# Patient Record
Sex: MALE | Race: WHITE | NOT HISPANIC OR LATINO | Employment: FULL TIME | URBAN - METROPOLITAN AREA
[De-identification: names, ages, dates, MRNs, and addresses within clinical notes are randomized per-mention and may not be internally consistent; named-entity substitution may affect disease eponyms.]

---

## 2017-03-13 ENCOUNTER — OFFICE VISIT (OUTPATIENT)
Dept: INTERNAL MEDICINE | Facility: MEDICAL CENTER | Age: 42
End: 2017-03-13
Payer: MEDICAID

## 2017-03-13 VITALS
BODY MASS INDEX: 29.58 KG/M2 | HEIGHT: 73 IN | OXYGEN SATURATION: 94 % | SYSTOLIC BLOOD PRESSURE: 132 MMHG | RESPIRATION RATE: 20 BRPM | WEIGHT: 223.2 LBS | DIASTOLIC BLOOD PRESSURE: 84 MMHG | TEMPERATURE: 97.3 F | HEART RATE: 114 BPM

## 2017-03-13 DIAGNOSIS — R12 HEARTBURN: ICD-10-CM

## 2017-03-13 DIAGNOSIS — R05.3 CHRONIC COUGH: ICD-10-CM

## 2017-03-13 DIAGNOSIS — E55.9 VITAMIN D DEFICIENCY: ICD-10-CM

## 2017-03-13 DIAGNOSIS — K86.1 CHRONIC PANCREATITIS, UNSPECIFIED PANCREATITIS TYPE (HCC): ICD-10-CM

## 2017-03-13 DIAGNOSIS — R51.9 CHRONIC NONINTRACTABLE HEADACHE, UNSPECIFIED HEADACHE TYPE: ICD-10-CM

## 2017-03-13 DIAGNOSIS — R61 NIGHT SWEATS: ICD-10-CM

## 2017-03-13 DIAGNOSIS — G89.29 CHRONIC NONINTRACTABLE HEADACHE, UNSPECIFIED HEADACHE TYPE: ICD-10-CM

## 2017-03-13 DIAGNOSIS — R00.2 PALPITATION: ICD-10-CM

## 2017-03-13 LAB — EKG 4674: NORMAL

## 2017-03-13 PROCEDURE — 93000 ELECTROCARDIOGRAM COMPLETE: CPT | Performed by: INTERNAL MEDICINE

## 2017-03-13 PROCEDURE — 99214 OFFICE O/P EST MOD 30 MIN: CPT | Mod: 25,GC | Performed by: INTERNAL MEDICINE

## 2017-03-13 RX ORDER — ERGOCALCIFEROL 1.25 MG/1
50000 CAPSULE ORAL
Qty: 4 CAP | Refills: 0 | Status: SHIPPED | OUTPATIENT
Start: 2017-03-13 | End: 2017-04-10

## 2017-03-13 RX ORDER — BENZONATATE 100 MG/1
100 CAPSULE ORAL 3 TIMES DAILY PRN
Qty: 30 CAP | Refills: 0 | Status: SHIPPED | OUTPATIENT
Start: 2017-03-13 | End: 2017-11-13

## 2017-03-13 RX ORDER — GUAIFENESIN 200 MG/1
200 TABLET ORAL EVERY 4 HOURS PRN
Qty: 90 TAB | Refills: 1 | Status: SHIPPED | OUTPATIENT
Start: 2017-03-13 | End: 2017-11-13

## 2017-03-13 ASSESSMENT — PATIENT HEALTH QUESTIONNAIRE - PHQ9: CLINICAL INTERPRETATION OF PHQ2 SCORE: 0

## 2017-03-13 ASSESSMENT — PAIN SCALES - GENERAL: PAINLEVEL: NO PAIN

## 2017-03-13 NOTE — MR AVS SNAPSHOT
"        Wero Thorpe   3/13/2017 3:00 PM   Office Visit   MRN: [id number]    Department:  [facility] - Internal Med   Dept Phone:  [phone]    Description:  Male : 1975   Provider:  Maria E Rodgers M.D.           Reason for Visit     URI cough times one year      Allergies as of 3/13/2017     Allergen Noted Reactions    Nkda [No Known Drug Allergy] 2007         You were diagnosed with     Vitamin D deficiency   [3851214]       Chronic cough   [600769]       Palpitation   [461860]       Night sweats   [462017]         Vital Signs     Blood Pressure Pulse Temperature Respirations Height Weight    132/84 mmHg 114 36.3 °C (97.3 °F) 20 1.854 m (6' 0.99\") 101.243 kg (223 lb 3.2 oz)    Body Mass Index Oxygen Saturation Smoking Status             29.45 kg/m2 94% Current Every Day Smoker         Basic Information     Date Of Birth Sex Race Ethnicity Preferred Language    1975 Male White Non- English      Your appointments     2017 10:15 AM   Established Patient with Maria E Rodgers M.D.   [facility] Med - Internal Medicine (--)    [address] [place] [id number] [phone]           You will be receiving a confirmation call a few days before your appointment from our automated call confirmation system.              Problem List              ICD-10-CM Priority Class Noted - Resolved    Chronic pancreatitis (CMS-[facility]) K86.1   2010 - Present    Alcoholism (CMS-[facility]) F10.20   2010 - Present    ADD (attention deficit disorder) F98.8   2010 - Present    Dyshidrotic eczema L30.1   3/13/2014 - Present    Transaminitis R74.0   2016 - Present    Abrasion of forehead S00.81XA   2016 - Present    Thrombocytopenia (CMS-[facility]) D69.6   2016 - Present    Pancytopenia (CMS-[facility]) D61.818   2016 - Present    Hypokalemia E87.6   2016 - Present    Shock liver K72.00   6/15/2016 - Present    Alcoholic pancreatitis K85.20  "  6/15/2016 - Present    Alcoholic hepatitis K70.10   6/16/2016 - Present    Anemia D64.9   6/16/2016 - Present    Essential hypertension I10   6/16/2016 - Present    Obstruction of bile duct K83.1   10/21/2016 - Present    Acute pancreatitis K85.90   10/21/2016 - Present    Other nonspecific abnormal serum enzyme levels R74.8   10/21/2016 - Present    Vitamin D deficiency E55.9   3/13/2017 - Present    Chronic cough R05   3/13/2017 - Present    Palpitation R00.2   3/13/2017 - Present      Health Maintenance        Date Due Completion Dates    IMM DTaP/Tdap/Td Vaccine (1 - Tdap) 8/29/1994 ---    IMM INFLUENZA (1) 9/1/2016 ---            Current Immunizations     No immunizations on file.      Below and/or attached are the medications your provider expects you to take. Review all of your home medications and newly ordered medications with your provider and/or pharmacist. Follow medication instructions as directed by your provider and/or pharmacist. Please keep your medication list with you and share with your provider. Update the information when medications are discontinued, doses are changed, or new medications (including over-the-counter products) are added; and carry medication information at all times in the event of emergency situations     Allergies:  NKDA - (reactions not documented)               Medications  Valid as of: March 13, 2017 -  5:02 PM    Generic Name Brand Name Tablet Size Instructions for use    Albuterol Sulfate (Aero Soln) albuterol 108 (90 BASE) MCG/ACT Inhale 2 Puffs by mouth every 2 hours as needed for Shortness of Breath (or cough).        Albuterol Sulfate (Aero Soln) albuterol 108 (90 BASE) MCG/ACT Inhale 2 Puffs by mouth every 6 hours as needed for Shortness of Breath.        Benzonatate (Cap) TESSALON 200 MG Take 1 Cap by mouth 3 times a day as needed for Cough.        Benzonatate (Cap) TESSALON 100 MG Take 1 Cap by mouth 3 times a day as needed for Cough.        BuPROPion HCl (Tab)  WELLBUTRIN 100 MG Take 1 Tab by mouth every day.        Ergocalciferol (Cap) DRISDOL 33286 UNITS Take 1 Cap by mouth every 7 days for 28 days.        GuaiFENesin (Tab) guaifenesin 200 MG Take 1 Tab by mouth every four hours as needed (cough).        Ibuprofen (Tab) MOTRIN 800 MG Take 800 mg by mouth every 8 hours as needed for Mild Pain.        Multiple Vitamins-Minerals (Liquid) MULTIVITAMIN  Take 1 Drop by mouth every day.        Omega-3 Fatty Acids   Take 1 Cap by mouth every day. One tsp liquid fish oil daily        Omeprazole (CAPSULE DELAYED RELEASE) PRILOSEC 20 MG Take 20 mg by mouth every day.        Pancrelipase (Lip-Prot-Amyl) (Cap DR Particles) Pancrelipase (Lip-Prot-Amyl) 41694 UNITS Take 2 Tabs by mouth 3 times a day before meals. Takes two and before snacks        TraZODone HCl (Tab) DESYREL 100 MG Take 100 mg by mouth every evening.        .                 Medicines prescribed today were sent to:     [facility] #[id number] [address] [address]    Phone: [phone] Fax: [phone]    Open 24 Hours?: No      Medication refill instructions:       If your prescription bottle indicates you have medication refills left, it is not necessary to call your provider’s office. Please contact your pharmacy and they will refill your medication.    If your prescription bottle indicates you do not have any refills left, you may request refills at any time through one of the following ways: The online eriQoo system (except Urgent Care), by calling your provider’s office, or by asking your pharmacy to contact your provider’s office with a refill request. Medication refills are processed only during regular business hours and may not be available until the next business day. Your provider may request additional information or to have a follow-up visit with you prior to refilling your medication.   *Please Note: Medication refills are assigned a new Rx  number when refilled electronically. Your pharmacy may indicate that no refills were authorized even though a new prescription for the same medication is available at the pharmacy. Please request the medicine by name with the pharmacy before contacting your provider for a refill.        Your To Do List     Future Labs/Procedures Complete By Expires    CBC WITH DIFFERENTIAL  As directed 3/13/2018    COMP METABOLIC PANEL  As directed 3/13/2018    CRP QUANTITIVE (NON-CARDIAC)  As directed 3/13/2018    DX-CHEST-2 VIEWS  As directed 3/13/2018    ECHOCARDIOGRAM COMP W/O CONT  As directed 3/13/2018    Scheduling Instructions:    palpitation    HIV-2 ABS  PANEL [id number]  As directed 3/13/2018    HOLTER MONITOR STUDY  As directed 3/13/2018    Quantiferon Gold TB (PPD)  As directed 3/13/2018    TSH WITH REFLEX TO FT4  As directed 3/13/2018    URINALYSIS  As directed 3/13/2018    WESTERGREN SED RATE  As directed 3/13/2018      Instructions    Request records from GI consultant  Keep journal of night sweats  Lab work, CXR before next visit              LeK[employer]k Access Code: [email or web address]  Expires: 3/24/2017  1:31 PM    LeKiosk  A secure, online tool to manage your health information     [employer]’s LeKiosk® is a secure, online tool that connects you to your personalized health information from the privacy of your home -- day or night - making it very easy for you to manage your healthcare. Once the activation process is completed, you can even access your medical information using the LeKiosk beulah, which is available for free in the Apple Beulah store or Google Play store.     LeKiosk provides the following levels of access (as shown below):   My Chart Features   [facility]  Specialists [facility]  Urgent  Care [facility]  Primary Care  Doctor   Email your healthcare team securely and privately 24/7 X X X    Manage appointments: schedule your next appointment; view details of past/upcoming appointments X       Request prescription refills. X      View recent personal medical records, including lab and immunizations X X X X   View health record, including health history, allergies, medications X X X X   Read reports about your outpatient visits, procedures, consult and ER notes X X X X   See your discharge summary, which is a recap of your hospital and/or ER visit that includes your diagnosis, lab results, and care plan. X X       How to register for [employer]:  1. Go to  [email or web address].  2. Click on the Sign Up Now box, which takes you to the New Member Sign Up page. You will need to provide the following information:  a. Enter your [employer]te Access Code exactly as it appears at the top of this page. (You will not need to use this code after you’ve completed the sign-up process. If you do not sign up before the expiration date, you must request a new code.)   b. Enter your date of birth.   c. Enter your home email address.   d. Click Submit, and follow the next screen’s instructions.  3. Create a LumaCyte ID. This will be your [employer]uma[employer]te login ID and cannot be changed, so think of one that is secure and easy to remember.  4. Create a [employer]te password. You can change your password at any time.  5. Enter your Password Reset Question and Answer. This can be used at a later time if you forget your password.   6. Enter your e-mail address. This allows you to receive e-mail notifications when new information is available in [employer]uma[employer]te.  7. Click Sign Up. You can now view your health information.    For assistance activating your [employer] account, call [phone]

## 2017-03-13 NOTE — PATIENT INSTRUCTIONS
Request records from GI consultant  Keep journal of night sweats  Lab work, CXR before next visit  Pending echo and holter for palpitations

## 2017-03-14 ENCOUNTER — TELEPHONE (OUTPATIENT)
Dept: INTERNAL MEDICINE | Facility: MEDICAL CENTER | Age: 42
End: 2017-03-14

## 2017-03-14 DIAGNOSIS — R61 NIGHT SWEATS: ICD-10-CM
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
## 2017-03-27 RX ORDER — BENZONATATE 100 MG/1
CAPSULE ORAL
Qty: 30 CAP | Refills: 0 | OUTPATIENT
Start: 2017-03-27

## 2017-03-27 NOTE — TELEPHONE ENCOUNTER
Last seen: 3/13/17 by Dr. Rodgers  Next appt: 4/17/17 with Dr. Rodgers    Was the patient seen in the last year in this department? Yes   Does patient have an active prescription for medications requested? No   Received Request Via: Pharmacy

## 2017-04-21 ENCOUNTER — HOSPITAL ENCOUNTER (OUTPATIENT)
Dept: CARDIOLOGY | Facility: MEDICAL CENTER | Age: 42
End: 2017-04-21
Attending: INTERNAL MEDICINE
Payer: MEDICAID

## 2017-04-21 DIAGNOSIS — R00.2 PALPITATION: ICD-10-CM

## 2017-04-21 LAB
LV EJECT FRACT  99904: 65
LV EJECT FRACT MOD 2C 99903: 56.18
LV EJECT FRACT MOD 4C 99902: 50.03
LV EJECT FRACT MOD BP 99901: 52.66

## 2017-04-21 PROCEDURE — 93306 TTE W/DOPPLER COMPLETE: CPT | Mod: 26 | Performed by: INTERNAL MEDICINE

## 2017-04-21 PROCEDURE — 93306 TTE W/DOPPLER COMPLETE: CPT

## 2017-05-30 RX ORDER — TRAZODONE HYDROCHLORIDE 100 MG/1
100 TABLET ORAL
Qty: 30 TAB | OUTPATIENT
Start: 2017-05-30

## 2017-11-09 ENCOUNTER — HOSPITAL ENCOUNTER (INPATIENT)
Facility: MEDICAL CENTER | Age: 42
LOS: 1 days | DRG: 493 | End: 2017-11-10
Attending: EMERGENCY MEDICINE | Admitting: ORTHOPAEDIC SURGERY
Payer: COMMERCIAL

## 2017-11-09 DIAGNOSIS — S82.842A ANKLE FRACTURE, BIMALLEOLAR, CLOSED, LEFT, INITIAL ENCOUNTER: ICD-10-CM

## 2017-11-09 DIAGNOSIS — S93.06XA CLOSED DISLOCATION OF ANKLE, INITIAL ENCOUNTER: ICD-10-CM

## 2017-11-09 DIAGNOSIS — S82.842A BIMALLEOLAR ANKLE FRACTURE, LEFT, CLOSED, INITIAL ENCOUNTER: ICD-10-CM

## 2017-11-09 PROCEDURE — 99285 EMERGENCY DEPT VISIT HI MDM: CPT

## 2017-11-09 ASSESSMENT — PAIN SCALES - GENERAL: PAINLEVEL_OUTOF10: 10

## 2017-11-10 ENCOUNTER — APPOINTMENT (OUTPATIENT)
Dept: RADIOLOGY | Facility: MEDICAL CENTER | Age: 42
DRG: 493 | End: 2017-11-10
Attending: EMERGENCY MEDICINE
Payer: COMMERCIAL

## 2017-11-10 ENCOUNTER — APPOINTMENT (OUTPATIENT)
Dept: RADIOLOGY | Facility: MEDICAL CENTER | Age: 42
DRG: 493 | End: 2017-11-10
Attending: ORTHOPAEDIC SURGERY
Payer: COMMERCIAL

## 2017-11-10 VITALS
HEART RATE: 110 BPM | OXYGEN SATURATION: 93 % | RESPIRATION RATE: 16 BRPM | WEIGHT: 190 LBS | DIASTOLIC BLOOD PRESSURE: 69 MMHG | HEIGHT: 73 IN | SYSTOLIC BLOOD PRESSURE: 120 MMHG | BODY MASS INDEX: 25.18 KG/M2 | TEMPERATURE: 98.7 F

## 2017-11-10 PROBLEM — S93.05XA ANKLE DISLOCATION, LEFT, INITIAL ENCOUNTER: Status: ACTIVE | Noted: 2017-11-10

## 2017-11-10 PROBLEM — S82.842A ANKLE FRACTURE, BIMALLEOLAR, CLOSED, LEFT, INITIAL ENCOUNTER: Status: ACTIVE | Noted: 2017-11-10

## 2017-11-10 LAB
ANION GAP SERPL CALC-SCNC: 10 MMOL/L (ref 0–11.9)
BUN SERPL-MCNC: 17 MG/DL (ref 8–22)
CALCIUM SERPL-MCNC: 9.1 MG/DL (ref 8.5–10.5)
CHLORIDE SERPL-SCNC: 111 MMOL/L (ref 96–112)
CO2 SERPL-SCNC: 22 MMOL/L (ref 20–33)
CREAT SERPL-MCNC: 0.99 MG/DL (ref 0.5–1.4)
ERYTHROCYTE [DISTWIDTH] IN BLOOD BY AUTOMATED COUNT: 38.8 FL (ref 35.9–50)
GFR SERPL CREATININE-BSD FRML MDRD: >60 ML/MIN/1.73 M 2
GLUCOSE SERPL-MCNC: 105 MG/DL (ref 65–99)
HCT VFR BLD AUTO: 48.2 % (ref 42–52)
HGB BLD-MCNC: 16.7 G/DL (ref 14–18)
MCH RBC QN AUTO: 29.7 PG (ref 27–33)
MCHC RBC AUTO-ENTMCNC: 34.6 G/DL (ref 33.7–35.3)
MCV RBC AUTO: 85.8 FL (ref 81.4–97.8)
PLATELET # BLD AUTO: 301 K/UL (ref 164–446)
PMV BLD AUTO: 9.4 FL (ref 9–12.9)
POTASSIUM SERPL-SCNC: 3.9 MMOL/L (ref 3.6–5.5)
RBC # BLD AUTO: 5.62 M/UL (ref 4.7–6.1)
SODIUM SERPL-SCNC: 143 MMOL/L (ref 135–145)
WBC # BLD AUTO: 8.1 K/UL (ref 4.8–10.8)

## 2017-11-10 PROCEDURE — A9270 NON-COVERED ITEM OR SERVICE: HCPCS | Performed by: PHYSICIAN ASSISTANT

## 2017-11-10 PROCEDURE — 96376 TX/PRO/DX INJ SAME DRUG ADON: CPT

## 2017-11-10 PROCEDURE — A9270 NON-COVERED ITEM OR SERVICE: HCPCS

## 2017-11-10 PROCEDURE — 73700 CT LOWER EXTREMITY W/O DYE: CPT | Mod: LT

## 2017-11-10 PROCEDURE — 73600 X-RAY EXAM OF ANKLE: CPT | Mod: LT

## 2017-11-10 PROCEDURE — 160002 HCHG RECOVERY MINUTES (STAT): Performed by: ORTHOPAEDIC SURGERY

## 2017-11-10 PROCEDURE — A9270 NON-COVERED ITEM OR SERVICE: HCPCS | Performed by: ORTHOPAEDIC SURGERY

## 2017-11-10 PROCEDURE — 73590 X-RAY EXAM OF LOWER LEG: CPT | Mod: LT

## 2017-11-10 PROCEDURE — 700105 HCHG RX REV CODE 258: Performed by: EMERGENCY MEDICINE

## 2017-11-10 PROCEDURE — 0QSH04Z REPOSITION LEFT TIBIA WITH INTERNAL FIXATION DEVICE, OPEN APPROACH: ICD-10-PCS | Performed by: ORTHOPAEDIC SURGERY

## 2017-11-10 PROCEDURE — 770006 HCHG ROOM/CARE - MED/SURG/GYN SEMI*

## 2017-11-10 PROCEDURE — C1713 ANCHOR/SCREW BN/BN,TIS/BN: HCPCS | Performed by: ORTHOPAEDIC SURGERY

## 2017-11-10 PROCEDURE — 27840 TREAT ANKLE DISLOCATION: CPT

## 2017-11-10 PROCEDURE — G8987 SELF CARE CURRENT STATUS: HCPCS | Mod: CI

## 2017-11-10 PROCEDURE — 96374 THER/PROPH/DIAG INJ IV PUSH: CPT

## 2017-11-10 PROCEDURE — G8989 SELF CARE D/C STATUS: HCPCS | Mod: CI

## 2017-11-10 PROCEDURE — 501445 HCHG STAPLER, SKIN DISP: Performed by: ORTHOPAEDIC SURGERY

## 2017-11-10 PROCEDURE — 160009 HCHG ANES TIME/MIN: Performed by: ORTHOPAEDIC SURGERY

## 2017-11-10 PROCEDURE — G8979 MOBILITY GOAL STATUS: HCPCS | Mod: CI

## 2017-11-10 PROCEDURE — 73610 X-RAY EXAM OF ANKLE: CPT | Mod: LT

## 2017-11-10 PROCEDURE — 700111 HCHG RX REV CODE 636 W/ 250 OVERRIDE (IP)

## 2017-11-10 PROCEDURE — 160029 HCHG SURGERY MINUTES - 1ST 30 MINS LEVEL 4: Performed by: ORTHOPAEDIC SURGERY

## 2017-11-10 PROCEDURE — 700112 HCHG RX REV CODE 229: Performed by: ORTHOPAEDIC SURGERY

## 2017-11-10 PROCEDURE — 97165 OT EVAL LOW COMPLEX 30 MIN: CPT

## 2017-11-10 PROCEDURE — 93005 ELECTROCARDIOGRAM TRACING: CPT | Performed by: EMERGENCY MEDICINE

## 2017-11-10 PROCEDURE — 99152 MOD SED SAME PHYS/QHP 5/>YRS: CPT

## 2017-11-10 PROCEDURE — 160036 HCHG PACU - EA ADDL 30 MINS PHASE I: Performed by: ORTHOPAEDIC SURGERY

## 2017-11-10 PROCEDURE — 0SSGXZZ REPOSITION LEFT ANKLE JOINT, EXTERNAL APPROACH: ICD-10-PCS | Performed by: EMERGENCY MEDICINE

## 2017-11-10 PROCEDURE — 700102 HCHG RX REV CODE 250 W/ 637 OVERRIDE(OP): Performed by: ORTHOPAEDIC SURGERY

## 2017-11-10 PROCEDURE — 96375 TX/PRO/DX INJ NEW DRUG ADDON: CPT

## 2017-11-10 PROCEDURE — 97162 PT EVAL MOD COMPLEX 30 MIN: CPT

## 2017-11-10 PROCEDURE — 700102 HCHG RX REV CODE 250 W/ 637 OVERRIDE(OP): Performed by: PHYSICIAN ASSISTANT

## 2017-11-10 PROCEDURE — 500881 HCHG PACK, EXTREMITY: Performed by: ORTHOPAEDIC SURGERY

## 2017-11-10 PROCEDURE — 304562 HCHG STAT O2 MASK/CANNULA

## 2017-11-10 PROCEDURE — 160041 HCHG SURGERY MINUTES - EA ADDL 1 MIN LEVEL 4: Performed by: ORTHOPAEDIC SURGERY

## 2017-11-10 PROCEDURE — 700111 HCHG RX REV CODE 636 W/ 250 OVERRIDE (IP): Performed by: EMERGENCY MEDICINE

## 2017-11-10 PROCEDURE — G8978 MOBILITY CURRENT STATUS: HCPCS | Mod: CJ

## 2017-11-10 PROCEDURE — 160048 HCHG OR STATISTICAL LEVEL 1-5: Performed by: ORTHOPAEDIC SURGERY

## 2017-11-10 PROCEDURE — 700101 HCHG RX REV CODE 250

## 2017-11-10 PROCEDURE — 501838 HCHG SUTURE GENERAL: Performed by: ORTHOPAEDIC SURGERY

## 2017-11-10 PROCEDURE — 502000 HCHG MISC OR IMPLANTS RC 0278: Performed by: ORTHOPAEDIC SURGERY

## 2017-11-10 PROCEDURE — 160035 HCHG PACU - 1ST 60 MINS PHASE I: Performed by: ORTHOPAEDIC SURGERY

## 2017-11-10 PROCEDURE — 0QHK04Z INSERTION OF INTERNAL FIXATION DEVICE INTO LEFT FIBULA, OPEN APPROACH: ICD-10-PCS | Performed by: ORTHOPAEDIC SURGERY

## 2017-11-10 PROCEDURE — G8988 SELF CARE GOAL STATUS: HCPCS | Mod: CI

## 2017-11-10 PROCEDURE — 85027 COMPLETE CBC AUTOMATED: CPT

## 2017-11-10 PROCEDURE — 80048 BASIC METABOLIC PNL TOTAL CA: CPT

## 2017-11-10 PROCEDURE — 700102 HCHG RX REV CODE 250 W/ 637 OVERRIDE(OP)

## 2017-11-10 DEVICE — SCREW 2.5 MM LOCKING TI X 14MM LONG (6TX8=48): Type: IMPLANTABLE DEVICE | Status: FUNCTIONAL

## 2017-11-10 DEVICE — SCREW 3.5 MM NON-LOCKING TI X 12MM LONG (6TX8+2TX5=58): Type: IMPLANTABLE DEVICE | Status: FUNCTIONAL

## 2017-11-10 DEVICE — SCREW 3.5 MM NON-LOCKING TI X 14MM LONG (6TX8+2TX5=58): Type: IMPLANTABLE DEVICE | Status: FUNCTIONAL

## 2017-11-10 DEVICE — SCREW CANN 4.0X50 LONG OIC - (3TX2=6): Type: IMPLANTABLE DEVICE | Status: FUNCTIONAL

## 2017-11-10 DEVICE — PLATE DISTAL FIBULA 7H (2TX2+2TX1=6): Type: IMPLANTABLE DEVICE | Status: FUNCTIONAL

## 2017-11-10 DEVICE — IMPLANTABLE DEVICE: Type: IMPLANTABLE DEVICE | Status: FUNCTIONAL

## 2017-11-10 DEVICE — SCREW 3.5 MM NON-LOCKING TI X 55MM LONG (6TX8=48): Type: IMPLANTABLE DEVICE | Status: FUNCTIONAL

## 2017-11-10 RX ORDER — ONDANSETRON 2 MG/ML
4 INJECTION INTRAMUSCULAR; INTRAVENOUS
Status: DISCONTINUED | OUTPATIENT
Start: 2017-11-10 | End: 2017-11-10 | Stop reason: HOSPADM

## 2017-11-10 RX ORDER — MORPHINE SULFATE 10 MG/ML
5 INJECTION, SOLUTION INTRAMUSCULAR; INTRAVENOUS
Status: COMPLETED | OUTPATIENT
Start: 2017-11-10 | End: 2017-11-10

## 2017-11-10 RX ORDER — PSEUDOEPHEDRINE HCL 30 MG
100 TABLET ORAL 2 TIMES DAILY
Qty: 60 CAP | Refills: 0 | Status: SHIPPED | OUTPATIENT
Start: 2017-11-10 | End: 2018-05-09

## 2017-11-10 RX ORDER — DIPHENHYDRAMINE HYDROCHLORIDE 50 MG/ML
25 INJECTION INTRAMUSCULAR; INTRAVENOUS EVERY 6 HOURS PRN
Status: DISCONTINUED | OUTPATIENT
Start: 2017-11-10 | End: 2017-11-10 | Stop reason: HOSPADM

## 2017-11-10 RX ORDER — DEXAMETHASONE SODIUM PHOSPHATE 4 MG/ML
4 INJECTION, SOLUTION INTRA-ARTICULAR; INTRALESIONAL; INTRAMUSCULAR; INTRAVENOUS; SOFT TISSUE
Status: DISCONTINUED | OUTPATIENT
Start: 2017-11-10 | End: 2017-11-10 | Stop reason: HOSPADM

## 2017-11-10 RX ORDER — OXYCODONE AND ACETAMINOPHEN 10; 325 MG/1; MG/1
1-2 TABLET ORAL EVERY 4 HOURS PRN
Qty: 60 TAB | Refills: 0 | Status: SHIPPED | OUTPATIENT
Start: 2017-11-10 | End: 2018-05-09

## 2017-11-10 RX ORDER — ACETAMINOPHEN 500 MG
1000 TABLET ORAL EVERY 6 HOURS PRN
Status: DISCONTINUED | OUTPATIENT
Start: 2017-11-10 | End: 2017-11-10 | Stop reason: HOSPADM

## 2017-11-10 RX ORDER — BUPIVACAINE HYDROCHLORIDE 2.5 MG/ML
INJECTION, SOLUTION EPIDURAL; INFILTRATION; INTRACAUDAL
Status: DISCONTINUED | OUTPATIENT
Start: 2017-11-10 | End: 2017-11-10 | Stop reason: HOSPADM

## 2017-11-10 RX ORDER — ENEMA 19; 7 G/133ML; G/133ML
1 ENEMA RECTAL
Status: DISCONTINUED | OUTPATIENT
Start: 2017-11-10 | End: 2017-11-10 | Stop reason: HOSPADM

## 2017-11-10 RX ORDER — ACETAMINOPHEN 325 MG/1
1000 TABLET ORAL EVERY 6 HOURS PRN
Status: DISCONTINUED | OUTPATIENT
Start: 2017-11-10 | End: 2017-11-10

## 2017-11-10 RX ORDER — ONDANSETRON 2 MG/ML
4 INJECTION INTRAMUSCULAR; INTRAVENOUS ONCE
Status: COMPLETED | OUTPATIENT
Start: 2017-11-10 | End: 2017-11-10

## 2017-11-10 RX ORDER — POLYETHYLENE GLYCOL 3350 17 G/17G
1 POWDER, FOR SOLUTION ORAL 2 TIMES DAILY PRN
Status: DISCONTINUED | OUTPATIENT
Start: 2017-11-10 | End: 2017-11-10 | Stop reason: HOSPADM

## 2017-11-10 RX ORDER — AMOXICILLIN 250 MG
1 CAPSULE ORAL NIGHTLY
Status: DISCONTINUED | OUTPATIENT
Start: 2017-11-10 | End: 2017-11-10 | Stop reason: HOSPADM

## 2017-11-10 RX ORDER — ASPIRIN 325 MG
325 TABLET ORAL 2 TIMES DAILY
Qty: 60 TAB | Refills: 3 | Status: SHIPPED | OUTPATIENT
Start: 2017-11-10 | End: 2018-05-09

## 2017-11-10 RX ORDER — ACETAMINOPHEN 500 MG
1000 TABLET ORAL EVERY 6 HOURS
Status: DISCONTINUED | OUTPATIENT
Start: 2017-11-10 | End: 2017-11-10 | Stop reason: HOSPADM

## 2017-11-10 RX ORDER — DOCUSATE SODIUM 100 MG/1
100 CAPSULE, LIQUID FILLED ORAL 2 TIMES DAILY
Status: DISCONTINUED | OUTPATIENT
Start: 2017-11-10 | End: 2017-11-10 | Stop reason: HOSPADM

## 2017-11-10 RX ORDER — OXYCODONE HCL 5 MG/5 ML
SOLUTION, ORAL ORAL
Status: COMPLETED
Start: 2017-11-10 | End: 2017-11-10

## 2017-11-10 RX ORDER — SODIUM CHLORIDE 9 MG/ML
1000 INJECTION, SOLUTION INTRAVENOUS ONCE
Status: COMPLETED | OUTPATIENT
Start: 2017-11-10 | End: 2017-11-10

## 2017-11-10 RX ORDER — ONDANSETRON 2 MG/ML
4 INJECTION INTRAMUSCULAR; INTRAVENOUS EVERY 4 HOURS PRN
Status: DISCONTINUED | OUTPATIENT
Start: 2017-11-10 | End: 2017-11-10 | Stop reason: HOSPADM

## 2017-11-10 RX ORDER — BISACODYL 10 MG
10 SUPPOSITORY, RECTAL RECTAL
Status: DISCONTINUED | OUTPATIENT
Start: 2017-11-10 | End: 2017-11-10 | Stop reason: HOSPADM

## 2017-11-10 RX ORDER — AMOXICILLIN 250 MG
1 CAPSULE ORAL
Status: DISCONTINUED | OUTPATIENT
Start: 2017-11-10 | End: 2017-11-10 | Stop reason: HOSPADM

## 2017-11-10 RX ORDER — HALOPERIDOL 5 MG/ML
1 INJECTION INTRAMUSCULAR EVERY 6 HOURS PRN
Status: DISCONTINUED | OUTPATIENT
Start: 2017-11-10 | End: 2017-11-10 | Stop reason: HOSPADM

## 2017-11-10 RX ORDER — SCOLOPAMINE TRANSDERMAL SYSTEM 1 MG/1
1 PATCH, EXTENDED RELEASE TRANSDERMAL
Status: DISCONTINUED | OUTPATIENT
Start: 2017-11-10 | End: 2017-11-10 | Stop reason: HOSPADM

## 2017-11-10 RX ORDER — OXYCODONE HYDROCHLORIDE 5 MG/1
5 TABLET ORAL
Status: DISCONTINUED | OUTPATIENT
Start: 2017-11-10 | End: 2017-11-10 | Stop reason: HOSPADM

## 2017-11-10 RX ORDER — OXYCODONE HYDROCHLORIDE 5 MG/1
5 TABLET ORAL
Qty: 30 TAB | Refills: 0 | Status: SHIPPED | OUTPATIENT
Start: 2017-11-10 | End: 2017-11-13

## 2017-11-10 RX ORDER — ONDANSETRON 2 MG/ML
INJECTION INTRAMUSCULAR; INTRAVENOUS
Status: COMPLETED
Start: 2017-11-10 | End: 2017-11-10

## 2017-11-10 RX ADMIN — PROPOFOL 75 MG: 10 INJECTION, EMULSION INTRAVENOUS at 02:06

## 2017-11-10 RX ADMIN — MORPHINE SULFATE 5 MG: 10 INJECTION INTRAVENOUS at 04:03

## 2017-11-10 RX ADMIN — HYDROMORPHONE HYDROCHLORIDE 1 MG: 1 INJECTION, SOLUTION INTRAMUSCULAR; INTRAVENOUS; SUBCUTANEOUS at 06:52

## 2017-11-10 RX ADMIN — OXYCODONE HYDROCHLORIDE 5 MG: 5 TABLET ORAL at 13:48

## 2017-11-10 RX ADMIN — FENTANYL CITRATE 50 MCG: 50 INJECTION, SOLUTION INTRAMUSCULAR; INTRAVENOUS at 12:00

## 2017-11-10 RX ADMIN — ONDANSETRON 4 MG: 2 INJECTION INTRAMUSCULAR; INTRAVENOUS at 12:00

## 2017-11-10 RX ADMIN — SODIUM CHLORIDE 1000 ML: 9 INJECTION, SOLUTION INTRAVENOUS at 04:03

## 2017-11-10 RX ADMIN — ACETAMINOPHEN 1000 MG: 500 TABLET ORAL at 13:48

## 2017-11-10 RX ADMIN — NICOTINE POLACRILEX 2 MG: 2 GUM, CHEWING BUCCAL at 13:53

## 2017-11-10 RX ADMIN — HYDROMORPHONE HYDROCHLORIDE 1 MG: 1 INJECTION, SOLUTION INTRAMUSCULAR; INTRAVENOUS; SUBCUTANEOUS at 00:31

## 2017-11-10 RX ADMIN — FENTANYL CITRATE 50 MCG: 50 INJECTION, SOLUTION INTRAMUSCULAR; INTRAVENOUS at 12:30

## 2017-11-10 RX ADMIN — ONDANSETRON 4 MG: 2 INJECTION INTRAMUSCULAR; INTRAVENOUS at 00:23

## 2017-11-10 RX ADMIN — HYDROMORPHONE HYDROCHLORIDE 1 MG: 1 INJECTION, SOLUTION INTRAMUSCULAR; INTRAVENOUS; SUBCUTANEOUS at 00:23

## 2017-11-10 RX ADMIN — DOCUSATE SODIUM 100 MG: 100 CAPSULE ORAL at 13:48

## 2017-11-10 RX ADMIN — OXYCODONE HYDROCHLORIDE 10 MG: 5 SOLUTION ORAL at 12:00

## 2017-11-10 ASSESSMENT — PAIN SCALES - GENERAL
PAINLEVEL_OUTOF10: 10
PAINLEVEL_OUTOF10: 9
PAINLEVEL_OUTOF10: 6
PAINLEVEL_OUTOF10: 8
PAINLEVEL_OUTOF10: 7
PAINLEVEL_OUTOF10: 9
PAINLEVEL_OUTOF10: 7
PAINLEVEL_OUTOF10: 7
PAINLEVEL_OUTOF10: 8
PAINLEVEL_OUTOF10: 7
PAINLEVEL_OUTOF10: 7
PAINLEVEL_OUTOF10: 3
PAINLEVEL_OUTOF10: 10
PAINLEVEL_OUTOF10: 3
PAINLEVEL_OUTOF10: 7

## 2017-11-10 ASSESSMENT — LIFESTYLE VARIABLES
EVER_SMOKED: YES
CONSUMPTION TOTAL: POSITIVE
PACK_YEARS: 12
HAVE YOU EVER FELT YOU SHOULD CUT DOWN ON YOUR DRINKING: NO
HOW MANY TIMES IN THE PAST YEAR HAVE YOU HAD 5 OR MORE DRINKS IN A DAY: 1
EVER HAD A DRINK FIRST THING IN THE MORNING TO STEADY YOUR NERVES TO GET RID OF A HANGOVER: NO
ALCOHOL_USE: YES
TOTAL SCORE: 0
EVER FELT BAD OR GUILTY ABOUT YOUR DRINKING: NO
TOTAL SCORE: 0
HAVE PEOPLE ANNOYED YOU BY CRITICIZING YOUR DRINKING: NO
ON A TYPICAL DAY WHEN YOU DRINK ALCOHOL HOW MANY DRINKS DO YOU HAVE: 1
TOTAL SCORE: 0
AVERAGE NUMBER OF DAYS PER WEEK YOU HAVE A DRINK CONTAINING ALCOHOL: 1

## 2017-11-10 ASSESSMENT — COGNITIVE AND FUNCTIONAL STATUS - GENERAL
DAILY ACTIVITIY SCORE: 22
DRESSING REGULAR LOWER BODY CLOTHING: A LITTLE
MOVING FROM LYING ON BACK TO SITTING ON SIDE OF FLAT BED: UNABLE
MOBILITY SCORE: 18
HELP NEEDED FOR BATHING: A LITTLE
STANDING UP FROM CHAIR USING ARMS: A LITTLE
CLIMB 3 TO 5 STEPS WITH RAILING: A LITTLE
SUGGESTED CMS G CODE MODIFIER DAILY ACTIVITY: CJ
WALKING IN HOSPITAL ROOM: A LITTLE
SUGGESTED CMS G CODE MODIFIER MOBILITY: CK

## 2017-11-10 ASSESSMENT — GAIT ASSESSMENTS
DISTANCE (FEET): 200
GAIT LEVEL OF ASSIST: STAND BY ASSIST
ASSISTIVE DEVICE: CRUTCHES

## 2017-11-10 ASSESSMENT — ACTIVITIES OF DAILY LIVING (ADL): TOILETING: INDEPENDENT

## 2017-11-10 NOTE — ED PROVIDER NOTES
ED Provider Note    Scribed for Red Herrera M.D. by Seth Feliciano. 11/10/2017, 12:00 AM.    Primary care provider: Maria E Rodgers M.D.  Means of arrival: Wheel Chair  History obtained from: Patient  History limited by: None    CHIEF COMPLAINT  Chief Complaint   Patient presents with   • T-5000 Ankle Injury     left ankle deformity, fell down 2 steps     HPI  Wero Thorpe is a 42 y.o. male who presents to the Emergency Department complaining of a T-5000 left ankle deformity onset 45 minutes prior to being seen. The patient fell down 2 flights of stairs and reports twisting his ankle. Denies LOC, head trauma, neck pain, pain elsewhere. His last meal was around 4 hours prior. Denies any allergies to medications.    REVIEW OF SYSTEMS  See HPI,  Negative for LOC, head trauma, neck pain, pain elsewhere. Remainder of ROS negative.    C.    PAST MEDICAL HISTORY   has a past medical history of Asthma; Bowel habit changes; Bronchitis (8/2016); Chronic pancreatitis (CMS-HCC); Cough (9/23/16); Fall (10/8/2016); Heart burn; Hypertension; Lumbar arthropathy; Pain (9/23/16); Psychiatric problem; Renal disorder (6/2016); and Urinary bladder disorder.    SURGICAL HISTORY   has a past surgical history that includes skull fracture depressed elevation (1995); cholecystectomy (2007); tonsillectomy and adenoidectomy (1989); gastroscopy-endo (N/A, 10/21/2016); egd w/endoscopic ultrasound (N/A, 10/21/2016); and ercp-diagnostic (N/A, 10/21/2016).    SOCIAL HISTORY  Social History   Substance Use Topics   • Smoking status: Current Every Day Smoker     Packs/day: 0.50     Years: 20.00     Types: Cigarettes     Last attempt to quit: 4/15/2011   • Smokeless tobacco: Never Used   • Alcohol use No      Comment: prior hx of alcoholism      History   Drug Use No     Comment: denies     FAMILY HISTORY  Family History   Problem Relation Age of Onset   • Cancer Mother      breast   • Cancer Brother      head, neck and lung  "  • Cancer Maternal Aunt      breast   • Hypertension Father      CURRENT MEDICATIONS  Reviewed.  See Encounter Summary.     ALLERGIES  Allergies   Allergen Reactions   • Nkda [No Known Drug Allergy]      PHYSICAL EXAM  VITAL SIGNS: /80   Pulse (!) 137   Temp 36.4 °C (97.6 °F)   Resp 20   Ht 1.854 m (6' 1\")   Wt 91.2 kg (201 lb)   SpO2 96%   BMI 26.52 kg/m²   Constitutional: Awake, alert in mild apparent distress.  HENT: Normocephalic, Bilateral external ears normal. Nose normal.   Eyes: Conjunctiva normal, non-icteric, EOMI.    Thorax & Lungs: Easy unlabored respirations, Clear to ascultation bilaterally.  Cardiovascular:Tachycardic, Regular rhythm, No murmurs, rubs or gallops.  Abdomen:  Soft, nontender, no masses    Skin: Visualized skin is  Dry, No erythema, No rash.   Extremities: Grossly deformed left ankle. The foot is everted with medial angulation of the ankle. Dorsalis pedis 2+. Left knee, RLE and bilateral upper extremities are unremarkable.  Neurologic: Alert, Grossly non-focal.   Psychiatric: Normal affect, Normal mood    DIAGNOSTIC STUDIES / PROCEDURES   Conscious Sedation Procedure Note    Indication: Ankle dislocation    Consent: I have discussed with the patient and/or the patient representative the indication, alternatives, and the possible risks and/or complications of the planned procedure and the anesthesia methods. The patient and/or patient representative appear to understand and agree to proceed.    Physician Involvement: The attending physician was present and supervising this procedure.    Pre-Sedation Documentation and Exam: I have personally completed a history, physical exam & review of systems for this patient (see notes).    Airway Assessment: Normal, dentition not prohibitive    Prior History of Anesthesia Complications: None    ASA Classification: Class 2 - A normal healthy patient with mild systemic disease    Sedation/ Anesthesia Plan: Intravenous " sedation    Medications Used: Propofol intravenously    Monitoring and Safety: The patient was placed on a cardiac monitor and vital signs, pulse oximetry and level of consciousness were continuously evaluated throughout the procedure. The patient was closely monitored until recovery from the medications was complete and the patient had returned to baseline status. Respiratory therapy was on standby at all times during the procedure.    (The following sections must be completed)  Post-Sedation Vital Signs: Vital signs were reviewed and were stable after the procedure (see flow sheet for vitals)            Post-Sedation Exam: Lungs: Clear to auscultation bilaterally           Complications: None    Joint Reduction Procedure Note    Indication: Joint dislocation    Consent: The patient provided verbal consent for this procedure.    Procedure: The pre-reduction exam showed distal perfusion & neurologic function to be normal. The patient was placed in the appropriate position. Anesthesia/pain control was obtained using conscious sedation -SEE CONSCIOUS SEDATION NOTE FOR DETAILS. Reduction of the left ankle was performed by direct traction. Post reduction films were obtained and revealed improved reduction though still significant angulation- the joint is unstable, the patient when awaking from anesthesia repeatedly move the left lower extremity which caused some malalignment. A post-reduction exam revealed distal perfusion & neurologic function to be normal. The affected area was immobilized with posterior slab with stirrups.    The patient tolerated the procedure well.    Complications: None    RADIOLOGY  DX-TIBIA AND FIBULA LEFT    (Results Pending)   DX-ANKLE 2- VIEWS LEFT    (Results Pending)   DX-ANKLE 3+ VIEWS LEFT    (Results Pending)   CT-ANKLE W/O PLUS RECONS LEFT    (Results Pending)     The radiologist's interpretation of all radiological studies have been reviewed by me.    COURSE & MEDICAL DECISION  MAKING  Pertinent Labs & Imaging studies reviewed. (See chart for details)    12:00 AM - Patient seen and examined at bedside. I explained that the patient will need imaging conducted and will likely need to have his ankle reduced. Patient will be treated with 4mg Zofran, 1mg Dilaudid. Ordered DX-Ankle to evaluate his symptoms.    12:45 AM - Ordered Estimated GFR, BMP, CBC.    1:05 AM - The images of the patient's radiology studies were independently reviewed by me. Ordered DX-Ankle. Treated with 1mg Dilaudid.    1:17 AM - Paged Ortho.    1:27 AM - Consulted with Dr. Garcia, Ortho, who is aware of the patient and advises he has a joint reduction to stabilize his ankle and has a CT-Ankle conducted. He requests holding orders for ORIF.    2:02 AM - Patient seen at bedside. I discussed the consult noted above. Performed Conscious Sedation as noted above.    2:03 AM - Performed Joint Reduction as noted above. Ordered DX-Ankle, CT-Ankle.    Decision Making:  This is a 42 y.o. year old male who presents with fracture dislocation of the left ankle. The patient is neurovascularly intact. I did perform a conscious sedation and reduce the ankle. The joint is quite unstable, when the patient awoke from anesthesia there was additional movement caused malalignment of the reduction. He is still neurovascularly intact. The angle is not acceptable for discharge however the patient will be getting ORIF later today. Given the instability of the joint, do not feel that it is worth the risk for a repeat conscious sedation to improve alignment.    The patient will be admitted to orthopedics in stable condition.        FINAL IMPRESSION  1. Closed dislocation of ankle, initial encounter    2. Bimalleolar ankle fracture, left, closed, initial encounter       Conscious Sedation Procedure  Joint Reduction Procedure    ISeth (Scribe), am scribing for, and in the presence of, Red Herrera M.D..    Electronically signed by:  Seth Feliciano (Scribe), 11/10/2017    IRed M.D. personally performed the services described in this documentation, as scribed by Seth Feliciano in my presence, and it is both accurate and complete.    The note accurately reflects work and decisions made by me.  Red Herrera  11/10/2017  2:47 AM

## 2017-11-10 NOTE — PROGRESS NOTES
Spoke with patient regarding surgery for his ankle.  Explained his ankle is not in a great position in the splint & he would benefit from staying at [facility] to allow us to fix his ankle today.  He is in agreement; however would like to go home the same day after, which is reasonable as long as his pain is controlled.    Exam:  Splint in place  F/e toes  Sensation preserved over exposed toes  Toes warm, well-perfused    Plan:  - Keep NPO for OR today, will plan for ORIF L ankle

## 2017-11-10 NOTE — OP REPORT
DATE OF SERVICE:  11/10/2017    PREOPERATIVE DIAGNOSIS:  Ankle fracture.    POSTOPERATIVE DIAGNOSIS:  Ankle fracture.    OPERATION PERFORMED:  Open reduction and internal fixation, bimalleolar ankle   fracture with syndesmotic disruption.    SURGEON:  Shaun Deluca MD    ASSISTANT:  Onofre Cope MD.    INDICATIONS FOR THE OPERATION:  Ankle fracture in a healthy patient.    COMPLICATIONS:  None.    MEDICATIONS UTILIZED:  Ancef.    TOURNIQUET TIME:  About 40 minutes.    DESCRIPTION OF OPERATION:  The patient was brought to the operating room,   awake and alert, placed on the operating table in supine position, delivered   general endotracheal anesthetic.  The lower extremity was then shaved,   scrubbed, prepped and draped in normal sterile routine fashion.  Timeout and   operation began.  Curvilinear incision over the medial side, subcutaneous   tissue dissected down to the fracture interface.  We spread the fracture part   and then irrigated out the joint and there were multiple joint debris.    At this point, using tenaculum reduction clamp, we reduced the medial side   stabilized with 2 screws anatomically.    Now, attention was turned to the lateral side.  Due to the severe comminution,   we simply used the plate in a neutral -- buttress mode.  Straight incision,   subcutaneous tissue dissected down, we did not interrupt the fracture   interface whatsoever created just enough exposure both proximal and distal,   applied the plate and single screws in the lag fashion and then checked the   plate position in multiple views.    The x-rays demonstrate excellent position of the implants and the reduction.    At this point, we simply placed the remainder of the screws including a   syndesmotic screw.  The post-reduction x-rays demonstrate excellent alignment   position.  The wounds were copiously irrigated, closed with 0 2-0, skin   staples.  We injected our local, posterior splint and the patient was then    taken to recovery room in stable condition.  No intraoperative or immediate   postop complications.  Patient tolerated the procedure well.       ____________________________________     MD JIMMIE MOSS / JULISSA    DD:  11/10/2017 11:15:10  DT:  11/10/2017 12:09:02    D#:  [id number]  Job#:  [id number]

## 2017-11-10 NOTE — OR SURGEON
Immediate Post OP Note    PreOp Diagnosis: bimalleolar ankle fracture, syndesmosis disruption    PostOp Diagnosis: same    Procedure(s):  ANKLE ORIF - Wound Class: Clean    Surgeon(s):  Shaun Deluca M.D. surgeon  Onofre Cope M.D. 1st assist    Anesthesiologist/Type of Anesthesia:  No anesthesia staff entered./General    Surgical Staff:  Circulator: Shivani Cardenas R.N.; Madiha Boss R.N.  Relief Circulator: Guillermina Joyner R.N.  Scrub Person: Dc Asif  Radiology Technologist: Juan BEJARANO Gross    Specimens:  * No specimens in log *    Estimated Blood Loss: minimal    Findings: as described    Complications: none        11/10/2017 11:26 AM Shaun Deluca

## 2017-11-10 NOTE — PROGRESS NOTES
Discharge to home vis [facility]. Pt signed a copy of the discharge papers and confirms all questions have been answered by the MD or the RN. Second copy of d/c papers in chart. 3 Prescriptions provided to pt. IV discontinued. Pt states all person belongings are in possession. Pt escorted off unit with assistance from the CNA staff in a wheelchair without incident. Pt room has been stripped and is ready to be cleaned. No belongings left behind.

## 2017-11-10 NOTE — CARE PLAN
Problem: Discharge Barriers/Planning  Goal: Patient's continuum of care needs will be met  Outcome: PROGRESSING AS EXPECTED  Pt is cleared for dc after surgery, voiding, ambulating, and eating food with nausea     Problem: Pain Management  Goal: Pain level will decrease to patient's comfort goal  Outcome: PROGRESSING AS EXPECTED  Pain is being managed with current regime.

## 2017-11-10 NOTE — CONSULTS
DATE OF SERVICE:  11/10/2017    CHIEF COMPLAINT:  Left ankle injury.    HISTORY OF PRESENT ILLNESS:  The patient is a 42-year-old gentleman who fell   down a couple flights of stairs and injured his left ankle.  He was seen in   the [facility] where he was found to have left ankle   fracture dislocation.  He underwent conscious sedation and closed reduction in   the emergency department by the emergency department staff and had a CT scan   and was admitted for expectant operative intervention for the ankle.  He   denies any other trauma.    REVIEW OF SYSTEMS:  Negative for loss of consciousness.    PAST MEDICAL HISTORY:  Asthma, bronchitis, chronic pancreatitis, heartburn,   hypertension, lumbar arthropathy, pain disorder, psychiatric issues, renal   disorder and bladder disorder.    PAST SURGICAL HISTORY:  Surgery for depressed skull fracture, cholecystectomy,   tonsillectomy, and adenoidectomy.    SOCIAL HISTORY:  The patient works in sales.  He smokes daily.  He is   attempting to quit by December, he was on the second floor of the apartment   building.    FAMILY HISTORY:  Noncontributory.    CURRENT MEDICATIONS:  See encounter summary.    ALLERGIES:  No known drug allergies.    PHYSICAL EXAMINATION:  VITAL SIGNS:  Blood pressure 103/80, pulse 137, temperature 97.6, respirations   20, BMI is 26.52.  GENERAL:  He is a healthy-appearing gentleman, who is in no apparent distress.    He is alert and oriented x4.  His mood is appropriate to the situation.  HEENT:  Pupils are equal, round and react to light and accommodate.    Extraocular muscles are intact.  NECK:  Full pain free range of motion of the cervical spine with no midline   tenderness.  HEART:  Regular heart rate.  LUNGS:  Regular respirations.  ABDOMEN:  Benign.  EXTREMITIES:  Examination of the left lower extremity shows normal range of   motion of the hip and knee.  He has a well-padded posterior splint on the left   lower extremity  per the emergency department staff.  SKIN:  Intact.  NEUROLOGIC:  He can wiggle his toes and has intact sensation to his toes.    IMAGING:  His x-rays show an open bimalleolar ankle fracture dislocation with   subsequent partial reduction by the emergency department staff.  CT scan shows   a comminuted transverse distal fibula fracture and comminuted medial   malleolus fracture with tibiotalar joint subluxation.    ASSESSMENT:  Left ankle fracture dislocation.    PLAN:  The risks, benefits, alternatives, limitations of open reduction and   internal fixation of the fracture were discussed in detail with the patient.    Plan will be for him to undergo surgery with Dr. Deluca later today.  He will be   kept n.p.o. until the time of the surgery.  He does understand the inherent   risks of surgery and desires to proceed.       ____________________________________     EDWARD CURRY MD RD / JULISSA    DD:  11/10/2017 08:08:02  DT:  11/10/2017 09:38:52    D#:  [id number]  Job#:  [id number]

## 2017-11-10 NOTE — PROGRESS NOTES
2 RN skin check done with JUANITA Gonzalez. Dressing to left ankle CDI, otherwise skin is intact, no noted breakdown or abrasions.

## 2017-11-10 NOTE — ED NOTES
Pt given urinal and educated on fall risk.  Pt educated he can not stand up due to sedation medication and ankle injury.  Pt also requesting to talk to BLAIR, RN verbalized she is not allowed on the property.  Pt verbalized understanding.

## 2017-11-10 NOTE — DISCHARGE INSTRUCTIONS
Discharge Instructions    Discharged to home by car with relative. Discharged via wheelchair, hospital escort: Yes.  Special equipment needed: Crutches    Be sure to schedule a follow-up appointment with your primary care doctor or any specialists as instructed.     Discharge Plan: Regular diet as tolerated        I understand that a diet low in cholesterol, fat, and sodium is recommended for good health. Unless I have been given specific instructions below for another diet, I accept this instruction as my diet prescription.   Other diet: Re    Special Instructions: Discharge instructions for the Orthopedic Patient    Follow up with Primary Care Physician within 2 weeks of discharge to home, regarding:  Review of medications and diagnostic testing.  Surveillance for medical complications.  Workup and treatment of osteoporosis, if appropriate.     -Is this a Joint Replacement patient? No    -Is this patient being discharged with medication to prevent blood clots?  No    · Is patient discharged on Warfarin / Coumadin?   No     · Is patient Post Blood Transfusion?  No    Fracture Care, Generic  The 206 bones in our body are important for supporting our body (skeleton) and also for production of blood cells by the bone marrow. A fracture is a break in a tissue. A tissue is a collection of cells that performs a function or job in our body. We most commonly think of fractures in bones.  When a bone is broken, or fractured, it affects not only blood production and function. There may also be other damage when structures near the bones are injured.  There are three main types of fractures:  · Open - where there is a wound leading to the fracture site or the bone is protruding from the skin.   · Closed - where the bone has fractured but has no external wound.   · Complicated - this may involve damage to associated vital organs and major blood vessels as a result of the fracture.   Fractures are usually managed by keeping the  bones in place long enough for them to heal. This is usually done with splints or casts. Sometimes surgery is required and pins, plates and screws may be used to hold fractures in proper position. The amount of time it takes a fracture to heal depends mostly on the age and health of the patient.  Young children are prone to fractures. These fractures heal rather quickly. The common fractures suffered by children tend to be associated with the arms and wrists. As young bones do not harden for some years, children's fractures tend to 'bend and splinter', similar to a broken branch on a tree. This the reason for the name, 'greenstick fracture'.  As we grow older, there may be a loss of bone known as osteopenia or osteoporosis. These conditions make breaking a bone much easier. Sometimes a minor accident or simply over-use may produce a fracture. These fractures do not heal as fast a younger person's.  SYMPTOMS  The signs and symptoms of fractures of bones depend on how bad the injury is. If shock is present, there may be pale, cool, clammy skin with a rapid, weak pulse. There is usually pain and tenderness in the area of the fracture. There may or may not be deformity of the bone. There may be injury to surrounding tissues.  TREATMENT  · Care and treatment of fractures relies on immobilization and adequate splinting of the injury. If the fracture is complex, the wound associated with an open fracture may be difficult to handle without professional help.   · If the pulse to the end part of the limb (distal pulse) cannot be restored by gentle traction, then the limb should be stabilized in its current position. Urgent ambulance transport should be obtained. Do not waste time with splinting.   · Generally, fractured limbs should be made immobile and left for medical aid. In remote areas or some distance from medical aid, you may be required to treat as follows.   FRACTURED LEG  · Check for circulation and pulse at the end  part of the limb (skin color and temperature). If no circulation, apply gentle traction until pulse or color returns.   · Call '911' for an ambulance.   · Treat any wounds.   · Immobilize (keep it from moving) the limb.   · Pad bony prominences.   · Reassess circulation below injury.   FRACTURED PELVIS  · Call '911' for an ambulance.   · Check for pulses in both legs.   · Bend legs at knees, elevate lower legs slightly and support on pillows or something padded.   · Support both hips with folded blankets either side.   · Discourage attempts to urinate.   · Care must be exercised with a suspected fractured pelvis. This injury may have serious complications. The casualty should always be transported by ambulance and not by alternative means unless absolutely necessary.   ·   If your caregiver has given you a follow-up appointment, it is very important to keep that appointment. This includes any orthopedic referrals, physical therapy, and rehabilitation. Any delay in obtaining necessary care could result in a delay or failure of the bones to heal. Not keeping the appointment could result in a chronic or permanent injury, pain, and disability. If there is any problem keeping the appointment, you must call back to this facility for assistance.   Document Released: 12/22/2005 Document Revised: 03/11/2013 Document Reviewed: 07/24/2009  [employer]® Patient Information ©2013 [employer].    Infection Control in the Home  If you have an infection or you are taking care of someone who has an infection, it is important to know how to keep the infection from spreading. Follow these guidelines to help stop the spread of infection, and talk to your health care provider.  HOW ARE INFECTIONS SPREAD?  In order for an infection to spread, the following must be present:  · A germ. This may be a virus, bacteria, fungus, or parasite.  · A place for the germ to live. This may be:  ¨ On or in a person, animal, plant, or food.  ¨ In soil or  water.  ¨ On surfaces, such as a door handle.  · A susceptible host. This is a person or animal who does not have resistance (immunity) to the germ.  · A way for the germ to enter the host. This may occur by:  ¨ Direct contact. This may happen by making contact--such as shaking hands or hugging--with an infected person or animal. Some germs can also travel through the air and spread to you if an infected person coughs or sneezes on you or near you.  ¨ Indirect contact. This is when the germ enters the host through contact with an infected object. Examples include eating contaminated food, drinking contaminated water, or touching a contaminated surface with your hands and then touching your face, nose, or mouth soon after that.  HOW CAN I HELP TO PREVENT INFECTION FROM SPREADING?  There are several things that you can do to help prevent infection from spreading.  Hand Washing  It is very important to wash your hands correctly, following these steps:  1. Wet your hands with clean, running water.  2. Apply soap to your hands. Liquid soap is better than bar soap.  3. Rub your hands together quickly to create lather.  4. Keep rubbing your hands together for at least 20 seconds. Thoroughly scrub all parts of your hands, including under your fingernails and between your fingers.  5. Rinse your hands with clean, running water until all of the soap is gone.  6. Dry your hands with an air dryer or a clean paper or cloth towel, or let your hands air-dry. Do not use your clothing or a soiled towel to dry your hands.  7. If you are in a public restroom, use your towel to turn off the water faucet and to open the bathroom door.  Make sure to wash your hands:  · Before:  ¨ Visiting a baby or anyone with a weakened or lowered defense (immune) system.  ¨ Putting in and taking out any contact lenses.  · After:  ¨ Working or playing outside.  ¨ Touching an animal or its toys or leash.  ¨ Handling livestock.  ¨ Using the bathroom or  helping a child or adult to use the bathroom.  ¨ Using household  or toxic chemicals.  ¨ Touching or taking out the garbage.  ¨ Touching anything dirty around your home.  ¨ Handling soiled clothes or rags.  ¨ Taking care of a sick child. This includes touching used tissues, toys, and clothes.  ¨ Sneezing, coughing, or blowing your nose.  ¨ Using public transportation.  ¨ Shaking hands.  ¨ Using a phone, including your mobile phone.  ¨ Touching money.  · Before and after:  ¨ Preparing food.  ¨ Preparing a bottle for a baby.  ¨ Feeding a baby or a young child.  ¨ Eating.  ¨ Visiting or taking care of someone who is sick.  ¨ Changing a diaper.  ¨ Changing a bandage (dressing) or taking care of an injury or wound.  ¨ Giving or taking medicine.  Taking Care of Your Home  · Make sure that you have enough cleaning supplies at all times. These include:  ¨ Disinfectants.  ¨ Reusable cleaning cloths. Wash these after each use.  ¨ Paper towels.  ¨ Utility gloves. Replace your gloves if they are cracked or torn or if they start to peel.  · Use bleach safely. Never mix it with other cleaning products, especially those that contain ammonia. This mixture can create a dangerous gas that may be deadly.  · Take care of your cleaning supplies. Toilet brushes, mops, and sponges can breed germs. Soak them in bleach and water for 5 minutes after each use.  · Do not pour used mop water down the sink. Pour it down the toilet instead.  · Maintain proper ventilation in your home.  · If you have a pet, ensure that your pet stays clean. Do not let people with weak immune systems touch bird droppings, fish tank water, or a litter box.  ¨ If you have a cat, be sure to change the litter every day.  · In the bathroom, make sure you:  ¨ Provide liquid soap.  ¨ Change towels and washcloths frequently. Avoid sharing towels and washcloths.  ¨ Change toothbrushes often and store them separately in a clean, dry place.  ¨ Disinfect the  toilet.  ¨ Clean the tub, shower, and sink with standard cleaning products.    ¨ Mop the floor with a standard .  ¨ Do not share personal items, such as razors, toothbrushes, drinking glasses, deodorant, vivas, brushes, towels, and washcloths.    · In the kitchen, make sure you:  ¨ Store food carefully.  ¨ Refrigerate leftovers promptly in covered containers.  ¨ Throw out stale or spoiled food.  ¨ Clean the inside of your refrigerator each week.  ¨ Keep your refrigerator set at 40°F (4°C) or less, and set your freezer at 0°F (-18°C) or less.  ¨ Thaw foods in the refrigerator or microwave, not at room temperature.  ¨ Serve foods at the proper temperature. Do not eat raw meat. Make sure it is cooked to the appropriate temperature. Cook eggs until they are firm.  ¨ Wash fruits and vegetables under running water.  ¨ Use separate cutting boards, plates, and utensils for raw foods and cooked foods.  ¨ Keep work surfaces clean.  ¨ Use a clean spoon each time you sample food while cooking.  ¨ Wash your dishes in hot, soapy water. Air-dry your dishes or use a .  ¨ Do not share forks, cups, or spoons during meals.  · Wear gloves if laundry is visibly soiled.  · Change linens each week or whenever they are soiled.  · Do not shake soiled linens. Doing that may send germs into the air. Put dressings, sanitary or incontinence pads, diapers, and gloves in plastic garbage bags for disposal.     This information is not intended to replace advice given to you by your health care provider. Make sure you discuss any questions you have with your health care provider.     Document Released: 09/26/2009 Document Revised: 01/08/2016 Document Reviewed: 08/20/2015  [employer]vier Interactive Patient Education ©2016 [employer]      Pain Medicine Instructions  HOW CAN PAIN MEDICINE AFFECT ME?  You were prescribed pain medicine. This medicine may:  · Make you tired or sleepy.  · Affect how well you can:  ¨ Drive  ¨ Do certain  activities.  Pain medicine may not make all of your pain go away. You should be comfortable enough to:  · Move.  · Breathe.  · Take care of yourself.  HOW OFTEN SHOULD I TAKE PAIN MEDICINE AND HOW MUCH SHOULD I TAKE?  · Take pain medicine only as told by your doctor and only as needed for pain.  · You do not need to take pain medicine if you are not having pain, unless your doctor tells you to do that.  · You can take less than the prescribed dose if you find that less medicine helps your pain.  WHAT SHOULD I AVOID WHILE I AM TAKING PAIN MEDICINE?  Follow these instructions after you start taking pain medicine, while you are taking the medicine, and for 8 hours after you stop taking the medicine:  · Do not drive.  · Do not use machinery.  · Do not use power tools.  · Do not sign legal documents.  · Do not drink alcohol.  · Do not take sleeping pills.  · Do not take care of children by yourself.  · Do not do any activities that involve climbing or being in high places.  · Do not go into any body of water unless there is an adult nearby who can watch and help you. This includes:  ¨ Lakes.  ¨ Rivers.  ¨ Oceans.  ¨ Spas.  ¨ Swimming pools.  HOW CAN I KEEP OTHERS SAFE WHILE I AM TAKING PAIN MEDICINE?  · Store your pain medicine as told by your doctor. Make sure that you keep it where children and pets cannot reach it.  · Do not share your pain medicine with anyone.  · Do not save any leftover pills. If you have any leftover pain medicine, get rid of it or destroy it as told by your doctor.  WHAT ELSE DO I NEED TO KNOW ABOUT TAKING PAIN MEDICINE?  · Use a poop (stool) softener if you have trouble pooping (constipation) because of your pain medicine. Eating more fruits and vegetables also helps with constipation.  · Write down the times when you take your pain medicine. Look at the times before you take your next dose of medicine.  · If your pain is very bad, do not take more pills than told by your doctor. Call your  doctor for help.  · Your pain medicine might have acetaminophen in it. Do not take any other acetaminophen while you are taking this medicine. An overdose of acetaminophen can do very bad damage to your liver. If you are taking any medicines in addition to your pain medicine, check the active ingredients on those medicines to see if acetaminophen is listed.  WHEN SHOULD I CALL MY DOCTOR?  · Your medicine is not helping the pain.  · You do either of these soon after you take the medicine:  ¨ Throw up (vomit).  ¨ Have watery poop (diarrhea).  · You have new pain in areas that did not hurt before.  · You have an allergic reaction to your medicine. This may include:  ¨ Feeling itchy.  ¨ Swelling.  ¨ Feeling dizzy.  ¨ Getting a new rash.  WHEN SHOULD I CALL 911 OR GO TO THE EMERGENCY ROOM?  · You feel dizzy or you faint.  · You feel very confused.  · You throw up again and again.  · Your skin or lips turn pale or bluish in color.  · You are:  ¨ Short of breath.  ¨ Breathing much more slowly than usual.  · You have a very bad allergic reaction to your medicine. This includes:  ¨ Developing a swollen tongue.  ¨ Having trouble breathing.     This information is not intended to replace advice given to you by your health care provider. Make sure you discuss any questions you have with your health care provider.     Document Released: 06/05/2009 Document Revised: 05/03/2016 Document Reviewed: 10/22/2015  [employer] Interactive Patient Education ©2016 [employer]      Depression / Suicide Risk    As you are discharged from this [facility] facility, it is important to learn how to keep safe from harming yourself.    Recognize the warning signs:  · Abrupt changes in personality, positive or negative- including increase in energy   · Giving away possessions  · Change in eating patterns- significant weight changes-  positive or negative  · Change in sleeping patterns- unable to sleep or sleeping all the time   · Unwillingness or  inability to communicate  · Depression  · Unusual sadness, discouragement and loneliness  · Talk of wanting to die  · Neglect of personal appearance   · Rebelliousness- reckless behavior  · Withdrawal from people/activities they love  · Confusion- inability to concentrate     If you or a loved one observes any of these behaviors or has concerns about self-harm, here's what you can do:  · Talk about it- your feelings and reasons for harming yourself  · Remove any means that you might use to hurt yourself (examples: pills, rope, extension cords, firearm)  · Get professional help from the community (Mental Health, Substance Abuse, psychological counseling)  · Do not be alone:Call your Safe Contact- someone whom you trust who will be there for you.  · Call your local CRISIS HOTLINE 849-2098 or 064-613-8985  · Call your local Children's Mobile Crisis Response Team [place] (754) 530-9722 or www.[employer]  · Call the toll free National Suicide Prevention Hotlines   · National Suicide Prevention Lifeline 877-240-EZCB (4403)  · National Hope Line Network 800-SUICIDE (166-0315)

## 2017-11-10 NOTE — THERAPY
"Occupational Therapy Evaluation completed.   Functional Status:  OT eval completed on 43 YO M s/p ORIF for bimalleolar ankle fracture. Pt demonstrated UB/LB dressing and toilet transfer with CGA/SPV. Pt demonstrated impulsivity and decreased safety awareness with use of crutches during ADLs and functional transfers. Pt reports SO and children are available to assist as needed upon d/c. Pt does not require further acute OT services at this time.   Plan of Care: Patient with no further skilled OT needs in the acute care setting at this time  Discharge Recommendations:  Equipment: Crutches. Post-acute therapy Discharge to home with outpatient or home health for additional skilled therapy services.    See \"Rehab Therapy-Acute\" Patient Summary Report for complete documentation.    "
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
## 2017-11-10 NOTE — ED NOTES
Pt to triage via w/c with friend. Pt c/o left ankle pain after falling down 2 steps, denies hitting head or LOC. Pt has deformity to left ankle, weak pedal pulse. Pt in obvious discomfort. Charge RN informed, working on room.

## 2017-11-10 NOTE — PROGRESS NOTES
Received bedside shift report from night RN. Pt AOx4.  Pt reports pain is 7/10. Does call appropriately. Bed alarm is off.  Pt is resting in bed. PIV assessed and is patent. Pt is on 3 L NC. POC discussed as well as unit routine, comfort, and safety. Dicussed DC planning to home after srugery. Discussed the goal for today surgery, keeping NPO, decrease anxiety and education about possible dc today. Reviewed orders, notes, labs, and test results. Hourly rounding in place with RN rounding on odd hours and CNA on even hours. 12 hour chart check completed.
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
## 2017-11-10 NOTE — PROGRESS NOTES
Pt back from surgery at 1340. Pt pain 7/10 and medicated accordingly. Pt has voided and diet tray ordered. PT and OT have been contacted to work with him for dc this after noon

## 2017-11-10 NOTE — PROGRESS NOTES
Patient seen and examined pre operatively  Left ankle ORIF explained procedure, and RBA  Answered all questions  [place]

## 2017-11-10 NOTE — RESPIRATORY CARE
Conscious Sedation Respiratory Update       O2 (LPM): 4 (11/10/17 0210)  O2 Daily Delivery Respiratory : Silicone Nasal Cannula (11/10/17 0210)

## 2017-11-13 ENCOUNTER — OFFICE VISIT (OUTPATIENT)
Dept: INTERNAL MEDICINE | Facility: MEDICAL CENTER | Age: 42
End: 2017-11-13
Payer: COMMERCIAL

## 2017-11-13 VITALS
BODY MASS INDEX: 28.04 KG/M2 | DIASTOLIC BLOOD PRESSURE: 82 MMHG | WEIGHT: 207 LBS | TEMPERATURE: 98.2 F | HEART RATE: 110 BPM | HEIGHT: 72 IN | OXYGEN SATURATION: 95 % | SYSTOLIC BLOOD PRESSURE: 143 MMHG

## 2017-11-13 DIAGNOSIS — K86.1 CHRONIC PANCREATITIS, UNSPECIFIED PANCREATITIS TYPE (HCC): ICD-10-CM

## 2017-11-13 DIAGNOSIS — R12 HEARTBURN: ICD-10-CM

## 2017-11-13 DIAGNOSIS — S82.842S ANKLE FRACTURE, BIMALLEOLAR, CLOSED, LEFT, SEQUELA: ICD-10-CM

## 2017-11-13 DIAGNOSIS — F41.9 ANXIETY: ICD-10-CM

## 2017-11-13 DIAGNOSIS — Z72.0 TOBACCO USE: ICD-10-CM

## 2017-11-13 PROBLEM — R00.2 PALPITATION: Status: RESOLVED | Noted: 2017-03-13 | Resolved: 2017-11-13

## 2017-11-13 PROBLEM — R61 NIGHT SWEATS: Status: RESOLVED | Noted: 2017-03-13 | Resolved: 2017-11-13

## 2017-11-13 PROCEDURE — 99214 OFFICE O/P EST MOD 30 MIN: CPT | Mod: GC | Performed by: INTERNAL MEDICINE

## 2017-11-13 RX ORDER — OXYCODONE HYDROCHLORIDE 5 MG/1
5 TABLET ORAL
Qty: 56 TAB | Refills: 0 | Status: SHIPPED | OUTPATIENT
Start: 2017-11-14 | End: 2018-05-09

## 2017-11-13 RX ORDER — ALBUTEROL SULFATE 90 UG/1
2 AEROSOL, METERED RESPIRATORY (INHALATION)
Qty: 1 INHALER | Refills: 2 | Status: ON HOLD | OUTPATIENT
Start: 2017-11-13 | End: 2018-05-11

## 2017-11-13 ASSESSMENT — PAIN SCALES - GENERAL: PAINLEVEL: 8=MODERATE-SEVERE PAIN

## 2017-11-13 NOTE — LETTER
November 13, 2017    To Whom It May Concern:         Please excuse Mr. Thorpe for work due to his ankle fracture. He had surgery ( Open reduction and internal fixation, bimalleolar ankle fracture with syndesmotic disruption. ) He will be follow up with orthopedics.         If you have any questions please do not hesitate to call me at the phone number listed below.    Sincerely,          Maria E Rodgers M.D.  [phone]

## 2017-11-14 NOTE — PROGRESS NOTES
Established Patient    Wero presents today with the following:    CC: 43 yo M with hx of heartburn, chronic pancreatitis, anxiety here c/o left ankle pain    HPI:        Ankle fracture, bimalleolar, closed, left  Stepped on his pet's foot while walked down on the stairs. Hospitalized at [facility] and s/p Open reduction and internal fixation, bimalleolar ankle fracture with syndesmotic disruption on 11/10/17. Was discharged with 3 days of Roxicodone. Patient still has severe pain.      Heartburn: stable on omeprazole     Chronic pancreatitis, unspecified pancreatitis type (CMS-HCC)  Stable. Hx of chornic pancreatitis due to gallstones and ETOH, had cholecystectomy and ERCP with biliary sphinterotomy and extraction of small stone.  On CREON. Following with GI. Dr. Shah     Anxiety Stable on wellbutrin and trazodone. No SI/HI. Used to follow up with mental health. Now change of insurance, will need another referral to mental health.    Tobacco use: 1/2 ppd x 20 years.  Asthma: stable on albuterol      Patient Active Problem List    Diagnosis Date Noted   • Ankle fracture, bimalleolar, closed, left, initial encounter 11/10/2017   • Ankle dislocation, left, initial encounter 11/10/2017   • Vitamin D deficiency 03/13/2017   • Chronic cough 03/13/2017   • Heartburn 03/13/2017   • Dyshidrotic eczema 03/13/2014   • ADD (attention deficit disorder) 09/02/2010   • Chronic pancreatitis (CMS-HCC) 08/02/2010   • Alcoholism (CMS-HCC) 08/02/2010       Current Outpatient Prescriptions   Medication Sig Dispense Refill   • albuterol 108 (90 Base) MCG/ACT Aero Soln inhalation aerosol Inhale 2 Puffs by mouth every 2 hours as needed for Shortness of Breath (or cough). 1 Inhaler 2   • [START ON 11/14/2017] oxycodone immediate-release (ROXICODONE) 5 MG Tab Take 1 Tab by mouth every 3 hours as needed (Severe Pain (NRS Pain Scale 7-10; CPOT Pain Scale 6-8)). 56 Tab 0   • oxycodone-acetaminophen (PERCOCET)  MG Tab Take 1-2 Tabs by  "mouth every four hours as needed for Severe Pain. 60 Tab 0   • aspirin (ASA) 325 MG Tab Take 1 Tab by mouth 2 Times a Day. 60 Tab 3   • docusate sodium 100 MG Cap Take 100 mg by mouth 2 Times a Day. 60 Cap 0   • aspirin EC (ECOTRIN) 325 MG Tablet Delayed Response Take 1 Tab by mouth 2 Times a Day. 60 Tab 0   • ibuprofen (MOTRIN) 800 MG Tab Take 800 mg by mouth every 8 hours as needed for Mild Pain.     • Pancrelipase, Lip-Prot-Amyl, (CREON) 91863 UNITS Cap DR Particles Take 2 Tabs by mouth 3 times a day before meals. Takes two and before snacks     • Multiple Vitamins-Minerals (MULTIVITAMIN) Liquid Take 1 Drop by mouth every day.     • Omega-3 Fatty Acids (FISH OIL PO) Take 1 Cap by mouth every day. One tsp liquid fish oil daily     • trazodone (DESYREL) 100 MG Tab Take 100 mg by mouth every evening.     • omeprazole (PRILOSEC) 20 MG delayed-release capsule Take 20 mg by mouth every day.     • buPROPion (WELLBUTRIN) 100 MG Tab Take 1 Tab by mouth every day. 30 Tab 7     No current facility-administered medications for this visit.        Allergies:  Allergies   Allergen Reactions   • Nkda [No Known Drug Allergy]        ROS  Constitutional: Denies fevers or chills  Eyes: Denies changes in vision  Ears/Nose/Throat/Mouth: Denies nasal congestion or sore throat   Cardiovascular: Denies chest pain or palpitations   Respiratory: asthma Denies shortness of breath , Denies cough  Gastrointestinal/Hepatic: Denies abd pain, nausea, vomiting   Genitourinary: Denies dysuria or frequency  Musculoskeletal/Rheum: per HPI  Neurological: Denies headache  Psychiatric: Anxiety.  Endocrine: Denies hx of diabetes or thyroid dysfunction  Heme/Oncology/Lymph Nodes: Denies weight changes or enlarged LNs.    /82   Pulse (!) 110   Temp 36.8 °C (98.2 °F)   Ht 1.831 m (6' 0.1\")   Wt 93.9 kg (207 lb)   SpO2 95%   BMI 28.00 kg/m²     Physical Exam  General: non-toxic apeparnce. NAD.   HEENT: EOMI. Scleral clear.  CVS: normal S1, S2. " NSR. No m/r/g. No JVD.   PULM: CTABL . No w/r/r. No basilar crackles.   ABD: soft, NT, ND. +BS.   : No suprapubic tenderness. No CVA tenderness.   EXT: LLE swelling, wrapped with dressing. Pulses intact.  Neuro: No focal deficits.   Pysch: AAOx4  Skin: no rashes.     Assessment and Plan    41 yo M with hx of heartburn, chronic pancreatitis, anxiety here c/o left ankle pain     Ankle fracture, bimalleolar, closed, left  Stepped on his pet's foot while walked down on the stairs. Hospitalized at [facility] and s/p Open reduction and internal fixation, bimalleolar ankle fracture with syndesmotic disruption on 11/10/17. Was discharged with 3 days of Roxicodone. Patient still has severe pain.   Plan:  -  aware checked. We will acutely prescribe 7 days of Roxicodone for severe pain due to acute fracture. Patient will have to follow up with orthopedics. Patient will need re-evaluation if his pain persists. Oxycodone immediate-release (ROXICODONE) 5 MG Tab; Take 1 Tab by mouth every 3 hours as needed (Severe Pain (NRS Pain Scale 7-10; CPOT Pain Scale 6-8)).  Dispense: 56 Tab; Refill: 0  - ibuprofen prn for moderate pain  - follow up with orthopedics as soon as possible.  - letter for work provided     Heartburn: stable on omeprazole     Chronic pancreatitis, unspecified pancreatitis type (CMS-HCC)  Stable. Hx of chornic pancreatitis due to gallstones and ETOH, had cholecystectomy and ERCP with biliary sphinterotomy and extraction of small stone.  On CREON. Following with GI. Dr. Shah     Anxiety  Stable on wellbutrin and trazodone. No SI/HI. Used to follow up with mental health. Now change of insurance, will need another referral to mental health.    Tobacco use: 1/2 ppd x 20 years. Recommend smoking cessation.  Asthma: stable on albuterol    Preventive care  Flu - decline  TD- NA  TDaP - discuss in the next visit  Pneumococcal - due to smoking. discuss in the next visit  Prevnar - NA  Colonoscopy - NA  Zostavax-  NA      Follow-up:Return in about 10 weeks (around 1/22/2018) for Long.    Signed by: Maria E Rodgers M.D.

## 2017-11-17 LAB — EKG IMPRESSION: NORMAL

## 2018-05-08 ENCOUNTER — APPOINTMENT (OUTPATIENT)
Dept: RADIOLOGY | Facility: MEDICAL CENTER | Age: 43
DRG: 440 | End: 2018-05-08
Attending: EMERGENCY MEDICINE
Payer: COMMERCIAL

## 2018-05-08 ENCOUNTER — HOSPITAL ENCOUNTER (INPATIENT)
Facility: MEDICAL CENTER | Age: 43
LOS: 2 days | DRG: 440 | End: 2018-05-11
Attending: EMERGENCY MEDICINE | Admitting: HOSPITALIST
Payer: COMMERCIAL

## 2018-05-08 DIAGNOSIS — K85.20 ALCOHOL-INDUCED ACUTE PANCREATITIS, UNSPECIFIED COMPLICATION STATUS: ICD-10-CM

## 2018-05-08 LAB
ALBUMIN SERPL BCP-MCNC: 4.4 G/DL (ref 3.2–4.9)
ALBUMIN/GLOB SERPL: 1.5 G/DL
ALP SERPL-CCNC: 68 U/L (ref 30–99)
ALT SERPL-CCNC: 28 U/L (ref 2–50)
ANION GAP SERPL CALC-SCNC: 6 MMOL/L (ref 0–11.9)
APTT PPP: 24.2 SEC (ref 24.7–36)
AST SERPL-CCNC: 20 U/L (ref 12–45)
BASOPHILS # BLD AUTO: 0.3 % (ref 0–1.8)
BASOPHILS # BLD: 0.04 K/UL (ref 0–0.12)
BILIRUB SERPL-MCNC: 0.7 MG/DL (ref 0.1–1.5)
BNP SERPL-MCNC: 16 PG/ML (ref 0–100)
BUN SERPL-MCNC: 9 MG/DL (ref 8–22)
CALCIUM SERPL-MCNC: 8.4 MG/DL (ref 8.4–10.2)
CHLORIDE SERPL-SCNC: 107 MMOL/L (ref 96–112)
CO2 SERPL-SCNC: 25 MMOL/L (ref 20–33)
COMMENT 1642: NORMAL
CREAT SERPL-MCNC: 0.72 MG/DL (ref 0.5–1.4)
EKG IMPRESSION: NORMAL
EOSINOPHIL # BLD AUTO: 0.02 K/UL (ref 0–0.51)
EOSINOPHIL NFR BLD: 0.2 % (ref 0–6.9)
ERYTHROCYTE [DISTWIDTH] IN BLOOD BY AUTOMATED COUNT: 39.6 FL (ref 35.9–50)
GLOBULIN SER CALC-MCNC: 3 G/DL (ref 1.9–3.5)
GLUCOSE SERPL-MCNC: 117 MG/DL (ref 65–99)
HCT VFR BLD AUTO: 48.5 % (ref 42–52)
HGB BLD-MCNC: 17.3 G/DL (ref 14–18)
IMM GRANULOCYTES # BLD AUTO: 0.04 K/UL (ref 0–0.11)
IMM GRANULOCYTES NFR BLD AUTO: 0.3 % (ref 0–0.9)
INR PPP: 0.95 (ref 0.87–1.13)
LIPASE SERPL-CCNC: 208 U/L (ref 7–58)
LYMPHOCYTES # BLD AUTO: 2.47 K/UL (ref 1–4.8)
LYMPHOCYTES NFR BLD: 19.4 % (ref 22–41)
MCH RBC QN AUTO: 30.8 PG (ref 27–33)
MCHC RBC AUTO-ENTMCNC: 35.7 G/DL (ref 33.7–35.3)
MCV RBC AUTO: 86.3 FL (ref 81.4–97.8)
MONOCYTES # BLD AUTO: 0.87 K/UL (ref 0–0.85)
MONOCYTES NFR BLD AUTO: 6.8 % (ref 0–13.4)
NEUTROPHILS # BLD AUTO: 9.29 K/UL (ref 1.82–7.42)
NEUTROPHILS NFR BLD: 73 % (ref 44–72)
NRBC # BLD AUTO: 0 K/UL
NRBC BLD-RTO: 0 /100 WBC
PLATELET # BLD AUTO: 225 K/UL (ref 164–446)
PMV BLD AUTO: 9.4 FL (ref 9–12.9)
POTASSIUM SERPL-SCNC: 3.7 MMOL/L (ref 3.6–5.5)
PROT SERPL-MCNC: 7.4 G/DL (ref 6–8.2)
PROTHROMBIN TIME: 12.6 SEC (ref 12–14.6)
RBC # BLD AUTO: 5.62 M/UL (ref 4.7–6.1)
SODIUM SERPL-SCNC: 138 MMOL/L (ref 135–145)
TROPONIN I SERPL-MCNC: <0.02 NG/ML (ref 0–0.04)
WBC # BLD AUTO: 12.7 K/UL (ref 4.8–10.8)

## 2018-05-08 PROCEDURE — 93005 ELECTROCARDIOGRAM TRACING: CPT | Performed by: EMERGENCY MEDICINE

## 2018-05-08 PROCEDURE — 85025 COMPLETE CBC W/AUTO DIFF WBC: CPT

## 2018-05-08 PROCEDURE — 84484 ASSAY OF TROPONIN QUANT: CPT

## 2018-05-08 PROCEDURE — 71045 X-RAY EXAM CHEST 1 VIEW: CPT

## 2018-05-08 PROCEDURE — 83690 ASSAY OF LIPASE: CPT

## 2018-05-08 PROCEDURE — 36415 COLL VENOUS BLD VENIPUNCTURE: CPT

## 2018-05-08 PROCEDURE — 80053 COMPREHEN METABOLIC PANEL: CPT

## 2018-05-08 PROCEDURE — 85730 THROMBOPLASTIN TIME PARTIAL: CPT

## 2018-05-08 PROCEDURE — 93005 ELECTROCARDIOGRAM TRACING: CPT

## 2018-05-08 PROCEDURE — 85610 PROTHROMBIN TIME: CPT

## 2018-05-08 PROCEDURE — 83880 ASSAY OF NATRIURETIC PEPTIDE: CPT

## 2018-05-08 PROCEDURE — 99291 CRITICAL CARE FIRST HOUR: CPT

## 2018-05-08 RX ORDER — ONDANSETRON 2 MG/ML
4 INJECTION INTRAMUSCULAR; INTRAVENOUS ONCE
Status: COMPLETED | OUTPATIENT
Start: 2018-05-09 | End: 2018-05-09

## 2018-05-08 RX ORDER — SODIUM CHLORIDE 9 MG/ML
1000 INJECTION, SOLUTION INTRAVENOUS ONCE
Status: COMPLETED | OUTPATIENT
Start: 2018-05-09 | End: 2018-05-09

## 2018-05-08 ASSESSMENT — PAIN SCALES - GENERAL: PAINLEVEL_OUTOF10: 6

## 2018-05-09 PROBLEM — K85.90 PANCREATITIS: Status: ACTIVE | Noted: 2018-05-09

## 2018-05-09 PROBLEM — F41.9 ANXIETY: Status: ACTIVE | Noted: 2018-05-09

## 2018-05-09 PROBLEM — Z72.0 TOBACCO ABUSE: Status: ACTIVE | Noted: 2018-05-09

## 2018-05-09 PROCEDURE — 700105 HCHG RX REV CODE 258: Performed by: EMERGENCY MEDICINE

## 2018-05-09 PROCEDURE — 700102 HCHG RX REV CODE 250 W/ 637 OVERRIDE(OP): Performed by: HOSPITALIST

## 2018-05-09 PROCEDURE — 99406 BEHAV CHNG SMOKING 3-10 MIN: CPT | Performed by: HOSPITALIST

## 2018-05-09 PROCEDURE — 96375 TX/PRO/DX INJ NEW DRUG ADDON: CPT

## 2018-05-09 PROCEDURE — 700111 HCHG RX REV CODE 636 W/ 250 OVERRIDE (IP): Performed by: EMERGENCY MEDICINE

## 2018-05-09 PROCEDURE — 96376 TX/PRO/DX INJ SAME DRUG ADON: CPT

## 2018-05-09 PROCEDURE — 96374 THER/PROPH/DIAG INJ IV PUSH: CPT

## 2018-05-09 PROCEDURE — A9270 NON-COVERED ITEM OR SERVICE: HCPCS | Performed by: EMERGENCY MEDICINE

## 2018-05-09 PROCEDURE — 700102 HCHG RX REV CODE 250 W/ 637 OVERRIDE(OP): Performed by: EMERGENCY MEDICINE

## 2018-05-09 PROCEDURE — 96361 HYDRATE IV INFUSION ADD-ON: CPT

## 2018-05-09 PROCEDURE — 99222 1ST HOSP IP/OBS MODERATE 55: CPT | Mod: 25 | Performed by: HOSPITALIST

## 2018-05-09 PROCEDURE — 770006 HCHG ROOM/CARE - MED/SURG/GYN SEMI*

## 2018-05-09 PROCEDURE — A9270 NON-COVERED ITEM OR SERVICE: HCPCS | Performed by: HOSPITALIST

## 2018-05-09 PROCEDURE — A9270 NON-COVERED ITEM OR SERVICE: HCPCS | Performed by: INTERNAL MEDICINE

## 2018-05-09 PROCEDURE — 700105 HCHG RX REV CODE 258: Performed by: HOSPITALIST

## 2018-05-09 PROCEDURE — 94760 N-INVAS EAR/PLS OXIMETRY 1: CPT

## 2018-05-09 PROCEDURE — 700102 HCHG RX REV CODE 250 W/ 637 OVERRIDE(OP): Performed by: INTERNAL MEDICINE

## 2018-05-09 PROCEDURE — 700111 HCHG RX REV CODE 636 W/ 250 OVERRIDE (IP): Performed by: HOSPITALIST

## 2018-05-09 RX ORDER — OXYCODONE HYDROCHLORIDE 5 MG/1
5 TABLET ORAL
Status: DISCONTINUED | OUTPATIENT
Start: 2018-05-09 | End: 2018-05-11 | Stop reason: HOSPADM

## 2018-05-09 RX ORDER — SODIUM CHLORIDE 9 MG/ML
INJECTION, SOLUTION INTRAVENOUS CONTINUOUS
Status: DISCONTINUED | OUTPATIENT
Start: 2018-05-09 | End: 2018-05-11 | Stop reason: HOSPADM

## 2018-05-09 RX ORDER — ONDANSETRON 2 MG/ML
4 INJECTION INTRAMUSCULAR; INTRAVENOUS EVERY 4 HOURS PRN
Status: DISCONTINUED | OUTPATIENT
Start: 2018-05-09 | End: 2018-05-11 | Stop reason: HOSPADM

## 2018-05-09 RX ORDER — ALBUTEROL SULFATE 90 UG/1
2 AEROSOL, METERED RESPIRATORY (INHALATION) EVERY 4 HOURS
Status: DISCONTINUED | OUTPATIENT
Start: 2018-05-10 | End: 2018-05-09

## 2018-05-09 RX ORDER — OMEPRAZOLE 20 MG/1
20 CAPSULE, DELAYED RELEASE ORAL ONCE
Status: COMPLETED | OUTPATIENT
Start: 2018-05-09 | End: 2018-05-09

## 2018-05-09 RX ORDER — OMEPRAZOLE 20 MG/1
20 CAPSULE, DELAYED RELEASE ORAL DAILY
Status: DISCONTINUED | OUTPATIENT
Start: 2018-05-09 | End: 2018-05-11 | Stop reason: HOSPADM

## 2018-05-09 RX ORDER — ALBUTEROL SULFATE 90 UG/1
2 AEROSOL, METERED RESPIRATORY (INHALATION)
Status: DISCONTINUED | OUTPATIENT
Start: 2018-05-09 | End: 2018-05-09

## 2018-05-09 RX ORDER — MORPHINE SULFATE 4 MG/ML
4 INJECTION, SOLUTION INTRAMUSCULAR; INTRAVENOUS
Status: DISCONTINUED | OUTPATIENT
Start: 2018-05-09 | End: 2018-05-11 | Stop reason: HOSPADM

## 2018-05-09 RX ORDER — AMOXICILLIN 250 MG
2 CAPSULE ORAL 2 TIMES DAILY
Status: DISCONTINUED | OUTPATIENT
Start: 2018-05-09 | End: 2018-05-11 | Stop reason: HOSPADM

## 2018-05-09 RX ORDER — PROMETHAZINE HYDROCHLORIDE 25 MG/1
12.5-25 TABLET ORAL EVERY 4 HOURS PRN
Status: DISCONTINUED | OUTPATIENT
Start: 2018-05-09 | End: 2018-05-11 | Stop reason: HOSPADM

## 2018-05-09 RX ORDER — NICOTINE 21 MG/24HR
21 PATCH, TRANSDERMAL 24 HOURS TRANSDERMAL
Status: DISCONTINUED | OUTPATIENT
Start: 2018-05-09 | End: 2018-05-11 | Stop reason: HOSPADM

## 2018-05-09 RX ORDER — TRAZODONE HYDROCHLORIDE 50 MG/1
100 TABLET ORAL NIGHTLY
Status: DISCONTINUED | OUTPATIENT
Start: 2018-05-09 | End: 2018-05-11 | Stop reason: HOSPADM

## 2018-05-09 RX ORDER — ALBUTEROL SULFATE 90 UG/1
2 AEROSOL, METERED RESPIRATORY (INHALATION) EVERY 4 HOURS
Status: DISCONTINUED | OUTPATIENT
Start: 2018-05-09 | End: 2018-05-11 | Stop reason: HOSPADM

## 2018-05-09 RX ORDER — BISACODYL 10 MG
10 SUPPOSITORY, RECTAL RECTAL
Status: DISCONTINUED | OUTPATIENT
Start: 2018-05-09 | End: 2018-05-11 | Stop reason: HOSPADM

## 2018-05-09 RX ORDER — POLYETHYLENE GLYCOL 3350 17 G/17G
1 POWDER, FOR SOLUTION ORAL
Status: DISCONTINUED | OUTPATIENT
Start: 2018-05-09 | End: 2018-05-11 | Stop reason: HOSPADM

## 2018-05-09 RX ORDER — OXYCODONE HYDROCHLORIDE 10 MG/1
10 TABLET ORAL
Status: DISCONTINUED | OUTPATIENT
Start: 2018-05-09 | End: 2018-05-11 | Stop reason: HOSPADM

## 2018-05-09 RX ORDER — PROMETHAZINE HYDROCHLORIDE 25 MG/1
12.5-25 SUPPOSITORY RECTAL EVERY 4 HOURS PRN
Status: DISCONTINUED | OUTPATIENT
Start: 2018-05-09 | End: 2018-05-11 | Stop reason: HOSPADM

## 2018-05-09 RX ORDER — ONDANSETRON 4 MG/1
4 TABLET, ORALLY DISINTEGRATING ORAL EVERY 4 HOURS PRN
Status: DISCONTINUED | OUTPATIENT
Start: 2018-05-09 | End: 2018-05-11 | Stop reason: HOSPADM

## 2018-05-09 RX ADMIN — SODIUM CHLORIDE: 9 INJECTION, SOLUTION INTRAVENOUS at 04:29

## 2018-05-09 RX ADMIN — HYDROMORPHONE HYDROCHLORIDE 1 MG: 1 INJECTION, SOLUTION INTRAMUSCULAR; INTRAVENOUS; SUBCUTANEOUS at 02:22

## 2018-05-09 RX ADMIN — ONDANSETRON HYDROCHLORIDE 4 MG: 2 INJECTION, SOLUTION INTRAMUSCULAR; INTRAVENOUS at 10:15

## 2018-05-09 RX ADMIN — MORPHINE SULFATE 4 MG: 4 INJECTION INTRAVENOUS at 04:48

## 2018-05-09 RX ADMIN — SODIUM CHLORIDE: 9 INJECTION, SOLUTION INTRAVENOUS at 14:39

## 2018-05-09 RX ADMIN — ALBUTEROL SULFATE 2 PUFF: 90 AEROSOL, METERED RESPIRATORY (INHALATION) at 21:59

## 2018-05-09 RX ADMIN — SODIUM CHLORIDE 1000 ML: 9 INJECTION, SOLUTION INTRAVENOUS at 00:14

## 2018-05-09 RX ADMIN — MORPHINE SULFATE 4 MG: 4 INJECTION INTRAVENOUS at 18:46

## 2018-05-09 RX ADMIN — ONDANSETRON 4 MG: 2 INJECTION INTRAMUSCULAR; INTRAVENOUS at 00:15

## 2018-05-09 RX ADMIN — MORPHINE SULFATE 4 MG: 4 INJECTION INTRAVENOUS at 12:09

## 2018-05-09 RX ADMIN — MORPHINE SULFATE 4 MG: 4 INJECTION INTRAVENOUS at 15:34

## 2018-05-09 RX ADMIN — HYDROMORPHONE HYDROCHLORIDE 1 MG: 1 INJECTION, SOLUTION INTRAMUSCULAR; INTRAVENOUS; SUBCUTANEOUS at 00:15

## 2018-05-09 RX ADMIN — SODIUM CHLORIDE: 9 INJECTION, SOLUTION INTRAVENOUS at 21:59

## 2018-05-09 RX ADMIN — TRAZODONE HYDROCHLORIDE 50 MG: 50 TABLET ORAL at 23:04

## 2018-05-09 RX ADMIN — MORPHINE SULFATE 4 MG: 4 INJECTION INTRAVENOUS at 21:58

## 2018-05-09 RX ADMIN — OMEPRAZOLE 20 MG: 20 CAPSULE, DELAYED RELEASE ORAL at 08:18

## 2018-05-09 RX ADMIN — OMEPRAZOLE 20 MG: 20 CAPSULE, DELAYED RELEASE ORAL at 02:34

## 2018-05-09 RX ADMIN — ONDANSETRON HYDROCHLORIDE 4 MG: 2 INJECTION, SOLUTION INTRAMUSCULAR; INTRAVENOUS at 04:48

## 2018-05-09 RX ADMIN — NICOTINE 21 MG: 21 PATCH, EXTENDED RELEASE TRANSDERMAL at 05:15

## 2018-05-09 RX ADMIN — MORPHINE SULFATE 4 MG: 4 INJECTION INTRAVENOUS at 08:18

## 2018-05-09 ASSESSMENT — ENCOUNTER SYMPTOMS
VOMITING: 1
NAUSEA: 1
ABDOMINAL PAIN: 1
PALPITATIONS: 0
HEADACHES: 0
WHEEZING: 0
FOCAL WEAKNESS: 0
MYALGIAS: 0
DIARRHEA: 0
PHOTOPHOBIA: 0
FEVER: 0
COUGH: 0
CHILLS: 0
SHORTNESS OF BREATH: 0
TINGLING: 0
DEPRESSION: 0
DIZZINESS: 0
SORE THROAT: 0

## 2018-05-09 ASSESSMENT — PAIN SCALES - GENERAL
PAINLEVEL_OUTOF10: 6
PAINLEVEL_OUTOF10: 3
PAINLEVEL_OUTOF10: 5
PAINLEVEL_OUTOF10: 3
PAINLEVEL_OUTOF10: 0
PAINLEVEL_OUTOF10: 3
PAINLEVEL_OUTOF10: 5
PAINLEVEL_OUTOF10: 6

## 2018-05-09 ASSESSMENT — COPD QUESTIONNAIRES
COPD SCREENING SCORE: 5
DURING THE PAST 4 WEEKS HOW MUCH DID YOU FEEL SHORT OF BREATH: SOME OF THE TIME
DO YOU EVER COUGH UP ANY MUCUS OR PHLEGM?: YES, EVERY DAY
IN THE PAST 12 MONTHS DO YOU DO LESS THAN YOU USED TO BECAUSE OF YOUR BREATHING PROBLEMS: DISAGREE/UNSURE
HAVE YOU SMOKED AT LEAST 100 CIGARETTES IN YOUR ENTIRE LIFE: YES

## 2018-05-09 ASSESSMENT — LIFESTYLE VARIABLES
EVER FELT BAD OR GUILTY ABOUT YOUR DRINKING: NO
CONSUMPTION TOTAL: NEGATIVE
HOW MANY TIMES IN THE PAST YEAR HAVE YOU HAD 5 OR MORE DRINKS IN A DAY: 0
TOTAL SCORE: 0
ALCOHOL_USE: YES
EVER HAD A DRINK FIRST THING IN THE MORNING TO STEADY YOUR NERVES TO GET RID OF A HANGOVER: NO
HAVE YOU EVER FELT YOU SHOULD CUT DOWN ON YOUR DRINKING: NO
AVERAGE NUMBER OF DAYS PER WEEK YOU HAVE A DRINK CONTAINING ALCOHOL: 1
EVER_SMOKED: YES
EVER_SMOKED: YES
ON A TYPICAL DAY WHEN YOU DRINK ALCOHOL HOW MANY DRINKS DO YOU HAVE: 5
TOTAL SCORE: 0
HAVE PEOPLE ANNOYED YOU BY CRITICIZING YOUR DRINKING: NO
TOTAL SCORE: 0

## 2018-05-09 ASSESSMENT — PATIENT HEALTH QUESTIONNAIRE - PHQ9
2. FEELING DOWN, DEPRESSED, IRRITABLE, OR HOPELESS: NOT AT ALL
SUM OF ALL RESPONSES TO PHQ9 QUESTIONS 1 AND 2: 0
1. LITTLE INTEREST OR PLEASURE IN DOING THINGS: NOT AT ALL

## 2018-05-09 NOTE — ASSESSMENT & PLAN NOTE
Patient has a known history of chronic pancreatitis in the setting of previous cholecystectomy as well as alcohol abuse. He has not had a flare in one year.   Patient is continued on IV fluids,will advance diet as tolerated. Patient is on antiemetics prn.

## 2018-05-09 NOTE — PROGRESS NOTES
Report received from Agustina about 1530 and pt walked with his cane about 1540 after receiving morphine.  Pt has been tolerating clears; states he still has pain when it hits his epigastric area, but not as bad as yesterday.  When assessing his ankles, pt states he recently had surgery on his tib/fib on left ankle and there is a healing incision.  VSS.  IVF infusing through newly placed IV.

## 2018-05-09 NOTE — ED PROVIDER NOTES
ED Provider Note  Chief Complaint:   Epigastric pain    HPI:  Wero Thorpe is a 42 y.o. male who presents with abdominal pain. His symptoms began 4-5 hours ago after drinking a few beers at a barbecue. Describes pain as localized to the center upper abdomen. He then developed associated nausea and vomiting, states he does have some chest discomfort only when vomiting. He does have a history of pancreatitis and states that today's symptoms are very similar. He denies any lower abdominal pain, denies fevers. States that he tried waiting to see if symptoms would improve, however they have steadily worsened. He says he is now no longer able to tolerate fluids, cannot drink water without vomiting. Symptoms are exacerbated by oral intake, no alleviating factors.    He does have some chest discomfort as described above, only while vomiting. He has no leg pain or leg swelling, no rashes or lesions, no headaches, no neck or back pain, no cough, no congestion. He has no history of impaired immunity. Denies daily alcohol use at this time.    Review of Systems:  See HPI for pertinent positives and negatives. All other systems negative.    Past Medical History:   has a past medical history of Asthma; Bowel habit changes; Bronchitis (8/2016); Chronic pancreatitis ([facility]); Cough (9/23/16); Fall (10/8/2016); Heart burn; Hypertension; Lumbar arthropathy; Pain (9/23/16); Pancreatitis; Psychiatric problem; Renal disorder (6/2016); and Urinary bladder disorder.    Social History:  Social History     Social History Main Topics   • Smoking status: Current Every Day Smoker     Packs/day: 0.50     Years: 20.00     Types: Cigarettes     Last attempt to quit: 4/15/2011   • Smokeless tobacco: Never Used   • Alcohol use No      Comment: prior hx of alcoholism   • Drug use: No      Comment: denies   • Sexual activity: Yes       Surgical History:   has a past surgical history that includes skull fracture depressed elevation (1995);  cholecystectomy (2007); tonsillectomy and adenoidectomy (1989); gastroscopy-endo (N/A, 10/21/2016); egd w/endoscopic ultrasound (N/A, 10/21/2016); ercp-diagnostic (N/A, 10/21/2016); and ankle orif (Left, 11/10/2017).    Current Medications:  Home Medications     Reviewed by Sruthi Tripathi R.N. (Registered Nurse) on 05/09/18 at 0230  Med List Status: Complete   Medication Last Dose Status   albuterol 108 (90 Base) MCG/ACT Aero Soln inhalation aerosol  Active   aspirin EC (ECOTRIN) 325 MG Tablet Delayed Response 5/9/2018 Active   ibuprofen (MOTRIN) 800 MG Tab 5/9/2018 Active   Multiple Vitamins-Minerals (MULTIVITAMIN) Liquid 5/9/2018 Active   Omega-3 Fatty Acids (FISH OIL PO) 5/9/2018 Active   omeprazole (PRILOSEC) 20 MG delayed-release capsule  Active   Pancrelipase, Lip-Prot-Amyl, (CREON) 79954 UNITS Cap DR Particles 11/13/2017 Active   trazodone (DESYREL) 100 MG Tab 11/13/2017 Active                Allergies:  Allergies   Allergen Reactions   • Nkda [No Known Drug Allergy]        Physical Exam:  Vital Signs: /74   Pulse 92   Temp 36.7 °C (98.1 °F)   Resp 18   Ht 1.829 m (6')   Wt 90.7 kg (200 lb)   SpO2 93%   BMI 27.12 kg/m²   Constitutional: Alert, no acute distress  HENT: Dry mucus membranes, normal posterior pharynx, no intraoral lesions  Eyes: Pupils equal and reactive, normal conjunctiva  Neck: Supple, normal range of motion, no stridor  Cardiovascular: Extremities are warm and well perfused, no murmer appreciated, normal cardiac auscultation  Pulmonary: No respiratory distress, normal work of breathing, no accessory muscule usage, breath sounds clear and equal bilaterally  Abdomen: Soft, non-distended, epigastric tenderness to palpation present without rebound or guarding, no peritoneal signs  Skin: Warm, dry, no rashes or lesions  Musculoskeletal: Normal range of motion in all extremities, no swelling or deformity noted  Neurologic: Alert, oriented, normal speech, normal motor  function  Psychiatric: Normal and appropriate mood and affect    Labs:  Labs Reviewed   CBC WITH DIFFERENTIAL - Abnormal; Notable for the following:        Result Value    WBC 12.7 (*)     MCHC 35.7 (*)     Neutrophils-Polys 73.00 (*)     Lymphocytes 19.40 (*)     Monos (Absolute) 0.87 (*)     Neutrophils (Absolute) 9.29 (*)     All other components within normal limits    Narrative:     Indicate which anticoagulants the patient is on:->UNKNOWN   COMP METABOLIC PANEL - Abnormal; Notable for the following:     Glucose 117 (*)     All other components within normal limits    Narrative:     Indicate which anticoagulants the patient is on:->UNKNOWN   APTT - Abnormal; Notable for the following:     APTT 24.2 (*)     All other components within normal limits    Narrative:     Indicate which anticoagulants the patient is on:->UNKNOWN   LIPASE - Abnormal; Notable for the following:     Lipase 208 (*)     All other components within normal limits    Narrative:     Indicate which anticoagulants the patient is on:->UNKNOWN   TROPONIN    Narrative:     Indicate which anticoagulants the patient is on:->UNKNOWN   BTYPE NATRIURETIC PEPTIDE    Narrative:     Indicate which anticoagulants the patient is on:->UNKNOWN   PROTHROMBIN TIME    Narrative:     Indicate which anticoagulants the patient is on:->UNKNOWN   DIFFERENTIAL COMMENT    Narrative:     Indicate which anticoagulants the patient is on:->UNKNOWN   ESTIMATED GFR    Narrative:     Indicate which anticoagulants the patient is on:->UNKNOWN       Radiology:  DX-CHEST-LIMITED (1 VIEW)   Final Result      No acute cardiopulmonary disease.         EKG: Rate 101, sinus tachycardia, no ST elevation or depression, no T-wave inversions    Medical records reviewed for continuity of care. Discharge summary reviewed from 5/3/16. Patient admitted for pancreatitis, lipase improved from 127-56. Transaminitis noted. Negative hepatitis panel. Hypokalemia, resolved with replacement.  Additionally patient had alcohol withdrawal noted at that visit.    Differential diagnosis:  Pancreatitis, gastritis, alcoholic gastritis, ACS, peptic ulcer disease    MDM:  History and physical exam as documented above. Patient presents with 4-5 hours of epigastric pain after drinking at a barbecue. Symptoms and physical exam are consistent with pancreatitis. He was treated with Zofran and Dilaudid in the emergency department. Fluid bolus initiated for tachycardia. On reassessment tachycardia has resolved.    On laboratory evaluation troponin is undetectable. CMP within normal limits. Lipase is elevated at 208, consistent with pancreatitis. BNP is 16, less concerning for heart failure. White blood count mildly elevated to 12.7. No bands resulted at this time.    Lipase has been elevated to greater than 400 with previous episodes of pancreatitis. CT of the pancreas on 7/6/16 demonstrates minimal stranding surrounding the pancreatic head. No pancreatic mass identified.    Chest x-ray is negative for acute process.    On my reassessment, patient is still symptomatic and is unable to tolerate oral fluids. Pain is improved but still not well controlled. Plan at this time is for admission to the hospitalist service for IV fluids, pain medication, antiemetics and discharge when able to tolerate PO. As patient has a known history of pancreatitis with identical symptoms, to complete CT scan is necessary at this time.    Disposition:  Admit to hospitalist in guarded condition.    Final Impression:  1. Alcohol-induced acute pancreatitis, unspecified complication status        Electronically signed by: Lizy Diaz, 5/8/2018 11:36 PM

## 2018-05-09 NOTE — PROGRESS NOTES
Patient admitted after midnight by Dr. Montes. This is a 42-year-old male who presents for abdominal pain. Patient has history of chronic pancreatitis and presents for acute on chronic pancreatitis.  on IV fluids, pain medications as needed. He is started on a clear liquid diet. Patient states that he has not drank alcohol for past one year. Tobacco cessation encouraged. Patient continued on nicotine replacement therapy.

## 2018-05-09 NOTE — H&P
" Hospital Medicine History and Physical    Date of Service  5/9/2018    Chief Complaint  Abdominal pain    History of Presenting Illness  42 y.o. male who presented on 5/8/2018 with abdominal pain. The patient has a known history of chronic pancreatitis in the setting of previous cholelithiasis status post cholecystectomy and ERCP in addition to alcoholism. He has been sober for the past year and has not had a pancreatic flare since then. Today, he attended a [facility] where he had \"a few beers\"and shortly following this, began to have a diffuse abdominal pain which gradually settled in his right upper quadrant. It was associated with nausea and vomiting. Ports that this feels similar to his previous episodes of pancreatitis. He denies any fevers, chills, headache, chest pain, shortness of breath, diarrhea or dysuria. Upon assessment in the emergency room, the patient is again noted to have an elevated lipase level. He will be made for further treatment.        Primary Care Physician  Maria E Rodgers M.D.    Consultants  None    Code Status  Full    Review of Systems  Review of Systems   Constitutional: Negative for chills and fever.   HENT: Negative for congestion and sore throat.    Eyes: Negative for photophobia.   Respiratory: Negative for cough, shortness of breath and wheezing.    Cardiovascular: Negative for chest pain and palpitations.   Gastrointestinal: Positive for abdominal pain, nausea and vomiting. Negative for diarrhea.   Genitourinary: Negative for dysuria.   Musculoskeletal: Negative for myalgias.   Skin: Negative.    Neurological: Negative for dizziness, tingling, focal weakness and headaches.   Psychiatric/Behavioral: Negative for depression and suicidal ideas.        Past Medical History  Past Medical History:   Diagnosis Date   • Fall 10/8/2016    fell ride side trunk area bruised with popping sensation rib area   • Cough 9/23/16    dry cough   • Pain 9/23/16    abdominal, bladder   • " "Bronchitis 8/2016    persistent, treated with three rounds of antibiotics, 10/12/2016 \"symptoms improving on new antibiotic\"   • Renal disorder 6/2016    kidney stones, hematuria   • Asthma     allergy induced   • Bowel habit changes     diarrhea   • Chronic pancreatitis (HCC)    • Heart burn    • Hypertension     no treatment \"r/t pain\"   • Lumbar arthropathy    • Pancreatitis    • Psychiatric problem     anxiety   • Urinary bladder disorder     burning with urination on occasion; trace blood in urine       Surgical History  Past Surgical History:   Procedure Laterality Date   • ANKLE ORIF Left 11/10/2017    Procedure: ANKLE ORIF;  Surgeon: Shaun Deluca M.D.;  Location: [facility];  Service: Orthopedics   • GASTROSCOPY-ENDO N/A 10/21/2016    Procedure: GASTROSCOPY-ENDO;  Surgeon: Monty Clay M.D.;  Location: [facility];  Service:    • EGD W/ENDOSCOPIC ULTRASOUND N/A 10/21/2016    Procedure: EGD W/ENDOSCOPIC ULTRASOUND - UPPER, RADIAL;  Surgeon: Monty Clay M.D.;  Location: [facility];  Service:    • ERCP-DIAGNOSTIC N/A 10/21/2016    Procedure: ERCP-DIAGNOSTIC;  Surgeon: Monty Clay M.D.;  Location: [facility];  Service:    • CHOLECYSTECTOMY  2007    laparoscopic   • SKULL FRACTURE DEPRESSED ELEVATION  1995    reconstructive    • TONSILLECTOMY AND ADENOIDECTOMY  1989       Medications  No current facility-administered medications on file prior to encounter.      Current Outpatient Prescriptions on File Prior to Encounter   Medication Sig Dispense Refill   • albuterol 108 (90 Base) MCG/ACT Aero Soln inhalation aerosol Inhale 2 Puffs by mouth every 2 hours as needed for Shortness of Breath (or cough). 1 Inhaler 2   • aspirin EC (ECOTRIN) 325 MG Tablet Delayed Response Take 1 Tab by mouth 2 Times a Day. 60 Tab 0   • ibuprofen (MOTRIN) 800 MG Tab Take 800 mg by mouth every 8 hours as needed for Mild Pain.     • Pancrelipase, " Lip-Prot-Amyl, (CREON) 62964 UNITS Cap DR Particles Take 2 Tabs by mouth 3 times a day before meals. Takes two and before snacks     • Multiple Vitamins-Minerals (MULTIVITAMIN) Liquid Take 1 Drop by mouth every day.     • Omega-3 Fatty Acids (FISH OIL PO) Take 1 Cap by mouth every day. One tsp liquid fish oil daily     • trazodone (DESYREL) 100 MG Tab Take 100 mg by mouth every evening.     • omeprazole (PRILOSEC) 20 MG delayed-release capsule Take 20 mg by mouth every day.         Family History  Family History   Problem Relation Age of Onset   • Cancer Mother      breast   • Cancer Brother      head, neck and lung   • Cancer Maternal Aunt      breast   • Hypertension Father        Social History  Social History   Substance Use Topics   • Smoking status: Current Every Day Smoker     Packs/day: 0.50     Years: 20.00     Types: Cigarettes     Last attempt to quit: 4/15/2011   • Smokeless tobacco: Never Used   • Alcohol use No      Comment: prior hx of alcoholism       Allergies  Allergies   Allergen Reactions   • Nkda [No Known Drug Allergy]         Physical Exam  Laboratory   Hemodynamics  Temp (24hrs), Av.9 °C (98.4 °F), Min:36.7 °C (98.1 °F), Max:37 °C (98.6 °F)   Temperature: 36.7 °C (98.1 °F)  Pulse  Av  Min: 98  Max: 103    Blood Pressure: 119/74      Respiratory      Respiration: 18, Pulse Oximetry: 96 %             Physical Exam   Constitutional: He is oriented to person, place, and time. No distress.   HENT:   Head: Normocephalic and atraumatic.   Right Ear: External ear normal.   Left Ear: External ear normal.   Eyes: EOM are normal. Right eye exhibits no discharge. Left eye exhibits no discharge.   Neck: Neck supple. No JVD present.   Cardiovascular: Normal rate, regular rhythm and normal heart sounds.    Pulmonary/Chest: Effort normal and breath sounds normal. No respiratory distress. He exhibits no tenderness.   Abdominal: Soft. Bowel sounds are normal. He exhibits no distension. There is  tenderness (right upper quadrant).   Musculoskeletal: He exhibits no edema.   Neurological: He is alert and oriented to person, place, and time. No cranial nerve deficit.   Skin: Skin is dry. He is not diaphoretic. No erythema.   Psychiatric: He has a normal mood and affect. His behavior is normal.   Nursing note and vitals reviewed.    Capillary refill less than 3 seconds, distal pulses intact    Recent Labs      05/08/18 2120   WBC  12.7*   RBC  5.62   HEMOGLOBIN  17.3   HEMATOCRIT  48.5   MCV  86.3   MCH  30.8   MCHC  35.7*   RDW  39.6   PLATELETCT  225   MPV  9.4     Recent Labs      05/08/18 2120   SODIUM  138   POTASSIUM  3.7   CHLORIDE  107   CO2  25   GLUCOSE  117*   BUN  9   CREATININE  0.72   CALCIUM  8.4     Recent Labs      05/08/18 2120   ALTSGPT  28   ASTSGOT  20   ALKPHOSPHAT  68   TBILIRUBIN  0.7   LIPASE  208*   GLUCOSE  117*     Recent Labs      05/08/18 2120   APTT  24.2*   INR  0.95     Recent Labs      05/08/18 2120   BNPBTYPENAT  16         Lab Results   Component Value Date    TROPONINI <0.02 05/08/2018       Imaging  Dx-chest-limited (1 View)    Result Date: 5/8/2018 5/8/2018 9:26 PM HISTORY/REASON FOR EXAM:  Chest Pain. TECHNIQUE/EXAM DESCRIPTION AND NUMBER OF VIEWS: Single AP view of the chest. COMPARISON:  10/12/2016 FINDINGS: Lungs:  No pulmonary infiltrates or consolidations are noted. Pleura:  There is no pleural effusion or pneumothorax. Heart and mediastinum:  The heart silhouette is within normal limits. Bones:  Normal     No acute cardiopulmonary disease.     Assessment/Plan     Anticipate that patient will need greater than 2 midnights for management of the discussed medical issues.    * Pancreatitis   Assessment & Plan    Patient has a known history of chronic pancreatitis in the setting of previous cholecystectomy as well as alcohol abuse. He has not had a flare in one year. He will be admitted to the hospital and kept nothing by mouth to allow for bowel rest. We'll  place on aggressive IV fluid resuscitation. He will receive symptomatic management for pain and nausea, I will continue to trend lipase levels. Once he has evidence of clinical improvement, then we will begin to advance his diet. In the meantime, he'll be nothing by mouth for now.        Tobacco abuse   Assessment & Plan    Greater than 3 minutes spent at bedside and tobacco cessation counseling, nicotine replacement ordered, continue to encourage cessation.        Anxiety   Assessment & Plan    Okay to continue home trazodone.          Prophylaxis: Sequential compression devices for DVT prophylaxis,  PPI indicated, bowel protocol

## 2018-05-09 NOTE — PROGRESS NOTES
Report received from JUANITA Stevens. Pt assisted to bathroom on med tele unit; ambulates with steady gait. POC discussed with pt, pt verbalizes understanding of treatment plan. Pt instructed to call Rn with any and all needs and agrees. Will continue with care.

## 2018-05-09 NOTE — ED NOTES
Report to Rodney GRANT on floor. Pt transported in wheelchair to [facility] with all belongings.

## 2018-05-09 NOTE — ASSESSMENT & PLAN NOTE
Tobacco cessation counseling provided, nicotine replacement ordered, continue to encourage cessation.

## 2018-05-09 NOTE — ED NOTES
Pt bib self with c/o epigrastric and chest pain that started earlier today after having EtOH. Pt reports hx of pancreatitis and admits to drinking earlier today at a BBQ.

## 2018-05-09 NOTE — ED NOTES
Pt states pain is still tolerable and nausea is gone. Pt provide more ice chips. Pt moved to a room.

## 2018-05-09 NOTE — ED NOTES
Pt in arreola bed. ERP over to see pt. Pt c/o upper abdomina pain that feels like pancreatitis he has had before. Pt also c/o nausea and prior episodes of vomiting.

## 2018-05-10 ENCOUNTER — PATIENT OUTREACH (OUTPATIENT)
Dept: HEALTH INFORMATION MANAGEMENT | Facility: OTHER | Age: 43
End: 2018-05-10

## 2018-05-10 PROBLEM — E87.6 HYPOKALEMIA: Status: ACTIVE | Noted: 2018-05-10

## 2018-05-10 PROBLEM — R19.7 DIARRHEA: Status: ACTIVE | Noted: 2018-05-10

## 2018-05-10 LAB
ANION GAP SERPL CALC-SCNC: 6 MMOL/L (ref 0–11.9)
BUN SERPL-MCNC: 8 MG/DL (ref 8–22)
CALCIUM SERPL-MCNC: 7.9 MG/DL (ref 8.4–10.2)
CHLORIDE SERPL-SCNC: 105 MMOL/L (ref 96–112)
CO2 SERPL-SCNC: 29 MMOL/L (ref 20–33)
CREAT SERPL-MCNC: 0.82 MG/DL (ref 0.5–1.4)
ERYTHROCYTE [DISTWIDTH] IN BLOOD BY AUTOMATED COUNT: 41.2 FL (ref 35.9–50)
GLUCOSE SERPL-MCNC: 104 MG/DL (ref 65–99)
HCT VFR BLD AUTO: 42.2 % (ref 42–52)
HGB BLD-MCNC: 14.3 G/DL (ref 14–18)
LIPASE SERPL-CCNC: 35 U/L (ref 7–58)
MAGNESIUM SERPL-MCNC: 1.9 MG/DL (ref 1.5–2.5)
MCH RBC QN AUTO: 30.6 PG (ref 27–33)
MCHC RBC AUTO-ENTMCNC: 33.9 G/DL (ref 33.7–35.3)
MCV RBC AUTO: 90.2 FL (ref 81.4–97.8)
PLATELET # BLD AUTO: 176 K/UL (ref 164–446)
PMV BLD AUTO: 9.3 FL (ref 9–12.9)
POTASSIUM SERPL-SCNC: 2.9 MMOL/L (ref 3.6–5.5)
RBC # BLD AUTO: 4.68 M/UL (ref 4.7–6.1)
SODIUM SERPL-SCNC: 140 MMOL/L (ref 135–145)
WBC # BLD AUTO: 6.7 K/UL (ref 4.8–10.8)

## 2018-05-10 PROCEDURE — 770006 HCHG ROOM/CARE - MED/SURG/GYN SEMI*

## 2018-05-10 PROCEDURE — 99232 SBSQ HOSP IP/OBS MODERATE 35: CPT | Performed by: INTERNAL MEDICINE

## 2018-05-10 PROCEDURE — 700102 HCHG RX REV CODE 250 W/ 637 OVERRIDE(OP): Performed by: HOSPITALIST

## 2018-05-10 PROCEDURE — 83735 ASSAY OF MAGNESIUM: CPT

## 2018-05-10 PROCEDURE — 80048 BASIC METABOLIC PNL TOTAL CA: CPT

## 2018-05-10 PROCEDURE — 700111 HCHG RX REV CODE 636 W/ 250 OVERRIDE (IP): Performed by: HOSPITALIST

## 2018-05-10 PROCEDURE — 85027 COMPLETE CBC AUTOMATED: CPT

## 2018-05-10 PROCEDURE — 700105 HCHG RX REV CODE 258: Performed by: HOSPITALIST

## 2018-05-10 PROCEDURE — 99406 BEHAV CHNG SMOKING 3-10 MIN: CPT

## 2018-05-10 PROCEDURE — A9270 NON-COVERED ITEM OR SERVICE: HCPCS | Performed by: HOSPITALIST

## 2018-05-10 PROCEDURE — 700111 HCHG RX REV CODE 636 W/ 250 OVERRIDE (IP): Performed by: INTERNAL MEDICINE

## 2018-05-10 PROCEDURE — 83690 ASSAY OF LIPASE: CPT

## 2018-05-10 RX ORDER — POTASSIUM CHLORIDE 7.45 MG/ML
10 INJECTION INTRAVENOUS ONCE
Status: COMPLETED | OUTPATIENT
Start: 2018-05-10 | End: 2018-05-10

## 2018-05-10 RX ADMIN — MORPHINE SULFATE 4 MG: 4 INJECTION INTRAVENOUS at 06:52

## 2018-05-10 RX ADMIN — OXYCODONE HYDROCHLORIDE 10 MG: 10 TABLET ORAL at 22:56

## 2018-05-10 RX ADMIN — OXYCODONE HYDROCHLORIDE 10 MG: 10 TABLET ORAL at 16:02

## 2018-05-10 RX ADMIN — ALBUTEROL SULFATE 2 PUFF: 90 AEROSOL, METERED RESPIRATORY (INHALATION) at 21:30

## 2018-05-10 RX ADMIN — OXYCODONE HYDROCHLORIDE 10 MG: 10 TABLET ORAL at 09:37

## 2018-05-10 RX ADMIN — OMEPRAZOLE 20 MG: 20 CAPSULE, DELAYED RELEASE ORAL at 09:38

## 2018-05-10 RX ADMIN — ALBUTEROL SULFATE 2 PUFF: 90 AEROSOL, METERED RESPIRATORY (INHALATION) at 09:35

## 2018-05-10 RX ADMIN — POTASSIUM CHLORIDE 10 MEQ: 7.46 INJECTION, SOLUTION INTRAVENOUS at 06:57

## 2018-05-10 RX ADMIN — SODIUM CHLORIDE: 9 INJECTION, SOLUTION INTRAVENOUS at 12:49

## 2018-05-10 RX ADMIN — ALBUTEROL SULFATE 2 PUFF: 90 AEROSOL, METERED RESPIRATORY (INHALATION) at 19:05

## 2018-05-10 RX ADMIN — NICOTINE 21 MG: 21 PATCH, EXTENDED RELEASE TRANSDERMAL at 06:03

## 2018-05-10 RX ADMIN — SODIUM CHLORIDE: 9 INJECTION, SOLUTION INTRAVENOUS at 21:45

## 2018-05-10 RX ADMIN — OXYCODONE HYDROCHLORIDE 10 MG: 10 TABLET ORAL at 19:07

## 2018-05-10 RX ADMIN — MORPHINE SULFATE 4 MG: 4 INJECTION INTRAVENOUS at 03:41

## 2018-05-10 RX ADMIN — ALBUTEROL SULFATE 2 PUFF: 90 AEROSOL, METERED RESPIRATORY (INHALATION) at 03:41

## 2018-05-10 RX ADMIN — OXYCODONE HYDROCHLORIDE 10 MG: 10 TABLET ORAL at 12:48

## 2018-05-10 RX ADMIN — TRAZODONE HYDROCHLORIDE 50 MG: 50 TABLET ORAL at 22:56

## 2018-05-10 ASSESSMENT — ENCOUNTER SYMPTOMS
BLOOD IN STOOL: 0
INSOMNIA: 0
DIARRHEA: 1
DEPRESSION: 0
DIZZINESS: 0
PALPITATIONS: 0
CONSTIPATION: 0
NAUSEA: 1
SORE THROAT: 0
SHORTNESS OF BREATH: 0
CHILLS: 0
FEVER: 0
HEADACHES: 0
VOMITING: 0
FLANK PAIN: 0
ABDOMINAL PAIN: 1
COUGH: 0
SENSORY CHANGE: 0
EYE PAIN: 0
SINUS PAIN: 0

## 2018-05-10 ASSESSMENT — PAIN SCALES - GENERAL
PAINLEVEL_OUTOF10: 0
PAINLEVEL_OUTOF10: 7
PAINLEVEL_OUTOF10: 7
PAINLEVEL_OUTOF10: 8
PAINLEVEL_OUTOF10: 8
PAINLEVEL_OUTOF10: 5
PAINLEVEL_OUTOF10: 4
PAINLEVEL_OUTOF10: 5

## 2018-05-10 NOTE — PROGRESS NOTES
2158 Due medications given per MAR. IV patent and IVF infusing. Assessment complete, see flowsheet. VSS. Pt A&Ox4. Pt c/o abdominal pain 5/10, pt medicated w/ Morphine per MAR. Pt given Albuterol for c/o SOB. Pt denies any N/V at this time. Pt resting comfortably in bed, watching TV. POC discussed. No other needs at this time.

## 2018-05-10 NOTE — CARE PLAN
Problem: Discharge Barriers/Planning  Goal: Patient's continuum of care needs will be met    Intervention: Involve patient and significant other/support system in setting and prioritizing goals for hospital stay and discharge  Made a f/u with new pcp and getting a referral for GI.       Problem: Pain Management  Goal: Pain level will decrease to patient's comfort goal    Intervention: Follow pain managment plan developed in collaboration with patient and Interdisciplinary Team  Have changed from the IV morphine to the oral oxycodone and pt states his pain is better.

## 2018-05-10 NOTE — PROGRESS NOTES
[facility] Hospitalist Progress Note    Date of Service: 5/10/2018    Chief Complaint  42 y.o. male admitted 2018 with acute on chronic pancreatitis    Interval Problem Update  Patient complains of abdominal pain that occurs after drinking liquids. He also complains of nausea associated with it. He states his abdominal pain is improved compared to admission. He also complains of loose stools overnight but denies any hematochezia, hematuria/melena. Patient denies any recent antibiotic use or travel.    Plan of care was discussed with patient and wife at bedside and all questions were answered.      Consultants/Specialty  None    Disposition  TBD        Review of Systems   Constitutional: Negative for chills and fever.   HENT: Negative for sinus pain and sore throat.    Eyes: Negative for pain.   Respiratory: Negative for cough and shortness of breath.    Cardiovascular: Negative for chest pain, palpitations and leg swelling.   Gastrointestinal: Positive for abdominal pain (after eating), diarrhea (nonbloody, non-foul-smelling) and nausea (after eating). Negative for blood in stool, constipation, melena and vomiting.   Genitourinary: Negative for dysuria, flank pain, frequency, hematuria and urgency.   Musculoskeletal: Negative for joint pain.   Neurological: Negative for dizziness, sensory change and headaches.   Psychiatric/Behavioral: Negative for depression. The patient does not have insomnia.       Physical Exam  Laboratory/Imaging   Hemodynamics  Temp (24hrs), Av.8 °C (98.2 °F), Min:36.5 °C (97.7 °F), Max:37.2 °C (99 °F)   Temperature: 36.6 °C (97.8 °F)  Pulse  Av.3  Min: 56  Max: 103    Blood Pressure: 130/88, NIBP: 140/82      Respiratory      Respiration: 18, Pulse Oximetry: 94 %             Fluids    Intake/Output Summary (Last 24 hours) at 05/10/18 1131  Last data filed at 05/10/18 0900   Gross per 24 hour   Intake             1680 ml   Output                0 ml   Net             1680 ml        Nutrition  Orders Placed This Encounter   Procedures   • DIET ORDER     Standing Status:   Standing     Number of Occurrences:   1     Order Specific Question:   Diet:     Answer:   Clear Liquids - No Red Foods [12]     Physical Exam   Constitutional: He is oriented to person, place, and time. He appears well-developed and well-nourished. No distress.   HENT:   Head: Normocephalic and atraumatic.   Right Ear: External ear normal.   Left Ear: External ear normal.   Eyes: Conjunctivae are normal. Right eye exhibits no discharge. Left eye exhibits no discharge. No scleral icterus.   Neck: Neck supple. No JVD present.   Cardiovascular: Normal rate, regular rhythm and normal heart sounds.    No murmur heard.  Pulmonary/Chest: Effort normal and breath sounds normal. No stridor. No respiratory distress. He has no wheezes. He has no rales.   Abdominal: Soft. He exhibits no distension. There is tenderness (epigastric region, mild). There is no rebound and no guarding.   Musculoskeletal: He exhibits no edema.   Neurological: He is alert and oriented to person, place, and time. No cranial nerve deficit.   Skin: Skin is warm and dry. He is not diaphoretic. No erythema.   Psychiatric: He has a normal mood and affect. His behavior is normal. Thought content normal.       Recent Labs      05/08/18   2120  05/10/18   0415   WBC  12.7*  6.7   RBC  5.62  4.68*   HEMOGLOBIN  17.3  14.3   HEMATOCRIT  48.5  42.2   MCV  86.3  90.2   MCH  30.8  30.6   MCHC  35.7*  33.9   RDW  39.6  41.2   PLATELETCT  225  176   MPV  9.4  9.3     Recent Labs      05/08/18   2120  05/10/18   0415   SODIUM  138  140   POTASSIUM  3.7  2.9*   CHLORIDE  107  105   CO2  25  29   GLUCOSE  117*  104*   BUN  9  8   CREATININE  0.72  0.82   CALCIUM  8.4  7.9*     Recent Labs      05/08/18 2120   APTT  24.2*   INR  0.95     Recent Labs      05/08/18 2120   BNPBTYPENAT  16              Assessment/Plan     * Pancreatitis- (present on admission)   Assessment &  Plan    Patient has a known history of chronic pancreatitis in the setting of previous cholecystectomy as well as alcohol abuse. He has not had a flare in one year.   Patient is continued on IV fluids, clear liquid diet at present. Will advance diet as tolerated Patient is on antiemetics prn.          Diarrhea   Assessment & Plan    Patient has had multiple loose stools that began overnight. Patient low risk for infectious cause. Will continue to monitor and if has recurrent diarrhea will check for C. difficile for further evaluation. Will hold off on Imodium at present time until infectious causes are ruled out.        Hypokalemia   Assessment & Plan    Replacing, follow BMP tomorrow. Mg 1.9.        Tobacco abuse- (present on admission)   Assessment & Plan    Tobacco cessation counseling provided, nicotine replacement ordered, continue to encourage cessation.        Anxiety- (present on admission)   Assessment & Plan    Okay to continue home trazodone.          Quality-Core Measures

## 2018-05-10 NOTE — PROGRESS NOTES
1905 Bedside report received from day shift RNMikel. POC discussed. Pt resting comfortably in bed. Call light and personal belongings within reach. Hourly rounding and safety precautions in place.

## 2018-05-10 NOTE — ASSESSMENT & PLAN NOTE
Patient has had multiple loose stools that began overnight. Patient low risk for infectious cause. Will continue to monitor and if has recurrent diarrhea will check for C. difficile for further evaluation. Will hold off on Imodium at present time until infectious causes are ruled out.

## 2018-05-10 NOTE — PROGRESS NOTES
0341 Pt complaining of 5/10 abdominal pain. Pt medicated w/ Morphine per MAR. Pt also requesting Albuterol, administered per MAR. Pt sitting up in bed, eating. No other needs at this time.

## 2018-05-10 NOTE — CARE PLAN
Problem: Safety  Goal: Will remain free from falls  Outcome: PROGRESSING AS EXPECTED  Fall precautions in place. Kristyn Rodgers fall risk assessment completed. Bed in lowest position. Treaded slipper socks on pt. Call light and personal belongings within reach. Pt educated to call when in need of assistance.

## 2018-05-10 NOTE — PROGRESS NOTES
0700 Bedside report given to day shift RNMikel. POC discussed with pt. Pt resting comfortably in bed. Call light and personal belongings within reach. Hourly rounding and safety precautions in place.

## 2018-05-10 NOTE — RESPIRATORY CARE
"COPD Education by COPD Clinical Educator  5/10/2018 at 11:05 AM by Nikole Leon    Patient interviewed by COPD education team.  Patient refused full COPD program at this time, but agreed to short intervention.  A comprehensive packet including information about COPD, treatments, and smoking cessation given.  Smoking Cessation Intervention 3 -10 minutes completed.  Provided smoking cessation packet with \"Tips to Quit\" and flyer for \"Free Smoking Cessation Classes\". Provided \"Quit Card\" with the phone number to [email or web address] Quit Line for free nicotine replacement therapy.  "

## 2018-05-10 NOTE — PROGRESS NOTES
Report received from palmer Olvera awake and engaging in conversation.  Morning assessment done about 0750, pt reports that he has been having diarrhea since yesterday.  He reports that what color he eats is the color is pouring out his buttocks he says; bm at the time was blue as he was drinking blue gatorade.  Pt still reporting pain when he eats and cramping in his lower abdomen.  In talking about discharge pt asking for a new pcp as he has been without his normal medications for 2 weeks; called scheduling and made a new appointment and need for gi referral.  Thus far today pt has reported 6 bms with the last two becoming more solid.  Switched pain medication to 10 mg of oxycodone and he states it works better.  VSS.  IVF still infusing.  Report pale yellow urine.

## 2018-05-10 NOTE — PROGRESS NOTES
0657 Pt c/o of abdominal pain 5/10. PRN Morphine administered for pt's pain. IV K+ infusing. Pt resting comfortably in bed, no other needs at this time.

## 2018-05-10 NOTE — PROGRESS NOTES
2304 Pt given Trazodone for sleep. Pt also requesting food, pt given Jell-O and at edge of bed. Pt tolerating clears well. No other needs at this time.

## 2018-05-11 VITALS
BODY MASS INDEX: 24.4 KG/M2 | OXYGEN SATURATION: 97 % | SYSTOLIC BLOOD PRESSURE: 160 MMHG | HEART RATE: 78 BPM | WEIGHT: 184.08 LBS | DIASTOLIC BLOOD PRESSURE: 98 MMHG | HEIGHT: 73 IN | RESPIRATION RATE: 18 BRPM | TEMPERATURE: 98.1 F

## 2018-05-11 PROBLEM — K85.90 PANCREATITIS: Status: RESOLVED | Noted: 2018-05-09 | Resolved: 2018-05-11

## 2018-05-11 PROBLEM — E87.6 HYPOKALEMIA: Status: RESOLVED | Noted: 2018-05-10 | Resolved: 2018-05-11

## 2018-05-11 PROBLEM — R19.7 DIARRHEA: Status: RESOLVED | Noted: 2018-05-10 | Resolved: 2018-05-11

## 2018-05-11 LAB
ANION GAP SERPL CALC-SCNC: 7 MMOL/L (ref 0–11.9)
BUN SERPL-MCNC: 5 MG/DL (ref 8–22)
CALCIUM SERPL-MCNC: 8.4 MG/DL (ref 8.4–10.2)
CHLORIDE SERPL-SCNC: 107 MMOL/L (ref 96–112)
CO2 SERPL-SCNC: 27 MMOL/L (ref 20–33)
CREAT SERPL-MCNC: 0.78 MG/DL (ref 0.5–1.4)
GLUCOSE SERPL-MCNC: 85 MG/DL (ref 65–99)
POTASSIUM SERPL-SCNC: 3.8 MMOL/L (ref 3.6–5.5)
SODIUM SERPL-SCNC: 141 MMOL/L (ref 135–145)

## 2018-05-11 PROCEDURE — 80048 BASIC METABOLIC PNL TOTAL CA: CPT

## 2018-05-11 PROCEDURE — 99239 HOSP IP/OBS DSCHRG MGMT >30: CPT | Performed by: INTERNAL MEDICINE

## 2018-05-11 PROCEDURE — A9270 NON-COVERED ITEM OR SERVICE: HCPCS | Performed by: HOSPITALIST

## 2018-05-11 PROCEDURE — 700102 HCHG RX REV CODE 250 W/ 637 OVERRIDE(OP): Performed by: HOSPITALIST

## 2018-05-11 PROCEDURE — 700105 HCHG RX REV CODE 258: Performed by: HOSPITALIST

## 2018-05-11 RX ORDER — ALBUTEROL SULFATE 90 UG/1
2 AEROSOL, METERED RESPIRATORY (INHALATION)
Qty: 1 INHALER | Refills: 0 | Status: SHIPPED | OUTPATIENT
Start: 2018-05-11 | End: 2018-11-17

## 2018-05-11 RX ADMIN — SODIUM CHLORIDE: 9 INJECTION, SOLUTION INTRAVENOUS at 07:50

## 2018-05-11 RX ADMIN — OXYCODONE HYDROCHLORIDE 10 MG: 10 TABLET ORAL at 11:51

## 2018-05-11 RX ADMIN — NICOTINE 21 MG: 21 PATCH, EXTENDED RELEASE TRANSDERMAL at 05:32

## 2018-05-11 RX ADMIN — OMEPRAZOLE 20 MG: 20 CAPSULE, DELAYED RELEASE ORAL at 08:29

## 2018-05-11 RX ADMIN — OXYCODONE HYDROCHLORIDE 10 MG: 10 TABLET ORAL at 05:32

## 2018-05-11 RX ADMIN — ALBUTEROL SULFATE 2 PUFF: 90 AEROSOL, METERED RESPIRATORY (INHALATION) at 13:24

## 2018-05-11 RX ADMIN — OXYCODONE HYDROCHLORIDE 10 MG: 10 TABLET ORAL at 08:34

## 2018-05-11 RX ADMIN — ALBUTEROL SULFATE 2 PUFF: 90 AEROSOL, METERED RESPIRATORY (INHALATION) at 09:48

## 2018-05-11 RX ADMIN — ALBUTEROL SULFATE 2 PUFF: 90 AEROSOL, METERED RESPIRATORY (INHALATION) at 05:35

## 2018-05-11 ASSESSMENT — PAIN SCALES - GENERAL
PAINLEVEL_OUTOF10: 3
PAINLEVEL_OUTOF10: 5
PAINLEVEL_OUTOF10: 4
PAINLEVEL_OUTOF10: 3
PAINLEVEL_OUTOF10: 5

## 2018-05-11 ASSESSMENT — PATIENT HEALTH QUESTIONNAIRE - PHQ9
SUM OF ALL RESPONSES TO PHQ9 QUESTIONS 1 AND 2: 0
1. LITTLE INTEREST OR PLEASURE IN DOING THINGS: NOT AT ALL
2. FEELING DOWN, DEPRESSED, IRRITABLE, OR HOPELESS: NOT AT ALL

## 2018-05-11 ASSESSMENT — ENCOUNTER SYMPTOMS
SHORTNESS OF BREATH: 0
ABDOMINAL PAIN: 1
CONSTIPATION: 0
COUGH: 0
NAUSEA: 0
FEVER: 0
SORE THROAT: 0
EYE PAIN: 0
VOMITING: 0
DIARRHEA: 0
BLOOD IN STOOL: 0
HEADACHES: 0
INSOMNIA: 0
CHILLS: 0

## 2018-05-11 NOTE — PROGRESS NOTES
Received report from Mookie GRANT. Discussed plan of care and safety measures in place. No further needs at this time.

## 2018-05-11 NOTE — PROGRESS NOTES
[facility] Hospitalist Progress Note    Date of Service: 2018    Chief Complaint  42 y.o. male admitted 2018 with acute on chronic pancreatitis    Interval Problem Update  Patient states that his diarrhea resolved yesterday afternoon. He is able to tolerate a clear liquid diet. We'll advance his diet as tolerated. Patient's hypokalemia improved.      Consultants/Specialty  None    Disposition  TBD        Review of Systems   Constitutional: Negative for chills and fever.   HENT: Negative for sore throat.    Eyes: Negative for pain.   Respiratory: Negative for cough and shortness of breath.    Cardiovascular: Negative for chest pain and leg swelling.   Gastrointestinal: Positive for abdominal pain (after eating). Negative for blood in stool, constipation, diarrhea, melena, nausea and vomiting.   Genitourinary: Negative for dysuria.   Musculoskeletal: Negative for joint pain.   Neurological: Negative for headaches.   Psychiatric/Behavioral: The patient does not have insomnia.       Physical Exam  Laboratory/Imaging   Hemodynamics  Temp (24hrs), Av.7 °C (98 °F), Min:36.5 °C (97.7 °F), Max:37 °C (98.6 °F)   Temperature: 36.7 °C (98.1 °F)  Pulse  Av.7  Min: 56  Max: 103    Blood Pressure: 160/98      Respiratory      Respiration: 18, Pulse Oximetry: 97 %        RUL Breath Sounds: Clear, RML Breath Sounds: Clear, RLL Breath Sounds: Diminished, RYAN Breath Sounds: Clear, LLL Breath Sounds: Diminished    Fluids    Intake/Output Summary (Last 24 hours) at 18 1145  Last data filed at 18 0800   Gross per 24 hour   Intake             1820 ml   Output                0 ml   Net             1820 ml       Nutrition  Orders Placed This Encounter   Procedures   • DIET ORDER     Standing Status:   Standing     Number of Occurrences:   1     Order Specific Question:   Diet:     Answer:   Low Fiber(GI Soft) [2]     Physical Exam   Constitutional: He is oriented to person, place, and time. He appears  well-developed and well-nourished. No distress.   HENT:   Head: Normocephalic and atraumatic.   Right Ear: External ear normal.   Left Ear: External ear normal.   Eyes: Conjunctivae are normal. Right eye exhibits no discharge. Left eye exhibits no discharge. No scleral icterus.   Neck: Neck supple. No JVD present.   Cardiovascular: Normal rate, regular rhythm and normal heart sounds.    No murmur heard.  Pulmonary/Chest: Effort normal and breath sounds normal. No stridor. No respiratory distress. He has no wheezes. He has no rales.   Abdominal: Soft. He exhibits no distension. There is tenderness (epigastric region, mild). There is no rebound and no guarding.   Musculoskeletal: He exhibits no edema.   Neurological: He is alert and oriented to person, place, and time. No cranial nerve deficit.   Skin: Skin is warm and dry. He is not diaphoretic. No erythema.   Psychiatric: He has a normal mood and affect. His behavior is normal. Thought content normal.       Recent Labs      05/08/18   2120  05/10/18   0415   WBC  12.7*  6.7   RBC  5.62  4.68*   HEMOGLOBIN  17.3  14.3   HEMATOCRIT  48.5  42.2   MCV  86.3  90.2   MCH  30.8  30.6   MCHC  35.7*  33.9   RDW  39.6  41.2   PLATELETCT  225  176   MPV  9.4  9.3     Recent Labs      05/08/18   2120  05/10/18   0415  05/11/18   0611   SODIUM  138  140  141   POTASSIUM  3.7  2.9*  3.8   CHLORIDE  107  105  107   CO2  25  29  27   GLUCOSE  117*  104*  85   BUN  9  8  5*   CREATININE  0.72  0.82  0.78   CALCIUM  8.4  7.9*  8.4     Recent Labs      05/08/18 2120   APTT  24.2*   INR  0.95     Recent Labs      05/08/18 2120   BNPBTYPENAT  16              Assessment/Plan     * Pancreatitis- (present on admission)   Assessment & Plan    Patient has a known history of chronic pancreatitis in the setting of previous cholecystectomy as well as alcohol abuse. He has not had a flare in one year.   Patient is continued on IV fluids,will advance diet as tolerated. Patient is on  antiemetics prn.          Diarrhea   Assessment & Plan    Patient has had multiple loose stools that began overnight. Patient low risk for infectious cause. Will continue to monitor and if has recurrent diarrhea will check for C. difficile for further evaluation. Will hold off on Imodium at present time until infectious causes are ruled out.        Hypokalemia   Assessment & Plan    Improved. Mg 1.9.        Tobacco abuse- (present on admission)   Assessment & Plan    Tobacco cessation counseling provided, nicotine replacement ordered, continue to encourage cessation.        Anxiety- (present on admission)   Assessment & Plan    Okay to continue home trazodone.          Quality-Core Measures   Reviewed items::  Medications reviewed and Labs reviewed  Raygoza catheter::  No Raygoza  DVT prophylaxis - mechanical:  Not indicated at this time, ambulatory  Ulcer Prophylaxis::  Yes

## 2018-05-11 NOTE — PROGRESS NOTES
Pt is A&Ox4, resting comfortably in bed. Assessment completed and pain level 5/10. Due medication have been given. Pt is up for meal, bed is in lowest postion,  and side rails up x2, pt calls when needs assistance and call light within reach.

## 2018-05-11 NOTE — PROGRESS NOTES
Assessment completed, alert and oriented  to person,place and time.On room air sat 95%. Denied pain at this time.Due medication given per MAR. States understanding of POC.

## 2018-05-11 NOTE — CARE PLAN
Problem: Infection  Goal: Will remain free from infection  Outcome: PROGRESSING AS EXPECTED  Standard precaution in place. Educated  and encouraged regarding proper hand washing techniques.    Problem: Pain Management  Goal: Pain level will decrease to patient's comfort goal  Outcome: PROGRESSING AS EXPECTED  Taught pt 0-10 pain scale and  non pharmacological method of pain mgt, encouraged to verbalize when in pain. Administered PRN pain med as needed.

## 2018-05-11 NOTE — PROGRESS NOTES
Bedside report given to  Brayden GRANT.Pt  Resting in the bed.Safety precautions in place and all the lines remain patent.

## 2018-05-11 NOTE — DISCHARGE SUMMARY
CHIEF COMPLAINT ON ADMISSION  Chief Complaint   Patient presents with   • Chest Pain   • Epigastric Pain       CODE STATUS  Full Code    HPI & HOSPITAL COURSE  Please see H&P dictated by Dr. Montes. This is a 42 y.o. male here with abdominal pain secondary to acute on chronic pancreatitis. Patient was initially kept nothing by mouth started on IV fluids and pain management. Patient has a history of alcohol use but has not drank alcohol over past one year. Patient was initially started on a clear liquid diet and he was advanced to regular diet as tolerated. Patient's electrolytes were replaced during his hospital stay. Patient's abdominal pain resolved and was tolerating a regular diet at the time of discharge.    The patient met 2-midnight criteria for an inpatient stay at the time of discharge.    Therefore, he is discharged in good and stable condition with close outpatient follow-up.      DISCHARGE PROBLEM LIST  Principal Problem (Resolved):    Pancreatitis POA: Yes  Active Problems:    Anxiety POA: Yes    Tobacco abuse POA: Yes  Resolved Problems:    Hypokalemia POA: No    Diarrhea POA: No      FOLLOW UP  Future Appointments  Date Time Provider Department Center   5/18/2018 8:45 AM Salo Rollins M.D. [facility] None     Maria E Rodgers M.D.  [address] [address]  [phone]    Schedule an appointment as soon as possible for a visit        MEDICATIONS ON DISCHARGE   Wero Thorpe   Home Medication Instructions ROHAN:[id number]    Printed on:05/11/18 2615   Medication Information                      albuterol 108 (90 Base) MCG/ACT Aero Soln inhalation aerosol  Inhale 2 Puffs by mouth every 2 hours as needed for Shortness of Breath (or cough).             aspirin EC (ECOTRIN) 325 MG Tablet Delayed Response  Take 1 Tab by mouth 2 Times a Day.             ibuprofen (MOTRIN) 800 MG Tab  Take 800 mg by mouth every 8 hours as needed for Mild Pain.             Multiple  Vitamins-Minerals (MULTIVITAMIN) Liquid  Take 1 Drop by mouth every day.             Omega-3 Fatty Acids (FISH OIL PO)  Take 1 Cap by mouth every day. One tsp liquid fish oil daily             omeprazole (PRILOSEC) 20 MG delayed-release capsule  Take 20 mg by mouth every day.             Pancrelipase, Lip-Prot-Amyl, (CREON) 26573 UNITS Cap DR Particles  Take 2 Tabs by mouth 3 times a day before meals. Takes two and before snacks             trazodone (DESYREL) 100 MG Tab  Take 100 mg by mouth every evening.                 DIET  Orders Placed This Encounter   Procedures   • DIET ORDER     Standing Status:   Standing     Number of Occurrences:   1     Order Specific Question:   Diet:     Answer:   Low Fiber(GI Soft) [2]       ACTIVITY  As tolerated.        CONSULTATIONS  None    PROCEDURES  None    LABORATORY  Lab Results   Component Value Date/Time    SODIUM 141 05/11/2018 06:11 AM    POTASSIUM 3.8 05/11/2018 06:11 AM    CHLORIDE 107 05/11/2018 06:11 AM    CO2 27 05/11/2018 06:11 AM    GLUCOSE 85 05/11/2018 06:11 AM    BUN 5 (L) 05/11/2018 06:11 AM    CREATININE 0.78 05/11/2018 06:11 AM    CREATININE 0.8 08/10/2006 08:25 AM        Lab Results   Component Value Date/Time    WBC 6.7 05/10/2018 04:15 AM    HEMOGLOBIN 14.3 05/10/2018 04:15 AM    HEMATOCRIT 42.2 05/10/2018 04:15 AM    PLATELETCT 176 05/10/2018 04:15 AM      This dictation was created using voice recognition software. The accuracy of the dictation is limited to the abilities of the software. I expect there may be some errors of grammar and possibly content.    Total time of the discharge process exceeds 38 minutes

## 2018-05-11 NOTE — DISCHARGE INSTRUCTIONS
Discharge Instructions    Discharged to home by car with relative. Discharged via wheelchair, hospital escort: Yes.  Special equipment needed: Not Applicable    Be sure to schedule a follow-up appointment with your primary care doctor or any specialists as instructed.     Discharge Plan:   Diet Plan: Discussed  Activity Level: Discussed  Smoking Cessation Offered: Patient Refused  Confirmed Follow up Appointment: Appointment Scheduled  Confirmed Symptoms Management: Discussed  Medication Reconciliation Updated: Yes  Pneumococcal Vaccine Administered/Refused: Not given - Patient refused pneumococcal vaccine  Influenza Vaccine Indication: Patient Refuses    I understand that a diet low in cholesterol, fat, and sodium is recommended for good health. Unless I have been given specific instructions below for another diet, I accept this instruction as my diet prescription.   Other diet: Regular    Special Instructions: None    · Is patient discharged on Warfarin / Coumadin?   No       Acute Pancreatitis  Introduction  Acute pancreatitis is a condition in which the pancreas suddenly gets irritated and swollen (has inflammation). The pancreas is a large gland behind the stomach. It makes enzymes that help to digest food. The pancreas also makes hormones that help to control your blood sugar. Acute pancreatitis happens when the enzymes attack the pancreas and damage it. Most attacks last a couple of days and can cause serious problems.  Follow these instructions at home:  Eating and drinking  · Follow instructions from your doctor about diet. You may need to:  ¨ Avoid alcohol.  ¨ Limit how much fat is in your diet.  · Eat small meals often. Avoid eating big meals.  · Drink enough fluid to keep your pee (urine) clear or pale yellow.  · Do not drink alcohol if it caused your condition.  General instructions  · Take over-the-counter and prescription medicines only as told by your doctor.  · Do not use any tobacco products. These  include cigarettes, chewing tobacco, and e-cigarettes. If you need help quitting, ask your doctor.  · Get plenty of rest.  · If directed, check your blood sugar at home as told by your doctor.  · Keep all follow-up visits as told by your doctor. This is important.  Contact a doctor if:  · You do not get better as quickly as expected.  · You have new symptoms.  · Your symptoms get worse.  · You have lasting pain or weakness.  · You continue to feel sick to your stomach (nauseous).  · You get better and then you have another pain attack.  · You have a fever.  Get help right away if:  · You cannot eat or keep fluids down.  · Your pain becomes very bad.  · Your skin or the white part of your eyes turns yellow (jaundice).  · You throw up (vomit).  · You feel dizzy or you pass out (faint).  · Your blood sugar is high (over 300 mg/dL).  This information is not intended to replace advice given to you by your health care provider. Make sure you discuss any questions you have with your health care provider.  Document Released: 06/05/2009 Document Revised: 05/25/2017 Document Reviewed: 09/20/2016  © 2017 [employer]    Depression / Suicide Risk    As you are discharged from this [facility] facility, it is important to learn how to keep safe from harming yourself.    Recognize the warning signs:  · Abrupt changes in personality, positive or negative- including increase in energy   · Giving away possessions  · Change in eating patterns- significant weight changes-  positive or negative  · Change in sleeping patterns- unable to sleep or sleeping all the time   · Unwillingness or inability to communicate  · Depression  · Unusual sadness, discouragement and loneliness  · Talk of wanting to die  · Neglect of personal appearance   · Rebelliousness- reckless behavior  · Withdrawal from people/activities they love  · Confusion- inability to concentrate     If you or a loved one observes any of these behaviors or has concerns about  self-harm, here's what you can do:  · Talk about it- your feelings and reasons for harming yourself  · Remove any means that you might use to hurt yourself (examples: pills, rope, extension cords, firearm)  · Get professional help from the community (Mental Health, Substance Abuse, psychological counseling)  · Do not be alone:Call your Safe Contact- someone whom you trust who will be there for you.  · Call your local CRISIS HOTLINE 817-4361 or 347-801-0556  · Call your local Children's Mobile Crisis Response Team [place] (591) 294-8115 or www.[employer]  · Call the toll free National Suicide Prevention Hotlines   · National Suicide Prevention Lifeline 657-144-CQRD (7715)  · National Hope Line Network 800-SUICIDE (030-0171)

## 2018-08-14 ENCOUNTER — HOSPITAL ENCOUNTER (EMERGENCY)
Facility: MEDICAL CENTER | Age: 43
End: 2018-08-15
Attending: EMERGENCY MEDICINE
Payer: COMMERCIAL

## 2018-08-14 ENCOUNTER — APPOINTMENT (OUTPATIENT)
Dept: RADIOLOGY | Facility: MEDICAL CENTER | Age: 43
End: 2018-08-14
Payer: COMMERCIAL

## 2018-08-14 DIAGNOSIS — R10.84 GENERALIZED ABDOMINAL PAIN: ICD-10-CM

## 2018-08-14 DIAGNOSIS — R31.9 HEMATURIA, UNSPECIFIED TYPE: ICD-10-CM

## 2018-08-14 LAB
ALBUMIN SERPL BCP-MCNC: 4 G/DL (ref 3.2–4.9)
ALBUMIN/GLOB SERPL: 1.5 G/DL
ALP SERPL-CCNC: 52 U/L (ref 30–99)
ALT SERPL-CCNC: 24 U/L (ref 2–50)
ANION GAP SERPL CALC-SCNC: 8 MMOL/L (ref 0–11.9)
APPEARANCE UR: CLEAR
AST SERPL-CCNC: 18 U/L (ref 12–45)
BACTERIA #/AREA URNS HPF: ABNORMAL /HPF
BASOPHILS # BLD AUTO: 0.6 % (ref 0–1.8)
BASOPHILS # BLD: 0.05 K/UL (ref 0–0.12)
BILIRUB SERPL-MCNC: 0.7 MG/DL (ref 0.1–1.5)
BILIRUB UR QL STRIP.AUTO: NEGATIVE
BUN SERPL-MCNC: 13 MG/DL (ref 8–22)
CALCIUM SERPL-MCNC: 9.1 MG/DL (ref 8.4–10.2)
CHLORIDE SERPL-SCNC: 110 MMOL/L (ref 96–112)
CO2 SERPL-SCNC: 26 MMOL/L (ref 20–33)
COLOR UR: YELLOW
CREAT SERPL-MCNC: 1.13 MG/DL (ref 0.5–1.4)
EKG IMPRESSION: NORMAL
EOSINOPHIL # BLD AUTO: 0.19 K/UL (ref 0–0.51)
EOSINOPHIL NFR BLD: 2.3 % (ref 0–6.9)
EPI CELLS #/AREA URNS HPF: ABNORMAL /HPF
ERYTHROCYTE [DISTWIDTH] IN BLOOD BY AUTOMATED COUNT: 38.2 FL (ref 35.9–50)
GLOBULIN SER CALC-MCNC: 2.7 G/DL (ref 1.9–3.5)
GLUCOSE SERPL-MCNC: 98 MG/DL (ref 65–99)
GLUCOSE UR STRIP.AUTO-MCNC: NEGATIVE MG/DL
HCT VFR BLD AUTO: 39.8 % (ref 42–52)
HGB BLD-MCNC: 14.3 G/DL (ref 14–18)
IMM GRANULOCYTES # BLD AUTO: 0.03 K/UL (ref 0–0.11)
IMM GRANULOCYTES NFR BLD AUTO: 0.4 % (ref 0–0.9)
KETONES UR STRIP.AUTO-MCNC: ABNORMAL MG/DL
LACTATE BLD-SCNC: 0.9 MMOL/L (ref 0.5–2)
LEUKOCYTE ESTERASE UR QL STRIP.AUTO: NEGATIVE
LIPASE SERPL-CCNC: 67 U/L (ref 7–58)
LYMPHOCYTES # BLD AUTO: 2.83 K/UL (ref 1–4.8)
LYMPHOCYTES NFR BLD: 34 % (ref 22–41)
MCH RBC QN AUTO: 30.9 PG (ref 27–33)
MCHC RBC AUTO-ENTMCNC: 35.9 G/DL (ref 33.7–35.3)
MCV RBC AUTO: 86 FL (ref 81.4–97.8)
MICRO URNS: ABNORMAL
MONOCYTES # BLD AUTO: 0.63 K/UL (ref 0–0.85)
MONOCYTES NFR BLD AUTO: 7.6 % (ref 0–13.4)
MUCOUS THREADS #/AREA URNS HPF: ABNORMAL /HPF
NEUTROPHILS # BLD AUTO: 4.6 K/UL (ref 1.82–7.42)
NEUTROPHILS NFR BLD: 55.1 % (ref 44–72)
NITRITE UR QL STRIP.AUTO: NEGATIVE
NRBC # BLD AUTO: 0 K/UL
NRBC BLD-RTO: 0 /100 WBC
PH UR STRIP.AUTO: 6 [PH]
PLATELET # BLD AUTO: 204 K/UL (ref 164–446)
PMV BLD AUTO: 9.2 FL (ref 9–12.9)
POTASSIUM SERPL-SCNC: 3.4 MMOL/L (ref 3.6–5.5)
PROT SERPL-MCNC: 6.7 G/DL (ref 6–8.2)
PROT UR QL STRIP: NEGATIVE MG/DL
RBC # BLD AUTO: 4.63 M/UL (ref 4.7–6.1)
RBC # URNS HPF: ABNORMAL /HPF
RBC UR QL AUTO: ABNORMAL
SODIUM SERPL-SCNC: 144 MMOL/L (ref 135–145)
SP GR UR STRIP.AUTO: 1.02
TROPONIN I SERPL-MCNC: <0.02 NG/ML (ref 0–0.04)
WBC # BLD AUTO: 8.3 K/UL (ref 4.8–10.8)
WBC #/AREA URNS HPF: ABNORMAL /HPF

## 2018-08-14 PROCEDURE — 700102 HCHG RX REV CODE 250 W/ 637 OVERRIDE(OP): Performed by: EMERGENCY MEDICINE

## 2018-08-14 PROCEDURE — 700111 HCHG RX REV CODE 636 W/ 250 OVERRIDE (IP)

## 2018-08-14 PROCEDURE — A9270 NON-COVERED ITEM OR SERVICE: HCPCS | Performed by: EMERGENCY MEDICINE

## 2018-08-14 PROCEDURE — 93005 ELECTROCARDIOGRAM TRACING: CPT | Performed by: EMERGENCY MEDICINE

## 2018-08-14 PROCEDURE — 80053 COMPREHEN METABOLIC PANEL: CPT

## 2018-08-14 PROCEDURE — 81001 URINALYSIS AUTO W/SCOPE: CPT

## 2018-08-14 PROCEDURE — 74177 CT ABD & PELVIS W/CONTRAST: CPT

## 2018-08-14 PROCEDURE — 700117 HCHG RX CONTRAST REV CODE 255: Performed by: EMERGENCY MEDICINE

## 2018-08-14 PROCEDURE — 96374 THER/PROPH/DIAG INJ IV PUSH: CPT

## 2018-08-14 PROCEDURE — 99285 EMERGENCY DEPT VISIT HI MDM: CPT

## 2018-08-14 PROCEDURE — 83605 ASSAY OF LACTIC ACID: CPT

## 2018-08-14 PROCEDURE — 83690 ASSAY OF LIPASE: CPT

## 2018-08-14 PROCEDURE — 84484 ASSAY OF TROPONIN QUANT: CPT

## 2018-08-14 PROCEDURE — 93005 ELECTROCARDIOGRAM TRACING: CPT

## 2018-08-14 PROCEDURE — 700105 HCHG RX REV CODE 258: Performed by: EMERGENCY MEDICINE

## 2018-08-14 PROCEDURE — 85025 COMPLETE CBC W/AUTO DIFF WBC: CPT

## 2018-08-14 RX ORDER — KETOROLAC TROMETHAMINE 30 MG/ML
30 INJECTION, SOLUTION INTRAMUSCULAR; INTRAVENOUS ONCE
Status: COMPLETED | OUTPATIENT
Start: 2018-08-14 | End: 2018-08-14

## 2018-08-14 RX ORDER — SODIUM CHLORIDE 9 MG/ML
1000 INJECTION, SOLUTION INTRAVENOUS ONCE
Status: COMPLETED | OUTPATIENT
Start: 2018-08-14 | End: 2018-08-14

## 2018-08-14 RX ORDER — KETOROLAC TROMETHAMINE 30 MG/ML
INJECTION, SOLUTION INTRAMUSCULAR; INTRAVENOUS
Status: COMPLETED
Start: 2018-08-14 | End: 2018-08-14

## 2018-08-14 RX ADMIN — KETOROLAC TROMETHAMINE 30 MG: 30 INJECTION, SOLUTION INTRAMUSCULAR at 22:47

## 2018-08-14 RX ADMIN — LIDOCAINE HYDROCHLORIDE 30 ML: 20 SOLUTION OROPHARYNGEAL at 22:17

## 2018-08-14 RX ADMIN — IOHEXOL 100 ML: 350 INJECTION, SOLUTION INTRAVENOUS at 23:40

## 2018-08-14 RX ADMIN — SODIUM CHLORIDE 1000 ML: 9 INJECTION, SOLUTION INTRAVENOUS at 22:17

## 2018-08-14 RX ADMIN — KETOROLAC TROMETHAMINE 30 MG: 30 INJECTION, SOLUTION INTRAMUSCULAR; INTRAVENOUS at 22:47

## 2018-08-14 ASSESSMENT — PAIN SCALES - GENERAL
PAINLEVEL_OUTOF10: 9
PAINLEVEL_OUTOF10: 8

## 2018-08-15 VITALS
BODY MASS INDEX: 28.02 KG/M2 | RESPIRATION RATE: 18 BRPM | HEIGHT: 73 IN | SYSTOLIC BLOOD PRESSURE: 146 MMHG | DIASTOLIC BLOOD PRESSURE: 97 MMHG | HEART RATE: 82 BPM | TEMPERATURE: 98.2 F | WEIGHT: 211.42 LBS | OXYGEN SATURATION: 92 %

## 2018-08-15 NOTE — ED NOTES
Rounded on patient.  Oriented to the room and call button. Patient assessed, chart reviewed. Call light within reach, bed in lowest position. Patient updated on plan of care, no additional needs at this time. Will continue to monitor. [place] fall assessment completed.  Patient is a low risk for fall.  No interventions needed at this time.  Will continue to assess risk and monitor for safety.

## 2018-08-15 NOTE — ED NOTES
Rounded on patient.  Pain is better.  Patient asking a couple questions. No additional needs at this time.

## 2018-08-15 NOTE — ED PROVIDER NOTES
ED Provider Note    CHIEF COMPLAINT  Chief Complaint   Patient presents with   • Abdominal Pain     Diffuse, radiates around to back. Pt started having flu like s/s a few days ago, N/V/D. Hx chronic pancreatitis. Pain worst on right side.        HPI  Wero Thorpe is a 42 y.o. male who presents to the emergency department complaining of upper abdominal pain.  Past medical history significant for alcohol-induced pancreatitis.  He notes that he has not had any recurrent drinking of recent.  He does smoke half pack of cigarettes per day.  Occasional marijuana.  No other illicit drugs.  He explains that over the end of last week Thursday, Friday, Saturday he and his family members all had similar symptoms of nausea, vomiting, diarrhea and some crampy abdominal pain.  He notes that his nausea, vomiting had stopped for the last couple days although he has had increasing upper abdominal discomfort.  He has a the pain does radiate through to the middle of his back.  Somewhat radiated around the right upper quadrant as well.  Additionally he notes that he has had limited ability to hold down food or fluids.  He has simultaneously noticed decreased urine output.  With only few attempts at urination throughout a 24-hour period.  Denies any lower abdominal pain.  No chest pain or palpitations.  No shortness of breath.  He did not take any medications prior to arrival.  Pain is currently moderate.  It is worse with lying down.    REVIEW OF SYSTEMS  See HPI for further details. All other systems are negative.     PAST MEDICAL HISTORY   has a past medical history of Asthma; Bowel habit changes; Bronchitis (8/2016); Chronic pancreatitis (HCC); Cough (9/23/16); Fall (10/8/2016); Heart burn; Hypertension; Lumbar arthropathy; Pain (9/23/16); Pancreatitis; Psychiatric problem; Renal disorder (6/2016); and Urinary bladder disorder.    SOCIAL HISTORY  Social History     Social History Main Topics   • Smoking status: Current  "Every Day Smoker     Packs/day: 0.50     Years: 20.00     Types: Cigarettes     Last attempt to quit: 4/15/2011   • Smokeless tobacco: Never Used   • Alcohol use No      Comment: prior hx of alcoholism   • Drug use: No      Comment: denies   • Sexual activity: Yes       SURGICAL HISTORY   has a past surgical history that includes skull fracture depressed elevation (1995); cholecystectomy (2007); tonsillectomy and adenoidectomy (1989); gastroscopy-endo (N/A, 10/21/2016); egd w/endoscopic ultrasound (N/A, 10/21/2016); ercp-diagnostic (N/A, 10/21/2016); and ankle orif (Left, 11/10/2017).    CURRENT MEDICATIONS  Home Medications    **Home medications have not yet been reviewed for this encounter**         ALLERGIES  Allergies   Allergen Reactions   • Nkda [No Known Drug Allergy]        PHYSICAL EXAM  VITAL SIGNS: /97   Pulse 82   Temp 36.6 °C (97.9 °F)   Resp 18   Ht 1.854 m (6' 1\")   Wt 95.9 kg (211 lb 6.7 oz)   SpO2 92%   BMI 27.89 kg/m²  @FELIBERTO[[id number]::@   Pulse ox interpretation: I interpret this pulse ox as normal.  Constitutional: Alert in no apparent distress.  HENT: No signs of trauma, Bilateral external ears normal, Nose normal.   Eyes: Pupils are equal and reactive, Conjunctiva normal, Non-icteric.   Neck: Normal range of motion, No tenderness, Supple, No stridor.   Lymphatic: No lymphadenopathy noted.   Cardiovascular: Regular rate and rhythm, no murmurs.   Thorax & Lungs: Normal breath sounds, No respiratory distress, No wheezing, No chest tenderness.   Abdomen: Bowel sounds normal, Soft, No tenderness, No masses, No pulsatile masses. No peritoneal signs.  Skin: Warm, Dry, No erythema, No rash.   Back: No bony tenderness, No CVA tenderness.   Extremities: Intact distal pulses, No edema, No tenderness, No cyanosis,  Negative Manuela's sign.   Musculoskeletal: Good range of motion in all major joints. No tenderness to palpation or major deformities noted.   Neurologic: Alert , Normal motor function, " Normal sensory function, No focal deficits noted.   Psychiatric: Affect normal, Judgment normal, Mood normal.       DIAGNOSTIC STUDIES / PROCEDURES    EKG  Sinus rhythm at a rate of 92, normal axis, normal intervals, no acute ST changes QTC of 480    LABS  Results for orders placed or performed during the hospital encounter of 08/14/18   CBC WITH DIFFERENTIAL   Result Value Ref Range    WBC 8.3 4.8 - 10.8 K/uL    RBC 4.63 (L) 4.70 - 6.10 M/uL    Hemoglobin 14.3 14.0 - 18.0 g/dL    Hematocrit 39.8 (L) 42.0 - 52.0 %    MCV 86.0 81.4 - 97.8 fL    MCH 30.9 27.0 - 33.0 pg    MCHC 35.9 (H) 33.7 - 35.3 g/dL    RDW 38.2 35.9 - 50.0 fL    Platelet Count 204 164 - 446 K/uL    MPV 9.2 9.0 - 12.9 fL    Neutrophils-Polys 55.10 44.00 - 72.00 %    Lymphocytes 34.00 22.00 - 41.00 %    Monocytes 7.60 0.00 - 13.40 %    Eosinophils 2.30 0.00 - 6.90 %    Basophils 0.60 0.00 - 1.80 %    Immature Granulocytes 0.40 0.00 - 0.90 %    Nucleated RBC 0.00 /100 WBC    Neutrophils (Absolute) 4.60 1.82 - 7.42 K/uL    Lymphs (Absolute) 2.83 1.00 - 4.80 K/uL    Monos (Absolute) 0.63 0.00 - 0.85 K/uL    Eos (Absolute) 0.19 0.00 - 0.51 K/uL    Baso (Absolute) 0.05 0.00 - 0.12 K/uL    Immature Granulocytes (abs) 0.03 0.00 - 0.11 K/uL    NRBC (Absolute) 0.00 K/uL   COMP METABOLIC PANEL   Result Value Ref Range    Sodium 144 135 - 145 mmol/L    Potassium 3.4 (L) 3.6 - 5.5 mmol/L    Chloride 110 96 - 112 mmol/L    Co2 26 20 - 33 mmol/L    Anion Gap 8.0 0.0 - 11.9    Glucose 98 65 - 99 mg/dL    Bun 13 8 - 22 mg/dL    Creatinine 1.13 0.50 - 1.40 mg/dL    Calcium 9.1 8.4 - 10.2 mg/dL    AST(SGOT) 18 12 - 45 U/L    ALT(SGPT) 24 2 - 50 U/L    Alkaline Phosphatase 52 30 - 99 U/L    Total Bilirubin 0.7 0.1 - 1.5 mg/dL    Albumin 4.0 3.2 - 4.9 g/dL    Total Protein 6.7 6.0 - 8.2 g/dL    Globulin 2.7 1.9 - 3.5 g/dL    A-G Ratio 1.5 g/dL   LIPASE   Result Value Ref Range    Lipase 67 (H) 7 - 58 U/L   TROPONIN   Result Value Ref Range    Troponin I <0.02 0.00 -  0.04 ng/mL   LACTIC ACID   Result Value Ref Range    Lactic Acid 0.9 0.5 - 2.0 mmol/L   URINALYSIS,CULTURE IF INDICATED   Result Value Ref Range    Color Yellow     Character Clear     Specific Gravity 1.025 <1.035    Ph 6.0 5.0 - 8.0    Glucose Negative Negative mg/dL    Ketones Trace (A) Negative mg/dL    Protein Negative Negative mg/dL    Bilirubin Negative Negative    Nitrite Negative Negative    Leukocyte Esterase Negative Negative    Occult Blood Moderate (A) Negative    Micro Urine Req Microscopic    URINE MICROSCOPIC (W/UA)   Result Value Ref Range    WBC 0-2 (A) /hpf    RBC 5-10 (A) /hpf    Bacteria Rare (A) None /hpf    Epithelial Cells Few Few /hpf    Mucous Threads Moderate /hpf   ESTIMATED GFR   Result Value Ref Range    GFR If African American >60 >60 mL/min/1.73 m 2    GFR If Non African American >60 >60 mL/min/1.73 m 2   EKG (ER)   Result Value Ref Range    Report       [facility] Emergency [facility].    Test Date:  2018  Pt Name:    XAVIER PARDO               Department: [facility]  MRN:        [id number]                      [facility]  Gender:     Male                         Technician: HRR  :        1975                   Requested By:ER TRIAGE PROTOCOL  Order #:    [id number]                    Reading MD:    Measurements  Intervals                                Axis  Rate:       92                           P:          72  OH:         185                          QRS:        15  QRSD:       107                          T:          56  QT:         388  QTc:        481    Interpretive Statements  Sinus rhythm  Borderline prolonged QT interval  Baseline wander in lead(s) III  Compared to ECG 2018 21:17:19  Sinus tachycardia no longer present  ST (T wave) deviation no longer present  Myocardial infarct finding no longer present         RADIOLOGY  CT-ABDOMEN-PELVIS WITH   Final Result      No acute abnormality in the abdomen or pelvis.               COURSE & MEDICAL DECISION MAKING  Pertinent Labs & Imaging studies reviewed. (See chart for details)  Patient presented to the emergency department for recurrent abdominal pain.  Patient has a history of alcohol-induced pericarditis.  Minimally elevated lipase tonight.  CT of the pelvis did not show any acute findings.  Patient's family all became ill at the end of last week.  I do believe that this is likely a viral pathology.  She does have scant hematuria with no evidence of acute infection.  For this reason I have referred him to urology.  I do not believe any further evaluation be required tonight.  Clinically the patient overall appears well.  He has been resting comfortably and sleeping throughout my repeat exams.  He is understanding return precautions the ER if needed.  He is feeling better after IV fluids.      The patient will not drink alcohol nor drive with providedmedications. The patient will return for worsening symptoms and is stable at the time of discharge. The patient verbalizes understanding and will comply.    FINAL IMPRESSION  1. Generalized abdominal pain    2. Hematuria, unspecified type            Electronically signed by: Robbi Ma, 8/14/2018 10:13 PM

## 2018-08-15 NOTE — DISCHARGE INSTRUCTIONS
Abdominal Pain, Adult  Many things can cause belly (abdominal) pain. Most times, belly pain is not dangerous. Many cases of belly pain can be watched and treated at home. Sometimes belly pain is serious, though. Your doctor will try to find the cause of your belly pain.  Follow these instructions at home:  · Take over-the-counter and prescription medicines only as told by your doctor. Do not take medicines that help you poop (laxatives) unless told to by your doctor.  · Drink enough fluid to keep your pee (urine) clear or pale yellow.  · Watch your belly pain for any changes.  · Keep all follow-up visits as told by your doctor. This is important.  Contact a doctor if:  · Your belly pain changes or gets worse.  · You are not hungry, or you lose weight without trying.  · You are having trouble pooping (constipated) or have watery poop (diarrhea) for more than 2-3 days.  · You have pain when you pee or poop.  · Your belly pain wakes you up at night.  · Your pain gets worse with meals, after eating, or with certain foods.  · You are throwing up and cannot keep anything down.  · You have a fever.  Get help right away if:  · Your pain does not go away as soon as your doctor says it should.  · You cannot stop throwing up.  · Your pain is only in areas of your belly, such as the right side or the left lower part of the belly.  · You have bloody or black poop, or poop that looks like tar.  · You have very bad pain, cramping, or bloating in your belly.  · You have signs of not having enough fluid or water in your body (dehydration), such as:  ¨ Dark pee, very little pee, or no pee.  ¨ Cracked lips.  ¨ Dry mouth.  ¨ Sunken eyes.  ¨ Sleepiness.  ¨ Weakness.  This information is not intended to replace advice given to you by your health care provider. Make sure you discuss any questions you have with your health care provider.  Document Released: 06/05/2009 Document Revised: 07/07/2017 Document Reviewed: 05/31/2017  [employer]  Interactive Patient Education © 2017 [employer]    Hematuria  Hematuria is the presence of blood in the urine. This condition can result from many problems. Some of these are:   · Urinary infections.   · Injuries.   · Kidney stones.   · Drug reactions.   · Tumors.   · Other diseases.   The treatment depends on the diagnosis. Further studies may be needed to find the cause even if the hematuria subsides on its own. An exam by an urologist may also be indicated.  If you are bleeding heavily, or have prostate problems, clots can form in the bladder and block the passage of urine. This requires emergency treatment with a catheter and bladder irrigation.   Contact your doctor for follow-up care within the next 2-4 days.  SEEK IMMEDIATE MEDICAL CARE IF:  You develop a fever, abdominal pain, urinary blockage, vomiting, or any other serious problems.  Document Released: 01/25/2006 Document Revised: 03/11/2013 Document Reviewed: 10/30/2009  [employer]® Patient Information ©2013 [employer].

## 2018-08-15 NOTE — ED NOTES
"Chief Complaint   Patient presents with   • Abdominal Pain     Diffuse, radiates around to back. Pt started having flu like s/s a few days ago, N/V/D. Hx chronic pancreatitis. Pain worst on right side.      /97   Pulse 100   Temp 36.6 °C (97.9 °F)   Resp 18   Ht 1.854 m (6' 1\")   Wt 95.9 kg (211 lb 6.7 oz)   SpO2 96%   BMI 27.89 kg/m²     "

## 2018-08-15 NOTE — ED NOTES
Discharge information reviewed in detail. Patient verbalized understanding of discharge instructions to follow up with Urology and to return to ER if condition worsens.   Patient ambulated out of ER in a steady gait.

## 2018-11-17 ENCOUNTER — APPOINTMENT (OUTPATIENT)
Dept: RADIOLOGY | Facility: MEDICAL CENTER | Age: 43
End: 2018-11-17
Attending: EMERGENCY MEDICINE
Payer: COMMERCIAL

## 2018-11-17 ENCOUNTER — HOSPITAL ENCOUNTER (EMERGENCY)
Facility: MEDICAL CENTER | Age: 43
End: 2018-11-17
Attending: EMERGENCY MEDICINE
Payer: COMMERCIAL

## 2018-11-17 VITALS
HEIGHT: 73 IN | WEIGHT: 207.23 LBS | DIASTOLIC BLOOD PRESSURE: 84 MMHG | OXYGEN SATURATION: 98 % | SYSTOLIC BLOOD PRESSURE: 122 MMHG | RESPIRATION RATE: 18 BRPM | HEART RATE: 89 BPM | BODY MASS INDEX: 27.47 KG/M2 | TEMPERATURE: 98.9 F

## 2018-11-17 DIAGNOSIS — E87.6 HYPOKALEMIA: ICD-10-CM

## 2018-11-17 DIAGNOSIS — K52.9 COLITIS: ICD-10-CM

## 2018-11-17 LAB
ALBUMIN SERPL BCP-MCNC: 4.2 G/DL (ref 3.2–4.9)
ALBUMIN/GLOB SERPL: 1.3 G/DL
ALP SERPL-CCNC: 56 U/L (ref 30–99)
ALT SERPL-CCNC: 32 U/L (ref 2–50)
ANION GAP SERPL CALC-SCNC: 10 MMOL/L (ref 0–11.9)
AST SERPL-CCNC: 33 U/L (ref 12–45)
BASOPHILS # BLD AUTO: 0.2 % (ref 0–1.8)
BASOPHILS # BLD: 0.02 K/UL (ref 0–0.12)
BILIRUB SERPL-MCNC: 1.1 MG/DL (ref 0.1–1.5)
BUN SERPL-MCNC: 11 MG/DL (ref 8–22)
CALCIUM SERPL-MCNC: 8.8 MG/DL (ref 8.4–10.2)
CHLORIDE SERPL-SCNC: 100 MMOL/L (ref 96–112)
CO2 SERPL-SCNC: 24 MMOL/L (ref 20–33)
CREAT SERPL-MCNC: 0.88 MG/DL (ref 0.5–1.4)
EOSINOPHIL # BLD AUTO: 0.01 K/UL (ref 0–0.51)
EOSINOPHIL NFR BLD: 0.1 % (ref 0–6.9)
ERYTHROCYTE [DISTWIDTH] IN BLOOD BY AUTOMATED COUNT: 40.8 FL (ref 35.9–50)
GLOBULIN SER CALC-MCNC: 3.3 G/DL (ref 1.9–3.5)
GLUCOSE SERPL-MCNC: 91 MG/DL (ref 65–99)
HCT VFR BLD AUTO: 44.8 % (ref 42–52)
HGB BLD-MCNC: 15.9 G/DL (ref 14–18)
IMM GRANULOCYTES # BLD AUTO: 0.04 K/UL (ref 0–0.11)
IMM GRANULOCYTES NFR BLD AUTO: 0.5 % (ref 0–0.9)
LIPASE SERPL-CCNC: 28 U/L (ref 7–58)
LYMPHOCYTES # BLD AUTO: 0.94 K/UL (ref 1–4.8)
LYMPHOCYTES NFR BLD: 11.1 % (ref 22–41)
MCH RBC QN AUTO: 30.8 PG (ref 27–33)
MCHC RBC AUTO-ENTMCNC: 35.5 G/DL (ref 33.7–35.3)
MCV RBC AUTO: 86.7 FL (ref 81.4–97.8)
MONOCYTES # BLD AUTO: 0.57 K/UL (ref 0–0.85)
MONOCYTES NFR BLD AUTO: 6.7 % (ref 0–13.4)
NEUTROPHILS # BLD AUTO: 6.91 K/UL (ref 1.82–7.42)
NEUTROPHILS NFR BLD: 81.4 % (ref 44–72)
NRBC # BLD AUTO: 0 K/UL
NRBC BLD-RTO: 0 /100 WBC
PLATELET # BLD AUTO: 194 K/UL (ref 164–446)
PMV BLD AUTO: 9.1 FL (ref 9–12.9)
POTASSIUM SERPL-SCNC: 2.8 MMOL/L (ref 3.6–5.5)
PROT SERPL-MCNC: 7.5 G/DL (ref 6–8.2)
RBC # BLD AUTO: 5.17 M/UL (ref 4.7–6.1)
SODIUM SERPL-SCNC: 134 MMOL/L (ref 135–145)
WBC # BLD AUTO: 8.5 K/UL (ref 4.8–10.8)

## 2018-11-17 PROCEDURE — 96374 THER/PROPH/DIAG INJ IV PUSH: CPT

## 2018-11-17 PROCEDURE — 700117 HCHG RX CONTRAST REV CODE 255: Performed by: EMERGENCY MEDICINE

## 2018-11-17 PROCEDURE — 99285 EMERGENCY DEPT VISIT HI MDM: CPT

## 2018-11-17 PROCEDURE — 85025 COMPLETE CBC W/AUTO DIFF WBC: CPT

## 2018-11-17 PROCEDURE — 96375 TX/PRO/DX INJ NEW DRUG ADDON: CPT

## 2018-11-17 PROCEDURE — 36415 COLL VENOUS BLD VENIPUNCTURE: CPT

## 2018-11-17 PROCEDURE — 700102 HCHG RX REV CODE 250 W/ 637 OVERRIDE(OP): Performed by: EMERGENCY MEDICINE

## 2018-11-17 PROCEDURE — 83690 ASSAY OF LIPASE: CPT

## 2018-11-17 PROCEDURE — A9270 NON-COVERED ITEM OR SERVICE: HCPCS | Performed by: EMERGENCY MEDICINE

## 2018-11-17 PROCEDURE — 80053 COMPREHEN METABOLIC PANEL: CPT

## 2018-11-17 PROCEDURE — 74177 CT ABD & PELVIS W/CONTRAST: CPT

## 2018-11-17 PROCEDURE — 700111 HCHG RX REV CODE 636 W/ 250 OVERRIDE (IP): Performed by: EMERGENCY MEDICINE

## 2018-11-17 RX ORDER — POTASSIUM CHLORIDE 20 MEQ/1
40 TABLET, EXTENDED RELEASE ORAL ONCE
Status: COMPLETED | OUTPATIENT
Start: 2018-11-17 | End: 2018-11-17

## 2018-11-17 RX ORDER — MORPHINE SULFATE 4 MG/ML
4 INJECTION, SOLUTION INTRAMUSCULAR; INTRAVENOUS ONCE
Status: COMPLETED | OUTPATIENT
Start: 2018-11-17 | End: 2018-11-17

## 2018-11-17 RX ORDER — METRONIDAZOLE 500 MG/1
500 TABLET ORAL ONCE
Status: COMPLETED | OUTPATIENT
Start: 2018-11-17 | End: 2018-11-17

## 2018-11-17 RX ORDER — KETOROLAC TROMETHAMINE 30 MG/ML
15 INJECTION, SOLUTION INTRAMUSCULAR; INTRAVENOUS ONCE
Status: COMPLETED | OUTPATIENT
Start: 2018-11-17 | End: 2018-11-17

## 2018-11-17 RX ORDER — TRAMADOL HYDROCHLORIDE 50 MG/1
50-100 TABLET ORAL EVERY 4 HOURS PRN
Qty: 15 TAB | Refills: 0 | Status: SHIPPED | OUTPATIENT
Start: 2018-11-17 | End: 2018-11-20

## 2018-11-17 RX ORDER — DICYCLOMINE HYDROCHLORIDE 10 MG/1
10 CAPSULE ORAL 3 TIMES DAILY PRN
Status: SHIPPED | COMMUNITY
End: 2019-09-14

## 2018-11-17 RX ORDER — ACETAMINOPHEN 500 MG
2000 TABLET ORAL EVERY 6 HOURS PRN
COMMUNITY

## 2018-11-17 RX ORDER — METRONIDAZOLE 500 MG/1
500 TABLET ORAL 3 TIMES DAILY
Qty: 30 TAB | Refills: 0 | Status: SHIPPED | OUTPATIENT
Start: 2018-11-17 | End: 2018-11-27

## 2018-11-17 RX ORDER — CIPROFLOXACIN 500 MG/1
500 TABLET, FILM COATED ORAL 2 TIMES DAILY
Qty: 20 TAB | Refills: 0 | Status: SHIPPED | OUTPATIENT
Start: 2018-11-17 | End: 2018-11-27

## 2018-11-17 RX ORDER — CIPROFLOXACIN 500 MG/1
500 TABLET, FILM COATED ORAL ONCE
Status: COMPLETED | OUTPATIENT
Start: 2018-11-17 | End: 2018-11-17

## 2018-11-17 RX ADMIN — POTASSIUM CHLORIDE 40 MEQ: 1500 TABLET, EXTENDED RELEASE ORAL at 15:10

## 2018-11-17 RX ADMIN — METRONIDAZOLE 500 MG: 500 TABLET ORAL at 16:43

## 2018-11-17 RX ADMIN — KETOROLAC TROMETHAMINE 15 MG: 30 INJECTION, SOLUTION INTRAMUSCULAR at 16:43

## 2018-11-17 RX ADMIN — MORPHINE SULFATE 4 MG: 4 INJECTION INTRAVENOUS at 14:06

## 2018-11-17 RX ADMIN — IOHEXOL 100 ML: 350 INJECTION, SOLUTION INTRAVENOUS at 14:49

## 2018-11-17 RX ADMIN — CIPROFLOXACIN 500 MG: 500 TABLET, FILM COATED ORAL at 16:43

## 2018-11-17 ASSESSMENT — PAIN SCALES - GENERAL: PAINLEVEL_OUTOF10: 3

## 2018-11-17 ASSESSMENT — PAIN DESCRIPTION - DESCRIPTORS: DESCRIPTORS: ACHING

## 2018-11-17 NOTE — ED NOTES
Pt presents to the ER with c/o abdominal pain and fever following colonoscopy this week. Pt states that for the first 24 hours he was okay but then developed intermittent fevers and night sweats. PT then developed sharp upper abdominal pain and dull lower abdominal pain. PIV initiated and blood taken to lab. Pt aware of need for urine sample.

## 2018-11-17 NOTE — ED NOTES
Med rec completed per pt.   Antibiotics within last 30 days: No  Patient allergies have been reviewed: RENE

## 2018-11-17 NOTE — ED PROVIDER NOTES
ED Provider Note    CHIEF COMPLAINT  Chief Complaint   Patient presents with   • Fever   • Abdominal Pain   • Nausea/Vomiting/Diarrhea       HPI  Wero Thorpe is a 43 y.o. male who presents with diffuse abdominal pain he had a colonoscopy on Wednesday and he felt fine after that then the next day he started having some fevers up to 102 at home.  It would persist throughout the night and come down with some Tylenol in the morning he will wake up covered in sweats.  He had some abdominal pain started yesterday that has persistently gotten worse he is had vomiting when he tries to eat anything but is able to keep fluids down.  He had fevers again last night and worsening upper abdominal pain today.  He denies any blood in his stool he does have a history of IBS he had a colonoscopy for an episode of blood in his stool and they removed some polyps.        REVIEW OF SYSTEMS  positive for abdominal pain fevers recent colonoscopy, negative for chest pain cough. All other systems are negative.     PAST MEDICAL HISTORY   has a past medical history of Asthma; Bowel habit changes; Bronchitis (8/2016); Chronic pancreatitis ([facility]); Cough (9/23/16); Fall (10/8/2016); Heart burn; Hypertension; Lumbar arthropathy; Pain (9/23/16); Pancreatitis; Psychiatric problem; Renal disorder (6/2016); and Urinary bladder disorder.    SOCIAL HISTORY  Social History     Social History Main Topics   • Smoking status: Current Every Day Smoker     Packs/day: 0.50     Years: 20.00     Types: Cigarettes     Last attempt to quit: 4/15/2011   • Smokeless tobacco: Never Used   • Alcohol use No      Comment: Hx of alcoholism   • Drug use: No      Comment: denies   • Sexual activity: Yes       SURGICAL HISTORY   has a past surgical history that includes skull fracture depressed elevation (1995); cholecystectomy (2007); tonsillectomy and adenoidectomy (1989); gastroscopy-endo (N/A, 10/21/2016); egd w/endoscopic ultrasound (N/A, 10/21/2016);  "ercp-diagnostic (N/A, 10/21/2016); and ankle orif (Left, 11/10/2017).    CURRENT MEDICATIONS  Home Medications     Reviewed by Jake Daugherty (Pharmacy Tech) on 11/17/18 at 1404  Med List Status: Complete   Medication Last Dose Status   acetaminophen (TYLENOL) 500 MG Tab 11/17/2018 Active   dicyclomine (BENTYL) 10 MG Cap unknown Active   Multiple Vitamins-Minerals (MULTIVITAMIN) Liquid unknown Active   multivitamin (THERAGRAN) Tab unknown Active   Pancrelipase, Lip-Prot-Amyl, (CREON) 36934 units Cap DR Particles 4 days ago Active                ALLERGIES  Allergies   Allergen Reactions   • Nkda [No Known Drug Allergy]        PHYSICAL EXAM  VITAL SIGNS: /84   Pulse 89   Temp 37.2 °C (98.9 °F) (Temporal)   Resp 18   Ht 1.854 m (6' 1\")   Wt 94 kg (207 lb 3.7 oz)   SpO2 98%   BMI 27.34 kg/m² .  Constitutional: Alert in no apparent distress.  HENT: No signs of trauma, Bilateral external ears normal, Nose normal.   Eyes: Pupils are equal and reactive, Conjunctiva normal, Non-icteric.   Neck: Normal range of motion, No tenderness, Supple, No stridor.   Cardiovascular: Regular rate and rhythm, no murmurs.   Thorax & Lungs: Normal breath sounds, No respiratory distress, No wheezing, No chest tenderness.   Abdomen: Bowel sounds normal, Soft, mid upper abdomen tenderness with some mild firmness no obvious masses, No peritoneal signs.  Skin: Warm, Dry, No erythema, No rash.   Musculoskeletal:  no major deformities noted.   Neurologic: Alert,  No focal deficits noted.   Psychiatric: Affect normal, Judgment normal, Mood normal.       DIAGNOSTIC STUDIES / PROCEDURES    LABS  Labs Reviewed   CBC WITH DIFFERENTIAL - Abnormal; Notable for the following:        Result Value    MCHC 35.5 (*)     Neutrophils-Polys 81.40 (*)     Lymphocytes 11.10 (*)     Lymphs (Absolute) 0.94 (*)     All other components within normal limits   COMP METABOLIC PANEL - Abnormal; Notable for the following:     Sodium 134 (*)     Potassium " 2.8 (*)     All other components within normal limits   LIPASE   ESTIMATED GFR   URINALYSIS,CULTURE IF INDICATED       RADIOLOGY  CT-ABDOMEN-PELVIS WITH   Final Result      Wall thickening of the ascending and transverse colon with surrounding inflammatory changes. Findings may represent an infectious or inflammatory colitis.      2.4 cm fat density structure in the cecum near the ileocecal valve. This may represent fat prominent involving the ileocecal valve but a lipoma is not excluded.      Right renal cyst.      Status post cholecystectomy.      Atherosclerotic plaque.                 COURSE & MEDICAL DECISION MAKING  Pertinent Labs & Imaging studies reviewed. (See chart for details)  This is a 43-year-old that is a few days status post colonoscopy presenting with fevers up to [id number] at home and abdominal pain.  Differential includes but is not limited to perforation, colitis, abscess.  An IV was established labs are obtained and a CT scan was performed.    CT scan shows evidence of thickening of the wall of the ascending and transverse colon consistent with colitis.  Labs show normal white count with a mild left shift.  His potassium is also quite low and this was repleted.    3:59 PM  I spoke with Dr. De La Garza, GI who is on-call for Dr. Shah who recommended starting the patient on Cipro and Flagyl and he will have the office call him Monday morning for close follow-up.    Patient was updated on the plan he will be given Cipro and Flagyl and a work note and pain medication.  Prior records were reviewed and there is no evidence of multiple narcotic prescriptions.  He will follow-up with GI early next week.    The patient will not drink alcohol nor drive with prescribed medications. The patient will return for new or worsening symptoms and is stable at the time of discharge. Patient was given return precautions. Patient and/or family member verbalizes understanding and will comply.    DISPOSITION:  Patient will  be discharged home in stable condition.    FOLLOW UP:  Shaun Shah M.D.  [id number]  [place] [id number]  [phone]    Schedule an appointment as soon as possible for a visit in 2 days  for recheck    [facility], Emergency Dept  [address]  [place] [id number] [phone]    Return for worsening pain, fever, vomiting or other concerns      OUTPATIENT MEDICATIONS:  Discharge Medication List as of 11/17/2018  4:52 PM      START taking these medications    Details   ciprofloxacin (CIPRO) 500 MG Tab Take 1 Tab by mouth 2 times a day for 10 days., Disp-20 Tab, R-0, Print Rx Paper      metroNIDAZOLE (FLAGYL) 500 MG Tab Take 1 Tab by mouth 3 times a day for 10 days., Disp-30 Tab, R-0, Print Rx Paper      tramadol (ULTRAM) 50 MG Tab Take 1-2 Tabs by mouth every four hours as needed for up to 3 days., Disp-15 Tab, R-0, Print Rx Paper, For 3 days           In prescribing controlled substances to this patient, I certify that I have obtained and reviewed the medical history of Wero Thorpe. I have also made a good del effort to obtain applicable records from other providers who have treated the patient and records did not demonstrate any increased risk of substance abuse that would prevent me from prescribing controlled substances.     I have conducted a physical exam and documented it. I have reviewed Mr. Thorpe’s prescription history as maintained by the [place] Prescription Monitoring Program.     I have assessed the patient’s risk for abuse, dependency, and addiction using the validated Opioid Risk Tool available at https://www.mdcalc.com/ppzdue-aejs-enph-ort-narcotic-abuse.     Given the above, I believe the benefits of controlled substance therapy outweigh the risks. The reasons for prescribing controlled substances include non-narcotic, oral analgesic alternatives have been inadequate for pain control. Accordingly, I have discussed the risk and benefits,  treatment plan, and alternative therapies with the patient.         FINAL IMPRESSION  1. Colitis    2. Hypokalemia        2.   3.    This dictation has been creating using voice recognition software. The accuracy of the dictation is limited the abilities of the software.  I expect there may be some errors of grammar and possibly content. I made every attempt to manually correct the errors within my dictation. However errors related to this voice recognition software may still exist and should be interpreted within the appropriate context.      The note accurately reflects work and decisions made by me.  Isabel Onofre  11/17/2018  5:44 PM

## 2018-11-17 NOTE — ED TRIAGE NOTES
Pt presents accompanied by his spouse.  He C/O N/V/D recurring for the past 3 days with diffuse, moderate abdominal pain.

## 2019-04-09 ENCOUNTER — OFFICE VISIT (OUTPATIENT)
Dept: URGENT CARE | Facility: CLINIC | Age: 44
End: 2019-04-09
Payer: COMMERCIAL

## 2019-04-09 VITALS
DIASTOLIC BLOOD PRESSURE: 94 MMHG | RESPIRATION RATE: 18 BRPM | TEMPERATURE: 98.4 F | HEIGHT: 73 IN | WEIGHT: 215 LBS | BODY MASS INDEX: 28.49 KG/M2 | OXYGEN SATURATION: 96 % | HEART RATE: 100 BPM | SYSTOLIC BLOOD PRESSURE: 138 MMHG

## 2019-04-09 DIAGNOSIS — J40 BRONCHITIS: ICD-10-CM

## 2019-04-09 PROCEDURE — 99214 OFFICE O/P EST MOD 30 MIN: CPT | Performed by: PHYSICIAN ASSISTANT

## 2019-04-09 RX ORDER — AMOXICILLIN AND CLAVULANATE POTASSIUM 875; 125 MG/1; MG/1
1 TABLET, FILM COATED ORAL 2 TIMES DAILY
Qty: 14 TAB | Refills: 0 | Status: SHIPPED | OUTPATIENT
Start: 2019-04-09 | End: 2019-04-16

## 2019-04-09 RX ORDER — IPRATROPIUM BROMIDE AND ALBUTEROL SULFATE 2.5; .5 MG/3ML; MG/3ML
3 SOLUTION RESPIRATORY (INHALATION) ONCE
Status: COMPLETED | OUTPATIENT
Start: 2019-04-09 | End: 2019-04-09

## 2019-04-09 RX ORDER — PROMETHAZINE HYDROCHLORIDE AND CODEINE PHOSPHATE 6.25; 1 MG/5ML; MG/5ML
5 SYRUP ORAL 4 TIMES DAILY PRN
Qty: 120 ML | Refills: 0 | Status: SHIPPED | OUTPATIENT
Start: 2019-04-09 | End: 2019-04-16

## 2019-04-09 RX ORDER — BENZONATATE 100 MG/1
100 CAPSULE ORAL 3 TIMES DAILY PRN
Qty: 60 CAP | Refills: 0 | Status: SHIPPED | OUTPATIENT
Start: 2019-04-09 | End: 2019-06-02

## 2019-04-09 RX ORDER — METHYLPREDNISOLONE 4 MG/1
4 TABLET ORAL DAILY
Qty: 1 KIT | Refills: 0 | Status: SHIPPED | OUTPATIENT
Start: 2019-04-09 | End: 2019-06-02

## 2019-04-09 RX ADMIN — IPRATROPIUM BROMIDE AND ALBUTEROL SULFATE 3 ML: 2.5; .5 SOLUTION RESPIRATORY (INHALATION) at 14:40

## 2019-04-09 ASSESSMENT — ENCOUNTER SYMPTOMS
MYALGIAS: 1
SPUTUM PRODUCTION: 1
SHORTNESS OF BREATH: 1
CHILLS: 0
FEVER: 0
SORE THROAT: 1
SINUS PAIN: 1
COUGH: 1

## 2019-04-09 NOTE — LETTER
April 9, 2019         Patient: Wero Thorpe   YOB: 1975   Date of Visit: 4/9/2019           To Whom it May Concern:    Wero Thorpe was seen in my clinic on 4/9/2019. He can return to work 4/11/19    If you have any questions or concerns, please don't hesitate to call.        Sincerely,           Stiven Tamez M.D.  Electronically Signed

## 2019-04-09 NOTE — PROGRESS NOTES
Subjective:   Wero Thorpe is a 43 y.o. male who presents today with   Chief Complaint   Patient presents with   • Cough     x2 weeks   • Generalized Body Aches   • Chills   • Headache   • Nausea   • Sinus Problem       Cough   This is a new problem. The current episode started 1 to 4 weeks ago. The problem has been gradually worsening. The cough is productive of sputum. Associated symptoms include chest pain, myalgias, nasal congestion, a sore throat and shortness of breath. Pertinent negatives include no chills or fever. Associated symptoms comments: Sinus pressure. He has tried steroid inhaler and OTC cough suppressant for the symptoms. The treatment provided no relief.   Patient states he was feeling better after having flu-like symptoms for several days and then stated the symptoms got worse.   He has not had any sleep due to coughing.   PMH:  has a past medical history of Asthma; Bowel habit changes; Bronchitis (8/2016); Chronic pancreatitis ([facility]); Cough (9/23/16); Fall (10/8/2016); Heart burn; Hypertension; Lumbar arthropathy; Pain (9/23/16); Pancreatitis; Psychiatric problem; Renal disorder (6/2016); and Urinary bladder disorder.  MEDS:   Current Outpatient Prescriptions:   •  MethylPREDNISolone (MEDROL DOSEPAK) 4 MG Tablet Therapy Pack, Take 1 Tab by mouth every day., Disp: 1 Kit, Rfl: 0  •  benzonatate (TESSALON) 100 MG Cap, Take 1 Cap by mouth 3 times a day as needed for Cough., Disp: 60 Cap, Rfl: 0  •  promethazine-codeine (PHENERGAN-CODEINE) 6.25-10 MG/5ML Syrup, Take 5 mL by mouth 4 times a day as needed for Cough for up to 7 days., Disp: 120 mL, Rfl: 0  •  amoxicillin-clavulanate (AUGMENTIN) 875-125 MG Tab, Take 1 Tab by mouth 2 times a day for 7 days., Disp: 14 Tab, Rfl: 0  •  multivitamin (THERAGRAN) Tab, Take 1 Tab by mouth every day., Disp: , Rfl:   •  Pancrelipase, Lip-Prot-Amyl, (CREON) 35504 units Cap DR Particles, Take 2 Tabs by mouth 3 times a day before meals. Takes two and  before snacks, Disp: 180 Cap, Rfl: 0  •  Multiple Vitamins-Minerals (MULTIVITAMIN) Liquid, Take 1 Drop by mouth every day., Disp: , Rfl:   •  dicyclomine (BENTYL) 10 MG Cap, Take 10 mg by mouth 3 times a day as needed., Disp: , Rfl:   •  acetaminophen (TYLENOL) 500 MG Tab, Take 500-1,000 mg by mouth every 6 hours as needed., Disp: , Rfl:   ALLERGIES:   Allergies   Allergen Reactions   • Nkda [No Known Drug Allergy]      SURGHX:   Past Surgical History:   Procedure Laterality Date   • ANKLE ORIF Left 11/10/2017    Procedure: ANKLE ORIF;  Surgeon: Shaun Deluca M.D.;  Location: [facility];  Service: Orthopedics   • GASTROSCOPY-ENDO N/A 10/21/2016    Procedure: GASTROSCOPY-ENDO;  Surgeon: Monty Clay M.D.;  Location: [facility];  Service:    • EGD W/ENDOSCOPIC ULTRASOUND N/A 10/21/2016    Procedure: EGD W/ENDOSCOPIC ULTRASOUND - UPPER, RADIAL;  Surgeon: Monty Clay M.D.;  Location: [facility];  Service:    • ERCP-DIAGNOSTIC N/A 10/21/2016    Procedure: ERCP-DIAGNOSTIC;  Surgeon: Monty Clay M.D.;  Location: [facility];  Service:    • CHOLECYSTECTOMY  2007    laparoscopic   • SKULL FRACTURE DEPRESSED ELEVATION  1995    reconstructive    • TONSILLECTOMY AND ADENOIDECTOMY  1989     SOCHX:  reports that he has been smoking Cigarettes.  He has a 10.00 pack-year smoking history. He has never used smokeless tobacco. He reports that he does not drink alcohol or use drugs.  FH: Reviewed with patient, not pertinent to this visit.       Review of Systems   Constitutional: Negative for chills and fever.   HENT: Positive for congestion, sinus pain and sore throat.    Respiratory: Positive for cough, sputum production and shortness of breath.    Cardiovascular: Positive for chest pain.   Musculoskeletal: Positive for myalgias.   All other systems reviewed and are negative.       Objective:   /94   Pulse 100   Temp 36.9 °C (98.4 °F)  "(Temporal)   Resp 18   Ht 1.854 m (6' 1\")   Wt 97.5 kg (215 lb)   SpO2 96%   BMI 28.37 kg/m²   Physical Exam   Constitutional: Vital signs are normal. He appears well-developed and well-nourished. No distress.   HENT:   Head: Normocephalic and atraumatic.   Right Ear: Hearing, tympanic membrane and ear canal normal.   Left Ear: Hearing, tympanic membrane and ear canal normal.   Nose: Rhinorrhea present.   Mouth/Throat: Posterior oropharyngeal edema and posterior oropharyngeal erythema present.   Tenderness to palpation of maxillary/frontal sinuses   Eyes: Pupils are equal, round, and reactive to light.   Neck: Neck supple.   Cardiovascular: Normal rate, regular rhythm and normal heart sounds.    Pulmonary/Chest: Effort normal. No respiratory distress. He has wheezes. He has no rales.   Musculoskeletal:   Normal movement in all 4 extremities   Neurological: He is alert. Coordination normal.   Skin: Skin is warm and dry.   Psychiatric: He has a normal mood and affect.   Nursing note and vitals reviewed.  Patient has a hoarse voice.     Patient tolerated duoneb and had less congestion and wheezing afterwards.   Assessment/Plan:   Assessment    1. Bronchitis  - ipratropium-albuterol (DUONEB) nebulizer solution; 3 mL by Nebulization route Once.  - MethylPREDNISolone (MEDROL DOSEPAK) 4 MG Tablet Therapy Pack; Take 1 Tab by mouth every day.  Dispense: 1 Kit; Refill: 0  - benzonatate (TESSALON) 100 MG Cap; Take 1 Cap by mouth 3 times a day as needed for Cough.  Dispense: 60 Cap; Refill: 0  - promethazine-codeine (PHENERGAN-CODEINE) 6.25-10 MG/5ML Syrup; Take 5 mL by mouth 4 times a day as needed for Cough for up to 7 days.  Dispense: 120 mL; Refill: 0  - amoxicillin-clavulanate (AUGMENTIN) 875-125 MG Tab; Take 1 Tab by mouth 2 times a day for 7 days.  Dispense: 14 Tab; Refill: 0  Patient to follow up with PCP for inhaler refill.  Encouraged patient to use cough syrup sparingly and at night.   Differential diagnosis, " natural history, supportive care, and indications for immediate follow-up discussed.   Patient given instructions and understanding of medications and treatment.    If not improving in 3-5 days, F/U with PCP or return to UC if symptoms worsen.    Patient agreeable to plan.      Stiven Tamez PA-C

## 2019-06-02 ENCOUNTER — HOSPITAL ENCOUNTER (OUTPATIENT)
Facility: MEDICAL CENTER | Age: 44
End: 2019-06-03
Attending: EMERGENCY MEDICINE | Admitting: HOSPITALIST
Payer: COMMERCIAL

## 2019-06-02 ENCOUNTER — APPOINTMENT (OUTPATIENT)
Dept: RADIOLOGY | Facility: MEDICAL CENTER | Age: 44
End: 2019-06-02
Attending: EMERGENCY MEDICINE
Payer: COMMERCIAL

## 2019-06-02 DIAGNOSIS — R19.7 DIARRHEA, UNSPECIFIED TYPE: ICD-10-CM

## 2019-06-02 DIAGNOSIS — E87.6 HYPOKALEMIA: ICD-10-CM

## 2019-06-02 DIAGNOSIS — R31.9 HEMATURIA, UNSPECIFIED TYPE: ICD-10-CM

## 2019-06-02 DIAGNOSIS — R10.9 ABDOMINAL PAIN, UNSPECIFIED ABDOMINAL LOCATION: ICD-10-CM

## 2019-06-02 PROBLEM — E87.1 HYPONATREMIA: Status: ACTIVE | Noted: 2019-06-02

## 2019-06-02 PROBLEM — L25.9 CONTACT DERMATITIS: Status: ACTIVE | Noted: 2019-06-02

## 2019-06-02 PROBLEM — E86.0 DEHYDRATION: Status: ACTIVE | Noted: 2019-06-02

## 2019-06-02 PROBLEM — J45.909 ASTHMA: Status: ACTIVE | Noted: 2019-06-02

## 2019-06-02 PROBLEM — R73.9 HYPERGLYCEMIA: Status: ACTIVE | Noted: 2019-06-02

## 2019-06-02 LAB
ALBUMIN SERPL BCP-MCNC: 4.9 G/DL (ref 3.2–4.9)
ALBUMIN/GLOB SERPL: 1.6 G/DL
ALP SERPL-CCNC: 85 U/L (ref 30–99)
ALT SERPL-CCNC: 34 U/L (ref 2–50)
ANION GAP SERPL CALC-SCNC: 14 MMOL/L (ref 0–11.9)
ANION GAP SERPL CALC-SCNC: 9 MMOL/L (ref 0–11.9)
APPEARANCE UR: ABNORMAL
AST SERPL-CCNC: 43 U/L (ref 12–45)
BACTERIA #/AREA URNS HPF: ABNORMAL /HPF
BASOPHILS # BLD AUTO: 0.8 % (ref 0–1.8)
BASOPHILS # BLD: 0.06 K/UL (ref 0–0.12)
BILIRUB SERPL-MCNC: 1.5 MG/DL (ref 0.1–1.5)
BILIRUB UR QL STRIP.AUTO: ABNORMAL
BUN SERPL-MCNC: 12 MG/DL (ref 8–22)
BUN SERPL-MCNC: 7 MG/DL (ref 8–22)
CALCIUM SERPL-MCNC: 8.2 MG/DL (ref 8.4–10.2)
CALCIUM SERPL-MCNC: 8.7 MG/DL (ref 8.4–10.2)
CASTS URNS QL MICRO: ABNORMAL /LPF
CHLORIDE SERPL-SCNC: 95 MMOL/L (ref 96–112)
CHLORIDE SERPL-SCNC: 99 MMOL/L (ref 96–112)
CK SERPL-CCNC: 98 U/L (ref 0–154)
CO2 SERPL-SCNC: 25 MMOL/L (ref 20–33)
CO2 SERPL-SCNC: 27 MMOL/L (ref 20–33)
COLOR UR: YELLOW
CREAT SERPL-MCNC: 0.99 MG/DL (ref 0.5–1.4)
CREAT SERPL-MCNC: 1.03 MG/DL (ref 0.5–1.4)
EOSINOPHIL # BLD AUTO: 0.11 K/UL (ref 0–0.51)
EOSINOPHIL NFR BLD: 1.4 % (ref 0–6.9)
EPI CELLS #/AREA URNS HPF: ABNORMAL /HPF
ERYTHROCYTE [DISTWIDTH] IN BLOOD BY AUTOMATED COUNT: 41.1 FL (ref 35.9–50)
GLOBULIN SER CALC-MCNC: 3.1 G/DL (ref 1.9–3.5)
GLUCOSE SERPL-MCNC: 135 MG/DL (ref 65–99)
GLUCOSE SERPL-MCNC: 92 MG/DL (ref 65–99)
GLUCOSE UR STRIP.AUTO-MCNC: NEGATIVE MG/DL
HCT VFR BLD AUTO: 50.3 % (ref 42–52)
HGB BLD-MCNC: 18.1 G/DL (ref 14–18)
HYALINE CASTS #/AREA URNS LPF: >10 /LPF
IMM GRANULOCYTES # BLD AUTO: 0.03 K/UL (ref 0–0.11)
IMM GRANULOCYTES NFR BLD AUTO: 0.4 % (ref 0–0.9)
KETONES UR STRIP.AUTO-MCNC: 15 MG/DL
LEUKOCYTE ESTERASE UR QL STRIP.AUTO: NEGATIVE
LIPASE SERPL-CCNC: 44 U/L (ref 7–58)
LYMPHOCYTES # BLD AUTO: 1.93 K/UL (ref 1–4.8)
LYMPHOCYTES NFR BLD: 24.8 % (ref 22–41)
MAGNESIUM SERPL-MCNC: 1.6 MG/DL (ref 1.5–2.5)
MCH RBC QN AUTO: 32.4 PG (ref 27–33)
MCHC RBC AUTO-ENTMCNC: 36 G/DL (ref 33.7–35.3)
MCV RBC AUTO: 90 FL (ref 81.4–97.8)
MICRO URNS: ABNORMAL
MONOCYTES # BLD AUTO: 0.58 K/UL (ref 0–0.85)
MONOCYTES NFR BLD AUTO: 7.5 % (ref 0–13.4)
MUCOUS THREADS #/AREA URNS HPF: ABNORMAL /HPF
NEUTROPHILS # BLD AUTO: 5.06 K/UL (ref 1.82–7.42)
NEUTROPHILS NFR BLD: 65.1 % (ref 44–72)
NITRITE UR QL STRIP.AUTO: NEGATIVE
NRBC # BLD AUTO: 0 K/UL
NRBC BLD-RTO: 0 /100 WBC
PH UR STRIP.AUTO: 6.5 [PH]
PLATELET # BLD AUTO: 264 K/UL (ref 164–446)
PMV BLD AUTO: 9 FL (ref 9–12.9)
POTASSIUM SERPL-SCNC: 2.4 MMOL/L (ref 3.6–5.5)
POTASSIUM SERPL-SCNC: 2.9 MMOL/L (ref 3.6–5.5)
PROT SERPL-MCNC: 8 G/DL (ref 6–8.2)
PROT UR QL STRIP: 30 MG/DL
RBC # BLD AUTO: 5.59 M/UL (ref 4.7–6.1)
RBC # URNS HPF: ABNORMAL /HPF
RBC UR QL AUTO: ABNORMAL
SODIUM SERPL-SCNC: 134 MMOL/L (ref 135–145)
SODIUM SERPL-SCNC: 135 MMOL/L (ref 135–145)
SP GR UR STRIP.AUTO: 1.01
WBC # BLD AUTO: 7.8 K/UL (ref 4.8–10.8)
WBC #/AREA URNS HPF: ABNORMAL /HPF
WBC STL QL MICRO: NORMAL

## 2019-06-02 PROCEDURE — 99219 PR INITIAL OBSERVATION CARE,LEVL II: CPT | Performed by: HOSPITALIST

## 2019-06-02 PROCEDURE — 96366 THER/PROPH/DIAG IV INF ADDON: CPT

## 2019-06-02 PROCEDURE — A9270 NON-COVERED ITEM OR SERVICE: HCPCS | Performed by: HOSPITALIST

## 2019-06-02 PROCEDURE — 96368 THER/DIAG CONCURRENT INF: CPT

## 2019-06-02 PROCEDURE — 700111 HCHG RX REV CODE 636 W/ 250 OVERRIDE (IP): Performed by: EMERGENCY MEDICINE

## 2019-06-02 PROCEDURE — 74177 CT ABD & PELVIS W/CONTRAST: CPT

## 2019-06-02 PROCEDURE — G0378 HOSPITAL OBSERVATION PER HR: HCPCS

## 2019-06-02 PROCEDURE — 82550 ASSAY OF CK (CPK): CPT

## 2019-06-02 PROCEDURE — 700105 HCHG RX REV CODE 258: Performed by: EMERGENCY MEDICINE

## 2019-06-02 PROCEDURE — 89055 LEUKOCYTE ASSESSMENT FECAL: CPT

## 2019-06-02 PROCEDURE — 80048 BASIC METABOLIC PNL TOTAL CA: CPT

## 2019-06-02 PROCEDURE — 36415 COLL VENOUS BLD VENIPUNCTURE: CPT

## 2019-06-02 PROCEDURE — 700102 HCHG RX REV CODE 250 W/ 637 OVERRIDE(OP): Performed by: EMERGENCY MEDICINE

## 2019-06-02 PROCEDURE — 99285 EMERGENCY DEPT VISIT HI MDM: CPT

## 2019-06-02 PROCEDURE — 83690 ASSAY OF LIPASE: CPT

## 2019-06-02 PROCEDURE — 83735 ASSAY OF MAGNESIUM: CPT

## 2019-06-02 PROCEDURE — 700117 HCHG RX CONTRAST REV CODE 255: Performed by: EMERGENCY MEDICINE

## 2019-06-02 PROCEDURE — A9270 NON-COVERED ITEM OR SERVICE: HCPCS | Performed by: EMERGENCY MEDICINE

## 2019-06-02 PROCEDURE — 96375 TX/PRO/DX INJ NEW DRUG ADDON: CPT

## 2019-06-02 PROCEDURE — 80053 COMPREHEN METABOLIC PANEL: CPT

## 2019-06-02 PROCEDURE — 700111 HCHG RX REV CODE 636 W/ 250 OVERRIDE (IP): Performed by: HOSPITALIST

## 2019-06-02 PROCEDURE — 85025 COMPLETE CBC W/AUTO DIFF WBC: CPT

## 2019-06-02 PROCEDURE — 700101 HCHG RX REV CODE 250: Performed by: HOSPITALIST

## 2019-06-02 PROCEDURE — 700102 HCHG RX REV CODE 250 W/ 637 OVERRIDE(OP): Performed by: HOSPITALIST

## 2019-06-02 PROCEDURE — 81001 URINALYSIS AUTO W/SCOPE: CPT

## 2019-06-02 PROCEDURE — 96365 THER/PROPH/DIAG IV INF INIT: CPT

## 2019-06-02 RX ORDER — TIOTROPIUM BROMIDE 18 UG/1
18 CAPSULE ORAL; RESPIRATORY (INHALATION) DAILY
COMMUNITY

## 2019-06-02 RX ORDER — TIOTROPIUM BROMIDE 18 UG/1
1 CAPSULE ORAL; RESPIRATORY (INHALATION) DAILY
Status: DISCONTINUED | OUTPATIENT
Start: 2019-06-03 | End: 2019-06-03 | Stop reason: HOSPADM

## 2019-06-02 RX ORDER — ONDANSETRON 2 MG/ML
4 INJECTION INTRAMUSCULAR; INTRAVENOUS ONCE
Status: COMPLETED | OUTPATIENT
Start: 2019-06-02 | End: 2019-06-02

## 2019-06-02 RX ORDER — CALCIUM CARBONATE 500 MG/1
500-1000 TABLET, CHEWABLE ORAL EVERY 4 HOURS PRN
Status: DISCONTINUED | OUTPATIENT
Start: 2019-06-02 | End: 2019-06-03 | Stop reason: HOSPADM

## 2019-06-02 RX ORDER — OMEPRAZOLE 20 MG/1
20 CAPSULE, DELAYED RELEASE ORAL ONCE
Status: DISCONTINUED | OUTPATIENT
Start: 2019-06-02 | End: 2019-06-02

## 2019-06-02 RX ORDER — NAPROXEN SODIUM 220 MG
440 TABLET ORAL PRN
COMMUNITY
End: 2019-09-14

## 2019-06-02 RX ORDER — OMEPRAZOLE 20 MG/1
20 CAPSULE, DELAYED RELEASE ORAL DAILY
COMMUNITY
End: 2019-09-14

## 2019-06-02 RX ORDER — ALBUTEROL SULFATE 90 UG/1
2 AEROSOL, METERED RESPIRATORY (INHALATION) EVERY 6 HOURS PRN
COMMUNITY

## 2019-06-02 RX ORDER — TRAZODONE HYDROCHLORIDE 50 MG/1
50 TABLET ORAL NIGHTLY PRN
COMMUNITY

## 2019-06-02 RX ORDER — PROMETHAZINE HYDROCHLORIDE 25 MG/1
12.5-25 TABLET ORAL EVERY 4 HOURS PRN
Status: DISCONTINUED | OUTPATIENT
Start: 2019-06-02 | End: 2019-06-03 | Stop reason: HOSPADM

## 2019-06-02 RX ORDER — MAGNESIUM SULFATE HEPTAHYDRATE 40 MG/ML
2 INJECTION, SOLUTION INTRAVENOUS ONCE
Status: COMPLETED | OUTPATIENT
Start: 2019-06-02 | End: 2019-06-02

## 2019-06-02 RX ORDER — CLONIDINE HYDROCHLORIDE 0.1 MG/1
0.1 TABLET ORAL EVERY 6 HOURS PRN
Status: DISCONTINUED | OUTPATIENT
Start: 2019-06-02 | End: 2019-06-03 | Stop reason: HOSPADM

## 2019-06-02 RX ORDER — OMEPRAZOLE 20 MG/1
20 CAPSULE, DELAYED RELEASE ORAL DAILY
Status: DISCONTINUED | OUTPATIENT
Start: 2019-06-02 | End: 2019-06-03 | Stop reason: HOSPADM

## 2019-06-02 RX ORDER — DIPHENOXYLATE HYDROCHLORIDE AND ATROPINE SULFATE 2.5; .025 MG/1; MG/1
1 TABLET ORAL 4 TIMES DAILY PRN
Status: DISCONTINUED | OUTPATIENT
Start: 2019-06-02 | End: 2019-06-03 | Stop reason: HOSPADM

## 2019-06-02 RX ORDER — POTASSIUM CHLORIDE 7.45 MG/ML
10 INJECTION INTRAVENOUS ONCE
Status: COMPLETED | OUTPATIENT
Start: 2019-06-02 | End: 2019-06-02

## 2019-06-02 RX ORDER — ONDANSETRON 4 MG/1
4 TABLET, ORALLY DISINTEGRATING ORAL EVERY 4 HOURS PRN
Status: DISCONTINUED | OUTPATIENT
Start: 2019-06-02 | End: 2019-06-03 | Stop reason: HOSPADM

## 2019-06-02 RX ORDER — DICYCLOMINE HCL 20 MG
10 TABLET ORAL 4 TIMES DAILY
Status: DISCONTINUED | OUTPATIENT
Start: 2019-06-02 | End: 2019-06-03 | Stop reason: HOSPADM

## 2019-06-02 RX ORDER — ONDANSETRON 2 MG/ML
4 INJECTION INTRAMUSCULAR; INTRAVENOUS EVERY 4 HOURS PRN
Status: DISCONTINUED | OUTPATIENT
Start: 2019-06-02 | End: 2019-06-03 | Stop reason: HOSPADM

## 2019-06-02 RX ORDER — MORPHINE SULFATE 4 MG/ML
4 INJECTION, SOLUTION INTRAMUSCULAR; INTRAVENOUS ONCE
Status: COMPLETED | OUTPATIENT
Start: 2019-06-02 | End: 2019-06-02

## 2019-06-02 RX ORDER — ACETAMINOPHEN 325 MG/1
650 TABLET ORAL EVERY 6 HOURS PRN
Status: DISCONTINUED | OUTPATIENT
Start: 2019-06-02 | End: 2019-06-03 | Stop reason: HOSPADM

## 2019-06-02 RX ORDER — PROMETHAZINE HYDROCHLORIDE 25 MG/1
12.5-25 SUPPOSITORY RECTAL EVERY 4 HOURS PRN
Status: DISCONTINUED | OUTPATIENT
Start: 2019-06-02 | End: 2019-06-03 | Stop reason: HOSPADM

## 2019-06-02 RX ORDER — SODIUM CHLORIDE AND POTASSIUM CHLORIDE 150; 900 MG/100ML; MG/100ML
INJECTION, SOLUTION INTRAVENOUS CONTINUOUS
Status: DISCONTINUED | OUTPATIENT
Start: 2019-06-02 | End: 2019-06-03 | Stop reason: HOSPADM

## 2019-06-02 RX ORDER — POTASSIUM CHLORIDE 20 MEQ/1
40 TABLET, EXTENDED RELEASE ORAL ONCE
Status: COMPLETED | OUTPATIENT
Start: 2019-06-02 | End: 2019-06-02

## 2019-06-02 RX ORDER — HYDROMORPHONE HYDROCHLORIDE 1 MG/ML
0.5 INJECTION, SOLUTION INTRAMUSCULAR; INTRAVENOUS; SUBCUTANEOUS ONCE
Status: COMPLETED | OUTPATIENT
Start: 2019-06-02 | End: 2019-06-02

## 2019-06-02 RX ORDER — SODIUM CHLORIDE, SODIUM LACTATE, POTASSIUM CHLORIDE, CALCIUM CHLORIDE 600; 310; 30; 20 MG/100ML; MG/100ML; MG/100ML; MG/100ML
1000 INJECTION, SOLUTION INTRAVENOUS ONCE
Status: COMPLETED | OUTPATIENT
Start: 2019-06-02 | End: 2019-06-02

## 2019-06-02 RX ADMIN — DICYCLOMINE HYDROCHLORIDE 10 MG: 20 TABLET ORAL at 19:59

## 2019-06-02 RX ADMIN — ENOXAPARIN SODIUM 40 MG: 100 INJECTION SUBCUTANEOUS at 17:38

## 2019-06-02 RX ADMIN — IOHEXOL 100 ML: 350 INJECTION, SOLUTION INTRAVENOUS at 15:35

## 2019-06-02 RX ADMIN — POTASSIUM CHLORIDE 40 MEQ: 1500 TABLET, EXTENDED RELEASE ORAL at 15:13

## 2019-06-02 RX ADMIN — HYDROMORPHONE HYDROCHLORIDE 0.5 MG: 1 INJECTION, SOLUTION INTRAMUSCULAR; INTRAVENOUS; SUBCUTANEOUS at 15:06

## 2019-06-02 RX ADMIN — MAGNESIUM SULFATE IN WATER 2 G: 40 INJECTION, SOLUTION INTRAVENOUS at 15:15

## 2019-06-02 RX ADMIN — ACETAMINOPHEN 650 MG: 325 TABLET, FILM COATED ORAL at 21:28

## 2019-06-02 RX ADMIN — MORPHINE SULFATE 4 MG: 4 INJECTION INTRAVENOUS at 16:34

## 2019-06-02 RX ADMIN — SODIUM CHLORIDE, POTASSIUM CHLORIDE, SODIUM LACTATE AND CALCIUM CHLORIDE 1000 ML: 600; 310; 30; 20 INJECTION, SOLUTION INTRAVENOUS at 14:45

## 2019-06-02 RX ADMIN — CALCIUM CARBONATE (ANTACID) CHEW TAB 500 MG 1000 MG: 500 CHEW TAB at 21:18

## 2019-06-02 RX ADMIN — DICYCLOMINE HYDROCHLORIDE 10 MG: 20 TABLET ORAL at 17:38

## 2019-06-02 RX ADMIN — OMEPRAZOLE 20 MG: 20 CAPSULE, DELAYED RELEASE ORAL at 21:18

## 2019-06-02 RX ADMIN — POTASSIUM CHLORIDE AND SODIUM CHLORIDE: 900; 150 INJECTION, SOLUTION INTRAVENOUS at 18:30

## 2019-06-02 RX ADMIN — ONDANSETRON 4 MG: 2 INJECTION INTRAMUSCULAR; INTRAVENOUS at 15:07

## 2019-06-02 RX ADMIN — DIPHENOXYLATE HYDROCHLORIDE AND ATROPINE SULFATE 1 TABLET: 2.5; .025 TABLET ORAL at 17:38

## 2019-06-02 RX ADMIN — POTASSIUM CHLORIDE AND SODIUM CHLORIDE: 900; 150 INJECTION, SOLUTION INTRAVENOUS at 19:09

## 2019-06-02 RX ADMIN — POTASSIUM CHLORIDE 10 MEQ: 7.46 INJECTION, SOLUTION INTRAVENOUS at 15:16

## 2019-06-02 ASSESSMENT — ENCOUNTER SYMPTOMS
FEVER: 0
DIZZINESS: 0
INSOMNIA: 0
CHILLS: 0
SHORTNESS OF BREATH: 0
DIARRHEA: 1
DEPRESSION: 0
ABDOMINAL PAIN: 1
HEADACHES: 0
BACK PAIN: 0
PALPITATIONS: 0
MYALGIAS: 1
VOMITING: 0
NAUSEA: 1
COUGH: 0
EYE PAIN: 0
SORE THROAT: 0
TINGLING: 0
NECK PAIN: 0
BLURRED VISION: 0

## 2019-06-02 ASSESSMENT — COGNITIVE AND FUNCTIONAL STATUS - GENERAL
MOBILITY SCORE: 24
SUGGESTED CMS G CODE MODIFIER MOBILITY: CH
SUGGESTED CMS G CODE MODIFIER DAILY ACTIVITY: CH
MOBILITY SCORE: 24
SUGGESTED CMS G CODE MODIFIER MOBILITY: CH
DAILY ACTIVITIY SCORE: 24

## 2019-06-02 ASSESSMENT — LIFESTYLE VARIABLES
EVER_SMOKED: YES
ALCOHOL_USE: NO

## 2019-06-02 NOTE — ED TRIAGE NOTES
"Chief Complaint   Patient presents with   • Abdominal Pain     c/o generalized abd pain x 3 days   • Body Aches   • Diarrhea     /101   Pulse (!) 121   Temp 35.9 °C (96.7 °F) (Temporal)   Resp 16   Ht 1.854 m (6' 1\")   Wt 90.5 kg (199 lb 8.3 oz)   SpO2 96%   BMI 26.32 kg/m²     Pt ambulated to Triage w/ mult complaints including \"my bladder feels weird,\" denies dysuria states feels more like \"it won't come out,\" c/o joint pain, nausea.  Pt currently denies c/o CP.  "

## 2019-06-02 NOTE — H&P
Hospital Medicine History & Physical Note    Date of Service  6/2/2019    Primary Care Physician  Pcp Pt States None    Consultants  none    Code Status  full    Chief Complaint  diarrhea    History of Presenting Illness  43 y.o. male who presented 6/2/2019 with abdominal pain, diarrhea and body aches for the last 3 days. His diarrhea is 6-10 times per day and yellw wiht a foul odor. His pain is in the lower part of his abdomen. He has had no vomiting but does have nausea. He has a documented history of alcoholism and chronic pancreatitis. His pain is much different than this pancreatitis and he has not been drinking in some time. He was on what he thinks was amoxicillin 2-3 months ago for bronchitis. He did not finish his 10 day course. He says he has difficulty urinating but does urinate when he stools. He denies overt dysuria.     Review of Systems  Review of Systems   Constitutional: Positive for malaise/fatigue. Negative for chills and fever.   HENT: Negative for sore throat.    Eyes: Negative for blurred vision and pain.   Respiratory: Negative for cough and shortness of breath.    Cardiovascular: Negative for chest pain and palpitations.   Gastrointestinal: Positive for abdominal pain, diarrhea and nausea. Negative for vomiting.   Genitourinary: Negative for dysuria and urgency.   Musculoskeletal: Positive for myalgias. Negative for back pain and neck pain.   Skin: Negative for itching and rash.   Neurological: Negative for dizziness, tingling and headaches.   Psychiatric/Behavioral: Negative for depression. The patient does not have insomnia.    All other systems reviewed and are negative.      Past Medical History   has a past medical history of Asthma; Bowel habit changes; Bronchitis (8/2016); Chronic pancreatitis (HCC); Cough (9/23/16); Fall (10/8/2016); Heart burn; Hypertension; Lumbar arthropathy; Pain (9/23/16); Pancreatitis; Psychiatric problem; Renal disorder (6/2016); and Urinary bladder  disorder.    Surgical History   has a past surgical history that includes skull fracture depressed elevation (1995); cholecystectomy (2007); tonsillectomy and adenoidectomy (1989); gastroscopy-endo (N/A, 10/21/2016); egd w/endoscopic ultrasound (N/A, 10/21/2016); ercp-diagnostic (N/A, 10/21/2016); and ankle orif (Left, 11/10/2017).     Family History  family history includes Cancer in his brother, maternal aunt, and mother; Hypertension in his father.     Social History   reports that he has been smoking Cigarettes.  He has a 10.00 pack-year smoking history. He has never used smokeless tobacco. He reports that he drinks alcohol. He reports that he uses drugs.    Allergies  Allergies   Allergen Reactions   • Nkda [No Known Drug Allergy]        Medications  Prior to Admission Medications   Prescriptions Last Dose Informant Patient Reported? Taking?   Ascorbic Acid (VITAMIN C PO) > 1 week at AM Patient Yes Yes   Sig: Take 1 Tab by mouth every day.   Cyanocobalamin (B-12 PO) > 1 week at AM Patient Yes Yes   Sig: Take 1 Tab by mouth every day.   Pancrelipase, Lip-Prot-Amyl, (CREON) 29175 units Cap DR Particles > 5 days at Unknown Patient No No   Sig: Take 2 Tabs by mouth 3 times a day before meals. Takes two and before snacks   acetaminophen (TYLENOL) 500 MG Tab 6/2/2019 at 0600 Patient Yes No   Sig: Take 2,000 mg by mouth every 6 hours as needed for Moderate Pain.   albuterol 108 (90 Base) MCG/ACT Aero Soln inhalation aerosol > 2 weeks at Ran out Patient Yes Yes   Sig: Inhale 2 Puffs by mouth every 6 hours as needed for Shortness of Breath.   dicyclomine (BENTYL) 10 MG Cap > 2 weeks at Unknown Patient Yes No   Sig: Take 10 mg by mouth 3 times a day as needed.   multivitamin (THERAGRAN) Tab > 1 week at AM Patient Yes No   Sig: Take 1 Tab by mouth every day.   naproxen (ALEVE) 220 MG tablet 6/1/2019 at 2100 Patient Yes Yes   Sig: Take 440 mg by mouth as needed.   tiotropium (SPIRIVA) 18 MCG Cap 6/1/2019 at 1430 Patient  Yes Yes   Sig: Inhale 18 mcg by mouth every day.      Facility-Administered Medications: None       Physical Exam  Temp:  [35.9 °C (96.7 °F)] 35.9 °C (96.7 °F)  Pulse:  [] 92  Resp:  [16-18] 18  BP: (149)/(101) 149/101  SpO2:  [91 %-96 %] 91 %    Physical Exam   Constitutional: He is oriented to person, place, and time. He appears well-developed and well-nourished. No distress.   Patient seen and examined  Plan discussed with RN   HENT:   Right Ear: External ear normal.   Left Ear: External ear normal.   Nose: Nose normal.   Eyes: Right eye exhibits no discharge. Left eye exhibits no discharge. No scleral icterus.   Neck: No JVD present. No tracheal deviation present.   Cardiovascular: Normal rate, normal heart sounds and intact distal pulses.    No murmur heard.  Cap refill 2sec  Pulses 2+ throughout     Pulmonary/Chest: Effort normal and breath sounds normal. No respiratory distress. He has no wheezes. He has no rales.   Abdominal: Soft. Bowel sounds are normal. He exhibits no distension. There is tenderness. There is no guarding.   Musculoskeletal: He exhibits no edema or tenderness.   Neurological: He is alert and oriented to person, place, and time.   Skin: Skin is warm and dry. He is not diaphoretic. No erythema.   Normal skin color   Psychiatric: He has a normal mood and affect. His behavior is normal.   Nursing note and vitals reviewed.      Laboratory:  Recent Labs      06/02/19   1420   WBC  7.8   RBC  5.59   HEMOGLOBIN  18.1*   HEMATOCRIT  50.3   MCV  90.0   MCH  32.4   MCHC  36.0*   RDW  41.1   PLATELETCT  264   MPV  9.0     Recent Labs      06/02/19   1420   SODIUM  134*   POTASSIUM  2.4*   CHLORIDE  95*   CO2  25   GLUCOSE  135*   BUN  12   CREATININE  0.99   CALCIUM  8.7     Recent Labs      06/02/19   1420   ALTSGPT  34   ASTSGOT  43   ALKPHOSPHAT  85   TBILIRUBIN  1.5   LIPASE  44   GLUCOSE  135*                 No results for input(s): TROPONINI in the last 72 hours.    Urinalysis:    Recent  Labs      06/02/19   1420   SPECGRAVITY  1.015   GLUCOSEUR  Negative   KETONES  15*   NITRITE  Negative   LEUKESTERAS  Negative   WBCURINE  0-2*   RBCURINE  20-50*   BACTERIA  Few*   EPITHELCELL  Few        Imaging:  CT-ABDOMEN-PELVIS WITH   Final Result      1.  Mildly dilated fluid-filled proximal small bowel loops suggest enteritis.  No definite bowel obstruction.   2.  Normal appendix.               Assessment/Plan:  I anticipate this patient is appropriate for observation status at this time.    * Diarrhea- (present on admission)   Assessment & Plan    Consistent with likely viral enteritis. Ct is ok. I doubt this is cdiff. Lomotil and rehydrate. Check stool wbc.      Asthma   Assessment & Plan    No flare.      Hyperglycemia   Assessment & Plan    Reactive likely but will check a1c     Hyponatremia   Assessment & Plan    Iv fluids.      Dehydration   Assessment & Plan    Severe. Iv fluids, control diarrhea and correct lytes.   His ua is abnormal but this may just be dehydration. Will repeat it after fluids .     Hypokalemia- (present on admission)   Assessment & Plan    Trend and replace. Severe. Trend on tele. Replace mag and check mag.      ADD (attention deficit disorder)- (present on admission)   Assessment & Plan    Not on meds. Stable.      Alcoholism (HCC)- (present on admission)   Assessment & Plan    In remission     Chronic pancreatitis (HCC)- (present on admission)   Assessment & Plan    Appears stable. Continue meds         VTE prophylaxis: none

## 2019-06-02 NOTE — ED PROVIDER NOTES
ED Provider Note    ER PROVIDER NOTE      CHIEF COMPLAINT  Chief Complaint   Patient presents with   • Abdominal Pain     c/o generalized abd pain x 3 days   • Body Aches   • Diarrhea       HPI  Wero Thorpe is a 43 y.o. male who presents to the emergency department complaining of lower abdominal pain and diarrhea.  Patient reports of the last 3 days multiple episodes of nonbloody diarrhea.  Similar timeframe gradually developed lower abdominal sharp and crampy pain, no real radiation or alleviating or aggravating factors.  He states some slight nausea but no vomiting, no real upper abdominal pain.  No fevers or chills.  No recent travel or antibiotic use.  He also states that he is having some sensation of urinary hesitancy although no real dysuria.  No concerns of STD exposure or discharge.    Patient does have history of apparent gallstone pancreatitis and has biliary stents in place    REVIEW OF SYSTEMS  Pertinent positives include abdominal pain, diarrhea. Pertinent negatives include no fever or antibiotic. See HPI for details. All other systems reviewed and are negative.    PAST MEDICAL HISTORY   has a past medical history of Asthma; Bowel habit changes; Bronchitis (8/2016); Chronic pancreatitis ([facility]CC); Cough (9/23/16); Fall (10/8/2016); Heart burn; Hypertension; Lumbar arthropathy; Pain (9/23/16); Pancreatitis; Psychiatric problem; Renal disorder (6/2016); and Urinary bladder disorder.    SURGICAL HISTORY   has a past surgical history that includes skull fracture depressed elevation (1995); cholecystectomy (2007); tonsillectomy and adenoidectomy (1989); gastroscopy-endo (N/A, 10/21/2016); egd w/endoscopic ultrasound (N/A, 10/21/2016); ercp-diagnostic (N/A, 10/21/2016); and ankle orif (Left, 11/10/2017).    FAMILY HISTORY  Family History   Problem Relation Age of Onset   • Cancer Mother         breast   • Cancer Brother         head, neck and lung   • Cancer Maternal Aunt         breast   •  "Hypertension Father        SOCIAL HISTORY  Social History     Social History   • Marital status: Single     Spouse name: N/A   • Number of children: N/A   • Years of education: N/A     Social History Main Topics   • Smoking status: Current Every Day Smoker     Packs/day: 0.50     Years: 20.00     Types: Cigarettes     Last attempt to quit: 4/15/2011   • Smokeless tobacco: Never Used   • Alcohol use 0.0 oz/week   • Drug use: Yes      Comment: Marijuana   • Sexual activity: Yes     Other Topics Concern   • Not on file     Social History Narrative   • No narrative on file      History   Drug Use     Comment: Marijuana       CURRENT MEDICATIONS  Home Medications     Reviewed by Jake Caro (Pharmacy Tech) on 06/02/19 at 1431  Med List Status: Complete   Medication Last Dose Status   acetaminophen (TYLENOL) 500 MG Tab 6/2/2019 Active   albuterol 108 (90 Base) MCG/ACT Aero Soln inhalation aerosol > 2 weeks Active   Ascorbic Acid (VITAMIN C PO) > 1 week Active   Cyanocobalamin (B-12 PO) > 1 week Active   dicyclomine (BENTYL) 10 MG Cap > 2 weeks Active   multivitamin (THERAGRAN) Tab > 1 week Active   naproxen (ALEVE) 220 MG tablet 6/1/2019 Active   Pancrelipase, Lip-Prot-Amyl, (CREON) 81127 units Cap DR Particles > 5 days Active   tiotropium (SPIRIVA) 18 MCG Cap 6/1/2019 Active                ALLERGIES  Allergies   Allergen Reactions   • Nkda [No Known Drug Allergy]        PHYSICAL EXAM  VITAL SIGNS: /101   Pulse 92   Temp 35.9 °C (96.7 °F) (Temporal)   Resp 18   Ht 1.854 m (6' 1\")   Wt 90.5 kg (199 lb 8.3 oz)   SpO2 91%   BMI 26.32 kg/m²   Pulse ox interpretation: I interpret this pulse ox as normal.    Constitutional: Alert in no apparent distress.  HENT: No signs of trauma, Bilateral external ears normal, Nose normal.  Mucous membranes mildly dry  Eyes: Pupils are equal and reactive, Conjunctiva normal, Non-icteric.   Neck: Normal range of motion, No tenderness, Supple, No stridor.   Lymphatic: No " lymphadenopathy noted.   Cardiovascular: Tachycardic, regular rhythm, no murmurs.   Thorax & Lungs: Normal breath sounds, No respiratory distress, No wheezing, No chest tenderness.   Abdomen: Bowel sounds normal, Soft, moderate bilateral suprapubic tenderness, No masses, No pulsatile masses. No peritoneal signs.  Skin: Warm, Dry, No erythema, No rash.   Back: No bony tenderness, No CVA tenderness.   Extremities: Intact distal pulses, No edema, No tenderness, No cyanosis, Negative Manuela's sign.  Musculoskeletal: Good range of motion in all major joints. No tenderness to palpation or major deformities noted.   Neurologic: Alert , Normal motor function, Normal sensory function, No focal deficits noted.   Psychiatric: Affect normal, Judgment normal, Mood normal.     DIAGNOSTIC STUDIES / PROCEDURES      LABS  Labs Reviewed   CBC WITH DIFFERENTIAL - Abnormal; Notable for the following:        Result Value    Hemoglobin 18.1 (*)     MCHC 36.0 (*)     All other components within normal limits   COMP METABOLIC PANEL - Abnormal; Notable for the following:     Sodium 134 (*)     Potassium 2.4 (*)     Chloride 95 (*)     Anion Gap 14.0 (*)     Glucose 135 (*)     All other components within normal limits   URINALYSIS - Abnormal; Notable for the following:     Character Hazy (*)     Ketones 15 (*)     Protein 30 (*)     Bilirubin Moderate (*)     Occult Blood Moderate (*)     All other components within normal limits   URINE MICROSCOPIC (W/UA) - Abnormal; Notable for the following:     WBC 0-2 (*)     RBC 20-50 (*)     Bacteria Few (*)     Urine Casts >10 Hyaline (*)     Hyaline Cast >10 (*)     All other components within normal limits   LIPASE   CREATINE KINASE   ESTIMATED GFR       All labs reviewed by me.    RADIOLOGY  CT-ABDOMEN-PELVIS WITH   Final Result      1.  Mildly dilated fluid-filled proximal small bowel loops suggest enteritis.  No definite bowel obstruction.   2.  Normal appendix.           The radiologist's  interpretation of all radiological studies have been reviewed by me.    COURSE & MEDICAL DECISION MAKING  Nursing notes, VS, PMSFHx reviewed in chart.    2:09 PM Patient seen and examined at bedside. Patient will be treated with IV fluids, Zofran, Hydromorphone. Ordered for CBC, CMP, UA, CK,lipase, CT to evaluate his symptoms.     2:57 PM patient reevaluated, updated on results thus far, plan for potassium and magnesium supplementation    4:09 PM patient reevaluated again, still some slight discomfort, updated on results, plan for admission        HYDRATION: Based on the patient's presentation of Acute Diarrhea and Dehydration the patient was given IV fluids. IV Hydration was used because oral hydration was not as rapid as required. Upon recheck following hydration, the patient was Improved.    Decision Making:  This is a 43 y.o. male presenting with diarrhea joint aches and some urinary discomfort.  Patient appears to have an enteritis and be quite dehydrated.  This is caused a significant hypokalemia and he will be admitted for further potassium repletion as well as hydration.  CT without evidence of surgical process and exam is not suggestive of intra-abdominal surgical process.  His urinalysis does show some blood and casts, although no signs of infection.  Potential for Ame's syndrome given his joint aches, urethritis and diarrhea.  Would not empirically start on antibiotics as he has no red flags for enteroinvasive disease    Patient is admitted in stable condition    FINAL IMPRESSION  1. Hypokalemia    2. Abdominal pain, unspecified abdominal location    3. Diarrhea, unspecified type    4. Hematuria, unspecified type         The note accurately reflects work and decisions made by me.  Itz Fraser  6/2/2019  4:21 PM

## 2019-06-03 ENCOUNTER — PATIENT OUTREACH (OUTPATIENT)
Dept: HEALTH INFORMATION MANAGEMENT | Facility: OTHER | Age: 44
End: 2019-06-03

## 2019-06-03 VITALS
HEIGHT: 73 IN | TEMPERATURE: 97.4 F | WEIGHT: 206.79 LBS | SYSTOLIC BLOOD PRESSURE: 122 MMHG | DIASTOLIC BLOOD PRESSURE: 81 MMHG | HEART RATE: 78 BPM | RESPIRATION RATE: 18 BRPM | OXYGEN SATURATION: 100 % | BODY MASS INDEX: 27.41 KG/M2

## 2019-06-03 LAB
ALBUMIN SERPL BCP-MCNC: 3.2 G/DL (ref 3.2–4.9)
ALBUMIN/GLOB SERPL: 1.5 G/DL
ALP SERPL-CCNC: 61 U/L (ref 30–99)
ALT SERPL-CCNC: 46 U/L (ref 2–50)
ANION GAP SERPL CALC-SCNC: 10 MMOL/L (ref 0–11.9)
AST SERPL-CCNC: 71 U/L (ref 12–45)
BASOPHILS # BLD AUTO: 1 % (ref 0–1.8)
BASOPHILS # BLD: 0.05 K/UL (ref 0–0.12)
BILIRUB SERPL-MCNC: 1.1 MG/DL (ref 0.1–1.5)
BUN SERPL-MCNC: 6 MG/DL (ref 8–22)
CALCIUM SERPL-MCNC: 7.8 MG/DL (ref 8.4–10.2)
CHLORIDE SERPL-SCNC: 103 MMOL/L (ref 96–112)
CO2 SERPL-SCNC: 23 MMOL/L (ref 20–33)
CREAT SERPL-MCNC: 0.92 MG/DL (ref 0.5–1.4)
EOSINOPHIL # BLD AUTO: 0.12 K/UL (ref 0–0.51)
EOSINOPHIL NFR BLD: 2.3 % (ref 0–6.9)
ERYTHROCYTE [DISTWIDTH] IN BLOOD BY AUTOMATED COUNT: 42.4 FL (ref 35.9–50)
EST. AVERAGE GLUCOSE BLD GHB EST-MCNC: 105 MG/DL
GLOBULIN SER CALC-MCNC: 2.1 G/DL (ref 1.9–3.5)
GLUCOSE SERPL-MCNC: 112 MG/DL (ref 65–99)
HBA1C MFR BLD: 5.3 % (ref 0–5.6)
HCT VFR BLD AUTO: 41.7 % (ref 42–52)
HGB BLD-MCNC: 14.6 G/DL (ref 14–18)
IMM GRANULOCYTES # BLD AUTO: 0.01 K/UL (ref 0–0.11)
IMM GRANULOCYTES NFR BLD AUTO: 0.2 % (ref 0–0.9)
LYMPHOCYTES # BLD AUTO: 1.75 K/UL (ref 1–4.8)
LYMPHOCYTES NFR BLD: 33.3 % (ref 22–41)
MCH RBC QN AUTO: 32.2 PG (ref 27–33)
MCHC RBC AUTO-ENTMCNC: 35 G/DL (ref 33.7–35.3)
MCV RBC AUTO: 92.1 FL (ref 81.4–97.8)
MONOCYTES # BLD AUTO: 0.36 K/UL (ref 0–0.85)
MONOCYTES NFR BLD AUTO: 6.8 % (ref 0–13.4)
NEUTROPHILS # BLD AUTO: 2.97 K/UL (ref 1.82–7.42)
NEUTROPHILS NFR BLD: 56.4 % (ref 44–72)
NRBC # BLD AUTO: 0 K/UL
NRBC BLD-RTO: 0 /100 WBC
PLATELET # BLD AUTO: 189 K/UL (ref 164–446)
PMV BLD AUTO: 9.3 FL (ref 9–12.9)
POTASSIUM SERPL-SCNC: 2.7 MMOL/L (ref 3.6–5.5)
POTASSIUM SERPL-SCNC: 3.9 MMOL/L (ref 3.6–5.5)
PROT SERPL-MCNC: 5.3 G/DL (ref 6–8.2)
RBC # BLD AUTO: 4.53 M/UL (ref 4.7–6.1)
SODIUM SERPL-SCNC: 136 MMOL/L (ref 135–145)
WBC # BLD AUTO: 5.3 K/UL (ref 4.8–10.8)

## 2019-06-03 PROCEDURE — 700101 HCHG RX REV CODE 250: Performed by: HOSPITALIST

## 2019-06-03 PROCEDURE — 99406 BEHAV CHNG SMOKING 3-10 MIN: CPT

## 2019-06-03 PROCEDURE — 80053 COMPREHEN METABOLIC PANEL: CPT

## 2019-06-03 PROCEDURE — G0378 HOSPITAL OBSERVATION PER HR: HCPCS

## 2019-06-03 PROCEDURE — 90471 IMMUNIZATION ADMIN: CPT

## 2019-06-03 PROCEDURE — 84132 ASSAY OF SERUM POTASSIUM: CPT

## 2019-06-03 PROCEDURE — 90732 PPSV23 VACC 2 YRS+ SUBQ/IM: CPT | Performed by: HOSPITALIST

## 2019-06-03 PROCEDURE — 83036 HEMOGLOBIN GLYCOSYLATED A1C: CPT

## 2019-06-03 PROCEDURE — 700102 HCHG RX REV CODE 250 W/ 637 OVERRIDE(OP): Performed by: HOSPITALIST

## 2019-06-03 PROCEDURE — 700102 HCHG RX REV CODE 250 W/ 637 OVERRIDE(OP): Performed by: INTERNAL MEDICINE

## 2019-06-03 PROCEDURE — 85025 COMPLETE CBC W/AUTO DIFF WBC: CPT

## 2019-06-03 PROCEDURE — 96365 THER/PROPH/DIAG IV INF INIT: CPT

## 2019-06-03 PROCEDURE — 96375 TX/PRO/DX INJ NEW DRUG ADDON: CPT

## 2019-06-03 PROCEDURE — 99285 EMERGENCY DEPT VISIT HI MDM: CPT

## 2019-06-03 PROCEDURE — 96366 THER/PROPH/DIAG IV INF ADDON: CPT

## 2019-06-03 PROCEDURE — 99217 PR OBSERVATION CARE DISCHARGE: CPT | Performed by: HOSPITALIST

## 2019-06-03 PROCEDURE — A9270 NON-COVERED ITEM OR SERVICE: HCPCS | Performed by: INTERNAL MEDICINE

## 2019-06-03 PROCEDURE — 700111 HCHG RX REV CODE 636 W/ 250 OVERRIDE (IP): Performed by: HOSPITALIST

## 2019-06-03 PROCEDURE — 96368 THER/DIAG CONCURRENT INF: CPT

## 2019-06-03 PROCEDURE — A9270 NON-COVERED ITEM OR SERVICE: HCPCS | Performed by: HOSPITALIST

## 2019-06-03 RX ORDER — DIPHENOXYLATE HYDROCHLORIDE AND ATROPINE SULFATE 2.5; .025 MG/1; MG/1
1 TABLET ORAL 4 TIMES DAILY PRN
Qty: 20 TAB | Refills: 0 | Status: SHIPPED | OUTPATIENT
Start: 2019-06-03 | End: 2019-06-08

## 2019-06-03 RX ORDER — POTASSIUM CHLORIDE 20 MEQ/1
40 TABLET, EXTENDED RELEASE ORAL ONCE
Status: COMPLETED | OUTPATIENT
Start: 2019-06-03 | End: 2019-06-03

## 2019-06-03 RX ORDER — MAGNESIUM SULFATE HEPTAHYDRATE 40 MG/ML
2 INJECTION, SOLUTION INTRAVENOUS ONCE
Status: COMPLETED | OUTPATIENT
Start: 2019-06-03 | End: 2019-06-03

## 2019-06-03 RX ADMIN — ACETAMINOPHEN 650 MG: 325 TABLET, FILM COATED ORAL at 10:31

## 2019-06-03 RX ADMIN — PNEUMOCOCCAL VACCINE POLYVALENT 25 MCG
25; 25; 25; 25; 25; 25; 25; 25; 25; 25; 25; 25; 25; 25; 25; 25; 25; 25; 25; 25; 25; 25; 25 INJECTION, SOLUTION INTRAMUSCULAR; SUBCUTANEOUS at 15:06

## 2019-06-03 RX ADMIN — DIPHENOXYLATE HYDROCHLORIDE AND ATROPINE SULFATE 1 TABLET: 2.5; .025 TABLET ORAL at 11:56

## 2019-06-03 RX ADMIN — ACETAMINOPHEN 650 MG: 325 TABLET, FILM COATED ORAL at 04:26

## 2019-06-03 RX ADMIN — TIOTROPIUM BROMIDE 1 CAPSULE: 18 CAPSULE ORAL; RESPIRATORY (INHALATION) at 06:00

## 2019-06-03 RX ADMIN — DIPHENOXYLATE HYDROCHLORIDE AND ATROPINE SULFATE 1 TABLET: 2.5; .025 TABLET ORAL at 06:04

## 2019-06-03 RX ADMIN — DICYCLOMINE HYDROCHLORIDE 10 MG: 20 TABLET ORAL at 13:40

## 2019-06-03 RX ADMIN — PANCRELIPASE 72000 UNITS: 24000; 76000; 120000 CAPSULE, DELAYED RELEASE PELLETS ORAL at 12:22

## 2019-06-03 RX ADMIN — PANCRELIPASE 72000 UNITS: 24000; 76000; 120000 CAPSULE, DELAYED RELEASE PELLETS ORAL at 08:07

## 2019-06-03 RX ADMIN — ONDANSETRON 4 MG: 4 TABLET, ORALLY DISINTEGRATING ORAL at 12:00

## 2019-06-03 RX ADMIN — MAGNESIUM SULFATE HEPTAHYDRATE 2 G: 40 INJECTION, SOLUTION INTRAVENOUS at 10:28

## 2019-06-03 RX ADMIN — DICYCLOMINE HYDROCHLORIDE 10 MG: 20 TABLET ORAL at 08:07

## 2019-06-03 RX ADMIN — POTASSIUM CHLORIDE AND SODIUM CHLORIDE: 900; 150 INJECTION, SOLUTION INTRAVENOUS at 03:16

## 2019-06-03 RX ADMIN — POTASSIUM CHLORIDE 40 MEQ: 1500 TABLET, EXTENDED RELEASE ORAL at 05:59

## 2019-06-03 NOTE — ASSESSMENT & PLAN NOTE
Consistent with likely viral enteritis. Ct is ok. I doubt this is cdiff. Lomotil and rehydrate. Check stool wbc.

## 2019-06-03 NOTE — PROGRESS NOTES
Report received from JUANITA Griffin. Patient resting comfortably in bed. Declines any needs at this time. Call light in reach.

## 2019-06-03 NOTE — PROGRESS NOTES
Telemetry Shift Summary    Rhythm NSR-ST  HR Range 's  Ectopy none  Measurements .18/.10/.38          Normal Values  Rhythm SR  HR Range    Measurements 0.12-0.20 / 0.06-0.10  / 0.30-0.52

## 2019-06-03 NOTE — ASSESSMENT & PLAN NOTE
Severe. Iv fluids, control diarrhea and correct lytes.   His ua is abnormal but this may just be dehydration. Will repeat it after fluids .

## 2019-06-03 NOTE — DISCHARGE INSTRUCTIONS
Viral Gastroenteritis, Adult  Viral gastroenteritis is also known as the stomach flu. This condition is caused by various viruses. These viruses can be passed from person to person very easily (are very contagious). This condition may affect your stomach, small intestine, and large intestine. It can cause sudden watery diarrhea, fever, and vomiting.  Diarrhea and vomiting can make you feel weak and cause you to become dehydrated. You may not be able to keep fluids down. Dehydration can make you tired and thirsty, cause you to have a dry mouth, and decrease how often you urinate. Older adults and people with other diseases or a weak immune system are at higher risk for dehydration.  It is important to replace the fluids that you lose from diarrhea and vomiting. If you become severely dehydrated, you may need to get fluids through an IV tube.  What are the causes?  Gastroenteritis is caused by various viruses, including rotavirus and norovirus. Norovirus is the most common cause in adults.  You can get sick by eating food, drinking water, or touching a surface contaminated with one of these viruses. You can also get sick from sharing utensils or other personal items with an infected person.  What increases the risk?  This condition is more likely to develop in people:  · Who have a weak defense system (immune system).  · Who live with one or more children who are younger than 2 years old.  · Who live in a nursing home.  · Who go on cruise ships.  What are the signs or symptoms?  Symptoms of this condition start suddenly 1-2 days after exposure to a virus. Symptoms may last a few days or as long as a week. The most common symptoms are watery diarrhea and vomiting. Other symptoms include:  · Fever.  · Headache.  · Fatigue.  · Pain in the abdomen.  · Chills.  · Weakness.  · Nausea.  · Muscle aches.  · Loss of appetite.  How is this diagnosed?  This condition is diagnosed with a medical history and physical exam. You may  also have a stool test to check for viruses or other infections.  How is this treated?  This condition typically goes away on its own. The focus of treatment is to restore lost fluids (rehydration). Your health care provider may recommend that you take an oral rehydration solution (ORS) to replace important salts and minerals (electrolytes) in your body. Severe cases of this condition may require giving fluids through an IV tube.  Treatment may also include medicine to help with your symptoms.  Follow these instructions at home:  Follow instructions from your health care provider about how to care for yourself at home.  Eating and drinking  Follow these recommendations as told by your health care provider:  · Take an ORS. This is a drink that is sold at pharmacies and retail stores.  · Drink clear fluids in small amounts as you are able. Clear fluids include water, ice chips, diluted fruit juice, and low-calorie sports drinks.  · Eat bland, easy-to-digest foods in small amounts as you are able. These foods include bananas, applesauce, rice, lean meats, toast, and crackers.  · Avoid fluids that contain a lot of sugar or caffeine, such as energy drinks, sports drinks, and soda.  · Avoid alcohol.  · Avoid spicy or fatty foods.  General instructions  · Drink enough fluid to keep your urine clear or pale yellow.  · Wash your hands often. If soap and water are not available, use hand .  · Make sure that all people in your household wash their hands well and often.  · Take over-the-counter and prescription medicines only as told by your health care provider.  · Rest at home while you recover.  · Watch your condition for any changes.  · Take a warm bath to relieve any burning or pain from frequent diarrhea episodes.  · Keep all follow-up visits as told by your health care provider. This is important.  Contact a health care provider if:  · You cannot keep fluids down.  · Your symptoms get worse.  · You have new  symptoms.  · You feel light-headed or dizzy.  · You have muscle cramps.  Get help right away if:  · You have chest pain.  · You feel extremely weak or you faint.  · You see blood in your vomit.  · Your vomit looks like coffee grounds.  · You have bloody or black stools or stools that look like tar.  · You have a severe headache, a stiff neck, or both.  · You have a rash.  · You have severe pain, cramping, or bloating in your abdomen.  · You have trouble breathing or you are breathing very quickly.  · Your heart is beating very quickly.  · Your skin feels cold and clammy.  · You feel confused.  · You have pain when you urinate.  · You have signs of dehydration, such as:  ¨ Dark urine, very little urine, or no urine.  ¨ Cracked lips.  ¨ Dry mouth.  ¨ Sunken eyes.  ¨ Sleepiness.  ¨ Weakness.  This information is not intended to replace advice given to you by your health care provider. Make sure you discuss any questions you have with your health care provider.  Document Released: 12/18/2006 Document Revised: 05/31/2017 Document Reviewed: 08/23/2016  [employer] Patient Education © 2017 [employer]      Hypokalemia  Hypokalemia means that the amount of potassium in the blood is lower than normal. Potassium is a chemical that helps regulate the amount of fluid in the body (electrolyte). It also stimulates muscle tightening (contraction) and helps nerves work properly. Normally, most of the body’s potassium is inside of cells, and only a very small amount is in the blood. Because the amount in the blood is so small, minor changes to potassium levels in the blood can be life-threatening.  What are the causes?  This condition may be caused by:  · Antibiotic medicine.  · Diarrhea or vomiting. Taking too much of a medicine that helps you have a bowel movement (laxative) can cause diarrhea and lead to hypokalemia.  · Chronic kidney disease (CKD).  · Medicines that help the body get rid of excess fluid  (diuretics).  · Eating disorders, such as bulimia.  · Low magnesium levels in the body.  · Sweating a lot.  What are the signs or symptoms?  Symptoms of this condition include:  · Weakness.  · Constipation.  · Fatigue.  · Muscle cramps.  · Mental confusion.  · Skipped heartbeats or irregular heartbeat (palpitations).  · Tingling or numbness.  How is this diagnosed?  This condition is diagnosed with a blood test.  How is this treated?  Hypokalemia can be treated by taking potassium supplements by mouth or adjusting the medicines that you take. Treatment may also include eating more foods that contain a lot of potassium. If your potassium level is very low, you may need to get potassium through an IV tube in one of your veins and be monitored in the hospital.  Follow these instructions at home:  · Take over-the-counter and prescription medicines only as told by your health care provider. This includes vitamins and supplements.  · Eat a healthy diet. A healthy diet includes fresh fruits and vegetables, whole grains, healthy fats, and lean proteins.  · If instructed, eat more foods that contain a lot of potassium, such as:  ¨ Nuts, such as peanuts and pistachios.  ¨ Seeds, such as sunflower seeds and pumpkin seeds.  ¨ Peas, lentils, and lima beans.  ¨ Whole grain and bran cereals and breads.  ¨ Fresh fruits and vegetables, such as apricots, avocado, bananas, cantaloupe, kiwi, oranges, tomatoes, asparagus, and potatoes.  ¨ Orange juice.  ¨ Tomato juice.  ¨ Red meats.  ¨ Yogurt.  · Keep all follow-up visits as told by your health care provider. This is important.  Contact a health care provider if:  · You have weakness that gets worse.  · You feel your heart pounding or racing.  · You vomit.  · You have diarrhea.  · You have diabetes (diabetes mellitus) and you have trouble keeping your blood sugar (glucose) in your target range.  Get help right away if:  · You have chest pain.  · You have shortness of breath.  · You have  vomiting or diarrhea that lasts for more than 2 days.  · You faint.  This information is not intended to replace advice given to you by your health care provider. Make sure you discuss any questions you have with your health care provider.  Document Released: 12/18/2006 Document Revised: 08/05/2017 Document Reviewed: 08/05/2017  [email or web address] Interactive Patient Education © 2017 [employer]    Discharge Instructions    Discharged to home by car with relative. Discharged via walking, hospital escort: Refused.  Special equipment needed: Not Applicable    Be sure to schedule a follow-up appointment with your primary care doctor or any specialists as instructed.     Discharge Plan:   Diet Plan: Discussed  Activity Level: Discussed  Smoking Cessation Offered: Patient Counseled  Confirmed Follow up Appointment: Appointment Scheduled  Confirmed Symptoms Management: Discussed  Medication Reconciliation Updated: Yes  Pneumococcal Vaccine Administered/Refused:  (please give at discharge, does not want right now)  Influenza Vaccine Indication: Patient Refuses    I understand that a diet low in cholesterol, fat, and sodium is recommended for good health. Unless I have been given specific instructions below for another diet, I accept this instruction as my diet prescription.   Other diet: regular    Special Instructions: None    · Is patient discharged on Warfarin / Coumadin?   No     Depression / Suicide Risk    As you are discharged from this [facility] facility, it is important to learn how to keep safe from harming yourself.    Recognize the warning signs:  · Abrupt changes in personality, positive or negative- including increase in energy   · Giving away possessions  · Change in eating patterns- significant weight changes-  positive or negative  · Change in sleeping patterns- unable to sleep or sleeping all the time   · Unwillingness or inability to communicate  · Depression  · Unusual sadness, discouragement and  loneliness  · Talk of wanting to die  · Neglect of personal appearance   · Rebelliousness- reckless behavior  · Withdrawal from people/activities they love  · Confusion- inability to concentrate     If you or a loved one observes any of these behaviors or has concerns about self-harm, here's what you can do:  · Talk about it- your feelings and reasons for harming yourself  · Remove any means that you might use to hurt yourself (examples: pills, rope, extension cords, firearm)  · Get professional help from the community (Mental Health, Substance Abuse, psychological counseling)  · Do not be alone:Call your Safe Contact- someone whom you trust who will be there for you.  · Call your local CRISIS HOTLINE 970-8671 or 171-116-7063  · Call your local Children's Mobile Crisis Response Team [place] (250) 591-7392 or www.[employer]  · Call the toll free National Suicide Prevention Hotlines   · National Suicide Prevention Lifeline 062-393-JPNJ (6175)  · National Hope Line Network 800-SUICIDE (302-6786)

## 2019-06-03 NOTE — PROGRESS NOTES
Patient states he is feeling better, tolerated the regular diet, still low appetite and some pain but better than yesterday. Decreased stools this afternoon.

## 2019-06-03 NOTE — PROGRESS NOTES
2 RN skin check complete with Robyn GRANT.   Devices in place NA.  Skin assessed under devices NA.  Confirmed pressure ulcers found on NA.  New potential pressure ulcers noted on NA.  The following interventions in place ambulatory, turns self in bed.

## 2019-06-03 NOTE — CARE PLAN
Problem: Knowledge Deficit  Goal: Knowledge of disease process/condition, treatment plan, diagnostic tests, and medications will improve  Outcome: PROGRESSING AS EXPECTED  Discuss POC with Pt. Encourage patient to ask questions and be involved in plan of care. Assess Pt's knowledge of disease process, treatment plan, diagnostic test, labs, and medications; educate, and give information as needed.

## 2019-06-03 NOTE — PROGRESS NOTES
Telemetry Shift Summary    Rhythm SR/ST  HR Range 90s  Ectopy none  Measurements .16/.10/.38        Normal Values  Rhythm SR  HR Range    Measurements 0.12-0.20 / 0.06-0.10  / 0.30-0.52

## 2019-06-03 NOTE — RESPIRATORY CARE
"COPD EDUCATION by COPD CLINICAL EDUCATOR  6/3/2019  at  2:35 PM by Nikole Leon     Patient interviewed by COPD education team.  Patient unable to participate in full program.  Short intervention has been conducted.  A comprehensive packet including information about smoking cessation given.  Smoking Cessation Intervention 3 -10 minutes completed.  Provided smoking cessation packet with \"Tips to Quit\" and flyer for \"Free Smoking Cessation Classes\". Patient plans on using chantix going to PCP to get Rx.  "

## 2019-06-03 NOTE — PROGRESS NOTES
Patient tolerated clear liquids overnight. Says he would like to try some eggs and hot cereal this AM. Upgraded diet per advance as tolerated order

## 2019-06-03 NOTE — PROGRESS NOTES
Pt arrives to unit from ER to unit via gurney. Ambulated from gurney to bed, accompanied by wife. Pt A&Ox4, No c/o pain. Tele monitor applied, vitals taken. History, allergies, and med rec reviewed and admission profile completed.     Pt oriented to unit and plan of care discussed, including IV fluids, diet etc. Welcome folder provided and discussed. Communication board filled out. Pt instructed on call light usage and encouraged to call for any questions, needs, or concerns and prior to getting out of bed. Fall precautions in place. Pt agrees with the plan and declines any needs at this time. Bed locked and low.

## 2019-06-03 NOTE — DISCHARGE SUMMARY
Discharge Summary    CHIEF COMPLAINT ON ADMISSION  Chief Complaint   Patient presents with   • Abdominal Pain     c/o generalized abd pain x 3 days   • Body Aches   • Diarrhea       Reason for Admission  Abdominal Cramping; Joint Paint     Admission Date  6/2/2019    CODE STATUS  Full Code    HPI & HOSPITAL COURSE  This is a 43 y.o. male here with diarrhea and hypokalemia.  He has a history of recurrent pancreatitis.  He was experiencing 5 days of almost hourly diarrhea that awaken him from sleep.  He had some stomach cramps and nausea with poor p.o. intake but no fevers or chills.  No ill contacts that he knows of but he does work with the public at a furniture store.  He was very hyperkalemic when he came in and had some low magnesium as well these were replaced, he was placed on lomotil as his stool was negative for C. difficile and had no white blood cells in it. He was feeling better and wished to go home. Lipase was normal, he was eating well at discharge.       Therefore, he is discharged in good and stable condition to home with close outpatient follow-up.        Discharge Date  6/3/19    FOLLOW UP ITEMS POST DISCHARGE  Primary care as needed.    DISCHARGE DIAGNOSES  Principal Problem:    Diarrhea POA: Yes  Active Problems:    Chronic pancreatitis (HCC) POA: Yes    Alcoholism (HCC) POA: Yes    ADD (attention deficit disorder) POA: Yes    Hypokalemia POA: Yes    Dehydration POA: Unknown    Hyponatremia POA: Unknown    Hyperglycemia POA: Unknown    Asthma POA: Unknown      Overview: allergy induced  Resolved Problems:    * No resolved hospital problems. *      FOLLOW UP  No future appointments.  No follow-up provider specified.    MEDICATIONS ON DISCHARGE     Medication List      START taking these medications      Instructions   diphenoxylate-atropine 2.5-0.025 MG Tabs  Commonly known as:  LOMOTIL   Take 1 Tab by mouth 4 times a day as needed for Diarrhea for up to 5 days.  Dose:  1 Tab        CONTINUE taking  these medications      Instructions   acetaminophen 500 MG Tabs  Commonly known as:  TYLENOL   Take 2,000 mg by mouth every 6 hours as needed for Moderate Pain.  Dose:  2000 mg     albuterol 108 (90 Base) MCG/ACT Aers inhalation aerosol   Inhale 2 Puffs by mouth every 6 hours as needed for Shortness of Breath.  Dose:  2 Puff     ALEVE 220 MG tablet  Generic drug:  naproxen   Take 440 mg by mouth as needed.  Dose:  440 mg     B-12 PO   Take 1 Tab by mouth every day.  Dose:  1 Tab     dicyclomine 10 MG Caps  Commonly known as:  BENTYL   Take 10 mg by mouth 3 times a day as needed.  Dose:  10 mg     multivitamin Tabs   Take 1 Tab by mouth every day.  Dose:  1 Tab     omeprazole 20 MG delayed-release capsule  Commonly known as:  PRILOSEC   Take 20 mg by mouth every day.  Dose:  20 mg     Pancrelipase (Lip-Prot-Amyl) 93614 units Cpep  Commonly known as:  CREON   Take 2 Tabs by mouth 3 times a day before meals. Takes two and before snacks  Dose:  2 Tab     tiotropium 18 MCG Caps  Commonly known as:  SPIRIVA   Inhale 18 mcg by mouth every day.  Dose:  18 mcg     traZODone 50 MG Tabs  Commonly known as:  DESYREL   Take 50 mg by mouth at bedtime as needed for Sleep.  Dose:  50 mg     VITAMIN C PO   Take 1 Tab by mouth every day.  Dose:  1 Tab            Allergies  Allergies   Allergen Reactions   • Dairy Food Allergy    • Nkda [No Known Drug Allergy]        DIET  Orders Placed This Encounter   Procedures   • Diet Order Regular     Standing Status:   Standing     Number of Occurrences:   1     Order Specific Question:   Diet:     Answer:   Regular [1]       ACTIVITY  As tolerated.  Weight bearing as tolerated    CONSULTATIONS  None.    PROCEDURES  none    LABORATORY  Lab Results   Component Value Date    SODIUM 136 06/03/2019    POTASSIUM 3.9 06/03/2019    CHLORIDE 103 06/03/2019    CO2 23 06/03/2019    GLUCOSE 112 (H) 06/03/2019    BUN 6 (L) 06/03/2019    CREATININE 0.92 06/03/2019    CREATININE 0.8 08/10/2006        Lab  Results   Component Value Date    WBC 5.3 06/03/2019    HEMOGLOBIN 14.6 06/03/2019    HEMATOCRIT 41.7 (L) 06/03/2019    PLATELETCT 189 06/03/2019

## 2019-06-03 NOTE — CARE PLAN
Problem: Safety  Goal: Will remain free from falls  Outcome: PROGRESSING AS EXPECTED  Reinforced fall risk precautions. Non-skid socks on feet, call light in reach. Stand by assist to the bathroom.      Problem: Bowel/Gastric:  Goal: Normal bowel function is maintained or improved  Outcome: PROGRESSING AS EXPECTED  5 loose BMs today but decreased in amount and frequency as compared to yesterday.     Problem: Discharge Barriers/Planning  Goal: Patient's continuum of care needs will be met  Outcome: PROGRESSING AS EXPECTED  Plan for discharge today.     Problem: Urinary Elimination:  Goal: Ability to reestablish a normal urinary elimination pattern will improve  Outcome: PROGRESSING AS EXPECTED  No c/o pain r/t urination.

## 2019-06-03 NOTE — CARE PLAN
Problem: Communication  Goal: The ability to communicate needs accurately and effectively will improve  Outcome: PROGRESSING AS EXPECTED  Discussed plan of care with patient and answered all questions at this time.  Reminded patient to use the call light for assistance.    Problem: Safety  Goal: Will remain free from falls  Outcome: PROGRESSING AS EXPECTED  Discussed fall and safety practices.  Reminded patient to use the call light for assistance.  Non-skid socks provided; bed alarm on.  Call light and belongings within reach.

## 2019-06-04 NOTE — PROGRESS NOTES
Discharge order written. IV removed, patient tolerated well. Tele removed and returned to monitor tech. Belongings gathered. Significant other at bedside. Pt states that all personal belongings are in possession. AVS printed, reviewed and copy signed and placed on the chart. Pneumonia vaccine given. Patient has no further questions. Written prescriptions sent with patient. Discharged in satisfactory condition home with sig other. Pt off unit via walking, declined staff escort.

## 2019-06-04 NOTE — PROGRESS NOTES
Telemetry Shift Summary    Rhythm SR  HR Range 70s-90s  Ectopy Rare PACs  Measurements .14/.08/.38        Normal Values  Rhythm SR  HR Range    Measurements 0.12-0.20 / 0.06-0.10  / 0.30-0.52

## 2019-09-14 ENCOUNTER — APPOINTMENT (OUTPATIENT)
Dept: RADIOLOGY | Facility: MEDICAL CENTER | Age: 44
DRG: 871 | End: 2019-09-14
Attending: EMERGENCY MEDICINE

## 2019-09-14 ENCOUNTER — HOSPITAL ENCOUNTER (INPATIENT)
Facility: MEDICAL CENTER | Age: 44
LOS: 2 days | DRG: 871 | End: 2019-09-16
Attending: EMERGENCY MEDICINE | Admitting: HOSPITALIST
Payer: COMMERCIAL

## 2019-09-14 DIAGNOSIS — R65.20 SEVERE SEPSIS (HCC): ICD-10-CM

## 2019-09-14 DIAGNOSIS — A41.9 SEVERE SEPSIS (HCC): ICD-10-CM

## 2019-09-14 DIAGNOSIS — A41.9 SEPSIS, DUE TO UNSPECIFIED ORGANISM: ICD-10-CM

## 2019-09-14 LAB
ALBUMIN SERPL BCP-MCNC: 4.1 G/DL (ref 3.2–4.9)
ALBUMIN/GLOB SERPL: 1.5 G/DL
ALP SERPL-CCNC: 79 U/L (ref 30–99)
ALT SERPL-CCNC: 25 U/L (ref 2–50)
ANION GAP SERPL CALC-SCNC: 22 MMOL/L (ref 0–11.9)
APPEARANCE UR: CLEAR
APTT PPP: 25.1 SEC (ref 24.7–36)
AST SERPL-CCNC: 23 U/L (ref 12–45)
BASOPHILS # BLD AUTO: 0.3 % (ref 0–1.8)
BASOPHILS # BLD: 0.03 K/UL (ref 0–0.12)
BILIRUB SERPL-MCNC: 0.3 MG/DL (ref 0.1–1.5)
BILIRUB UR QL STRIP.AUTO: NEGATIVE
BUN SERPL-MCNC: 14 MG/DL (ref 8–22)
BURR CELLS/RBC NFR CSF MANUAL: 0 %
CALCIUM SERPL-MCNC: 8.6 MG/DL (ref 8.4–10.2)
CHLORIDE SERPL-SCNC: 98 MMOL/L (ref 96–112)
CLARITY CSF: CLEAR
CO2 SERPL-SCNC: 19 MMOL/L (ref 20–33)
COLOR CSF: COLORLESS
COLOR SPUN CSF: COLORLESS
COLOR UR: YELLOW
CREAT SERPL-MCNC: 0.77 MG/DL (ref 0.5–1.4)
EKG IMPRESSION: NORMAL
EOSINOPHIL # BLD AUTO: 0 K/UL (ref 0–0.51)
EOSINOPHIL NFR BLD: 0 % (ref 0–6.9)
ERYTHROCYTE [DISTWIDTH] IN BLOOD BY AUTOMATED COUNT: 42.5 FL (ref 35.9–50)
GLOBULIN SER CALC-MCNC: 2.8 G/DL (ref 1.9–3.5)
GLUCOSE CSF-MCNC: 64 MG/DL (ref 40–80)
GLUCOSE SERPL-MCNC: 105 MG/DL (ref 65–99)
GLUCOSE UR STRIP.AUTO-MCNC: NEGATIVE MG/DL
GRAM STN SPEC: NORMAL
HCT VFR BLD AUTO: 39.4 % (ref 42–52)
HGB BLD-MCNC: 13.7 G/DL (ref 14–18)
HIV 1+2 AB+HIV1 P24 AG SERPL QL IA: NON REACTIVE
IMM GRANULOCYTES # BLD AUTO: 0.14 K/UL (ref 0–0.11)
IMM GRANULOCYTES NFR BLD AUTO: 1.2 % (ref 0–0.9)
INR PPP: 0.92 (ref 0.87–1.13)
KETONES UR STRIP.AUTO-MCNC: ABNORMAL MG/DL
LACTATE BLD-SCNC: 1.7 MMOL/L (ref 0.5–2)
LACTATE BLD-SCNC: 3.8 MMOL/L (ref 0.5–2)
LEUKOCYTE ESTERASE UR QL STRIP.AUTO: NEGATIVE
LYMPHOCYTES # BLD AUTO: 0.8 K/UL (ref 1–4.8)
LYMPHOCYTES NFR BLD: 7.1 % (ref 22–41)
MAGNESIUM SERPL-MCNC: 1.1 MG/DL (ref 1.5–2.5)
MCH RBC QN AUTO: 31.4 PG (ref 27–33)
MCHC RBC AUTO-ENTMCNC: 34.8 G/DL (ref 33.7–35.3)
MCV RBC AUTO: 90.2 FL (ref 81.4–97.8)
MICRO URNS: ABNORMAL
MONOCYTES # BLD AUTO: 0.66 K/UL (ref 0–0.85)
MONOCYTES NFR BLD AUTO: 5.8 % (ref 0–13.4)
NEUTROPHILS # BLD AUTO: 9.7 K/UL (ref 1.82–7.42)
NEUTROPHILS NFR BLD: 85.6 % (ref 44–72)
NITRITE UR QL STRIP.AUTO: NEGATIVE
NRBC # BLD AUTO: 0 K/UL
NRBC BLD-RTO: 0 /100 WBC
PH UR STRIP.AUTO: 6 [PH] (ref 5–8)
PLATELET # BLD AUTO: 251 K/UL (ref 164–446)
PMV BLD AUTO: 8.6 FL (ref 9–12.9)
POTASSIUM SERPL-SCNC: 3.2 MMOL/L (ref 3.6–5.5)
PROCALCITONIN SERPL-MCNC: 0.07 NG/ML
PROT CSF-MCNC: 42 MG/DL (ref 15–45)
PROT SERPL-MCNC: 6.9 G/DL (ref 6–8.2)
PROT UR QL STRIP: NEGATIVE MG/DL
PROTHROMBIN TIME: 12.4 SEC (ref 12–14.6)
RBC # BLD AUTO: 4.37 M/UL (ref 4.7–6.1)
RBC # CSF: 2 CELLS/UL
RBC # URNS HPF: ABNORMAL /HPF
RBC UR QL AUTO: ABNORMAL
SIGNIFICANT IND 70042: NORMAL
SITE SITE: NORMAL
SODIUM SERPL-SCNC: 139 MMOL/L (ref 135–145)
SOURCE SOURCE: NORMAL
SP GR UR STRIP.AUTO: 1.01
SPECIMEN VOL CSF: 4.1 ML
TUBE # CSF: 3
TUBE # CSF: 4
WBC # BLD AUTO: 11.3 K/UL (ref 4.8–10.8)
WBC # CSF: <1 CELLS/UL (ref 0–10)
WBC #/AREA URNS HPF: ABNORMAL /HPF

## 2019-09-14 PROCEDURE — 96376 TX/PRO/DX INJ SAME DRUG ADON: CPT

## 2019-09-14 PROCEDURE — 85610 PROTHROMBIN TIME: CPT

## 2019-09-14 PROCEDURE — 36415 COLL VENOUS BLD VENIPUNCTURE: CPT

## 2019-09-14 PROCEDURE — 71045 X-RAY EXAM CHEST 1 VIEW: CPT

## 2019-09-14 PROCEDURE — 99255 IP/OBS CONSLTJ NEW/EST HI 80: CPT | Performed by: INTERNAL MEDICINE

## 2019-09-14 PROCEDURE — 84145 PROCALCITONIN (PCT): CPT

## 2019-09-14 PROCEDURE — A9270 NON-COVERED ITEM OR SERVICE: HCPCS | Performed by: HOSPITALIST

## 2019-09-14 PROCEDURE — 81001 URINALYSIS AUTO W/SCOPE: CPT

## 2019-09-14 PROCEDURE — 700111 HCHG RX REV CODE 636 W/ 250 OVERRIDE (IP): Performed by: HOSPITALIST

## 2019-09-14 PROCEDURE — 96366 THER/PROPH/DIAG IV INF ADDON: CPT

## 2019-09-14 PROCEDURE — 89051 BODY FLUID CELL COUNT: CPT

## 2019-09-14 PROCEDURE — 94640 AIRWAY INHALATION TREATMENT: CPT

## 2019-09-14 PROCEDURE — 87070 CULTURE OTHR SPECIMN AEROBIC: CPT

## 2019-09-14 PROCEDURE — 87207 SMEAR SPECIAL STAIN: CPT

## 2019-09-14 PROCEDURE — 700105 HCHG RX REV CODE 258: Performed by: HOSPITALIST

## 2019-09-14 PROCEDURE — 009U3ZX DRAINAGE OF SPINAL CANAL, PERCUTANEOUS APPROACH, DIAGNOSTIC: ICD-10-PCS | Performed by: EMERGENCY MEDICINE

## 2019-09-14 PROCEDURE — 80053 COMPREHEN METABOLIC PANEL: CPT

## 2019-09-14 PROCEDURE — 700101 HCHG RX REV CODE 250: Performed by: HOSPITALIST

## 2019-09-14 PROCEDURE — 62270 DX LMBR SPI PNXR: CPT

## 2019-09-14 PROCEDURE — 700105 HCHG RX REV CODE 258: Performed by: EMERGENCY MEDICINE

## 2019-09-14 PROCEDURE — 96365 THER/PROPH/DIAG IV INF INIT: CPT

## 2019-09-14 PROCEDURE — 87205 SMEAR GRAM STAIN: CPT

## 2019-09-14 PROCEDURE — 85730 THROMBOPLASTIN TIME PARTIAL: CPT

## 2019-09-14 PROCEDURE — 96368 THER/DIAG CONCURRENT INF: CPT

## 2019-09-14 PROCEDURE — 96367 TX/PROPH/DG ADDL SEQ IV INF: CPT

## 2019-09-14 PROCEDURE — 87040 BLOOD CULTURE FOR BACTERIA: CPT | Mod: 91

## 2019-09-14 PROCEDURE — 96375 TX/PRO/DX INJ NEW DRUG ADDON: CPT

## 2019-09-14 PROCEDURE — 86790 VIRUS ANTIBODY NOS: CPT | Mod: 91

## 2019-09-14 PROCEDURE — 83605 ASSAY OF LACTIC ACID: CPT | Mod: 91

## 2019-09-14 PROCEDURE — 82945 GLUCOSE OTHER FLUID: CPT

## 2019-09-14 PROCEDURE — 93005 ELECTROCARDIOGRAM TRACING: CPT

## 2019-09-14 PROCEDURE — 87798 DETECT AGENT NOS DNA AMP: CPT

## 2019-09-14 PROCEDURE — 87086 URINE CULTURE/COLONY COUNT: CPT

## 2019-09-14 PROCEDURE — 770020 HCHG ROOM/CARE - TELE (206)

## 2019-09-14 PROCEDURE — 700102 HCHG RX REV CODE 250 W/ 637 OVERRIDE(OP): Performed by: HOSPITALIST

## 2019-09-14 PROCEDURE — 83735 ASSAY OF MAGNESIUM: CPT

## 2019-09-14 PROCEDURE — 700111 HCHG RX REV CODE 636 W/ 250 OVERRIDE (IP): Performed by: EMERGENCY MEDICINE

## 2019-09-14 PROCEDURE — 700111 HCHG RX REV CODE 636 W/ 250 OVERRIDE (IP)

## 2019-09-14 PROCEDURE — 86794 ZIKA VIRUS IGM ANTIBODY: CPT

## 2019-09-14 PROCEDURE — 87662 ZIKA VIRUS DNA/RNA AMP PROBE: CPT

## 2019-09-14 PROCEDURE — 85025 COMPLETE CBC W/AUTO DIFF WBC: CPT

## 2019-09-14 PROCEDURE — 99223 1ST HOSP IP/OBS HIGH 75: CPT | Performed by: HOSPITALIST

## 2019-09-14 PROCEDURE — 99285 EMERGENCY DEPT VISIT HI MDM: CPT

## 2019-09-14 PROCEDURE — 87389 HIV-1 AG W/HIV-1&-2 AB AG IA: CPT

## 2019-09-14 PROCEDURE — 84157 ASSAY OF PROTEIN OTHER: CPT

## 2019-09-14 RX ORDER — NICOTINE 21 MG/24HR
21 PATCH, TRANSDERMAL 24 HOURS TRANSDERMAL
Status: DISCONTINUED | OUTPATIENT
Start: 2019-09-14 | End: 2019-09-16 | Stop reason: HOSPADM

## 2019-09-14 RX ORDER — SODIUM CHLORIDE, SODIUM LACTATE, POTASSIUM CHLORIDE, AND CALCIUM CHLORIDE .6; .31; .03; .02 G/100ML; G/100ML; G/100ML; G/100ML
30 INJECTION, SOLUTION INTRAVENOUS
Status: DISCONTINUED | OUTPATIENT
Start: 2019-09-14 | End: 2019-09-16 | Stop reason: HOSPADM

## 2019-09-14 RX ORDER — PROMETHAZINE HYDROCHLORIDE 25 MG/1
12.5-25 SUPPOSITORY RECTAL EVERY 4 HOURS PRN
Status: DISCONTINUED | OUTPATIENT
Start: 2019-09-14 | End: 2019-09-16 | Stop reason: HOSPADM

## 2019-09-14 RX ORDER — IPRATROPIUM BROMIDE AND ALBUTEROL SULFATE 2.5; .5 MG/3ML; MG/3ML
3 SOLUTION RESPIRATORY (INHALATION)
Status: DISCONTINUED | OUTPATIENT
Start: 2019-09-14 | End: 2019-09-15

## 2019-09-14 RX ORDER — TIOTROPIUM BROMIDE 18 UG/1
1 CAPSULE ORAL; RESPIRATORY (INHALATION)
Status: DISCONTINUED | OUTPATIENT
Start: 2019-09-15 | End: 2019-09-15

## 2019-09-14 RX ORDER — SODIUM CHLORIDE 9 MG/ML
INJECTION, SOLUTION INTRAVENOUS CONTINUOUS
Status: DISCONTINUED | OUTPATIENT
Start: 2019-09-14 | End: 2019-09-16 | Stop reason: HOSPADM

## 2019-09-14 RX ORDER — POLYETHYLENE GLYCOL 3350 17 G/17G
1 POWDER, FOR SOLUTION ORAL
Status: DISCONTINUED | OUTPATIENT
Start: 2019-09-14 | End: 2019-09-16 | Stop reason: HOSPADM

## 2019-09-14 RX ORDER — KETOROLAC TROMETHAMINE 30 MG/ML
15 INJECTION, SOLUTION INTRAMUSCULAR; INTRAVENOUS EVERY 6 HOURS PRN
Status: DISCONTINUED | OUTPATIENT
Start: 2019-09-14 | End: 2019-09-16 | Stop reason: HOSPADM

## 2019-09-14 RX ORDER — ONDANSETRON 2 MG/ML
4 INJECTION INTRAMUSCULAR; INTRAVENOUS ONCE
Status: COMPLETED | OUTPATIENT
Start: 2019-09-14 | End: 2019-09-14

## 2019-09-14 RX ORDER — ONDANSETRON 4 MG/1
4 TABLET, ORALLY DISINTEGRATING ORAL EVERY 4 HOURS PRN
Status: DISCONTINUED | OUTPATIENT
Start: 2019-09-14 | End: 2019-09-16 | Stop reason: HOSPADM

## 2019-09-14 RX ORDER — TRAZODONE HYDROCHLORIDE 50 MG/1
50 TABLET ORAL
Status: DISCONTINUED | OUTPATIENT
Start: 2019-09-14 | End: 2019-09-16 | Stop reason: HOSPADM

## 2019-09-14 RX ORDER — SODIUM CHLORIDE, SODIUM LACTATE, POTASSIUM CHLORIDE, AND CALCIUM CHLORIDE .6; .31; .03; .02 G/100ML; G/100ML; G/100ML; G/100ML
1000 INJECTION, SOLUTION INTRAVENOUS
Status: DISCONTINUED | OUTPATIENT
Start: 2019-09-14 | End: 2019-09-16 | Stop reason: HOSPADM

## 2019-09-14 RX ORDER — ACETAMINOPHEN 325 MG/1
650 TABLET ORAL EVERY 6 HOURS PRN
Status: DISCONTINUED | OUTPATIENT
Start: 2019-09-14 | End: 2019-09-16 | Stop reason: HOSPADM

## 2019-09-14 RX ORDER — BISACODYL 10 MG
10 SUPPOSITORY, RECTAL RECTAL
Status: DISCONTINUED | OUTPATIENT
Start: 2019-09-14 | End: 2019-09-16 | Stop reason: HOSPADM

## 2019-09-14 RX ORDER — SODIUM CHLORIDE 9 MG/ML
3000 INJECTION, SOLUTION INTRAVENOUS ONCE
Status: COMPLETED | OUTPATIENT
Start: 2019-09-14 | End: 2019-09-14

## 2019-09-14 RX ORDER — TRAZODONE HYDROCHLORIDE 50 MG/1
TABLET ORAL
Status: ACTIVE
Start: 2019-09-14 | End: 2019-09-15

## 2019-09-14 RX ORDER — PROMETHAZINE HYDROCHLORIDE 25 MG/1
12.5-25 TABLET ORAL EVERY 4 HOURS PRN
Status: DISCONTINUED | OUTPATIENT
Start: 2019-09-14 | End: 2019-09-16 | Stop reason: HOSPADM

## 2019-09-14 RX ORDER — ONDANSETRON 2 MG/ML
4 INJECTION INTRAMUSCULAR; INTRAVENOUS EVERY 4 HOURS PRN
Status: DISCONTINUED | OUTPATIENT
Start: 2019-09-14 | End: 2019-09-16 | Stop reason: HOSPADM

## 2019-09-14 RX ORDER — ALBUTEROL SULFATE 90 UG/1
2 AEROSOL, METERED RESPIRATORY (INHALATION) EVERY 6 HOURS PRN
Status: DISCONTINUED | OUTPATIENT
Start: 2019-09-14 | End: 2019-09-16 | Stop reason: HOSPADM

## 2019-09-14 RX ORDER — AMOXICILLIN 250 MG
2 CAPSULE ORAL 2 TIMES DAILY
Status: DISCONTINUED | OUTPATIENT
Start: 2019-09-14 | End: 2019-09-16 | Stop reason: HOSPADM

## 2019-09-14 RX ORDER — POTASSIUM CHLORIDE 20 MEQ/1
40 TABLET, EXTENDED RELEASE ORAL ONCE
Status: COMPLETED | OUTPATIENT
Start: 2019-09-14 | End: 2019-09-14

## 2019-09-14 RX ORDER — MAGNESIUM SULFATE HEPTAHYDRATE 40 MG/ML
4 INJECTION, SOLUTION INTRAVENOUS ONCE
Status: COMPLETED | OUTPATIENT
Start: 2019-09-14 | End: 2019-09-14

## 2019-09-14 RX ORDER — DOXYCYCLINE 100 MG/1
100 TABLET ORAL EVERY 12 HOURS
Status: DISCONTINUED | OUTPATIENT
Start: 2019-09-14 | End: 2019-09-14

## 2019-09-14 RX ORDER — PROCHLORPERAZINE EDISYLATE 5 MG/ML
5-10 INJECTION INTRAMUSCULAR; INTRAVENOUS EVERY 4 HOURS PRN
Status: DISCONTINUED | OUTPATIENT
Start: 2019-09-14 | End: 2019-09-16 | Stop reason: HOSPADM

## 2019-09-14 RX ADMIN — KETOROLAC TROMETHAMINE 15 MG: 30 INJECTION, SOLUTION INTRAMUSCULAR; INTRAVENOUS at 16:24

## 2019-09-14 RX ADMIN — KETOROLAC TROMETHAMINE 15 MG: 30 INJECTION, SOLUTION INTRAMUSCULAR; INTRAVENOUS at 22:36

## 2019-09-14 RX ADMIN — FENTANYL CITRATE 100 MCG: 50 INJECTION, SOLUTION INTRAMUSCULAR; INTRAVENOUS at 12:17

## 2019-09-14 RX ADMIN — POTASSIUM CHLORIDE 40 MEQ: 1500 TABLET, EXTENDED RELEASE ORAL at 14:07

## 2019-09-14 RX ADMIN — FENTANYL CITRATE 100 MCG: 50 INJECTION, SOLUTION INTRAMUSCULAR; INTRAVENOUS at 11:37

## 2019-09-14 RX ADMIN — CEFTRIAXONE SODIUM 2 G: 2 INJECTION, POWDER, FOR SOLUTION INTRAMUSCULAR; INTRAVENOUS at 12:16

## 2019-09-14 RX ADMIN — ONDANSETRON 4 MG: 2 INJECTION INTRAMUSCULAR; INTRAVENOUS at 11:46

## 2019-09-14 RX ADMIN — SODIUM CHLORIDE 3000 ML: 9 INJECTION, SOLUTION INTRAVENOUS at 10:41

## 2019-09-14 RX ADMIN — VANCOMYCIN HYDROCHLORIDE 2250 MG: 500 INJECTION, POWDER, LYOPHILIZED, FOR SOLUTION INTRAVENOUS at 12:38

## 2019-09-14 RX ADMIN — VANCOMYCIN HYDROCHLORIDE: 1 INJECTION, POWDER, LYOPHILIZED, FOR SOLUTION INTRAVENOUS at 20:15

## 2019-09-14 RX ADMIN — IPRATROPIUM BROMIDE AND ALBUTEROL SULFATE 3 ML: .5; 3 SOLUTION RESPIRATORY (INHALATION) at 19:34

## 2019-09-14 RX ADMIN — ACETAMINOPHEN 650 MG: 325 TABLET, FILM COATED ORAL at 13:51

## 2019-09-14 RX ADMIN — ACETAMINOPHEN 650 MG: 325 TABLET, FILM COATED ORAL at 20:50

## 2019-09-14 RX ADMIN — SODIUM CHLORIDE: 9 INJECTION, SOLUTION INTRAVENOUS at 18:49

## 2019-09-14 RX ADMIN — PANCRELIPASE 72000 UNITS: 24000; 76000; 120000 CAPSULE, DELAYED RELEASE PELLETS ORAL at 18:48

## 2019-09-14 RX ADMIN — MAGNESIUM SULFATE IN WATER 4 G: 40 INJECTION, SOLUTION INTRAVENOUS at 14:09

## 2019-09-14 RX ADMIN — NICOTINE 21 MG: 21 PATCH, EXTENDED RELEASE TRANSDERMAL at 18:56

## 2019-09-14 ASSESSMENT — COGNITIVE AND FUNCTIONAL STATUS - GENERAL
MOBILITY SCORE: 23
SUGGESTED CMS G CODE MODIFIER DAILY ACTIVITY: CH
CLIMB 3 TO 5 STEPS WITH RAILING: A LITTLE
SUGGESTED CMS G CODE MODIFIER MOBILITY: CI
DAILY ACTIVITIY SCORE: 24

## 2019-09-14 ASSESSMENT — ENCOUNTER SYMPTOMS
HEADACHES: 0
VOMITING: 0
NERVOUS/ANXIOUS: 0
NAUSEA: 0
PND: 0
TINGLING: 0
CLAUDICATION: 0
DIAPHORESIS: 1
WEAKNESS: 1
SORE THROAT: 0
EYE PAIN: 0
SHORTNESS OF BREATH: 0
MYALGIAS: 0
CHILLS: 1
DEPRESSION: 0
HEMOPTYSIS: 0
ORTHOPNEA: 0
MEMORY LOSS: 0
CONSTIPATION: 0
SPEECH CHANGE: 0
BACK PAIN: 0
BLURRED VISION: 0
BLOOD IN STOOL: 0
PHOTOPHOBIA: 0
SENSORY CHANGE: 0
STRIDOR: 0
PALPITATIONS: 0
COUGH: 0
FEVER: 1
SPUTUM PRODUCTION: 0
HEARTBURN: 0
NECK PAIN: 0
TREMORS: 0
DOUBLE VISION: 0
DIZZINESS: 0

## 2019-09-14 ASSESSMENT — COPD QUESTIONNAIRES
DO YOU EVER COUGH UP ANY MUCUS OR PHLEGM?: YES, EVERY DAY
COPD SCREENING SCORE: 8
HAVE YOU SMOKED AT LEAST 100 CIGARETTES IN YOUR ENTIRE LIFE: YES
DURING THE PAST 4 WEEKS HOW MUCH DID YOU FEEL SHORT OF BREATH: MOST  OR ALL OF THE TIME

## 2019-09-14 ASSESSMENT — LIFESTYLE VARIABLES: EVER_SMOKED: YES

## 2019-09-14 NOTE — ED NOTES
Pt is eating a meal tray. Pt requested to remove his SPO2 monitor while eating, and pt was told he could. Pt has remained above 94% on RA while here.

## 2019-09-14 NOTE — H&P
Hospital Medicine History & Physical Note    Date of Service  9/14/2019    Primary Care Physician  ZAKIA eKys    Consultants  ID      Code Status  Full Code    Chief Complaint  Chief Complaint   Patient presents with   • Cough   • Congestion   • Rash       History of Presenting Illness  Maurilio james a very pleasant 44 y.o. male with a past medical history of Chronic alcoholic pancreatitis, tobacco dependence, mood disorder presented to the emergency room on 9/14/2019 for evaluation of fever and rash.  Apparently patient was visiting [facility] around the end of August, he apparently in [place] developed left ankle swelling with noted tenderness and erythema in his feet.  He was seen by a local physician who thought it was secondary to the humidity.  As result he was given pain medications which he said transiently improved his symptoms.  However around the first week of September patient started developing fevers chills and has not seek any medical attention since. He reported having cut his finger on a bottle, and apparently felt very sik and developed red dots on both of his legs, he also noted pus drainage, his wife placed neosporin on it and appeared to have improved. However, patient says over the past 24 hours he has a follow-up worsening fevers chills diffuse erythematous rash all over his body, with a cough with congestion feeling.  He also says that he had a severe headache with confusion which his family members noticed.  Currently patient is alert oriented x3, and is shaking from chills.  Patient reported taking all required vaccinees before traveling. He denies photophobia or neck stiffness.     Review of Systems  Review of Systems   Constitutional: Positive for chills, diaphoresis, fever and malaise/fatigue.   HENT: Negative for congestion, hearing loss, sore throat and tinnitus.    Eyes: Negative for blurred vision, double vision, photophobia and pain.   Respiratory: Negative for  "cough, hemoptysis, sputum production, shortness of breath and stridor.    Cardiovascular: Negative for chest pain, palpitations, orthopnea, claudication and PND.   Gastrointestinal: Negative for blood in stool, constipation, heartburn, melena, nausea and vomiting.   Genitourinary: Negative for dysuria, frequency and urgency.   Musculoskeletal: Negative for back pain, myalgias and neck pain.   Skin: Positive for rash.   Neurological: Positive for weakness. Negative for dizziness, tingling, tremors, sensory change, speech change and headaches.   Psychiatric/Behavioral: Negative for depression, memory loss and suicidal ideas. The patient is not nervous/anxious.    All other systems reviewed and are negative.      Past Medical History  Past Medical History:   Diagnosis Date   • Fall 10/8/2016    fell ride side trunk area bruised with popping sensation rib area   • Cough 9/23/16    dry cough   • Pain 9/23/16    abdominal, bladder   • Bronchitis 8/2016    persistent, treated with three rounds of antibiotics, 10/12/2016 \"symptoms improving on new antibiotic\"   • Renal disorder 6/2016    kidney stones, hematuria   • Asthma     allergy induced   • Bowel habit changes     diarrhea   • Chronic pancreatitis (HCC)    • Heart burn    • Hypertension     no treatment \"r/t pain\"   • Lumbar arthropathy    • Pancreatitis    • Psychiatric problem     anxiety   • Urinary bladder disorder     burning with urination on occasion; trace blood in urine       Surgical History   has a past surgical history that includes skull fracture depressed elevation (1995); cholecystectomy (2007); tonsillectomy and adenoidectomy (1989); gastroscopy-endo (N/A, 10/21/2016); egd w/endoscopic ultrasound (N/A, 10/21/2016); ercp-diagnostic (N/A, 10/21/2016); and ankle orif (Left, 11/10/2017).    Family History  family history includes Cancer in his brother, maternal aunt, and mother; Hypertension in his father.      Social History   reports that he has been " smoking cigarettes. He has a 10.00 pack-year smoking history. He has never used smokeless tobacco. He reports that he drinks alcohol. He reports that he has current or past drug history.    Allergies  Allergies   Allergen Reactions   • Dairy Food Allergy    • Nkda [No Known Drug Allergy]        Medications  Prior to Admission medications    Medication Sig Start Date End Date Taking? Authorizing Provider   albuterol 108 (90 Base) MCG/ACT Aero Soln inhalation aerosol Inhale 2 Puffs by mouth every 6 hours as needed for Shortness of Breath.    Physician Outpatient   tiotropium (SPIRIVA) 18 MCG Cap Inhale 18 mcg by mouth every day.    Physician Outpatient   traZODone (DESYREL) 50 MG Tab Take 50 mg by mouth at bedtime as needed for Sleep.    Physician Outpatient   acetaminophen (TYLENOL) 500 MG Tab Take 2,000 mg by mouth every 6 hours as needed for Moderate Pain.    Physician Outpatient   Pancrelipase, Lip-Prot-Amyl, (CREON) 18408 units Cap DR Particles Take 2 Tabs by mouth 3 times a day before meals. Takes two and before snacks 5/11/18   Cindy Ortega M.D.       Physical Exam  Temp:  [38.9 °C (102.1 °F)] 38.9 °C (102.1 °F)  Pulse:  [110-146] 110  Resp:  [22-24] 22  BP: (124-162)/(68-85) 147/71  SpO2:  [93 %-96 %] 93 %  Physical Exam   Constitutional: He is oriented to person, place, and time. He appears well-developed. He appears distressed.   HENT:   Head: Normocephalic and atraumatic.   Mouth/Throat: No oropharyngeal exudate.   Mucous membranes dry  No neck stiffness or tenderness   Eyes: Pupils are equal, round, and reactive to light. Conjunctivae are normal. Right eye exhibits no discharge. No scleral icterus.   Neck: Neck supple. No JVD present. No thyromegaly present.   Cardiovascular: Intact distal pulses.   No murmur heard.  Pulses:       Dorsalis pedis pulses are 2+ on the right side, and 2+ on the left side.   Cap refill < 3 s   Pulmonary/Chest: Effort normal and breath sounds normal. No stridor. No respiratory  distress. He has no wheezes. He has no rales.   Abdominal: Soft. Bowel sounds are normal. He exhibits no distension. There is no tenderness. There is no rebound.   Musculoskeletal: Normal range of motion. He exhibits no edema.   Neurological: He is alert and oriented to person, place, and time. No cranial nerve deficit.   Patient is able to wrinkle forehead equal and bilaterally, Strength 5/5 LUE, 5/5 RUE, 5/5 LLE, 5/5 RLE,   Sensation to light touch intact  Plantar Flexion, Dorsiflexion, Extensor Hallicus Longus 5/5,  No clonus noted ankle/elbow  Finger to nose equal bilaterally  No clonus or gaze     Skin: Skin is warm and dry. He is not diaphoretic. No erythema.   Psychiatric: He has a normal mood and affect. His behavior is normal. Thought content normal.   Nursing note reviewed.      Laboratory:  Recent Labs     09/14/19  1024   WBC 11.3*   RBC 4.37*   HEMOGLOBIN 13.7*   HEMATOCRIT 39.4*   MCV 90.2   MCH 31.4   MCHC 34.8   RDW 42.5   PLATELETCT 251   MPV 8.6*     Recent Labs     09/14/19  1024   SODIUM 139   POTASSIUM 3.2*   CHLORIDE 98   CO2 19*   GLUCOSE 105*   BUN 14   CREATININE 0.77   CALCIUM 8.6     Recent Labs     09/14/19  1024   ALTSGPT 25   ASTSGOT 23   ALKPHOSPHAT 79   TBILIRUBIN 0.3   GLUCOSE 105*     Recent Labs     09/14/19  1024   APTT 25.1   INR 0.92           Urinalysis:          Imaging:  DX-CHEST-PORTABLE (1 VIEW)   Final Result      No acute cardiopulmonary disease.          Assessment/Plan:  I anticipate this patient will require at least two midnights for appropriate medical management, necessitating inpatient admission.    Severe sepsis (HCC)  Assessment & Plan  This is severe sepsis with the following associated acute organ dysfunction(s): metabolic/septic encephalopathy.   Lumbar puncture done. Awaiting for fluid analysis  Possibly viral?   Blood cultures ordered and pending.  ID consulted, awaiting for further recommendations  IV Vancomycin and Ceftriaxone started.  CXR neg for acute  cardiopulmonary processes.  UA: grossly negative.     Tobacco abuse- (present on admission)  Assessment & Plan  Tobacco cessation counseling and education provided for more than 5 minutes. Nicotine replacement options provided including patch, and further medical treatments including Wellbutrin and chantix.       Hypokalemia- (present on admission)  Assessment & Plan  Potassium supplementation ordered  Expect increase of 0.1 mEq/L per each 10 mEq given  Will recheck after supplementation  Lab Results   Component Value Date/Time    POTASSIUM 3.2 (L) 09/14/2019 10:24 AM   Recheck bmp in the am for changes        VTE prophylaxis: Prophylaxis: SCDs

## 2019-09-14 NOTE — ED NOTES
Mag Sulfate IV requested from pharmacy. AVRIL roberts also requested to be released from pharmacy.

## 2019-09-14 NOTE — ED NOTES
Pt is requesting something for chills/pain. Tylenol ordered for the the floor/admission will be released by pharmacy. See MAR for details.

## 2019-09-14 NOTE — ED NOTES
Pt has received Tylenol po, Mag Sulfate IV and po potassium. Pt is drinking OJ. No nausea currently. Will continue to monitor. Pt remains febrile, see VS.

## 2019-09-14 NOTE — ED NOTES
Pt had another loose BM, brown in color. Pt requested another glass of water that was provided. Pt is taking in good po intake. Will continue to monitor.

## 2019-09-14 NOTE — ASSESSMENT & PLAN NOTE
This is severe sepsis with the following associated acute organ dysfunction(s): metabolic/septic encephalopathy.   Lumbar puncture done, negative for evidence of infection  Possibly viral-he is still spiking fever.    CXR and UA are negative  I discussed with ID  Negative: HIV, Blood cultures so far, CSF evaluation  Pending: Chikungunya PCR, Zika PCR & IgM, dengue IgG/ IgM, CMV quant PCR, EBV serology, Blood culture, [place] serology  Continue IV Vancomycin and Ceftriaxone  Repeat Malaria smear (during fever) if he gets another fever >100

## 2019-09-14 NOTE — ED TRIAGE NOTES
"Presents accompanied by his raye.  They have been sojourning in [place] for approximately 3 weeks, and they have returned approximately 2 weeks ago.  He C/O cough,generalized rash congestion,and intermittent fever recurring for the past 2 weeks.  He meets the sepsis R/O protocol, and therefore he is roomed immediately.   /81   Pulse (!) 146   Temp (!) 38.9 °C (102.1 °F) (Oral)   Resp (!) 24   Ht 1.854 m (6' 1\")   Wt 94 kg (207 lb 3.7 oz)   SpO2 94%   BMI 27.34 kg/m²   Chief Complaint   Patient presents with   • Cough   • Congestion   • Rash              "

## 2019-09-14 NOTE — ED PROVIDER NOTES
"ED Provider Note    CHIEF COMPLAINT  Chief Complaint   Patient presents with   • Cough   • Congestion   • Rash       HPI  Wero Thorpe is a 44 y.o. male who presents with a fever and a rash.  The patient went to [facility] around 24 August.  Subsequently had significant swelling to his feet that is erythematous and tender.  He was divided by a physician in [facility] and they thought it was from the humidity in the dependent edema.  He received some pain medication and things seem to be improving.  However on approximately 4 September the patient started having shaking fevers and chills.  He was given return to the [facility] soon therefore he did not seek medical attention.  The fever seemed to resolve however the last 24 hours the patient has had again shaking chills and fevers.  He is got a diffuse erythematous rash.  He is also had a lot of cough and congestion.  He states he has a severe headache and the family has noticed some confusion.  The patient denies urinary symptoms.  He has not been any recent antibiotics.    REVIEW OF SYSTEMS  See HPI for further details. All other systems are negative.     PAST MEDICAL HISTORY  Past Medical History:   Diagnosis Date   • Fall 10/8/2016    fell ride side trunk area bruised with popping sensation rib area   • Cough 9/23/16    dry cough   • Pain 9/23/16    abdominal, bladder   • Bronchitis 8/2016    persistent, treated with three rounds of antibiotics, 10/12/2016 \"symptoms improving on new antibiotic\"   • Renal disorder 6/2016    kidney stones, hematuria   • Asthma     allergy induced   • Bowel habit changes     diarrhea   • Chronic pancreatitis (HCC)    • Heart burn    • Hypertension     no treatment \"r/t pain\"   • Lumbar arthropathy    • Pancreatitis    • Psychiatric problem     anxiety   • Urinary bladder disorder     burning with urination on occasion; trace blood in urine       FAMILY HISTORY  [unfilled]    SOCIAL HISTORY  Social History     Socioeconomic " History   • Marital status: Single     Spouse name: Not on file   • Number of children: Not on file   • Years of education: Not on file   • Highest education level: Not on file   Occupational History   • Not on file   Social Needs   • Financial resource strain: Not on file   • Food insecurity:     Worry: Not on file     Inability: Not on file   • Transportation needs:     Medical: Not on file     Non-medical: Not on file   Tobacco Use   • Smoking status: Current Every Day Smoker     Packs/day: 0.50     Years: 20.00     Pack years: 10.00     Types: Cigarettes     Last attempt to quit: 4/15/2011     Years since quittin.4   • Smokeless tobacco: Never Used   Substance and Sexual Activity   • Alcohol use: Yes     Alcohol/week: 0.0 oz   • Drug use: Yes     Comment: Marijuana   • Sexual activity: Yes   Lifestyle   • Physical activity:     Days per week: Not on file     Minutes per session: Not on file   • Stress: Not on file   Relationships   • Social connections:     Talks on phone: Not on file     Gets together: Not on file     Attends Taoism service: Not on file     Active member of club or organization: Not on file     Attends meetings of clubs or organizations: Not on file     Relationship status: Not on file   • Intimate partner violence:     Fear of current or ex partner: Not on file     Emotionally abused: Not on file     Physically abused: Not on file     Forced sexual activity: Not on file   Other Topics Concern   • Not on file   Social History Narrative   • Not on file       SURGICAL HISTORY  Past Surgical History:   Procedure Laterality Date   • ANKLE ORIF Left 11/10/2017    Procedure: ANKLE ORIF;  Surgeon: Shaun Deluca M.D.;  Location: SURGERY [facility];  Service: Orthopedics   • GASTROSCOPY-ENDO N/A 10/21/2016    Procedure: GASTROSCOPY-ENDO;  Surgeon: Monty Clay M.D.;  Location: SURGERY [facility];  Service:    • EGD W/ENDOSCOPIC ULTRASOUND N/A 10/21/2016    Procedure: EGD  "W/ENDOSCOPIC ULTRASOUND - UPPER, RADIAL;  Surgeon: Monty Clay M.D.;  Location: SURGERY [facility];  Service:    • ERCP-DIAGNOSTIC N/A 10/21/2016    Procedure: ERCP-DIAGNOSTIC;  Surgeon: Monty Clay M.D.;  Location: SURGERY [facility];  Service:    • CHOLECYSTECTOMY  2007    laparoscopic   • SKULL FRACTURE DEPRESSED ELEVATION  1995    reconstructive    • TONSILLECTOMY AND ADENOIDECTOMY  1989       CURRENT MEDICATIONS  Home Medications    **Home medications have not yet been reviewed for this encounter**         ALLERGIES  Allergies   Allergen Reactions   • Dairy Food Allergy    • Nkda [No Known Drug Allergy]        PHYSICAL EXAM  VITAL SIGNS: /81   Pulse (!) 146   Temp (!) 38.9 °C (102.1 °F) (Oral)   Resp (!) 24   Ht 1.854 m (6' 1\")   Wt 94 kg (207 lb 3.7 oz)   SpO2 94%   BMI 27.34 kg/m²       Constitutional: Patient appears ill.   HENT: Normocephalic, Atraumatic, Bilateral external ears normal, Oropharynx dry, No oral exudates, Nose normal.   Eyes: PERRLA, EOMI, Conjunctiva normal, No discharge.   Neck: Normal range of motion, No tenderness, Supple, No stridor.   Lymphatic: No lymphadenopathy noted.   Cardiovascular: Tachycardic heart rate, Normal rhythm, No murmurs, No rubs, No gallops.   Thorax & Lungs: Symmetrically diminished throughout, diffuse rhonchi, no rales  Abdomen: Bowel sounds normal, Soft, No tenderness, No masses, No pulsatile masses.   Skin: Diffuse erythematous rash.   Back: No tenderness, No CVA tenderness.   Extremities: Intact distal pulses, diffuse lower extremity edema, No tenderness, No cyanosis, No clubbing.   Neurologic: Alert & oriented x 3, Normal motor function, Normal sensory function, No focal deficits noted.   Psychiatric: Affect normal, Judgment normal, Mood normal.     EKG  Twelve-lead EKG shows a sinus tachycardia with a ventricular rate of 122, normal QRS, normal intervals, no ST segment elevation or depression, T waves flat in aVL " otherwise normal.    RADIOLOGY  DX-CHEST-PORTABLE (1 VIEW)   Final Result      No acute cardiopulmonary disease.        PROCEDURES lumbar puncture  Lumbar Puncture Procedure Note    Indication: worst headache of patient's life    Consent: The patient was counseled regarding the procedure, it's indications, risks, potential complications and alternatives and any questions were answered. Consent was obtained.    Procedure: The patient was placed in the left lateral decubitus position and the appropriate landmarks were identified. The area was prepped and draped in the usual sterile fashion. Anesthesia was obtained using 3 cc of 1% Lidocaine without epinephrine. A spinal needle was inserted at the L3- L4 level with the stylet in place until spinal fluid was returned. Opening pressure was not measured. At this point 5.0 cc of clear cerebral spinal fluid was obtained and sent for appropriate testing. The stylet was then replaced and the needle was withdrawn. A sterile dressing was placed over the site and the patient was placed in the supine position.    The patient tolerated the procedure well.    Complications: None      COURSE & MEDICAL DECISION MAKING  Pertinent Labs & Imaging studies reviewed. (See chart for details)  This a 44-year-old gentleman who presents the emerge department acutely ill in appearance.  I do not have a clear source of the infection.  He did recently return from [place] and I did contact infectious disease.  After speaking with infectious disease it was recommended the patient received vancomycin and Rocephin.  This was ordered and he will also need to be started on doxycycline for the stenotic disease according to infectious disease.  I did perform a lumbar puncture and the results are pending.  The patient received 3 L of fluid intravenously and subsequently does feel better as his pulses come down.  He continues to have diffuse myalgias as well as chills.  His chest X does not show any  evidence of a pulmonary process such as pneumonia.  Again at this time I do not have a clear source for the sepsis.  The patient's initial lactic acid is elevated at 3.8 and we are currently awaiting the second lactic acid suspect to be coming down as his pulses come down he has received volume resuscitation.  The patient will require admission to the ICU in guarded condition.    FINAL IMPRESSION  1.  Sepsis unclear source  2.  Lumbar puncture  3.Critical Care time 30 minutes    Disposition  The patient will be admitted in guarded condition         Electronically signed by: Rene Tsai, 9/14/2019 10:26 AM

## 2019-09-14 NOTE — ED NOTES
Consent obtained by MD and time out completed by me at bedside for lumbar puncture. See consent on chart.

## 2019-09-14 NOTE — CONSULTS
[facility] INFECTIOUS DISEASES INPATIENT CONSULT NOTE     Date of Service: 9/14/2019    Consult Requested By: Rene Tsai M.D.    Reason for Consultation: Fever, rash    Chief Complaint: Fever    History of Present Illness:     Wero Thorpe is a 44 y.o. male admitted 9/14/2019.  Patient has a past medical history of alcoholism and pancreatitis.  History obtained from patient and his fiancée.  Patient first went to [place] on 8/21 along with his fiancée to get their passports.  At the time, he noted having some runny nose, red and itchy eyes.  He also had a cough but he has had a chronic cough at baseline for a long time, and there is no worsening of this.  On 8/24, they flew to [facility], with improvement in the eye redness.  He had some left ankle swelling which progressed on 8/29 to significant swelling of both ankles with tenderness and body aches.  On 8/30, they went to a local doctor in [facility] who prescribed pain medications.  The next day, he felt better but continued to have body aches and had subjective fevers.  Per fiancé, it may have been low-grade.  On 9/29/2, patient had some nosebleeding.      On 9/3, patient cut his finger on a bottle. On 9/5, patient began to feel really sick with vomiting, reddish dots over both legs, flushed face, red eyes, subjective high-grade fevers (never measured, but per fiancé could have been as high as 101), also had some shaking, reportedly some confusion and an episode of blood in stool.  Patient also had some headache and light sensitivity at the time.  He also noted some swelling and drainage from the cut he had sustained from the bottle 2 days prior.  They used peroxide and Neosporin ointment on this wound which improved.  He also [place] in the ocean, and per fiancé and this improved his wound.  On 9/7, the cough went away but subjective fevers remained.  Onset 9/10, [place] the [place]  Some swelling of his feet was noted on the  "plane.    On 9/12,-subjective fevers and sweats returned, felt a little better the next day, but this morning he was again noted to be febrile, thus presented to the ER where he was noted to be febrile to 103, tachycardic to 140.    His white count is 11.3, some lymphopenia noted, no SHENG, no liver abnormalities.  His lactate was elevated to 3.8, decreased to 1.7 after IV hydration.  Given his headache, lumbar puncture was performed and patient was started empirically on IV vancomycin, ceftriaxone, p.o. doxycycline.  UA with no abnormalities.    In [place], patient lived among the locals, 8 and drank with the locals, went kayaking, swimming, had close exposure to howler monkeys, several birds, raccoons.  They did experience some mosquito bites, but not as much as they had anticipated.    Patient took no prophylactic medications or vaccines prior to his travel.  Patient does not remember if he received all his childhood vaccinations.    Patient's fiancé reportedly felt a little sick with some rash and body aches but this only lasted for 2 days.  Patient is feeling a little bit better since admission.    Review of Systems:  All other systems reviewed and are negative expect as noted in HPI    Past Medical History:   Diagnosis Date   • Asthma     allergy induced   • Bowel habit changes     diarrhea   • Bronchitis 8/2016    persistent, treated with three rounds of antibiotics, 10/12/2016 \"symptoms improving on new antibiotic\"   • Chronic pancreatitis (HCC)    • Cough 9/23/16    dry cough   • Fall 10/8/2016    fell ride side trunk area bruised with popping sensation rib area   • Heart burn    • Hypertension     no treatment \"r/t pain\"   • Lumbar arthropathy    • Pain 9/23/16    abdominal, bladder   • Pancreatitis    • Psychiatric problem     anxiety   • Renal disorder 6/2016    kidney stones, hematuria   • Urinary bladder disorder     burning with urination on occasion; trace blood in urine       Past Surgical History: "   Procedure Laterality Date   • ANKLE ORIF Left 11/10/2017    Procedure: ANKLE ORIF;  Surgeon: Shaun Deluca M.D.;  Location: SURGERY [facility];  Service: Orthopedics   • GASTROSCOPY-ENDO N/A 10/21/2016    Procedure: GASTROSCOPY-ENDO;  Surgeon: Monty Clay M.D.;  Location: SURGERY [facility];  Service:    • EGD W/ENDOSCOPIC ULTRASOUND N/A 10/21/2016    Procedure: EGD W/ENDOSCOPIC ULTRASOUND - UPPER, RADIAL;  Surgeon: Monty Clay M.D.;  Location: SURGERY [facility];  Service:    • ERCP-DIAGNOSTIC N/A 10/21/2016    Procedure: ERCP-DIAGNOSTIC;  Surgeon: Monty Clay M.D.;  Location: [facility];  Service:    • CHOLECYSTECTOMY      laparoscopic   • SKULL FRACTURE DEPRESSED ELEVATION      reconstructive    • TONSILLECTOMY AND ADENOIDECTOMY         Family History   Problem Relation Age of Onset   • Cancer Mother         breast   • Cancer Brother         head, neck and lung   • Cancer Maternal Aunt         breast   • Hypertension Father        Social History     Socioeconomic History   • Marital status: Single     Spouse name: Not on file   • Number of children: Not on file   • Years of education: Not on file   • Highest education level: Not on file   Occupational History   • Not on file   Social Needs   • Financial resource strain: Not on file   • Food insecurity:     Worry: Not on file     Inability: Not on file   • Transportation needs:     Medical: Not on file     Non-medical: Not on file   Tobacco Use   • Smoking status: Current Every Day Smoker     Packs/day: 0.50     Years: 20.00     Pack years: 10.00     Types: Cigarettes     Last attempt to quit: 4/15/2011     Years since quittin.4   • Smokeless tobacco: Never Used   Substance and Sexual Activity   • Alcohol use: Yes     Alcohol/week: 0.0 oz   • Drug use: Yes     Comment: Marijuana   • Sexual activity: Yes   Lifestyle   • Physical activity:     Days per week: Not on file     Minutes  per session: Not on file   • Stress: Not on file   Relationships   • Social connections:     Talks on phone: Not on file     Gets together: Not on file     Attends Faith service: Not on file     Active member of club or organization: Not on file     Attends meetings of clubs or organizations: Not on file     Relationship status: Not on file   • Intimate partner violence:     Fear of current or ex partner: Not on file     Emotionally abused: Not on file     Physically abused: Not on file     Forced sexual activity: Not on file   Other Topics Concern   • Not on file   Social History Narrative   • Not on file       Allergies   Allergen Reactions   • Dairy Food Allergy    • Nkda [No Known Drug Allergy]        Medications:    Current Facility-Administered Medications:   •  MD Alert...Vancomycin per Pharmacy, , Other, Once, Rene Tsai M.D.  •  vancomycin (VANCOCIN) 2,250 mg in  mL IVPB, 25 mg/kg, Intravenous, Once, Rene Tsai M.D., Last Rate: 166.7 mL/hr at 09/14/19 1238, 2,250 mg at 09/14/19 1238  •  Pancrelipase (Lip-Prot-Amyl) CPEP 2 Tab, 2 Tab, Oral, TID AC, Zoey Curiel M.D.  •  tiotropium (SPIRIVA) 18 MCG inhalation capsule 1 Cap, 1 Cap, Inhalation, DAILY, Zoey Curiel M.D.  •  traZODone (DESYREL) tablet 50 mg, 50 mg, Oral, QHS, Zoey Curiel M.D.  •  albuterol inhaler 2 Puff, 2 Puff, Inhalation, Q6HRS PRN, Zoey Curiel M.D.  •  senna-docusate (PERICOLACE or SENOKOT S) 8.6-50 MG per tablet 2 Tab, 2 Tab, Oral, BID **AND** polyethylene glycol/lytes (MIRALAX) PACKET 1 Packet, 1 Packet, Oral, QDAY PRN **AND** magnesium hydroxide (MILK OF MAGNESIA) suspension 30 mL, 30 mL, Oral, QDAY PRN **AND** bisacodyl (DULCOLAX) suppository 10 mg, 10 mg, Rectal, QDAY PRN, Movses Kazanchyan, M.D.  •  Respiratory Care per Protocol, , Nebulization, Continuous RT, Zoey Curiel M.D.  •  lactated ringers infusion (BOLUS), 1,000 mL, Intravenous, Once PRN, Zoey Curiel M.D.  •   lactated ringers infusion (BOLUS): BMI less than or equal to 30, 30 mL/kg, Intravenous, Once PRN, Zoey Curiel M.D.  •  NS infusion, , Intravenous, Continuous, Zoey Curiel M.D.  •  acetaminophen (TYLENOL) tablet 650 mg, 650 mg, Oral, Q6HRS PRN, Zoey Curiel M.D.  •  cefTRIAXone (ROCEPHIN) 2 g in  mL IVPB, 2 g, Intravenous, Q12HRS, Zoey Curiel M.D.  •  MD Alert...Vancomycin per Pharmacy, 1 Each, Other, PHARMACY TO DOSE, Zoey Curiel M.D.  •  ondansetron (ZOFRAN) syringe/vial injection 4 mg, 4 mg, Intravenous, Q4HRS PRN, Zoey Curiel M.D.  •  ondansetron (ZOFRAN ODT) dispertab 4 mg, 4 mg, Oral, Q4HRS PRN, Zoey Curiel M.D.  •  promethazine (PHENERGAN) tablet 12.5-25 mg, 12.5-25 mg, Oral, Q4HRS PRN, Zoey Curiel M.D.  •  promethazine (PHENERGAN) suppository 12.5-25 mg, 12.5-25 mg, Rectal, Q4HRS PRN, Zoey Curiel M.D.  •  prochlorperazine (COMPAZINE) injection 5-10 mg, 5-10 mg, Intravenous, Q4HRS PRN, Zoey Curiel M.D.  •  nicotine (NICODERM) 21 MG/24HR 21 mg, 21 mg, Transdermal, Daily-0600 **AND** Nicotine Replacement Patient Education Materials, , , Once **AND** nicotine polacrilex (NICORETTE) 2 MG piece 2 mg, 2 mg, Oral, Q HOUR PRN, Zoey Curiel M.D.  •  MD Alert...Vancomycin per Pharmacy, , Other, PHARMACY TO DOSE, Zoey Curiel M.D.  •  cefTRIAXone (ROCEPHIN) 2 g in  mL IVPB, 2 g, Intravenous, Q12HRS, Zoey Curiel M.D.  •  doxycycline monohydrate (ADOXA) tablet 100 mg, 100 mg, Oral, Q12HRS, Zoey Curiel M.D.  •  potassium chloride SA (Kdur) tablet 40 mEq, 40 mEq, Oral, Once, Zoey Curiel M.D.    Current Outpatient Medications:   •  albuterol 108 (90 Base) MCG/ACT Aero Soln inhalation aerosol, Inhale 2 Puffs by mouth every 6 hours as needed for Shortness of Breath., Disp: , Rfl:   •  tiotropium (SPIRIVA) 18 MCG Cap, Inhale 18 mcg by mouth every day., Disp: , Rfl:   •  traZODone (DESYREL) 50 MG Tab, Take 50 mg by  "mouth at bedtime as needed for Sleep., Disp: , Rfl:   •  acetaminophen (TYLENOL) 500 MG Tab, Take 2,000 mg by mouth every 6 hours as needed for Moderate Pain., Disp: , Rfl:   •  Pancrelipase, Lip-Prot-Amyl, (CREON) 32836 units Cap DR Particles, Take 2 Tabs by mouth 3 times a day before meals. Takes two and before snacks, Disp: 180 Cap, Rfl: 0    Physical Exam:   Vital Signs: /72   Pulse (!) 117   Temp (!) 38.9 °C (102.1 °F) (Oral)   Resp 18   Ht 1.854 m (6' 1\")   Wt 94 kg (207 lb 3.7 oz)   SpO2 94%   BMI 27.34 kg/m²   Temp  Av.9 °C (102.1 °F)  Min: 38.9 °C (102.1 °F)  Max: 38.9 °C (102.1 °F)  Vital signs reviewed  Constitutional: Patient is oriented to person, place, and time. Appears well-developed and well-nourished.  Appears ill  Head: Atraumatic, normocephalic.  Face and neck are flushed  Eyes: Mild conjunctival erythema, EOM intact. Pupils are equal, round, and reactive to light.   Mouth/Throat: Lips without lesions, good dentition, oropharynx is clear and moist.  No Koplik spots  Neck: Neck supple. No masses/lymphadenopathy.  Neck flushed, mostly posterior.  No neck stiffness  Cardiovascular: Tachycardic, regular rhythm, normal S1S2 and intact distal pulses. No murmur, gallop, or friction rub. No pedal edema.  Pulmonary/Chest: No respiratory distress. Unlabored respiratory effort, lungs clear to auscultation. No wheezes or rales.   Abdominal: Soft, non tender. BS + x 4. No masses or hepatosplenomegaly.   Genital: No penile or scrotal abnormalities noted  Musculoskeletal: No joint tenderness, swelling, erythema, or restriction of motion noted.  Left ankle scar noted.  Left middle finger with healing wound just proximal to the PIP, scab noted, no surrounding erythema, no active drainage, no TTP, no increased warmth  Neurological: Alert and oriented to person, place, and time. No gross cranial nerve deficit. No focal neural deficit noted.  No meningeal signs  Skin: Skin is warm and dry.  " Pinpoint blanching erythematous rash scattered, noted over back  Psychiatric: Normal mood and affect. Behavior is normal.     LABS:  Recent Labs     09/14/19  1024   WBC 11.3*      Recent Labs     09/14/19  1024   HEMOGLOBIN 13.7*   HEMATOCRIT 39.4*   MCV 90.2   MCH 31.4   PLATELETCT 251       Recent Labs     09/14/19  1024   SODIUM 139   POTASSIUM 3.2*   CHLORIDE 98   CO2 19*   CREATININE 0.77        Recent Labs     09/14/19  1024   ALBUMIN 4.1        MICRO:  Blood Culture   Date Value Ref Range Status   01/17/2006 No growth after 5 days of incubation.  Final        Latest pertinent labs were reviewed    IMAGING STUDIES:  Chest x-ray with no acute abnormalities    Hospital Course/Assessment:   Wero Thorpe is a 44 y.o. male with a history of alcoholism and pancreatitis, admitted with fever, sepsis, rash, after returning from travel to [place].  Timeline and onset of symptoms are difficult to ascertain with his history, could even be a process that began before his travel.  He certainly got worse on 9/5 while he was in [place], and this seems more likely.    Multiple infections on the differential, including bacterial such as typical infections with bacteremia through his left finger wound he sustained while in [place], and atypical such as Salmonella.  Arthropod borne viruses including Chikungunya, Zika, Dengue (less likely given normal platelets) are on the list, as is Malaria.  Other zoonoses such as leptospirosis less likely given his relatively normal labs.  His rash is not typical for Measles.     Pertinent Diagnoses:  Sepsis  Fever  Rash  Body aches  Recent travel to [place]    Plan:   -CSF is normal    -Continue IV vancomycin and ceftriaxone 2 g every 24 hours for now white blood cultures are pending  -Will stop doxycycline  -We will check serum Chikungunya PCR, Zika PCR and IgM, dengue IgG and IgM  -We will check malaria smear  -We will check HIV  -We will check West Nile  serology    Plan was discussed with the primary team, Dr. Tsai and Dr. Curiel    ID will follow. Please feel free to call with questions.    Casimiro Chávez M.D.    Please note that this dictation was created using voice recognition software. I have worked with technical experts from [facility] to optimize the interface.  I have made every reasonable attempt to correct obvious errors, but there may be errors of grammar and possibly content that I did not discover before finalizing the note.

## 2019-09-14 NOTE — ED NOTES
Pt still reports pain at 9/10, since receiving Fentanyl prior to lumbar puncture. Second dose of Fentanyl IV given, see MAR for details. Will continue to monitor.

## 2019-09-15 PROBLEM — J44.9 COPD (CHRONIC OBSTRUCTIVE PULMONARY DISEASE) (HCC): Status: ACTIVE | Noted: 2019-09-15

## 2019-09-15 LAB
ALBUMIN SERPL BCP-MCNC: 4.2 G/DL (ref 3.2–4.9)
ALBUMIN/GLOB SERPL: 1.2 G/DL
ALP SERPL-CCNC: 82 U/L (ref 30–99)
ALT SERPL-CCNC: 23 U/L (ref 2–50)
ANION GAP SERPL CALC-SCNC: 15 MMOL/L (ref 0–11.9)
AST SERPL-CCNC: 20 U/L (ref 12–45)
BASOPHILS # BLD AUTO: 0.3 % (ref 0–1.8)
BASOPHILS # BLD: 0.04 K/UL (ref 0–0.12)
BILIRUB SERPL-MCNC: 0.7 MG/DL (ref 0.1–1.5)
BUN SERPL-MCNC: 5 MG/DL (ref 8–22)
CALCIUM SERPL-MCNC: 8.5 MG/DL (ref 8.4–10.2)
CHLORIDE SERPL-SCNC: 98 MMOL/L (ref 96–112)
CO2 SERPL-SCNC: 24 MMOL/L (ref 20–33)
CREAT SERPL-MCNC: 0.7 MG/DL (ref 0.5–1.4)
EOSINOPHIL # BLD AUTO: 0 K/UL (ref 0–0.51)
EOSINOPHIL NFR BLD: 0 % (ref 0–6.9)
ERYTHROCYTE [DISTWIDTH] IN BLOOD BY AUTOMATED COUNT: 44 FL (ref 35.9–50)
GLOBULIN SER CALC-MCNC: 3.6 G/DL (ref 1.9–3.5)
GLUCOSE SERPL-MCNC: 90 MG/DL (ref 65–99)
HCT VFR BLD AUTO: 44.3 % (ref 42–52)
HGB BLD-MCNC: 15.2 G/DL (ref 14–18)
IMM GRANULOCYTES # BLD AUTO: 0.04 K/UL (ref 0–0.11)
IMM GRANULOCYTES NFR BLD AUTO: 0.3 % (ref 0–0.9)
LYMPHOCYTES # BLD AUTO: 1.76 K/UL (ref 1–4.8)
LYMPHOCYTES NFR BLD: 15.1 % (ref 22–41)
MAGNESIUM SERPL-MCNC: 2 MG/DL (ref 1.5–2.5)
MALARIA SMEAR BLD: NORMAL
MCH RBC QN AUTO: 31.9 PG (ref 27–33)
MCHC RBC AUTO-ENTMCNC: 34.3 G/DL (ref 33.7–35.3)
MCV RBC AUTO: 92.9 FL (ref 81.4–97.8)
MONOCYTES # BLD AUTO: 0.91 K/UL (ref 0–0.85)
MONOCYTES NFR BLD AUTO: 7.8 % (ref 0–13.4)
NEUTROPHILS # BLD AUTO: 8.91 K/UL (ref 1.82–7.42)
NEUTROPHILS NFR BLD: 76.5 % (ref 44–72)
NRBC # BLD AUTO: 0 K/UL
NRBC BLD-RTO: 0 /100 WBC
PLATELET # BLD AUTO: 228 K/UL (ref 164–446)
PMV BLD AUTO: 9.3 FL (ref 9–12.9)
POTASSIUM SERPL-SCNC: 3.1 MMOL/L (ref 3.6–5.5)
PROT SERPL-MCNC: 7.8 G/DL (ref 6–8.2)
RBC # BLD AUTO: 4.77 M/UL (ref 4.7–6.1)
SIGNIFICANT IND 70042: NORMAL
SITE SITE: NORMAL
SODIUM SERPL-SCNC: 137 MMOL/L (ref 135–145)
SOURCE SOURCE: NORMAL
VANCOMYCIN TROUGH SERPL-MCNC: 12.8 UG/ML (ref 10–20)
WBC # BLD AUTO: 11.7 K/UL (ref 4.8–10.8)

## 2019-09-15 PROCEDURE — 87207 SMEAR SPECIAL STAIN: CPT

## 2019-09-15 PROCEDURE — 85025 COMPLETE CBC W/AUTO DIFF WBC: CPT

## 2019-09-15 PROCEDURE — 770020 HCHG ROOM/CARE - TELE (206)

## 2019-09-15 PROCEDURE — 700102 HCHG RX REV CODE 250 W/ 637 OVERRIDE(OP): Performed by: INTERNAL MEDICINE

## 2019-09-15 PROCEDURE — 86664 EPSTEIN-BARR NUCLEAR ANTIGEN: CPT

## 2019-09-15 PROCEDURE — 86663 EPSTEIN-BARR ANTIBODY: CPT

## 2019-09-15 PROCEDURE — 700111 HCHG RX REV CODE 636 W/ 250 OVERRIDE (IP): Performed by: INTERNAL MEDICINE

## 2019-09-15 PROCEDURE — 94760 N-INVAS EAR/PLS OXIMETRY 1: CPT

## 2019-09-15 PROCEDURE — 700105 HCHG RX REV CODE 258: Performed by: INTERNAL MEDICINE

## 2019-09-15 PROCEDURE — 80202 ASSAY OF VANCOMYCIN: CPT

## 2019-09-15 PROCEDURE — 700105 HCHG RX REV CODE 258: Performed by: HOSPITALIST

## 2019-09-15 PROCEDURE — 83735 ASSAY OF MAGNESIUM: CPT

## 2019-09-15 PROCEDURE — 99232 SBSQ HOSP IP/OBS MODERATE 35: CPT | Performed by: INTERNAL MEDICINE

## 2019-09-15 PROCEDURE — 80053 COMPREHEN METABOLIC PANEL: CPT

## 2019-09-15 PROCEDURE — 700101 HCHG RX REV CODE 250: Performed by: HOSPITALIST

## 2019-09-15 PROCEDURE — 700111 HCHG RX REV CODE 636 W/ 250 OVERRIDE (IP)

## 2019-09-15 PROCEDURE — 700111 HCHG RX REV CODE 636 W/ 250 OVERRIDE (IP): Performed by: HOSPITALIST

## 2019-09-15 PROCEDURE — 86665 EPSTEIN-BARR CAPSID VCA: CPT | Mod: 91

## 2019-09-15 PROCEDURE — A9270 NON-COVERED ITEM OR SERVICE: HCPCS | Performed by: INTERNAL MEDICINE

## 2019-09-15 PROCEDURE — 94640 AIRWAY INHALATION TREATMENT: CPT

## 2019-09-15 PROCEDURE — 99406 BEHAV CHNG SMOKING 3-10 MIN: CPT

## 2019-09-15 PROCEDURE — 700102 HCHG RX REV CODE 250 W/ 637 OVERRIDE(OP): Performed by: HOSPITALIST

## 2019-09-15 PROCEDURE — 99233 SBSQ HOSP IP/OBS HIGH 50: CPT | Performed by: INTERNAL MEDICINE

## 2019-09-15 PROCEDURE — A9270 NON-COVERED ITEM OR SERVICE: HCPCS | Performed by: HOSPITALIST

## 2019-09-15 RX ORDER — POTASSIUM CHLORIDE 20 MEQ/1
40 TABLET, EXTENDED RELEASE ORAL 2 TIMES DAILY
Status: COMPLETED | OUTPATIENT
Start: 2019-09-15 | End: 2019-09-15

## 2019-09-15 RX ORDER — VANCOMYCIN HYDROCHLORIDE 1 G/20ML
INJECTION, POWDER, LYOPHILIZED, FOR SOLUTION INTRAVENOUS
Status: COMPLETED
Start: 2019-09-15 | End: 2019-09-15

## 2019-09-15 RX ORDER — TIOTROPIUM BROMIDE 18 UG/1
1 CAPSULE ORAL; RESPIRATORY (INHALATION) DAILY
Status: DISCONTINUED | OUTPATIENT
Start: 2019-09-16 | End: 2019-09-16 | Stop reason: HOSPADM

## 2019-09-15 RX ORDER — POTASSIUM CHLORIDE 20 MEQ/1
40 TABLET, EXTENDED RELEASE ORAL ONCE
Status: DISCONTINUED | OUTPATIENT
Start: 2019-09-15 | End: 2019-09-15

## 2019-09-15 RX ADMIN — SODIUM CHLORIDE: 9 INJECTION, SOLUTION INTRAVENOUS at 17:08

## 2019-09-15 RX ADMIN — POTASSIUM CHLORIDE 40 MEQ: 1500 TABLET, EXTENDED RELEASE ORAL at 08:16

## 2019-09-15 RX ADMIN — KETOROLAC TROMETHAMINE 15 MG: 30 INJECTION, SOLUTION INTRAMUSCULAR; INTRAVENOUS at 05:03

## 2019-09-15 RX ADMIN — CEFTRIAXONE SODIUM 2 G: 2 INJECTION, POWDER, FOR SOLUTION INTRAMUSCULAR; INTRAVENOUS at 06:24

## 2019-09-15 RX ADMIN — PANCRELIPASE 72000 UNITS: 24000; 76000; 120000 CAPSULE, DELAYED RELEASE PELLETS ORAL at 11:22

## 2019-09-15 RX ADMIN — KETOROLAC TROMETHAMINE 15 MG: 30 INJECTION, SOLUTION INTRAMUSCULAR; INTRAVENOUS at 17:06

## 2019-09-15 RX ADMIN — BENZOCAINE AND MENTHOL 1 LOZENGE: 15; 3.6 LOZENGE ORAL at 17:08

## 2019-09-15 RX ADMIN — VANCOMYCIN HYDROCHLORIDE 1250 MG: 500 INJECTION, POWDER, LYOPHILIZED, FOR SOLUTION INTRAVENOUS at 20:31

## 2019-09-15 RX ADMIN — VANCOMYCIN 1250 MG: 1 INJECTION, SOLUTION INTRAVENOUS at 03:45

## 2019-09-15 RX ADMIN — POTASSIUM CHLORIDE 40 MEQ: 1500 TABLET, EXTENDED RELEASE ORAL at 17:07

## 2019-09-15 RX ADMIN — PANCRELIPASE 72000 UNITS: 24000; 76000; 120000 CAPSULE, DELAYED RELEASE PELLETS ORAL at 06:24

## 2019-09-15 RX ADMIN — TIOTROPIUM BROMIDE 1 CAPSULE: 18 CAPSULE ORAL; RESPIRATORY (INHALATION) at 07:20

## 2019-09-15 RX ADMIN — KETOROLAC TROMETHAMINE 15 MG: 30 INJECTION, SOLUTION INTRAMUSCULAR; INTRAVENOUS at 11:22

## 2019-09-15 RX ADMIN — VANCOMYCIN HYDROCHLORIDE 1250 MG: 500 INJECTION, POWDER, LYOPHILIZED, FOR SOLUTION INTRAVENOUS at 12:31

## 2019-09-15 RX ADMIN — IPRATROPIUM BROMIDE AND ALBUTEROL SULFATE 3 ML: .5; 3 SOLUTION RESPIRATORY (INHALATION) at 07:20

## 2019-09-15 RX ADMIN — KETOROLAC TROMETHAMINE 15 MG: 30 INJECTION, SOLUTION INTRAMUSCULAR; INTRAVENOUS at 23:56

## 2019-09-15 RX ADMIN — NICOTINE 21 MG: 21 PATCH, EXTENDED RELEASE TRANSDERMAL at 06:24

## 2019-09-15 RX ADMIN — SODIUM CHLORIDE: 9 INJECTION, SOLUTION INTRAVENOUS at 08:20

## 2019-09-15 RX ADMIN — PANCRELIPASE 72000 UNITS: 24000; 76000; 120000 CAPSULE, DELAYED RELEASE PELLETS ORAL at 17:06

## 2019-09-15 RX ADMIN — ACETAMINOPHEN 650 MG: 325 TABLET, FILM COATED ORAL at 03:15

## 2019-09-15 ASSESSMENT — ENCOUNTER SYMPTOMS
WEAKNESS: 0
SHORTNESS OF BREATH: 0
PALPITATIONS: 0
SORE THROAT: 0
COUGH: 1
SORE THROAT: 1
CHILLS: 1
BLOOD IN STOOL: 0
BACK PAIN: 0
MYALGIAS: 1
FOCAL WEAKNESS: 0
HEADACHES: 1
DIARRHEA: 0
ABDOMINAL PAIN: 0
HEARTBURN: 0
DIZZINESS: 0
FEVER: 1
NAUSEA: 0
HALLUCINATIONS: 0
DEPRESSION: 0
VOMITING: 0
EYE REDNESS: 0
COUGH: 0
DIAPHORESIS: 1

## 2019-09-15 NOTE — ED NOTES
Pt was transported to 305 via stretcher, on cardiac monitoring, awake, alert. Pt ambulated to the bed and was placed on tele by 3rd floor staff. All belongings with his wife who also escorted the pt up to 305.

## 2019-09-15 NOTE — FLOWSHEET NOTE
09/14/19 1936   Interdisciplinary Plan of Care-Goals (Indications)   Bronchodilator Indications History / Diagnosis   Interdisciplinary Plan of Care-Outcomes    Bronchodilator Outcome Improved Patient Appearance with Decreased use of Accessory Muscles;Patient at Stable Baseline   Education   Education Yes - Pt. / Family has been Instructed in use of Respiratory Equipment;Yes - Pt. / Family has been Instructed in use of Respiratory Medications and Adverse Reactions   RT Assessment of Delivered Medications   Evaluation of Medication Delivery Daily Yes-- Pt /Family has been Instructed in use of Respiratory Medications and Adverse Reactions   SVN Group   #SVN Performed Yes   Given By: Mouthpiece   Date SVN Last Changed 09/14/19   Date SVN Next Change Due (Q 7 Days) 09/21/19   Respiratory WDL   Respiratory (WDL) X   Chest Exam   Chest Observation Barrel Chest   Work Of Breathing / Effort Moderate   Respiration (!) 22   Pulse (!) 120   Breath Sounds   Pre/Post Intervention Pre Intervention Assessment   RUL Breath Sounds Diminished   RML Breath Sounds Diminished   RLL Breath Sounds Diminished   RYAN Breath Sounds Diminished   LLL Breath Sounds Diminished   Secretions   Cough Productive   How Sputum Obtained Spontaneous   Sputum Amount Moderate   Sputum Color Yellow   Sputum Consistency Thick   Oxygen   Home O2 Use Prior To Admission? No   Pulse Oximetry 95 %   O2 (LPM) 0   O2 Daily Delivery Respiratory  Room Air with O2 Available

## 2019-09-15 NOTE — PROGRESS NOTES
Pt arrives to unit from ER to unit via gurney. Ambulated from gurney to bed, accompanied by significant other. Pt A&Ox4, No c/o pain. Tele monitor applied, vitals taken.    Pt oriented to unit and plan of care discussed, including isolation, IV fluids, tests. Welcome folder provided and discussed. Communication board filled out. Pt instructed on call light usage and encouraged to call for any questions, needs, or concerns and prior to getting out of bed. Fall precautions in place. Pt agrees with the plan and declines any needs at this time. Bed locked and low.

## 2019-09-15 NOTE — PROGRESS NOTES
Pt just back from bathroom independently. Per pt was able to void & did have diarrhea. Pt c/o feeling like he is going to have a fever, pt with shakes/chills. RN took pts temperature orally, now at 100.2*F. Will continue to monitor.

## 2019-09-15 NOTE — PROGRESS NOTES
Infectious Disease Progress Note    Author: Casimiro Chávez M.D. Date & Time of service: 9/15/2019  8:45 AM    Chief Complaint:  Fever    Interval History:  9/15 T-max of 102.3 at 3 AM.  White count 11.7.  Patient reports that his insurance lapsed and he wants to leave as soon as he can.  Patient states his rash is better.  Notes a worse sore throat than before.  Heart rate improving    Labs Reviewed and Medications Reviewed.    Review of Systems:  Review of Systems   Constitutional: Positive for chills, diaphoresis, fever and malaise/fatigue.   HENT: Positive for sore throat.    Eyes: Negative for redness.   Respiratory: Positive for cough. Negative for shortness of breath.    Cardiovascular: Negative for chest pain.   Gastrointestinal: Negative for abdominal pain, diarrhea, nausea and vomiting.   Genitourinary: Negative for dysuria.   Musculoskeletal: Positive for myalgias.   Skin: Negative for itching and rash.   Neurological: Positive for headaches. Negative for focal weakness.       Hemodynamics:  Temp (24hrs), Av.4 °C (101.1 °F), Min:37.3 °C (99.1 °F), Max:39.3 °C (102.8 °F)  Temperature: (!) 38.4 °C (101.1 °F)(RN notified)  Pulse  Av.2  Min: 96  Max: 146   Blood Pressure: 158/98(RN notified)       Physical Exam:  Physical Exam   Constitutional: He is oriented to person, place, and time. He appears well-developed and well-nourished.   Facial flushing has improved, much lighter now  Ill-appearing but improved   HENT:   Head: Normocephalic and atraumatic.   Mouth/Throat: Oropharynx is clear and moist. No oropharyngeal exudate.   Eyes: Pupils are equal, round, and reactive to light. Conjunctivae are normal. No scleral icterus.   Neck: Neck supple.   Flushing around skin of neck has improved   Cardiovascular: Regular rhythm and normal heart sounds.   No murmur heard.  Tachycardic but much improved   Pulmonary/Chest: Effort normal and breath sounds normal. No respiratory distress. He has no wheezes. He  has no rales.   Abdominal: Soft. He exhibits no distension. There is no tenderness.   Musculoskeletal: Normal range of motion. He exhibits no edema.   Scar left ankle well-healed  Healing wound noted over left middle finger, just proximal to the PIP, scab, no starting erythema, no active drainage, no TTP or increased warmth   Neurological: He is alert and oriented to person, place, and time.   No gross focal neuro deficit  Intention tremors noted   Skin: Skin is warm. No rash noted. He is diaphoretic. No erythema.   Rash has resolved   Psychiatric: He has a normal mood and affect. His behavior is normal.   Pleasant   Vitals reviewed.      Meds:    Current Facility-Administered Medications:   •  potassium chloride SA  •  pancrelipase (Lip-Prot-Amyl)  •  tiotropium  •  traZODone  •  albuterol  •  senna-docusate **AND** polyethylene glycol/lytes **AND** magnesium hydroxide **AND** bisacodyl  •  Respiratory Care per Protocol  •  LR  •  LR  •  NS  •  acetaminophen  •  ondansetron  •  ondansetron  •  promethazine  •  promethazine  •  prochlorperazine  •  nicotine **AND** Nicotine Replacement Patient Education Materials **AND** nicotine polacrilex  •  cefTRIAXone (ROCEPHIN) IV  •  MD Alert...Vancomycin per Pharmacy  •  ketorolac  •  vancomycin  •  ipratropium-albuterol  •  albuterol    Labs:  Recent Labs     09/14/19  1024 09/15/19  0321   WBC 11.3* 11.7*   RBC 4.37* 4.77   HEMOGLOBIN 13.7* 15.2   HEMATOCRIT 39.4* 44.3   MCV 90.2 92.9   MCH 31.4 31.9   RDW 42.5 44.0   PLATELETCT 251 228   MPV 8.6* 9.3   NEUTSPOLYS 85.60* 76.50*   LYMPHOCYTES 7.10* 15.10*   MONOCYTES 5.80 7.80   EOSINOPHILS 0.00 0.00   BASOPHILS 0.30 0.30     Recent Labs     09/14/19  1024 09/15/19  0321   SODIUM 139 137   POTASSIUM 3.2* 3.1*   CHLORIDE 98 98   CO2 19* 24   GLUCOSE 105* 90   BUN 14 5*     Recent Labs     09/14/19  1024 09/15/19  0321   ALBUMIN 4.1 4.2   TBILIRUBIN 0.3 0.7   ALKPHOSPHAT 79 82   TOTPROTEIN 6.9 7.8   ALTSGPT 25 23   ASTSGOT 23  "20   CREATININE 0.77 0.70       Imaging:  Dx-chest-portable (1 View)    Result Date: 9/14/2019 9/14/2019 10:11 AM HISTORY/REASON FOR EXAM:  possible sepsis.. Cough, congestion, fever, rash. TECHNIQUE/EXAM DESCRIPTION AND NUMBER OF VIEWS: Single portable view of the chest. COMPARISON: 5/8/2018 FINDINGS: Cardiomediastinal contour is within normal limits. No focal pulmonary consolidation. No pleural fluid collection or pneumothorax. No major bony abnormality is seen.     No acute cardiopulmonary disease.      Micro:  Results     Procedure Component Value Units Date/Time    CSF CULTURE [[id number]] Collected:  09/14/19 1201    Order Status:  Completed Specimen:  CSF from Tap Updated:  09/14/19 1650    Malaria Smear [[id number]] Collected:  09/14/19 1430    Order Status:  Completed Specimen:  Blood Updated:  09/14/19 1538    Narrative:       Relevant Zika virus Exposure?->Yes  Symptoms consistent with Zika virus?->Yes  Per Hospital Policy: Only change Specimen Src: to \"Line\" if  specified by physician order.    GRAM STAIN [[id number]] Collected:  09/14/19 1201    Order Status:  Completed Specimen:  CSF Updated:  09/14/19 1452     Significant Indicator .     Source CSF     Site TAP     Gram Stain Result No organisms seen.    URINALYSIS [[id number]]  (Abnormal) Collected:  09/14/19 1147    Order Status:  Completed Specimen:  Urine Updated:  09/14/19 1210     Color Yellow     Character Clear     Specific Gravity 1.010     Ph 6.0     Glucose Negative mg/dL      Ketones Trace mg/dL      Protein Negative mg/dL      Bilirubin Negative     Nitrite Negative     Leukocyte Esterase Negative     Occult Blood Small     Micro Urine Req Microscopic    Narrative:       Indication for culture:->Emergency Room Patient    URINE CULTURE(NEW) [[id number]] Collected:  09/14/19 1147    Order Status:  Completed Specimen:  Urine Updated:  09/14/19 1151    Narrative:       Indication for culture:->Emergency Room Patient    BLOOD CULTURE [[id number]] " "Collected:  09/14/19 1034    Order Status:  Completed Specimen:  Blood from Peripheral Updated:  09/14/19 1053    Narrative:       Per Hospital Policy: Only change Specimen Src: to \"Line\" if  specified by physician order.    BLOOD CULTURE [[id number]] Collected:  09/14/19 1024    Order Status:  Completed Specimen:  Blood from Peripheral Updated:  09/14/19 1033    Narrative:       Per Hospital Policy: Only change Specimen Src: to \"Line\" if  specified by physician order.    Blood Culture [[id number]] Collected:  09/14/19 0000    Order Status:  Canceled Specimen:  Other from Peripheral     Blood Culture [[id number]] Collected:  09/14/19 0000    Order Status:  Canceled Specimen:  Other from Peripheral           Assessment:  Wero Thorpe is a 44 y.o. male with a history of alcoholism and pancreatitis, admitted with fever, sepsis, rash, after returning from travel to [place].  Timeline and onset of symptoms are difficult to ascertain with his history, could even be a process that began before his travel.  He certainly got worse on 9/5 while he was in [place], and this seems more likely.     Multiple infections on the differential, including bacterial such as typical infections with bacteremia through his left finger wound he sustained while in [place], and atypical such as Salmonella.  Arthropod borne viruses including Chikungunya, Zika, Dengue (less likely given normal platelets) are on the list, as is Malaria.  Other zoonoses such as leptospirosis less likely given his relatively normal labs.  His rash is not typical for Measles, and has now resolved.      Pertinent Diagnoses:  Sepsis  Fever  Body aches  Recent travel to [place]    Plan:  -Follow malaria smear.  If negative and patient spikes fever again, repeat malaria smear  -CSF is normal    -Continue IV vancomycin and ceftriaxone 2 g every 24 hours for now white blood cultures are pending  -Monitor vancomycin levels and renal function  -Follow " serum Chikungunya PCR, Zika PCR and IgM, dengue IgG and IgM  -HIV negative  -Follow West Nile serology  -Will add CMV quant PCR and acute EBV serologies    Discussed with internal medicine, Dr. Byrd    ID will follow.  Please call with questions.

## 2019-09-15 NOTE — FLOWSHEET NOTE
09/14/19 1906   Type of Assessment   Assessment Yes   Patient History   Pulmonary Diagnosis asthma   Home O2 Use Prior To Admission? No   Home Treatments/Frequency Yes   MDI 1/Frequency albuterol PRN   DPI 1/Frequency spiriva   COPD Risk Screening   Do you have a history of COPD? No  (unknown by patient)   COPD Population Screener   During the past 4 weeks, how much did you feel short of breath? 2   Do you ever cough up any mucus or phlegm? 2   In the past 12 months, you do less than you used to because of your breathing problems 2   Have you smoked at least 100 cigarettes in your entire life? 2   How old are you? 0   COPD Screening Score 8   COPD Coordinator Recommended Yes   Smoking History   Have you ever smoked Yes   Have you smoked in the last 12 months Yes   Have you quit smoking No   Do you have any pipes/cigarettes/lighter/matches/etc in your possesion No   Asthma Consult Referral Yes   Level Of Consciousness   Level of Consciousness Alert   Respiratory WDL   Respiratory (WDL) X   Chest Exam   Chest Observation Barrel Chest   Work Of Breathing / Effort Moderate   Respiration (!) 22   Pulse (!) 120   Breath Sounds   Pre/Post Intervention Pre Intervention Assessment   RUL Breath Sounds Diminished   RML Breath Sounds Diminished   RLL Breath Sounds Diminished   RYAN Breath Sounds Diminished   LLL Breath Sounds Diminished   Secretions   Cough Productive   How Sputum Obtained Spontaneous   Sputum Amount Moderate   Sputum Color Yellow   Sputum Consistency Thick   Protocol Pathways   Protocol Pathways Yes   Bronchodilator Protocol   Med Order With RCP Yes - Initial Order by MD for Therapy - Follow Order for 24 Hours, Reassess Patient   Is this an exacerbation of COPD/Asthma? No   Bronchodilator Indications History / Diagnosis   Breath Sounds Criteria 1    Benefit Criteria 1    Pulse Criteria 1    Respiratory Rate Criteria 1    S.O.B. Criteria 1    Criteria Total 5   Point Values 4-6 - QID and PRN   Bronchodilator  Goals/Outcome Patient Able to Perform MDI/DPI Effectively and Converted Accordingly;Patient at Stable Baseline   O2 Protocol   O2 (LPM) 0   Pulse Oximetry 95 %

## 2019-09-15 NOTE — PROGRESS NOTES
"Pharmacy Kinetics 44 y.o. male on vancomycin day # 2 9/15/2019    Currently on Vancomycin 1250 mg iv q8hr  Provider specified end date: To be determined    Indication for Treatment: Unknown source of infection    Pertinent history per medical record: Admitted on 2019 for Severe sepsis.    Other antibiotics: CTX 2G q23h    Allergies: Dairy food allergy and Nkda [no known drug allergy]     List concerns for renal function:  None    Pertinent cultures to date:    PCT 0.07    MRSA nares swab if pneumonia is a concern (ordered/positive/negative/n-a): N/A    Recent Labs     19  1024 09/15/19  0321   WBC 11.3* 11.7*   NEUTSPOLYS 85.60* 76.50*     Recent Labs     19  1024 09/15/19  0321   BUN 14 5*   CREATININE 0.77 0.70   ALBUMIN 4.1 4.2     Recent Labs     09/15/19  1126   VANCOTROUGH 12.8       Intake/Output Summary (Last 24 hours) at 9/15/2019 1358  Last data filed at 9/15/2019 1035  Gross per 24 hour   Intake 3420 ml   Output 875 ml   Net 2545 ml      /81   Pulse 92   Temp 36.9 °C (98.5 °F) (Oral)   Resp 16   Ht 1.854 m (6' 1\")   Wt 97.6 kg (215 lb 2.7 oz)   SpO2 95%  Temp (24hrs), Av.2 °C (100.8 °F), Min:36.9 °C (98.5 °F), Max:39.3 °C (102.8 °F)      A/P   1. Vancomycin dose change: 1250 mg q 8 h  2. Next vancomycin level: In a few days  3. Goal trough: 12-16 mcg/ml  Comments: Will continue to monitor and adjust dose as needed per protocol.    Jumana Canchola    "

## 2019-09-15 NOTE — PROGRESS NOTES
Tele Monitor Summary   Rhythm: SR to ST  Rate:      NJ: .20   QRS: .08  QT: .34     Ectopy: rare PACs

## 2019-09-15 NOTE — PROGRESS NOTES
MEWS score 5.  Patient just transferred from ED. MD aware of tachypnea and tachycardia and temp, RRT not necessary at this time.

## 2019-09-15 NOTE — PROGRESS NOTES
Telemetry Shift Summary    Rhythm ST  HR Range 100s-120s  Ectopy None  Measurements .18/.08/.32        Normal Values  Rhythm SR  HR Range    Measurements 0.12-0.20 / 0.06-0.10  / 0.30-0.52

## 2019-09-15 NOTE — PROGRESS NOTES
· 2 RN skin check complete.   · Devices in place : IVs, Tele monitor  · Skin assessed under devices: yes  · Confirmed pressure ulcers found on: N/A  · New potential pressure ulcers noted on: N/A.   · Top of left third (ring) finger. Dry wound (cut on glass in [place]).      The following interventions in place:    Pt can turn self independently. Wound dry- open to air, will continue to monitor.

## 2019-09-15 NOTE — CARE PLAN
Problem: Communication  Goal: The ability to communicate needs accurately and effectively will improve  Outcome: PROGRESSING AS EXPECTED  Intervention: [place] patient and significant other/support system to call light to alert staff of needs  Note:   Patient oriented to call light system and all relevant hospital policies. Call light within reach and used appropriately when in need of assistance.       Problem: Infection  Goal: Will remain free from infection  Outcome: PROGRESSING AS EXPECTED  Intervention: Implement standard precautions and perform hand washing before and after patient contact  Note:   Airborne/droplet precautions in effect at this time. Gown/gloves/mask worn appropriately. Staff aware, appropriate signs placed outside of room.

## 2019-09-15 NOTE — FLOWSHEET NOTE
Patient declined nebulizer treatment, complains of sore throat.  Nebulizer changed to PRN per respiratory protocol, breath sounds clear with no SOB.  Patient aware he may request PRN Albuterol inhaler or nebulizer at any time.     09/15/19 1035   Therapy Not Performed   Type of Therapy Not Performed SVN   Reason Therapy Not Performed Refused   Chest Exam   Respiration 16   Pulse 92   Breath Sounds   RUL Breath Sounds Clear   RML Breath Sounds Clear   RLL Breath Sounds Clear;Diminished   RYAN Breath Sounds Clear   LLL Breath Sounds Clear;Diminished   Oxygen   Home O2 Use Prior To Admission? No   Pulse Oximetry 95 %   O2 Daily Delivery Respiratory  Room Air with O2 Available   Events   #(RT ONLY) Smoking and tobacco use cessation counseling 3-10 minutes smart text complete?  Symptomatic

## 2019-09-15 NOTE — FLOWSHEET NOTE
09/15/19 0720   Interdisciplinary Plan of Care-Goals (Indications)   Bronchodilator Indications History / Diagnosis   RT Assessment of Delivered Medications   Evaluation of Medication Delivery Daily Yes-- Pt /Family has been Instructed in use of Respiratory Medications and Adverse Reactions   SVN Group   #SVN Performed Yes   Given By: Mouthpiece   MDI/DPI Group   #MDI/DPI Given MDI/DPI x 1  (spiriva)   Chest Exam   Respiration 18   Pulse 96   Breath Sounds   RUL Breath Sounds Diminished   RML Breath Sounds Diminished   RLL Breath Sounds Diminished   RYAN Breath Sounds Diminished   LLL Breath Sounds Diminished   Oximetry   #Pulse Oximetry (Single Determination) Yes   Oxygen   Home O2 Use Prior To Admission? No   Pulse Oximetry 97 %   O2 (LPM) 0   O2 Daily Delivery Respiratory  Room Air with O2 Available

## 2019-09-15 NOTE — RESPIRATORY CARE
COPD EDUCATION by COPD CLINICAL EDUCATOR  9/15/2019 at 10:52 AM by Abigail Talavera     Patient reviewed by COPD education team. Patient does not have a history or diagnosis of COPD, therefore does not qualify for the COPD program.  Patient does have a history of asthma, reviewed inhaler technique.Smoking Cessation Intervention completed.

## 2019-09-15 NOTE — PROGRESS NOTES
Report received from Ramo RN, pt care assumed. Pt resting in bed with eyes closed & respirations regular & rhythmic. Droplet & airborne isolation precautions in place & to be continued. Hourly rounds continue.    BACK PAIN

## 2019-09-16 ENCOUNTER — PATIENT OUTREACH (OUTPATIENT)
Dept: HEALTH INFORMATION MANAGEMENT | Facility: OTHER | Age: 44
End: 2019-09-16

## 2019-09-16 VITALS
TEMPERATURE: 98.3 F | OXYGEN SATURATION: 96 % | HEIGHT: 73 IN | WEIGHT: 215.17 LBS | RESPIRATION RATE: 18 BRPM | HEART RATE: 79 BPM | DIASTOLIC BLOOD PRESSURE: 90 MMHG | BODY MASS INDEX: 28.52 KG/M2 | SYSTOLIC BLOOD PRESSURE: 150 MMHG

## 2019-09-16 LAB
ALBUMIN SERPL BCP-MCNC: 3.6 G/DL (ref 3.2–4.9)
ALBUMIN/GLOB SERPL: 1.2 G/DL
ALP SERPL-CCNC: 68 U/L (ref 30–99)
ALT SERPL-CCNC: 18 U/L (ref 2–50)
ANION GAP SERPL CALC-SCNC: 13 MMOL/L (ref 0–11.9)
AST SERPL-CCNC: 16 U/L (ref 12–45)
BACTERIA UR CULT: NORMAL
BASOPHILS # BLD AUTO: 0.6 % (ref 0–1.8)
BASOPHILS # BLD: 0.05 K/UL (ref 0–0.12)
BILIRUB SERPL-MCNC: 0.3 MG/DL (ref 0.1–1.5)
BUN SERPL-MCNC: 7 MG/DL (ref 8–22)
CALCIUM SERPL-MCNC: 8.3 MG/DL (ref 8.4–10.2)
CHLORIDE SERPL-SCNC: 105 MMOL/L (ref 96–112)
CO2 SERPL-SCNC: 22 MMOL/L (ref 20–33)
CREAT SERPL-MCNC: 0.61 MG/DL (ref 0.5–1.4)
EOSINOPHIL # BLD AUTO: 0.04 K/UL (ref 0–0.51)
EOSINOPHIL NFR BLD: 0.5 % (ref 0–6.9)
ERYTHROCYTE [DISTWIDTH] IN BLOOD BY AUTOMATED COUNT: 43.5 FL (ref 35.9–50)
GLOBULIN SER CALC-MCNC: 3 G/DL (ref 1.9–3.5)
GLUCOSE SERPL-MCNC: 113 MG/DL (ref 65–99)
HCT VFR BLD AUTO: 38.1 % (ref 42–52)
HGB BLD-MCNC: 13 G/DL (ref 14–18)
IMM GRANULOCYTES # BLD AUTO: 0.05 K/UL (ref 0–0.11)
IMM GRANULOCYTES NFR BLD AUTO: 0.6 % (ref 0–0.9)
LYMPHOCYTES # BLD AUTO: 1.54 K/UL (ref 1–4.8)
LYMPHOCYTES NFR BLD: 17.3 % (ref 22–41)
MAGNESIUM SERPL-MCNC: 1.8 MG/DL (ref 1.5–2.5)
MCH RBC QN AUTO: 31.6 PG (ref 27–33)
MCHC RBC AUTO-ENTMCNC: 34.1 G/DL (ref 33.7–35.3)
MCV RBC AUTO: 92.5 FL (ref 81.4–97.8)
MONOCYTES # BLD AUTO: 0.71 K/UL (ref 0–0.85)
MONOCYTES NFR BLD AUTO: 8 % (ref 0–13.4)
NEUTROPHILS # BLD AUTO: 6.49 K/UL (ref 1.82–7.42)
NEUTROPHILS NFR BLD: 73 % (ref 44–72)
NRBC # BLD AUTO: 0 K/UL
NRBC BLD-RTO: 0 /100 WBC
PLATELET # BLD AUTO: 214 K/UL (ref 164–446)
PMV BLD AUTO: 9.4 FL (ref 9–12.9)
POTASSIUM SERPL-SCNC: 3.2 MMOL/L (ref 3.6–5.5)
PROT SERPL-MCNC: 6.6 G/DL (ref 6–8.2)
RBC # BLD AUTO: 4.12 M/UL (ref 4.7–6.1)
SIGNIFICANT IND 70042: NORMAL
SITE SITE: NORMAL
SODIUM SERPL-SCNC: 140 MMOL/L (ref 135–145)
SOURCE SOURCE: NORMAL
WBC # BLD AUTO: 8.9 K/UL (ref 4.8–10.8)

## 2019-09-16 PROCEDURE — A9270 NON-COVERED ITEM OR SERVICE: HCPCS | Performed by: HOSPITALIST

## 2019-09-16 PROCEDURE — 700105 HCHG RX REV CODE 258: Performed by: HOSPITALIST

## 2019-09-16 PROCEDURE — 83735 ASSAY OF MAGNESIUM: CPT

## 2019-09-16 PROCEDURE — 99232 SBSQ HOSP IP/OBS MODERATE 35: CPT | Performed by: INTERNAL MEDICINE

## 2019-09-16 PROCEDURE — 700111 HCHG RX REV CODE 636 W/ 250 OVERRIDE (IP): Performed by: INTERNAL MEDICINE

## 2019-09-16 PROCEDURE — 700111 HCHG RX REV CODE 636 W/ 250 OVERRIDE (IP): Performed by: HOSPITALIST

## 2019-09-16 PROCEDURE — 99239 HOSP IP/OBS DSCHRG MGMT >30: CPT | Performed by: INTERNAL MEDICINE

## 2019-09-16 PROCEDURE — 700102 HCHG RX REV CODE 250 W/ 637 OVERRIDE(OP): Performed by: HOSPITALIST

## 2019-09-16 PROCEDURE — 700105 HCHG RX REV CODE 258: Performed by: INTERNAL MEDICINE

## 2019-09-16 PROCEDURE — A9270 NON-COVERED ITEM OR SERVICE: HCPCS | Performed by: INTERNAL MEDICINE

## 2019-09-16 PROCEDURE — 85025 COMPLETE CBC W/AUTO DIFF WBC: CPT

## 2019-09-16 PROCEDURE — 80053 COMPREHEN METABOLIC PANEL: CPT

## 2019-09-16 PROCEDURE — 700102 HCHG RX REV CODE 250 W/ 637 OVERRIDE(OP): Performed by: INTERNAL MEDICINE

## 2019-09-16 RX ORDER — POTASSIUM CHLORIDE 20 MEQ/1
40 TABLET, EXTENDED RELEASE ORAL ONCE
Status: COMPLETED | OUTPATIENT
Start: 2019-09-16 | End: 2019-09-16

## 2019-09-16 RX ORDER — BUTALBITAL, ACETAMINOPHEN AND CAFFEINE 50; 325; 40 MG/1; MG/1; MG/1
1 TABLET ORAL EVERY 6 HOURS PRN
Qty: 15 TAB | Refills: 0 | Status: SHIPPED | OUTPATIENT
Start: 2019-09-16 | End: 2019-09-21

## 2019-09-16 RX ORDER — TRAMADOL HYDROCHLORIDE 50 MG/1
50 TABLET ORAL EVERY 6 HOURS PRN
Status: DISCONTINUED | OUTPATIENT
Start: 2019-09-16 | End: 2019-09-16 | Stop reason: HOSPADM

## 2019-09-16 RX ORDER — BUTALBITAL, ACETAMINOPHEN AND CAFFEINE 50; 325; 40 MG/1; MG/1; MG/1
1 TABLET ORAL EVERY 6 HOURS PRN
Status: DISCONTINUED | OUTPATIENT
Start: 2019-09-16 | End: 2019-09-16 | Stop reason: HOSPADM

## 2019-09-16 RX ORDER — ACETAMINOPHEN 500 MG
1000 TABLET ORAL ONCE
Status: DISCONTINUED | OUTPATIENT
Start: 2019-09-16 | End: 2019-09-16 | Stop reason: HOSPADM

## 2019-09-16 RX ADMIN — KETOROLAC TROMETHAMINE 15 MG: 30 INJECTION, SOLUTION INTRAMUSCULAR; INTRAVENOUS at 06:29

## 2019-09-16 RX ADMIN — CEFTRIAXONE SODIUM 2 G: 2 INJECTION, POWDER, FOR SOLUTION INTRAMUSCULAR; INTRAVENOUS at 06:23

## 2019-09-16 RX ADMIN — PANCRELIPASE 72000 UNITS: 24000; 76000; 120000 CAPSULE, DELAYED RELEASE PELLETS ORAL at 11:17

## 2019-09-16 RX ADMIN — POTASSIUM CHLORIDE 40 MEQ: 1500 TABLET, EXTENDED RELEASE ORAL at 11:13

## 2019-09-16 RX ADMIN — VANCOMYCIN HYDROCHLORIDE 1250 MG: 500 INJECTION, POWDER, LYOPHILIZED, FOR SOLUTION INTRAVENOUS at 04:05

## 2019-09-16 RX ADMIN — TRAMADOL HYDROCHLORIDE 50 MG: 50 TABLET, FILM COATED ORAL at 01:41

## 2019-09-16 RX ADMIN — BUTALBITAL, ACETAMINOPHEN AND CAFFEINE 1 TABLET: 50; 325; 40 TABLET ORAL at 11:13

## 2019-09-16 RX ADMIN — TIOTROPIUM BROMIDE 1 CAPSULE: 18 CAPSULE ORAL; RESPIRATORY (INHALATION) at 06:28

## 2019-09-16 RX ADMIN — PANCRELIPASE 72000 UNITS: 24000; 76000; 120000 CAPSULE, DELAYED RELEASE PELLETS ORAL at 08:29

## 2019-09-16 RX ADMIN — TRAMADOL HYDROCHLORIDE 50 MG: 50 TABLET, FILM COATED ORAL at 08:35

## 2019-09-16 RX ADMIN — SODIUM CHLORIDE: 9 INJECTION, SOLUTION INTRAVENOUS at 00:20

## 2019-09-16 ASSESSMENT — ENCOUNTER SYMPTOMS
DIARRHEA: 0
FOCAL WEAKNESS: 0
NAUSEA: 0
EYE REDNESS: 0
HEADACHES: 0
MYALGIAS: 1
SORE THROAT: 1
DIAPHORESIS: 0
FEVER: 0
ABDOMINAL PAIN: 0
COUGH: 1
SHORTNESS OF BREATH: 0
CHILLS: 0
VOMITING: 0

## 2019-09-16 NOTE — PROGRESS NOTES
Received report from Isabel GRANT. Patient awake sitting up in bed watching TV. Proper airborne/droplet precautions in use. Assessment complete. Due medications administered. Patient complains of a headache 5/10, declines intervention. Safety precautions in place. Call bell is within reach.

## 2019-09-16 NOTE — PROGRESS NOTES
Telemetry Shift Summary    Rhythm SR/ST  HR Range 80-100s  Ectopy Rare PVC  Measurements 0.18/0.10/0.44        Normal Values  Rhythm SR  HR Range    Measurements 0.12-0.20 / 0.06-0.10  / 0.30-0.52

## 2019-09-16 NOTE — DISCHARGE INSTRUCTIONS
Nursing Comm: Ok to DC patient  D/C to: Home  Condition: stable  Diet: As tolerated    Activity: As tolerated  Avoid unprotected sex until Zika virus is ruled out.    Instructions: Please instruct patient to return to the ED if worsening fever or malaise  Pending viral labs will be called to you by ID    Follow up:  With PCP or ID in one week    Discharge Instructions    Discharged to home by car with relative. Discharged via walking, hospital escort: Refused.  Special equipment needed: Not Applicable    Be sure to schedule a follow-up appointment with your primary care doctor or any specialists as instructed.     Discharge Plan:   Diet Plan: Discussed  Activity Level: Discussed  Confirmed Follow up Appointment: Patient to Call and Schedule Appointment  Confirmed Symptoms Management: Discussed  Medication Reconciliation Updated: Yes    I understand that a diet low in cholesterol, fat, and sodium is recommended for good health. Unless I have been given specific instructions below for another diet, I accept this instruction as my diet prescription.   Other diet: regular    Special Instructions: None    · Is patient discharged on Warfarin / Coumadin?   No     Depression / Suicide Risk    As you are discharged from this [facility] facility, it is important to learn how to keep safe from harming yourself.    Recognize the warning signs:  · Abrupt changes in personality, positive or negative- including increase in energy   · Giving away possessions  · Change in eating patterns- significant weight changes-  positive or negative  · Change in sleeping patterns- unable to sleep or sleeping all the time   · Unwillingness or inability to communicate  · Depression  · Unusual sadness, discouragement and loneliness  · Talk of wanting to die  · Neglect of personal appearance   · Rebelliousness- reckless behavior  · Withdrawal from people/activities they love  · Confusion- inability to concentrate     If you or a loved one  observes any of these behaviors or has concerns about self-harm, here's what you can do:  · Talk about it- your feelings and reasons for harming yourself  · Remove any means that you might use to hurt yourself (examples: pills, rope, extension cords, firearm)  · Get professional help from the community (Mental Health, Substance Abuse, psychological counseling)  · Do not be alone:Call your Safe Contact- someone whom you trust who will be there for you.  · Call your local CRISIS HOTLINE 738-4962 or 824-548-8824  · Call your local Children's Mobile Crisis Response Team [place] (657) 050-0956 or www.[employer]  · Call the toll free National Suicide Prevention Hotlines   · National Suicide Prevention Lifeline 404-577-HXGZ (3912)  · [place] [facility] Network 800-SUICIDE (106-0820)        Sepsis, Adult  Sepsis is a serious infection of your blood or tissues that affects your whole body. The infection that causes sepsis may be bacterial, viral, fungal, or parasitic. Sepsis may be life threatening. Sepsis can cause your blood pressure to drop. This may result in shock. Shock causes your central nervous system and your organs to stop working correctly.  What increases the risk?  Sepsis can happen in anyone, but it is more likely to happen in people who have weakened immune systems.  What are the signs or symptoms?  Symptoms of sepsis can include:  · Fever or low body temperature (hypothermia).  · Rapid breathing (hyperventilation).  · Chills.  · Rapid heartbeat (tachycardia).  · Confusion or light-headedness.  · Trouble breathing.  · Urinating much less than usual.  · Cool, clammy skin or red, flushed skin.  · Other problems with the heart, kidneys, or brain.  How is this diagnosed?  Your health care provider will likely do tests to look for an infection, to see if the infection has spread to your blood, and to see how serious your condition is. Tests can include:  · Blood tests, including cultures of your  blood.  · Cultures of other fluids from your body, such as:  ¨ Urine.  ¨ Pus from wounds.  ¨ Mucus coughed up from your lungs.  · Urine tests other than cultures.  · X-ray exams or other imaging tests.  How is this treated?  Treatment will begin with elimination of the source of infection. If your sepsis is likely caused by a bacterial or fungal infection, you will be given antibiotic or antifungal medicines.  You may also receive:  · Oxygen.  · Fluids through an IV tube.  · Medicines to increase your blood pressure.  · A machine to clean your blood (dialysis) if your kidneys fail.  · A machine to help you breathe if your lungs fail.  Get help right away if:  You get an infection or develop any of the signs and symptoms of sepsis after surgery or a hospitalization.  This information is not intended to replace advice given to you by your health care provider. Make sure you discuss any questions you have with your health care provider.  Document Released: 09/15/2004 Document Revised: 05/25/2017 Document Reviewed: 08/25/2014  [employer] Interactive Patient Education © 2017 [employer]    Zika Virus Disease Prevention  Zika virus disease, or Zika, is an illness that can spread to people from mosquitoes that carry the virus. It may also spread from person to person through infected body fluids. Zika first occurred in [place], but recently it has spread to new areas. The virus occurs in tropical climates. The location of Zika continues to change.  Most people who become infected with Zika virus do not develop serious illness. Zika may cause birth defects in an unborn baby whose mother is infected with the virus. It may also increase the risk of miscarriage.  How does Zika virus disease spread?  The main way that Zika virus spreads is through the bite of a certain type of mosquito. Unlike most types of mosquitoes, which bite only at night, the type of mosquito that carries Zika virus bites both at night and during the  day.  Zika virus can also spread through sexual contact, through a blood transfusion, and from a mother to her baby before or during birth.  Once you have had Zika virus disease, it is unlikely that you will get it again.  What are the symptoms of Zika virus disease?  In many cases, people who have been infected with Zika virus do not develop any symptoms. If symptoms appear, they usually start about a week after the person is infected. Symptoms are usually mild. They may include:  · Fever.  · Rash.  · Red eyes.  · Joint pain.  A woman who is infected with Zika virus while pregnant is at risk of having her baby born with a condition in which the brain or head is smaller than expected (microcephaly). Babies who have microcephaly can have developmental delays, seizures, hearing problems, and vision problems.  In rare cases, a person can develop a disorder called Guillain-McCune syndrome after having a viral illness such as Zika. Symptoms of this disorder include weakness in the arms, legs, or face.  How do health care providers diagnose Zika virus disease?  A sample of blood can be tested for Zika virus.  How can Zika virus disease be prevented?  There is no vaccine to prevent Zika. The best way to prevent the disease is to avoid infected mosquitoes and avoid exposure to body fluids that can spread the virus. Prevention is important for everyone. Because of the risk to unborn babies, taking precautions is especially important for pregnant women, women who are trying to get pregnant, and sexual partners of these women.  What should I know about traveling?  · The locations where Zika is being reported change often. To identify high-risk areas, check the CDC travel website: www.cdc.gov/zika/fatoumata/index.html  · Take all precautions to avoid mosquito bites if you live in, or travel to, any of the high-risk areas.  · Pregnant women, women who are trying to get pregnant, and sexual partners of these women should talk with their  health care providers before and after traveling to a high-risk area.  · When you return from traveling to any high-risk area, continue taking actions to protect yourself against mosquito bites for 3 weeks, even if you show no signs of illness. This will prevent spreading Zika virus to uninfected mosquitoes.  What steps should I take to avoid mosquito bites?  Take these steps to avoid mosquito bites when you are in a high-risk area:  · Wear loose clothing that covers your arms and legs.  · Limit your outdoor activities.  · Do not open windows unless they have window screens.  · Sleep under mosquito nets.  · Use insect repellent. The best insect repellents have:  ¨ DEET, picaridin, oil of lemon eucalyptus (OLE), or [id number] in them.  ¨ Higher amounts of an active ingredient in them.  · Remember that insect repellents are safe to use during pregnancy.  · Do not use OLE on children who are younger than 3 years of age. Do not use insect repellent on babies who are younger than 2 months of age.  · Cover your child's stroller with mosquito netting. Make sure netting fits snugly and that any loose netting does not cover your child's mouth or nose. Do not use a blanket as a mosquito-protection cover.  · Do not apply insect repellent underneath clothing.  · If you are using sunscreen, apply the sunscreen before applying the insect repellent.  · Treat clothing with permethrin. Do not apply permethrin directly to your skin. Follow label directions for safe use.  · Get rid of standing water, where mosquitoes may reproduce. Standing water is often found in items such as buckets, bowls, animal food dishes, and flowerpots.  What should I know about donating blood?  To prevent Zika from passing through blood, wait to donate blood until:  · 4 weeks after you have traveled to an area where Zika has been reported.  · 4 weeks after you have had symptoms of Zika.  · 4 weeks after you have had sexual contact with:  ¨ A person who has had  Zika.  ¨ A person who has traveled to a high-risk area at any time during the 3 months before the sexual contact occurred. You must wait this long because Zika virus can live longer in semen than it can live in blood.  What should I know about the sexual transmission of Zika?  People can spread Zika to their sexual partners during vaginal, anal, or oral sex, or by sharing sexual devices. Many people with Zika do not develop symptoms, so a person could spread the disease without knowing that they are infected. The greatest risk is to women who are pregnant or who may become pregnant.  Couples can prevent sexual transmission of the virus by:  · Using condoms correctly during the entire duration of sexual activity, every time. This includes vaginal, anal, and oral sex.  · Not sharing sexual devices. Sharing increases your risk of being exposed to body fluid from another person.  · Avoiding all sexual activity until your health care provider says it is safe.  These precautions are especially important for couples with a sexual partner who has traveled to an area with Zika or who has symptoms of Zika. Talk with your health care provider about your risk.  This information is not intended to replace advice given to you by your health care provider. Make sure you discuss any questions you have with your health care provider.  Document Released: 05/03/2016 Document Revised: 05/31/2017 Document Reviewed: 08/31/2016  [employer] Interactive Patient Education © 2017 [employer]

## 2019-09-16 NOTE — CARE PLAN
Problem: Safety  Goal: Will remain free from falls  Outcome: PROGRESSING AS EXPECTED  Note:   Patient calls appropriately. Treaded slipper socks are on. Bed is locked and in lowest position. Patient educated to dangle prior to standing.      Problem: Venous Thromboembolism (VTW)/Deep Vein Thrombosis (DVT) Prevention:  Goal: Patient will participate in Venous Thrombosis (VTE)/Deep Vein Thrombosis (DVT)Prevention Measures  Outcome: PROGRESSING AS EXPECTED  Flowsheets (Taken 9/15/2019 2200)  Risk Assessment Score: 1  VTE RISK: Moderate  Note:   Patient ambulating frequently.

## 2019-09-16 NOTE — PROGRESS NOTES
Telemetry Strip     Strip printed: 1905  Measurements from am strip were as follows:  Rhythm:SR  HR:89  Measurements:0.20/0.08/0.38        Telemetry Shift Summary:    Rhythm:SR/ST  HR:   Ectopy:Rare PAC        Normal Values  Rhythm SR  HR Range    Measurements 0.12-0.20 / 0.06-0.10  / 0.30-0.52

## 2019-09-16 NOTE — PROGRESS NOTES
Spoke to Dr. Byrd, patient does not need to discharge on antibiotics, updated Dr. Byrd as patient would like Fiorect upon discharge.

## 2019-09-16 NOTE — PROGRESS NOTES
Assumed patient care at 0700 from Abby GRANT. Patient has had constant headache only relieved by Toradol, and a sore throat (cepacol lozenges ordered). Proper airborne/droplet precautions in use. 1200 Vanco IVPB within parameters for administration from trough level. Patient remained afebrile today, but educated to alert staff if he feels a fever coming on so staff can place standing order for malaria smear. Shower order received, patient showered and feels much better afterward. Huma came to visit in the afternoon, updated accordingly. Patient had a couple episodes of coughing up blood tinged sputum that seems thicker than previously. Report passed onto Iveth/Patrick GRANT at 1900.

## 2019-09-16 NOTE — PROGRESS NOTES
"Pharmacy Kinetics 44 y.o. male on vancomycin day # 3 2019    Currently on Vancomycin 1250 mg iv q8hr    Provider specified end date: To be determined    Indication for Treatment:Unknown source of infection     Pertinent history per medical record: Admitted on 2019 for Severe sepsis.     Other antibiotics: CTX 2G q23h     Allergies: Dairy food allergy and Nkda [no known drug allergy]      List concerns for renal function:  None     Pertinent cultures to date:    PCT 0.07   BCX2 NGTD   Urine Neg   CFS Tap Neg   Possible Zika Virus     MRSA nares swab if pneumonia is a concern (ordered/positive/negative/n-a): N/A    Recent Labs     19  1024 09/15/19  0321 19  0316   WBC 11.3* 11.7* 8.9   NEUTSPOLYS 85.60* 76.50* 73.00*     Recent Labs     19  1024 09/15/19  0321 19  0316   BUN 14 5* 7*   CREATININE 0.77 0.70 0.61   ALBUMIN 4.1 4.2 3.6     Recent Labs     09/15/19  1126   VANCOTROUGH 12.8       Intake/Output Summary (Last 24 hours) at 2019 0651  Last data filed at 2019 0141  Gross per 24 hour   Intake 620 ml   Output 1200 ml   Net -580 ml      /94   Pulse 86   Temp 37.1 °C (98.8 °F) (Oral)   Resp 18   Ht 1.854 m (6' 1\")   Wt 97.6 kg (215 lb 2.7 oz)   SpO2 92%  Temp (24hrs), Av.1 °C (98.8 °F), Min:36.9 °C (98.5 °F), Max:37.3 °C (99.1 °F)      A/P   1. Vancomycin dose change: No change today  2. Next vancomycin level: In a couple of days  3. Goal trough: 12-16 mcg/ml  Comments: Will continue to monitor and adjust dose as needed per protocol.    Jumana Canchola    "

## 2019-09-16 NOTE — PROGRESS NOTES
Report received from Iveth GRANT. Plan of care discussed. Patient resting comfortably in bed. Safety precautions in place.

## 2019-09-16 NOTE — PROGRESS NOTES
Patient c/o of a headache 6/10 pain. Medication administered (see MAR). Patient denies any other needs at this time. Safety precautions in place. Call bell is within reach.

## 2019-09-16 NOTE — PROGRESS NOTES
Infectious Disease Progress Note    Author: Casimiro Chváez M.D. Date & Time of service: 2019  9:07 AM    Chief Complaint:  Fever    Interval History:  9/15 T-max of 102.3 at 3 AM.  White count 11.7.  Patient reports that his insurance lapsed and he wants to leave as soon as he can.  Patient states his rash is better.  Notes a worse sore throat than before.  Heart rate improving   now afebrile, leukocytosis resolved.  Tachycardia resolved.  Malaria smear x1-.  Patient feeling much better, does have some residual headache    Labs Reviewed and Medications Reviewed.    Review of Systems:  Review of Systems   Constitutional: Positive for malaise/fatigue. Negative for chills, diaphoresis and fever.   HENT: Positive for sore throat.    Eyes: Negative for redness.   Respiratory: Positive for cough. Negative for shortness of breath.    Cardiovascular: Negative for chest pain.   Gastrointestinal: Negative for abdominal pain, diarrhea, nausea and vomiting.   Genitourinary: Negative for dysuria.   Musculoskeletal: Positive for myalgias.   Skin: Negative for itching and rash.   Neurological: Negative for focal weakness and headaches.       Hemodynamics:  Temp (24hrs), Av.1 °C (98.7 °F), Min:36.8 °C (98.3 °F), Max:37.3 °C (99.1 °F)  Temperature: 36.8 °C (98.3 °F)  Pulse  Av.4  Min: 76  Max: 146   Blood Pressure: 150/90       Physical Exam:  Physical Exam   Constitutional: He is oriented to person, place, and time. He appears well-developed and well-nourished. No distress.   Facial flushing resolved   HENT:   Head: Normocephalic and atraumatic.   Mouth/Throat: Oropharynx is clear and moist. No oropharyngeal exudate.   Eyes: Pupils are equal, round, and reactive to light. Conjunctivae are normal. No scleral icterus.   Neck: Neck supple.   Flushing around skin of neck has resolved   Cardiovascular: Normal rate, regular rhythm and normal heart sounds.   No murmur heard.  No longer tachycardic   Pulmonary/Chest:  Effort normal and breath sounds normal. No respiratory distress. He has no wheezes. He has no rales.   Abdominal: Soft. He exhibits no distension. There is no tenderness.   Musculoskeletal: Normal range of motion. He exhibits no edema.   Scar left ankle well-healed  Healing wound noted over left middle finger, just proximal to the PIP, scab, no starting erythema, no active drainage, no TTP or increased warmth   Neurological: He is alert and oriented to person, place, and time.   No gross focal neuro deficit  Tremors resolved   Skin: Skin is warm. No rash noted. He is not diaphoretic. No erythema.   Rash has resolved   Psychiatric: He has a normal mood and affect. His behavior is normal.   Pleasant   Vitals reviewed.      Meds:    Current Facility-Administered Medications:   •  acetaminophen  •  tramadol  •  tiotropium  •  vancomycin  •  benzocaine-menthol  •  pancrelipase (Lip-Prot-Amyl)  •  traZODone  •  albuterol  •  senna-docusate **AND** polyethylene glycol/lytes **AND** magnesium hydroxide **AND** bisacodyl  •  Respiratory Care per Protocol  •  LR  •  LR  •  NS  •  acetaminophen  •  ondansetron  •  ondansetron  •  promethazine  •  promethazine  •  prochlorperazine  •  nicotine **AND** Nicotine Replacement Patient Education Materials **AND** nicotine polacrilex  •  cefTRIAXone (ROCEPHIN) IV  •  MD Alert...Vancomycin per Pharmacy  •  ketorolac  •  albuterol    Labs:  Recent Labs     09/14/19  1024 09/15/19  0321 09/16/19  0316   WBC 11.3* 11.7* 8.9   RBC 4.37* 4.77 4.12*   HEMOGLOBIN 13.7* 15.2 13.0*   HEMATOCRIT 39.4* 44.3 38.1*   MCV 90.2 92.9 92.5   MCH 31.4 31.9 31.6   RDW 42.5 44.0 43.5   PLATELETCT 251 228 214   MPV 8.6* 9.3 9.4   NEUTSPOLYS 85.60* 76.50* 73.00*   LYMPHOCYTES 7.10* 15.10* 17.30*   MONOCYTES 5.80 7.80 8.00   EOSINOPHILS 0.00 0.00 0.50   BASOPHILS 0.30 0.30 0.60     Recent Labs     09/14/19  1024 09/15/19  0321 09/16/19  0316   SODIUM 139 137 140   POTASSIUM 3.2* 3.1* 3.2*   CHLORIDE 98 98  "105   CO2 19* 24 22   GLUCOSE 105* 90 113*   BUN 14 5* 7*     Recent Labs     09/14/19  1024 09/15/19  0321 09/16/19  0316   ALBUMIN 4.1 4.2 3.6   TBILIRUBIN 0.3 0.7 0.3   ALKPHOSPHAT 79 82 68   TOTPROTEIN 6.9 7.8 6.6   ALTSGPT 25 23 18   ASTSGOT 23 20 16   CREATININE 0.77 0.70 0.61       Imaging:  Dx-chest-portable (1 View)    Result Date: 9/14/2019 9/14/2019 10:11 AM HISTORY/REASON FOR EXAM:  possible sepsis.. Cough, congestion, fever, rash. TECHNIQUE/EXAM DESCRIPTION AND NUMBER OF VIEWS: Single portable view of the chest. COMPARISON: 5/8/2018 FINDINGS: Cardiomediastinal contour is within normal limits. No focal pulmonary consolidation. No pleural fluid collection or pneumothorax. No major bony abnormality is seen.     No acute cardiopulmonary disease.      Micro:  Results     Procedure Component Value Units Date/Time    URINE CULTURE(NEW) [[id number]] Collected:  09/14/19 1147    Order Status:  Completed Specimen:  Urine Updated:  09/16/19 0707     Significant Indicator NEG     Source UR     Site -     Culture Result No growth at 48 hours.    Narrative:       Indication for culture:->Emergency Room Patient  Indication for culture:->Emergency Room Patient    Malaria Smear [[id number]] Collected:  09/15/19 1424    Order Status:  Completed Specimen:  Blood Updated:  09/15/19 1734     Significant Indicator NEG     Source BLD     Site BLOOD     Malaria Smear Evaluation No malarial parasites seen by thin smear    Narrative:       Airborne  Per Hospital Policy: Only change Specimen Src: to \"Line\" if  specified by physician order.  Airborne    CSF CULTURE [[id number]] Collected:  09/14/19 1201    Order Status:  Completed Specimen:  CSF from Tap Updated:  09/15/19 1339     Significant Indicator NEG     Source CSF     Site TAP     Culture Result No growth at 24 hours.     Gram Stain Result No organisms seen.    Malaria Smear [[id number]] Collected:  09/14/19 1430    Order Status:  Completed Specimen:  Blood Updated:  09/15/19 " "1320     Significant Indicator NEG     Source BLD     Site BLOOD     Malaria Smear Evaluation No malarial parasites seen.    Narrative:       Relevant Zika virus Exposure?->Yes  Symptoms consistent with Zika virus?->Yes  Per Hospital Policy: Only change Specimen Src: to \"Line\" if  specified by physician order.  Per Hospital Policy: Only change Specimen Src: to \"Line\" if    BLOOD CULTURE [[id number]] Collected:  09/14/19 1024    Order Status:  Completed Specimen:  Blood from Peripheral Updated:  09/15/19 0859     Significant Indicator NEG     Source BLD     Site PERIPHERAL     Culture Result No Growth  Note: Blood cultures are incubated for 5 days and  are monitored continuously.Positive blood cultures  are called to the RN and reported as soon as  they are identified.  Blood culture testing and Gram stain, if indicated, are  performed at [facility], [facility]  [employer] [place], [place].  Positive blood cultures are  sent to [facility], [address] [place] [address], for organism identification and  susceptibility testing.      Narrative:       Per Hospital Policy: Only change Specimen Src: to \"Line\" if  specified by physician order.  Right AC    BLOOD CULTURE [[id number]] Collected:  09/14/19 1034    Order Status:  Completed Specimen:  Blood from Peripheral Updated:  09/15/19 0859     Significant Indicator NEG     Source BLD     Site PERIPHERAL     Culture Result No Growth  Note: Blood cultures are incubated for 5 days and  are monitored continuously.Positive blood cultures  are called to the RN and reported as soon as  they are identified.  Blood culture testing and Gram stain, if indicated, are  performed at [facility], [facility]  [employer] [facility].[place], [place].  Positive blood cultures are  sent to [facility], [address] [place] [address], for organism identification and  susceptibility testing.      Narrative:       Per " "Hospital Policy: Only change Specimen Src: to \"Line\" if  specified by physician order.  Left AC    GRAM STAIN [[id number]] Collected:  09/14/19 1201    Order Status:  Completed Specimen:  CSF Updated:  09/14/19 1452     Significant Indicator .     Source CSF     Site TAP     Gram Stain Result No organisms seen.    URINALYSIS [[id number]]  (Abnormal) Collected:  09/14/19 1147    Order Status:  Completed Specimen:  Urine Updated:  09/14/19 1210     Color Yellow     Character Clear     Specific Gravity 1.010     Ph 6.0     Glucose Negative mg/dL      Ketones Trace mg/dL      Protein Negative mg/dL      Bilirubin Negative     Nitrite Negative     Leukocyte Esterase Negative     Occult Blood Small     Micro Urine Req Microscopic    Narrative:       Indication for culture:->Emergency Room Patient    Blood Culture [[id number]] Collected:  09/14/19 0000    Order Status:  Canceled Specimen:  Other from Peripheral     Blood Culture [[id number]] Collected:  09/14/19 0000    Order Status:  Canceled Specimen:  Other from Peripheral           Assessment:  Wero Thorpe is a 44 y.o. male with a history of alcoholism and pancreatitis, admitted with fever, sepsis, rash, after returning from travel to [place].  Timeline and onset of symptoms are difficult to ascertain with his history, could even be a process that began before his travel.  He certainly got worse on 9/5 while he was in [place], and this seems more likely.     Multiple infections on the differential, including bacterial such as typical infections with bacteremia through his left finger wound he sustained while in [place], and atypical such as Salmonella.  Arthropod borne viruses including Chikungunya, Zika, Dengue (less likely given normal platelets) are on the list, as is Malaria.  Other zoonoses such as leptospirosis less likely given his relatively normal labs.  His rash is not typical for Measles, and has now resolved.      Pertinent " Diagnoses:  Fever  Body aches  Recent travel to [place]    Plan:  -Malaria smear negative, repeat pending.  If patient spikes fever again, repeat malaria smear  -CSF is normal    -Now afebrile and leukocytosis resolved.  Patient feels much better. Will stop IV vancomycin  -Continue IV ceftriaxone 2 g every 24 hours for now white blood cultures are pending  -Follow serum Chikungunya PCR, Zika PCR and IgM, dengue IgG and IgM  -HIV negative  -Follow West Nile serology  -Follow CMV quant PCR and acute EBV serologies  -Advised patient to avoid sex until Zika PCR and IgM return  -Patient advised to return to ER if his symptoms were to recur    Discussed with internal medicine, Dr. Byrd    Patient is being discharged today.  ID will sign off.  Please call with questions.

## 2019-09-17 LAB
BACTERIA CSF CULT: NORMAL
EBV EA-D IGG SER-ACNC: <5 U/ML (ref 0–10.9)
EBV NA IGG SER IA-ACNC: >600 U/ML (ref 0–21.9)
EBV VCA IGG SER IA-ACNC: 344 U/ML (ref 0–21.9)
EBV VCA IGM SER IA-ACNC: <10 U/ML (ref 0–43.9)
GRAM STN SPEC: NORMAL
MALARIA SMEAR BLD: NORMAL
SIGNIFICANT IND 70042: NORMAL
SIGNIFICANT IND 70042: NORMAL
SITE SITE: NORMAL
SITE SITE: NORMAL
SOURCE SOURCE: NORMAL
SOURCE SOURCE: NORMAL

## 2019-09-17 NOTE — PROGRESS NOTES
Telemetry Shift Summary    Rhythm SR  HR Range 64-91  Ectopy R PACs  Measurements .16/.08/.46        Normal Values  Rhythm SR  HR Range    Measurements 0.12-0.20 / 0.06-0.10  / 0.30-0.52

## 2019-09-17 NOTE — DISCHARGE SUMMARY
Discharge Summary    CHIEF COMPLAINT ON ADMISSION  Chief Complaint   Patient presents with   • Cough   • Congestion   • Rash       Reason for Admission  Congestion; Cough; Fever     Admission Date  9/14/2019    CODE STATUS  Full    HPI & HOSPITAL COURSE  This is a 44 y.o. male with History of alcohol abuse, tobacco abuse, COPD admitted after traveling to [place] with fever.  He was admitted and started on empiric antibiotic therapy.  His UA and chest x-ray were negative for any evidence of infection.  Blood cultures were drawn.  Infectious disease was consulted.  Patient underwent an LP however CSF showed no evidence of infection.  HIV was negative.  We will double viral studies were sent and were pending at the time of discharge that included Zika, dengue, West Nile, EBV, Chikungunya.  The results of these will be called to the patient by infectious disease after discharge.  Malaria smear was drawn however it was not taken during a fever.  The patient did have resolution of his fever therefore, malaria was not drawn as his illness was resolving.    He complained of a headache and this was treated with Fioricet.  He will follow-up with his primary care physician and will be called by ID for the results of his viral lab studies.    Due to the possibility of sexual transmission of Zika virus he was told to avoid unprotected sexual intercourse until Zika is ruled out    Therefore, he is discharged in good and stable condition to home with close outpatient follow-up.    The patient met 2-midnight criteria for an inpatient stay at the time of discharge.    Discharge Date  9/16/2019    FOLLOW UP ITEMS POST DISCHARGE  Follow-up with PCP    DISCHARGE DIAGNOSES  Principal Problem:    Severe sepsis (HCC) POA: Unknown  Active Problems:    Chronic pancreatitis (HCC) POA: Yes    Alcoholism (HCC) POA: Yes    Hypokalemia POA: Yes    Tobacco abuse POA: Yes    COPD (chronic obstructive pulmonary disease) (HCC) POA:  Unknown  Resolved Problems:    * No resolved hospital problems. *      FOLLOW UP  No future appointments.  ZAKIA Keys  [address] [place] [address]  [phone]    Go on 9/25/2019  Please arrive 10:45 am for your appointment at 11 am. Thank you       MEDICATIONS ON DISCHARGE     Medication List      START taking these medications      Instructions   acetaminophen/caffeine/butalbital 325-40-50 mg -40 MG Tabs  Commonly known as:  FIORICET   Take 1 Tab by mouth every 6 hours as needed for Headache for up to 5 days.  Dose:  1 Tab        CONTINUE taking these medications      Instructions   acetaminophen 500 MG Tabs  Commonly known as:  TYLENOL   Take 2,000 mg by mouth every 6 hours as needed for Moderate Pain.  Dose:  2,000 mg     albuterol 108 (90 Base) MCG/ACT Aers inhalation aerosol   Inhale 2 Puffs by mouth every 6 hours as needed for Shortness of Breath.  Dose:  2 Puff     Pancrelipase (Lip-Prot-Amyl) 58011 units Cpep   Take 2 Tabs by mouth 3 times a day before meals. Takes two and before snacks  Dose:  2 Tab     tiotropium 18 MCG Caps  Commonly known as:  SPIRIVA   Inhale 18 mcg by mouth every day.  Dose:  18 mcg     traZODone 50 MG Tabs  Commonly known as:  DESYREL   Take 50 mg by mouth at bedtime as needed for Sleep.  Dose:  50 mg            Allergies  Allergies   Allergen Reactions   • Dairy Food Allergy    • Nkda [No Known Drug Allergy]        DIET  No orders of the defined types were placed in this encounter.      ACTIVITY  As tolerated.  Weight bearing as tolerated    CONSULTATIONS  ID, Dr. Chávez    PROCEDURES  LP    LABORATORY  Lab Results   Component Value Date    SODIUM 140 09/16/2019    POTASSIUM 3.2 (L) 09/16/2019    CHLORIDE 105 09/16/2019    CO2 22 09/16/2019    GLUCOSE 113 (H) 09/16/2019    BUN 7 (L) 09/16/2019    CREATININE 0.61 09/16/2019    CREATININE 0.8 08/10/2006        Lab Results   Component Value Date    WBC 8.9 09/16/2019    HEMOGLOBIN 13.0 (L) 09/16/2019     HEMATOCRIT 38.1 (L) 09/16/2019    PLATELETCT 214 09/16/2019        Total time of the discharge process exceeds 40 minutes.

## 2019-09-18 LAB
CMV DNA # SERPL NAA+PROBE: <390 CPY/ML
CMV DNA SERPL NAA+PROBE-ACNC: <227 IU/ML
CMV DNA SERPL NAA+PROBE-LOG IU: <2.4 LOG IU/ML
CMV DNA SERPL NAA+PROBE-LOG#: <2.6 LOG CPY/ML
CMV GENE MUT DET ISLT: NOT DETECTED
CMV PCR SOURCE Q4398: NORMAL
MISCELLANEOUS LAB RESULT MISCLAB: NORMAL
PREGNANT CURRENTLY PHENX: NO
RELEVANT ZIKA VIRUS EXPOSURE? Q5911: YES
SYMPTOM: YES
TEST NAME 95000: NORMAL
WNV IGG SER QL IA: NORMAL
WNV IGM SER QL IA: NORMAL
ZIKV IGM SERPL QL IA: NEGATIVE

## 2019-09-19 ENCOUNTER — TELEPHONE (OUTPATIENT)
Dept: INFECTIOUS DISEASES | Facility: MEDICAL CENTER | Age: 44
End: 2019-09-19

## 2019-09-19 LAB
BACTERIA BLD CULT: NORMAL
BACTERIA BLD CULT: NORMAL
SIGNIFICANT IND 70042: NORMAL
SIGNIFICANT IND 70042: NORMAL
SITE SITE: NORMAL
SITE SITE: NORMAL
SOURCE SOURCE: NORMAL
SOURCE SOURCE: NORMAL
SPECIMEN SOURCE: NORMAL
ZIKV RNA SPEC QL NAA+PROBE: NOT DETECTED

## 2019-09-19 NOTE — TELEPHONE ENCOUNTER
Please let Mr. Thorpe know that all his tests have returned negative, including Zika.  If he is feeling better, he probably had a virus that was not detectable on our tests.  Since his Zika is negative, the restriction on having sex is lifted. KK    Called and LM for pt to return my call to discuss all information given by Dr. Chávez above. ABRAHAM

## 2019-09-19 NOTE — TELEPHONE ENCOUNTER
Pt called back all information/instructions were given. Pt verbally understood and will comply with all given. Dr. Chávez is releasing the pt back to work on 9/23/19. Letter for pt to  at the ID office. ABRAHAM

## 2019-12-03 ENCOUNTER — HOSPITAL ENCOUNTER (EMERGENCY)
Facility: MEDICAL CENTER | Age: 44
End: 2019-12-03
Attending: EMERGENCY MEDICINE
Payer: COMMERCIAL

## 2019-12-03 ENCOUNTER — APPOINTMENT (OUTPATIENT)
Dept: RADIOLOGY | Facility: MEDICAL CENTER | Age: 44
End: 2019-12-03
Attending: EMERGENCY MEDICINE
Payer: COMMERCIAL

## 2019-12-03 VITALS
HEART RATE: 83 BPM | DIASTOLIC BLOOD PRESSURE: 79 MMHG | BODY MASS INDEX: 25.92 KG/M2 | RESPIRATION RATE: 16 BRPM | TEMPERATURE: 99.6 F | OXYGEN SATURATION: 97 % | SYSTOLIC BLOOD PRESSURE: 136 MMHG | WEIGHT: 195.55 LBS | HEIGHT: 73 IN

## 2019-12-03 DIAGNOSIS — S32.009D CLOSED FRACTURE OF TRANSVERSE PROCESS OF LUMBAR VERTEBRA WITH ROUTINE HEALING: ICD-10-CM

## 2019-12-03 DIAGNOSIS — G47.9 DIFFICULTY SLEEPING: ICD-10-CM

## 2019-12-03 DIAGNOSIS — R19.7 DIARRHEA, UNSPECIFIED TYPE: ICD-10-CM

## 2019-12-03 LAB
ALBUMIN SERPL BCP-MCNC: 4.1 G/DL (ref 3.2–4.9)
ALBUMIN/GLOB SERPL: 1.5 G/DL
ALP SERPL-CCNC: 79 U/L (ref 30–99)
ALT SERPL-CCNC: 27 U/L (ref 2–50)
ANION GAP SERPL CALC-SCNC: 14 MMOL/L (ref 0–11.9)
APPEARANCE UR: CLEAR
AST SERPL-CCNC: 22 U/L (ref 12–45)
BACTERIA #/AREA URNS HPF: ABNORMAL /HPF
BASOPHILS # BLD AUTO: 0.4 % (ref 0–1.8)
BASOPHILS # BLD: 0.02 K/UL (ref 0–0.12)
BILIRUB SERPL-MCNC: 0.2 MG/DL (ref 0.1–1.5)
BILIRUB UR QL STRIP.AUTO: NEGATIVE
BUN SERPL-MCNC: 12 MG/DL (ref 8–22)
CALCIUM SERPL-MCNC: 7.9 MG/DL (ref 8.4–10.2)
CHLORIDE SERPL-SCNC: 108 MMOL/L (ref 96–112)
CO2 SERPL-SCNC: 22 MMOL/L (ref 20–33)
COLOR UR: YELLOW
CREAT SERPL-MCNC: 1.08 MG/DL (ref 0.5–1.4)
EOSINOPHIL # BLD AUTO: 0.08 K/UL (ref 0–0.51)
EOSINOPHIL NFR BLD: 1.4 % (ref 0–6.9)
EPI CELLS #/AREA URNS HPF: ABNORMAL /HPF
ERYTHROCYTE [DISTWIDTH] IN BLOOD BY AUTOMATED COUNT: 39.9 FL (ref 35.9–50)
ETHANOL BLD-MCNC: <10 MG/DL (ref 0–10)
GLOBULIN SER CALC-MCNC: 2.8 G/DL (ref 1.9–3.5)
GLUCOSE SERPL-MCNC: 147 MG/DL (ref 65–99)
GLUCOSE UR STRIP.AUTO-MCNC: NEGATIVE MG/DL
HCT VFR BLD AUTO: 46.4 % (ref 42–52)
HGB BLD-MCNC: 16.2 G/DL (ref 14–18)
HYALINE CASTS #/AREA URNS LPF: ABNORMAL /LPF
IMM GRANULOCYTES # BLD AUTO: 0.03 K/UL (ref 0–0.11)
IMM GRANULOCYTES NFR BLD AUTO: 0.5 % (ref 0–0.9)
KETONES UR STRIP.AUTO-MCNC: ABNORMAL MG/DL
LEUKOCYTE ESTERASE UR QL STRIP.AUTO: NEGATIVE
LIPASE SERPL-CCNC: 55 U/L (ref 7–58)
LYMPHOCYTES # BLD AUTO: 1.25 K/UL (ref 1–4.8)
LYMPHOCYTES NFR BLD: 21.9 % (ref 22–41)
MCH RBC QN AUTO: 31.2 PG (ref 27–33)
MCHC RBC AUTO-ENTMCNC: 34.9 G/DL (ref 33.7–35.3)
MCV RBC AUTO: 89.4 FL (ref 81.4–97.8)
MICRO URNS: ABNORMAL
MONOCYTES # BLD AUTO: 0.55 K/UL (ref 0–0.85)
MONOCYTES NFR BLD AUTO: 9.6 % (ref 0–13.4)
MUCOUS THREADS #/AREA URNS HPF: ABNORMAL /HPF
NEUTROPHILS # BLD AUTO: 3.77 K/UL (ref 1.82–7.42)
NEUTROPHILS NFR BLD: 66.2 % (ref 44–72)
NITRITE UR QL STRIP.AUTO: NEGATIVE
NRBC # BLD AUTO: 0 K/UL
NRBC BLD-RTO: 0 /100 WBC
PH UR STRIP.AUTO: 6.5 [PH] (ref 5–8)
PLATELET # BLD AUTO: 217 K/UL (ref 164–446)
PMV BLD AUTO: 9.1 FL (ref 9–12.9)
POTASSIUM SERPL-SCNC: 3 MMOL/L (ref 3.6–5.5)
PROT SERPL-MCNC: 6.9 G/DL (ref 6–8.2)
PROT UR QL STRIP: NEGATIVE MG/DL
RBC # BLD AUTO: 5.19 M/UL (ref 4.7–6.1)
RBC # URNS HPF: ABNORMAL /HPF
RBC UR QL AUTO: ABNORMAL
SODIUM SERPL-SCNC: 144 MMOL/L (ref 135–145)
SP GR UR STRIP.AUTO: 1.01
WBC # BLD AUTO: 5.7 K/UL (ref 4.8–10.8)
WBC #/AREA URNS HPF: ABNORMAL /HPF

## 2019-12-03 PROCEDURE — 36415 COLL VENOUS BLD VENIPUNCTURE: CPT

## 2019-12-03 PROCEDURE — A9270 NON-COVERED ITEM OR SERVICE: HCPCS | Performed by: EMERGENCY MEDICINE

## 2019-12-03 PROCEDURE — 700102 HCHG RX REV CODE 250 W/ 637 OVERRIDE(OP): Performed by: EMERGENCY MEDICINE

## 2019-12-03 PROCEDURE — 83690 ASSAY OF LIPASE: CPT

## 2019-12-03 PROCEDURE — 85025 COMPLETE CBC W/AUTO DIFF WBC: CPT

## 2019-12-03 PROCEDURE — 81001 URINALYSIS AUTO W/SCOPE: CPT

## 2019-12-03 PROCEDURE — 80053 COMPREHEN METABOLIC PANEL: CPT

## 2019-12-03 PROCEDURE — 99284 EMERGENCY DEPT VISIT MOD MDM: CPT

## 2019-12-03 PROCEDURE — 700117 HCHG RX CONTRAST REV CODE 255: Performed by: EMERGENCY MEDICINE

## 2019-12-03 PROCEDURE — 74177 CT ABD & PELVIS W/CONTRAST: CPT

## 2019-12-03 PROCEDURE — 80307 DRUG TEST PRSMV CHEM ANLYZR: CPT

## 2019-12-03 RX ORDER — HYDROCODONE BITARTRATE AND ACETAMINOPHEN 5; 325 MG/1; MG/1
1-2 TABLET ORAL EVERY 4 HOURS PRN
Qty: 12 TAB | Refills: 0 | Status: SHIPPED | OUTPATIENT
Start: 2019-12-03 | End: 2019-12-06

## 2019-12-03 RX ORDER — NAPROXEN 500 MG/1
500 TABLET ORAL 2 TIMES DAILY WITH MEALS
Qty: 30 TAB | Refills: 0 | Status: SHIPPED | OUTPATIENT
Start: 2019-12-03

## 2019-12-03 RX ORDER — LOPERAMIDE HYDROCHLORIDE 2 MG/1
2 CAPSULE ORAL 4 TIMES DAILY PRN
Qty: 30 CAP | Refills: 0 | Status: SHIPPED | OUTPATIENT
Start: 2019-12-03

## 2019-12-03 RX ORDER — AMITRIPTYLINE HYDROCHLORIDE 75 MG/1
75 TABLET ORAL NIGHTLY PRN
Qty: 30 TAB | Refills: 0 | Status: SHIPPED | OUTPATIENT
Start: 2019-12-03

## 2019-12-03 RX ORDER — HYDROCODONE BITARTRATE AND ACETAMINOPHEN 5; 325 MG/1; MG/1
2 TABLET ORAL ONCE
Status: COMPLETED | OUTPATIENT
Start: 2019-12-03 | End: 2019-12-03

## 2019-12-03 RX ADMIN — IOHEXOL 100 ML: 350 INJECTION, SOLUTION INTRAVENOUS at 18:29

## 2019-12-03 RX ADMIN — HYDROCODONE BITARTRATE AND ACETAMINOPHEN 2 TABLET: 5; 325 TABLET ORAL at 19:28

## 2019-12-03 ASSESSMENT — LIFESTYLE VARIABLES
HAVE YOU EVER FELT YOU SHOULD CUT DOWN ON YOUR DRINKING: NO
DOES PATIENT WANT TO STOP DRINKING: NO
TOTAL SCORE: 0
AVERAGE NUMBER OF DAYS PER WEEK YOU HAVE A DRINK CONTAINING ALCOHOL: 0
TOTAL SCORE: 0
TOTAL SCORE: 0
HAVE PEOPLE ANNOYED YOU BY CRITICIZING YOUR DRINKING: NO
ON A TYPICAL DAY WHEN YOU DRINK ALCOHOL HOW MANY DRINKS DO YOU HAVE: 0
HOW MANY TIMES IN THE PAST YEAR HAVE YOU HAD 5 OR MORE DRINKS IN A DAY: 0
EVER HAD A DRINK FIRST THING IN THE MORNING TO STEADY YOUR NERVES TO GET RID OF A HANGOVER: NO
DO YOU DRINK ALCOHOL: NO
EVER FELT BAD OR GUILTY ABOUT YOUR DRINKING: NO
CONSUMPTION TOTAL: NEGATIVE

## 2019-12-04 NOTE — ED TRIAGE NOTES
"Chief Complaint   Patient presents with   • Nausea/Vomiting/Diarrhea     x days.    • Loss of Appetite     Pt reports that symptoms started 2 months ago, has progressed. Reports that he came in because diarrhea is increasing and he is concerned that he is dehydrated.    /97   Pulse 84   Temp 37.6 °C (99.6 °F) (Oral)   Resp 16   Ht 1.854 m (6' 1\")   Wt 88.7 kg (195 lb 8.8 oz)   SpO2 97%   Pt informed of wait times. Educated on triage process.  Asked to return to triage RN for any new or worsening of symptoms. Thanked for patience.        "

## 2019-12-04 NOTE — ED PROVIDER NOTES
ED Provider Note    ED Provider Note    Primary care provider: ZAKIA Keys  Means of arrival: Walk-in  History obtained from: Patient    CHIEF COMPLAINT  Chief Complaint   Patient presents with   • Nausea/Vomiting/Diarrhea     x days.    • Loss of Appetite     Seen at 5:37 PM.   HPI  Wero Thorpe is a 44 y.o. male who presents to the Emergency Department with generalized malaise and copious diarrhea for the past 48 to 72 hours.  The patient denies any recent antibiotic use or overseas travel.  He notes diffuse cramping abdominal pain, worse in the upper abdomen.  He also notes some lower back pain, worse if he lays still for prolonged period of time and improves with movement.  He chews low back pain to lifting something at work last week.  He has had several episodes of vomiting.    His wife provides additional history, she feels that he has been losing weight, she feels that he is not normal since his return back from [place] and possibly before that, she is stating that his bowels have been abnormal for at least 6 months.    The patient denies any fevers but feels cold at times.  He notes intermittent headaches.  He has occasional coughs, he denies any shortness of breath.  He denies any melena or hematochezia.  He denies any painful urination he feels his urine is dark.  He does have a history of chronic pancreatitis.  He denies any daily alcohol use.    REVIEW OF SYSTEMS  See HPI,   Remainder of ROS negative.     PAST MEDICAL HISTORY   has a past medical history of Asthma, Bowel habit changes, Bronchitis (8/2016), Chronic pancreatitis ([facility]), Cough (9/23/16), Fall (10/8/2016), Heart burn, Hypertension, Lumbar arthropathy, Pain (9/23/16), Pancreatitis, Psychiatric problem, Renal disorder (6/2016), and Urinary bladder disorder.    SURGICAL HISTORY   has a past surgical history that includes skull fracture depressed elevation (1995); cholecystectomy (2007); tonsillectomy and adenoidectomy  "(); gastroscopy-endo (N/A, 10/21/2016); egd w/endoscopic ultrasound (N/A, 10/21/2016); ercp-diagnostic (N/A, 10/21/2016); and ankle orif (Left, 11/10/2017).    SOCIAL HISTORY  Social History     Tobacco Use   • Smoking status: Current Every Day Smoker     Packs/day: 0.50     Years: 20.00     Pack years: 10.00     Types: Cigarettes     Last attempt to quit: 4/15/2011     Years since quittin.6   • Smokeless tobacco: Never Used   Substance Use Topics   • Alcohol use: Yes     Alcohol/week: 0.0 oz     Comment: occ   • Drug use: Yes     Comment: Marijuana      Social History     Substance and Sexual Activity   Drug Use Yes    Comment: Marijuana       FAMILY HISTORY  Family History   Problem Relation Age of Onset   • Cancer Mother         breast   • Cancer Brother         head, neck and lung   • Cancer Maternal Aunt         breast   • Hypertension Father        CURRENT MEDICATIONS  Reviewed.  See Encounter Summary.     ALLERGIES  Allergies   Allergen Reactions   • Dairy Food Allergy    • Nkda [No Known Drug Allergy]        PHYSICAL EXAM  VITAL SIGNS: /79   Pulse 83   Temp 37.6 °C (99.6 °F) (Oral)   Resp 16   Ht 1.854 m (6' 1\")   Wt 88.7 kg (195 lb 8.8 oz)   SpO2 97%   BMI 25.80 kg/m²   Constitutional: Awake, alert in no apparent distress.  Obese.  HENT: Normocephalic, Bilateral external ears normal. Nose normal.   Eyes: Conjunctiva normal, non-icteric, EOMI.    Thorax & Lungs: Easy unlabored respirations, Clear to ascultation bilaterally.  Cardiovascular: Regular rate, Regular rhythm, No murmurs, rubs or gallops.  Abdomen:  Soft, mild diffuse abdominal tenderness, nondistended, normal active bowel sounds.   The back is normal in appearance without any midline tenderness or step-off.  The patient notes tenderness in the L4-L5 region.  No CVA tenderness.  :    Skin: Visualized skin is  Dry, No erythema, No rash.   Musculoskeletal:   No cyanosis, clubbing or edema.  Neurologic: Alert, Grossly non-focal. "   Psychiatric: Normal affect, Normal mood  Lymphatic:  No cervical LAD      RADIOLOGY  CT-ABDOMEN-PELVIS WITH   Final Result      1.  There is no acute inflammatory process within the abdomen or pelvis.      2.  Gallbladder is surgically absent without biliary dilatation.   3.  There is an incidental subacute left L2 transverse process fracture.            COURSE & MEDICAL DECISION MAKING  Pertinent Labs & Imaging studies reviewed. (See chart for details)    Differential diagnoses include but are not limited to: Diarrhea, dehydration, chronic back pain    5:37 PM - Medical record reviewed, patient was admitted twice this year, once for diarrhea and hyperkalemia, the second time for fevers of unknown origin following a trip back from [place].  History of alcohol abuse.  Patient weighed 90 kg in 2017.  88.7 kg today.    Decision Making:  This is a 44 y.o. year old male who presents with what appears to be subacute to chronic low back pain, no findings concerning for cauda equina.  He also has diarrhea for the past few days.  The patient overall does not have any significant complaints and just feels some generalized malaise.  The wife however seems quite anxious and she added a significant number of other issues.  She states that he is having difficulty sleeping, that he is having difficulty with weight loss, she states that he has been severely weak etc., for this reason CT was obtained that is relatively unremarkable.  The patient does not have any evidence of pancreatitis.  He does have an incidental finding of a transverse process fracture which would certainly explain some the patient's pain.  Electrolytes are unremarkable with a slightly decreased potassium, consistent with a diarrheal illness.  Motility agents can be used this point.  The patient does not have any risk factors for infectious diarrhea, I see no indication for antibiotics at this time.    I plan to discharge patient with anti-inflammatories,  motility agents and some amitriptyline to help with his back pain and difficulty sleeping.  The wife becomes quite irate, she is refusing to leave without any aspect of pain management.  She states that her primary care physician refuses to prescribe any opioid analgesia.  They state that the anti-inflammatories are not helpful.  She is calling the hospital supervisor to file complaints to the hospital.   I explained that most I can prescribe a few days of opioid analgesia because he does have a transverse process fracture.  I explained that this pain is going to be a chronic issue and he cannot receive any more opioid analgesia for this complaint from the emergency department.  He will need to follow-up with either his primary care physician, pain management or orthopedics for additional analgesia, she is in agreement with this.  I did look the patient of the prescription monitoring he did not have any recent prescriptions.    Discharge Medications:  Discharge Medication List as of 12/3/2019  8:18 PM      START taking these medications    Details   amitriptyline (ELAVIL) 75 MG Tab Take 1 Tab by mouth at bedtime as needed., Disp-30 Tab, R-0, Normal      loperamide (IMODIUM) 2 MG Cap Take 1 Cap by mouth 4 times a day as needed for Diarrhea., Disp-30 Cap, R-0, Normal      naproxen (NAPROSYN) 500 MG Tab Take 1 Tab by mouth 2 times a day, with meals., Disp-30 Tab, R-0, Normal      HYDROcodone-acetaminophen (NORCO) 5-325 MG Tab per tablet Take 1-2 Tabs by mouth every four hours as needed for up to 3 days., Disp-12 Tab, R-0, Print Rx Paper, For 3 days             The patient was discharged home (see d/c instructions) and parent was told to return immediately for any signs or symptoms listed, or any worsening at all.  The patient's parent verbally agreed to the discharge precautions and follow-up plan which is documented in EPIC.    The patient's blood pressure is elevated today. >120/80. I have referred them to primary  care for follow up.       FINAL IMPRESSION  1. Diarrhea, unspecified type    2. Closed fracture of transverse process of lumbar vertebra with routine healing    3. Difficulty sleeping

## 2019-12-04 NOTE — ED NOTES
Pt given discharge instructions. RN answered questions. VSS. Pt ambulated steadily out to [facility].

## 2021-10-08 ENCOUNTER — ANESTHESIA (OUTPATIENT)
Dept: SURGERY | Facility: MEDICAL CENTER | Age: 46
DRG: 481 | End: 2021-10-08
Payer: MEDICAID

## 2021-10-08 ENCOUNTER — APPOINTMENT (OUTPATIENT)
Dept: RADIOLOGY | Facility: MEDICAL CENTER | Age: 46
DRG: 481 | End: 2021-10-08
Attending: EMERGENCY MEDICINE
Payer: MEDICAID

## 2021-10-08 ENCOUNTER — APPOINTMENT (OUTPATIENT)
Dept: RADIOLOGY | Facility: MEDICAL CENTER | Age: 46
DRG: 481 | End: 2021-10-08
Attending: ORTHOPAEDIC SURGERY
Payer: MEDICAID

## 2021-10-08 ENCOUNTER — ANESTHESIA EVENT (OUTPATIENT)
Dept: SURGERY | Facility: MEDICAL CENTER | Age: 46
DRG: 481 | End: 2021-10-08
Payer: MEDICAID

## 2021-10-08 ENCOUNTER — HOSPITAL ENCOUNTER (INPATIENT)
Facility: MEDICAL CENTER | Age: 46
LOS: 24 days | DRG: 481 | End: 2021-11-01
Attending: EMERGENCY MEDICINE | Admitting: SURGERY
Payer: MEDICAID

## 2021-10-08 DIAGNOSIS — S72.309A: ICD-10-CM

## 2021-10-08 DIAGNOSIS — I82.411 ACUTE DEEP VEIN THROMBOSIS (DVT) OF FEMORAL VEIN OF RIGHT LOWER EXTREMITY (HCC): ICD-10-CM

## 2021-10-08 PROBLEM — K74.60 LIVER CIRRHOSIS (HCC): Status: ACTIVE | Noted: 2021-10-08

## 2021-10-08 PROBLEM — T14.8XXA INTRAMUSCULAR HEMATOMA: Status: ACTIVE | Noted: 2021-10-08

## 2021-10-08 PROBLEM — T14.90XA TRAUMA: Status: ACTIVE | Noted: 2021-10-08

## 2021-10-08 PROBLEM — Z53.09 CONTRAINDICATION TO DEEP VEIN THROMBOSIS (DVT) PROPHYLAXIS: Status: ACTIVE | Noted: 2021-10-08

## 2021-10-08 PROBLEM — Z11.52 ENCOUNTER FOR SCREENING FOR COVID-19: Status: ACTIVE | Noted: 2021-10-08

## 2021-10-08 LAB
ABO GROUP BLD: NORMAL
ALBUMIN SERPL BCP-MCNC: 4.3 G/DL (ref 3.2–4.9)
ALBUMIN/GLOB SERPL: 1.3 G/DL
ALP SERPL-CCNC: 82 U/L (ref 30–99)
ALT SERPL-CCNC: 16 U/L (ref 2–50)
ANION GAP SERPL CALC-SCNC: 11 MMOL/L (ref 7–16)
AST SERPL-CCNC: 19 U/L (ref 12–45)
BILIRUB SERPL-MCNC: 0.3 MG/DL (ref 0.1–1.5)
BLD GP AB SCN SERPL QL: NORMAL
BUN SERPL-MCNC: 29 MG/DL (ref 8–22)
CALCIUM SERPL-MCNC: 8.9 MG/DL (ref 8.5–10.5)
CHLORIDE SERPL-SCNC: 103 MMOL/L (ref 96–112)
CO2 SERPL-SCNC: 24 MMOL/L (ref 20–33)
CREAT SERPL-MCNC: 0.98 MG/DL (ref 0.5–1.4)
ERYTHROCYTE [DISTWIDTH] IN BLOOD BY AUTOMATED COUNT: 40 FL (ref 35.9–50)
ETHANOL BLD-MCNC: <10.1 MG/DL (ref 0–10)
GLOBULIN SER CALC-MCNC: 3.3 G/DL (ref 1.9–3.5)
GLUCOSE SERPL-MCNC: 115 MG/DL (ref 65–99)
HCT VFR BLD AUTO: 42.4 % (ref 42–52)
HGB BLD-MCNC: 13.8 G/DL (ref 14–18)
MCH RBC QN AUTO: 27 PG (ref 27–33)
MCHC RBC AUTO-ENTMCNC: 32.5 G/DL (ref 33.7–35.3)
MCV RBC AUTO: 82.8 FL (ref 81.4–97.8)
PLATELET # BLD AUTO: 296 K/UL (ref 164–446)
PMV BLD AUTO: 8.8 FL (ref 9–12.9)
POTASSIUM SERPL-SCNC: 3.8 MMOL/L (ref 3.6–5.5)
PROT SERPL-MCNC: 7.6 G/DL (ref 6–8.2)
RBC # BLD AUTO: 5.12 M/UL (ref 4.7–6.1)
RH BLD: NORMAL
SODIUM SERPL-SCNC: 138 MMOL/L (ref 135–145)
WBC # BLD AUTO: 9.1 K/UL (ref 4.8–10.8)

## 2021-10-08 PROCEDURE — 72131 CT LUMBAR SPINE W/O DYE: CPT

## 2021-10-08 PROCEDURE — 700117 HCHG RX CONTRAST REV CODE 255: Performed by: EMERGENCY MEDICINE

## 2021-10-08 PROCEDURE — 99232 SBSQ HOSP IP/OBS MODERATE 35: CPT | Mod: 57 | Performed by: ORTHOPAEDIC SURGERY

## 2021-10-08 PROCEDURE — 86900 BLOOD TYPING SEROLOGIC ABO: CPT

## 2021-10-08 PROCEDURE — 86850 RBC ANTIBODY SCREEN: CPT

## 2021-10-08 PROCEDURE — 82077 ASSAY SPEC XCP UR&BREATH IA: CPT

## 2021-10-08 PROCEDURE — 72128 CT CHEST SPINE W/O DYE: CPT

## 2021-10-08 PROCEDURE — 700111 HCHG RX REV CODE 636 W/ 250 OVERRIDE (IP): Performed by: EMERGENCY MEDICINE

## 2021-10-08 PROCEDURE — 700105 HCHG RX REV CODE 258: Performed by: ANESTHESIOLOGY

## 2021-10-08 PROCEDURE — 73706 CT ANGIO LWR EXTR W/O&W/DYE: CPT | Mod: RT

## 2021-10-08 PROCEDURE — 0QS604Z REPOSITION RIGHT UPPER FEMUR WITH INTERNAL FIXATION DEVICE, OPEN APPROACH: ICD-10-PCS | Performed by: ORTHOPAEDIC SURGERY

## 2021-10-08 PROCEDURE — 86901 BLOOD TYPING SEROLOGIC RH(D): CPT

## 2021-10-08 PROCEDURE — 73551 X-RAY EXAM OF FEMUR 1: CPT | Mod: RT

## 2021-10-08 PROCEDURE — 99291 CRITICAL CARE FIRST HOUR: CPT

## 2021-10-08 PROCEDURE — 770006 HCHG ROOM/CARE - MED/SURG/GYN SEMI*

## 2021-10-08 PROCEDURE — 85027 COMPLETE CBC AUTOMATED: CPT

## 2021-10-08 PROCEDURE — 27513 TREATMENT OF THIGH FRACTURE: CPT | Mod: 51ROC,RT | Performed by: ORTHOPAEDIC SURGERY

## 2021-10-08 PROCEDURE — 96375 TX/PRO/DX INJ NEW DRUG ADDON: CPT

## 2021-10-08 PROCEDURE — 96376 TX/PRO/DX INJ SAME DRUG ADON: CPT

## 2021-10-08 PROCEDURE — 99223 1ST HOSP IP/OBS HIGH 75: CPT | Performed by: SURGERY

## 2021-10-08 PROCEDURE — 70450 CT HEAD/BRAIN W/O DYE: CPT

## 2021-10-08 PROCEDURE — 96374 THER/PROPH/DIAG INJ IV PUSH: CPT

## 2021-10-08 PROCEDURE — 27244 TREAT THIGH FRACTURE: CPT | Mod: 51ROC,RT | Performed by: ORTHOPAEDIC SURGERY

## 2021-10-08 PROCEDURE — 0QSB04Z REPOSITION RIGHT LOWER FEMUR WITH INTERNAL FIXATION DEVICE, OPEN APPROACH: ICD-10-PCS | Performed by: ORTHOPAEDIC SURGERY

## 2021-10-08 PROCEDURE — 72170 X-RAY EXAM OF PELVIS: CPT

## 2021-10-08 PROCEDURE — 700101 HCHG RX REV CODE 250: Performed by: ANESTHESIOLOGY

## 2021-10-08 PROCEDURE — 71045 X-RAY EXAM CHEST 1 VIEW: CPT

## 2021-10-08 PROCEDURE — 71260 CT THORAX DX C+: CPT

## 2021-10-08 PROCEDURE — 80053 COMPREHEN METABOLIC PANEL: CPT

## 2021-10-08 PROCEDURE — 700111 HCHG RX REV CODE 636 W/ 250 OVERRIDE (IP): Performed by: ANESTHESIOLOGY

## 2021-10-08 PROCEDURE — 0QS806Z REPOSITION RIGHT FEMORAL SHAFT WITH INTRAMEDULLARY INTERNAL FIXATION DEVICE, OPEN APPROACH: ICD-10-PCS | Performed by: ORTHOPAEDIC SURGERY

## 2021-10-08 PROCEDURE — 72125 CT NECK SPINE W/O DYE: CPT

## 2021-10-08 PROCEDURE — G0390 TRAUMA RESPONS W/HOSP CRITI: HCPCS

## 2021-10-08 PROCEDURE — 27506 TREATMENT OF THIGH FRACTURE: CPT | Mod: 59,RT | Performed by: ORTHOPAEDIC SURGERY

## 2021-10-08 DEVICE — IMPLANTABLE DEVICE: Type: IMPLANTABLE DEVICE | Site: HIP | Status: FUNCTIONAL

## 2021-10-08 DEVICE — SCREW CROSS LOCK 5MM X 40MM (4TX5=20): Type: IMPLANTABLE DEVICE | Site: HIP | Status: FUNCTIONAL

## 2021-10-08 DEVICE — SCREW COMPRESSION 36MM OIC - (T=2): Type: IMPLANTABLE DEVICE | Site: HIP | Status: FUNCTIONAL

## 2021-10-08 DEVICE — SCREW CROSS LOCK 5MM X 65MM (4TX5=20): Type: IMPLANTABLE DEVICE | Site: HIP | Status: FUNCTIONAL

## 2021-10-08 DEVICE — SCREW CROSS LOCK 5MM X 42.5MM (4TX5=20): Type: IMPLANTABLE DEVICE | Site: HIP | Status: FUNCTIONAL

## 2021-10-08 DEVICE — SCREW CROSS LOCK 5MM X 80MM (4TX5=20): Type: IMPLANTABLE DEVICE | Site: HIP | Status: FUNCTIONAL

## 2021-10-08 DEVICE — SCREW CORTEX 4.5X38 ST OIC - (T=8): Type: IMPLANTABLE DEVICE | Site: HIP | Status: FUNCTIONAL

## 2021-10-08 RX ORDER — KETAMINE HYDROCHLORIDE 50 MG/ML
INJECTION, SOLUTION INTRAMUSCULAR; INTRAVENOUS PRN
Status: DISCONTINUED | OUTPATIENT
Start: 2021-10-08 | End: 2021-10-09 | Stop reason: SURG

## 2021-10-08 RX ORDER — LIDOCAINE HYDROCHLORIDE 20 MG/ML
INJECTION, SOLUTION EPIDURAL; INFILTRATION; INTRACAUDAL; PERINEURAL PRN
Status: DISCONTINUED | OUTPATIENT
Start: 2021-10-08 | End: 2021-10-09 | Stop reason: SURG

## 2021-10-08 RX ORDER — HYDROMORPHONE HYDROCHLORIDE 1 MG/ML
1 INJECTION, SOLUTION INTRAMUSCULAR; INTRAVENOUS; SUBCUTANEOUS ONCE
Status: COMPLETED | OUTPATIENT
Start: 2021-10-08 | End: 2021-10-08

## 2021-10-08 RX ORDER — DEXMEDETOMIDINE HYDROCHLORIDE 100 UG/ML
INJECTION, SOLUTION INTRAVENOUS PRN
Status: DISCONTINUED | OUTPATIENT
Start: 2021-10-08 | End: 2021-10-09 | Stop reason: SURG

## 2021-10-08 RX ORDER — MORPHINE SULFATE 4 MG/ML
INJECTION, SOLUTION INTRAMUSCULAR; INTRAVENOUS
Status: COMPLETED | OUTPATIENT
Start: 2021-10-08 | End: 2021-10-08

## 2021-10-08 RX ORDER — CEFAZOLIN SODIUM 1 G/3ML
INJECTION, POWDER, FOR SOLUTION INTRAMUSCULAR; INTRAVENOUS PRN
Status: DISCONTINUED | OUTPATIENT
Start: 2021-10-08 | End: 2021-10-09 | Stop reason: SURG

## 2021-10-08 RX ORDER — SODIUM CHLORIDE, SODIUM LACTATE, POTASSIUM CHLORIDE, CALCIUM CHLORIDE 600; 310; 30; 20 MG/100ML; MG/100ML; MG/100ML; MG/100ML
INJECTION, SOLUTION INTRAVENOUS
Status: DISCONTINUED | OUTPATIENT
Start: 2021-10-08 | End: 2021-10-09 | Stop reason: HOSPADM

## 2021-10-08 RX ORDER — MORPHINE SULFATE 10 MG/ML
6 INJECTION, SOLUTION INTRAMUSCULAR; INTRAVENOUS ONCE
Status: COMPLETED | OUTPATIENT
Start: 2021-10-08 | End: 2021-10-08

## 2021-10-08 RX ADMIN — Medication 200 MG: at 22:49

## 2021-10-08 RX ADMIN — PROPOFOL 200 MG: 10 INJECTION, EMULSION INTRAVENOUS at 22:48

## 2021-10-08 RX ADMIN — PHENYLEPHRINE HYDROCHLORIDE 100 MCG: 10 INJECTION INTRAVENOUS at 23:56

## 2021-10-08 RX ADMIN — PROPOFOL 100 MG: 10 INJECTION, EMULSION INTRAVENOUS at 22:49

## 2021-10-08 RX ADMIN — MORPHINE SULFATE 6 MG: 10 INJECTION INTRAVENOUS at 21:23

## 2021-10-08 RX ADMIN — SODIUM CHLORIDE, POTASSIUM CHLORIDE, SODIUM LACTATE AND CALCIUM CHLORIDE: 600; 310; 30; 20 INJECTION, SOLUTION INTRAVENOUS at 22:54

## 2021-10-08 RX ADMIN — ROCURONIUM BROMIDE 50 MG: 10 INJECTION, SOLUTION INTRAVENOUS at 22:54

## 2021-10-08 RX ADMIN — DEXMEDETOMIDINE 50 MCG: 200 INJECTION, SOLUTION INTRAVENOUS at 23:03

## 2021-10-08 RX ADMIN — KETAMINE HYDROCHLORIDE 50 MG: 50 INJECTION INTRAMUSCULAR; INTRAVENOUS at 22:43

## 2021-10-08 RX ADMIN — CEFAZOLIN 2 G: 330 INJECTION, POWDER, FOR SOLUTION INTRAMUSCULAR; INTRAVENOUS at 22:54

## 2021-10-08 RX ADMIN — MORPHINE SULFATE 6 MG: 4 INJECTION INTRAVENOUS at 20:33

## 2021-10-08 RX ADMIN — IOHEXOL 130 ML: 350 INJECTION, SOLUTION INTRAVENOUS at 21:10

## 2021-10-08 RX ADMIN — KETAMINE HYDROCHLORIDE 100 MG: 50 INJECTION INTRAMUSCULAR; INTRAVENOUS at 22:54

## 2021-10-08 RX ADMIN — LIDOCAINE HYDROCHLORIDE 100 MG: 20 INJECTION, SOLUTION EPIDURAL; INFILTRATION; INTRACAUDAL at 22:48

## 2021-10-08 RX ADMIN — ROCURONIUM BROMIDE 50 MG: 10 INJECTION, SOLUTION INTRAVENOUS at 22:48

## 2021-10-08 RX ADMIN — PROPOFOL 200 MG: 10 INJECTION, EMULSION INTRAVENOUS at 22:54

## 2021-10-08 RX ADMIN — HYDROMORPHONE HYDROCHLORIDE 1 MG: 1 INJECTION, SOLUTION INTRAMUSCULAR; INTRAVENOUS; SUBCUTANEOUS at 22:19

## 2021-10-08 RX ADMIN — SODIUM CHLORIDE, SODIUM GLUCONATE, SODIUM ACETATE, POTASSIUM CHLORIDE AND MAGNESIUM CHLORIDE: 526; 502; 368; 37; 30 INJECTION, SOLUTION INTRAVENOUS at 23:07

## 2021-10-08 ASSESSMENT — LIFESTYLE VARIABLES: DO YOU DRINK ALCOHOL: NO

## 2021-10-09 ENCOUNTER — APPOINTMENT (OUTPATIENT)
Dept: RADIOLOGY | Facility: MEDICAL CENTER | Age: 46
DRG: 481 | End: 2021-10-09
Attending: SURGERY
Payer: MEDICAID

## 2021-10-09 ENCOUNTER — APPOINTMENT (OUTPATIENT)
Dept: RADIOLOGY | Facility: MEDICAL CENTER | Age: 46
DRG: 481 | End: 2021-10-09
Attending: NURSE PRACTITIONER
Payer: MEDICAID

## 2021-10-09 PROBLEM — I82.411 ACUTE DEEP VEIN THROMBOSIS (DVT) OF FEMORAL VEIN OF RIGHT LOWER EXTREMITY (HCC): Status: ACTIVE | Noted: 2021-10-08

## 2021-10-09 PROBLEM — Z78.9 NO CONTRAINDICATION TO DEEP VEIN THROMBOSIS (DVT) PROPHYLAXIS: Status: ACTIVE | Noted: 2021-10-08

## 2021-10-09 LAB
ALBUMIN SERPL BCP-MCNC: 3.2 G/DL (ref 3.2–4.9)
ALBUMIN/GLOB SERPL: 1.2 G/DL
ALP SERPL-CCNC: 55 U/L (ref 30–99)
ALT SERPL-CCNC: 16 U/L (ref 2–50)
AMPHET UR QL SCN: POSITIVE
ANION GAP SERPL CALC-SCNC: 13 MMOL/L (ref 7–16)
APTT PPP: 41.5 SEC (ref 24.7–36)
AST SERPL-CCNC: 42 U/L (ref 12–45)
BARBITURATES UR QL SCN: NEGATIVE
BASE EXCESS BLDA CALC-SCNC: 1 MMOL/L (ref -4–3)
BENZODIAZ UR QL SCN: POSITIVE
BILIRUB SERPL-MCNC: 0.8 MG/DL (ref 0.1–1.5)
BODY TEMPERATURE: ABNORMAL CENTIGRADE
BUN SERPL-MCNC: 25 MG/DL (ref 8–22)
BZE UR QL SCN: NEGATIVE
CALCIUM SERPL-MCNC: 7.6 MG/DL (ref 8.5–10.5)
CANNABINOIDS UR QL SCN: NEGATIVE
CHLORIDE SERPL-SCNC: 105 MMOL/L (ref 96–112)
CO2 SERPL-SCNC: 18 MMOL/L (ref 20–33)
CREAT SERPL-MCNC: 0.96 MG/DL (ref 0.5–1.4)
GLOBULIN SER CALC-MCNC: 2.6 G/DL (ref 1.9–3.5)
GLUCOSE BLD-MCNC: 124 MG/DL (ref 65–99)
GLUCOSE BLD-MCNC: 134 MG/DL (ref 65–99)
GLUCOSE SERPL-MCNC: 130 MG/DL (ref 65–99)
HCO3 BLDA-SCNC: 19 MMOL/L (ref 17–25)
INR PPP: 1.4 (ref 0.87–1.13)
MAGNESIUM SERPL-MCNC: 2 MG/DL (ref 1.5–2.5)
METHADONE UR QL SCN: NEGATIVE
OPIATES UR QL SCN: POSITIVE
OXYCODONE UR QL SCN: POSITIVE
PCO2 BLDA: 15.4 MMHG (ref 26–37)
PCP UR QL SCN: NEGATIVE
PH BLDA: 7.7 [PH] (ref 7.4–7.5)
PHOSPHATE SERPL-MCNC: 2.7 MG/DL (ref 2.5–4.5)
PO2 BLDA: 125.5 MMHG (ref 64–87)
POTASSIUM SERPL-SCNC: 3.5 MMOL/L (ref 3.6–5.5)
PROPOXYPH UR QL SCN: NEGATIVE
PROT SERPL-MCNC: 5.8 G/DL (ref 6–8.2)
PROTHROMBIN TIME: 16.7 SEC (ref 12–14.6)
SAO2 % BLDA: 98.1 % (ref 93–99)
SARS-COV+SARS-COV-2 AG RESP QL IA.RAPID: NOTDETECTED
SODIUM SERPL-SCNC: 136 MMOL/L (ref 135–145)
SPECIMEN SOURCE: NORMAL

## 2021-10-09 PROCEDURE — 110371 HCHG SHELL REV 272: Performed by: ORTHOPAEDIC SURGERY

## 2021-10-09 PROCEDURE — 160009 HCHG ANES TIME/MIN: Performed by: ORTHOPAEDIC SURGERY

## 2021-10-09 PROCEDURE — 83735 ASSAY OF MAGNESIUM: CPT

## 2021-10-09 PROCEDURE — 700117 HCHG RX CONTRAST REV CODE 255: Performed by: SURGERY

## 2021-10-09 PROCEDURE — A9270 NON-COVERED ITEM OR SERVICE: HCPCS | Performed by: ORTHOPAEDIC SURGERY

## 2021-10-09 PROCEDURE — 82962 GLUCOSE BLOOD TEST: CPT

## 2021-10-09 PROCEDURE — 700111 HCHG RX REV CODE 636 W/ 250 OVERRIDE (IP): Performed by: ANESTHESIOLOGY

## 2021-10-09 PROCEDURE — 700102 HCHG RX REV CODE 250 W/ 637 OVERRIDE(OP)

## 2021-10-09 PROCEDURE — 99024 POSTOP FOLLOW-UP VISIT: CPT | Performed by: SURGERY

## 2021-10-09 PROCEDURE — A9270 NON-COVERED ITEM OR SERVICE: HCPCS | Performed by: NURSE PRACTITIONER

## 2021-10-09 PROCEDURE — 87426 SARSCOV CORONAVIRUS AG IA: CPT

## 2021-10-09 PROCEDURE — 71045 X-RAY EXAM CHEST 1 VIEW: CPT

## 2021-10-09 PROCEDURE — 93005 ELECTROCARDIOGRAM TRACING: CPT | Performed by: NURSE PRACTITIONER

## 2021-10-09 PROCEDURE — 71275 CT ANGIOGRAPHY CHEST: CPT

## 2021-10-09 PROCEDURE — 770022 HCHG ROOM/CARE - ICU (200)

## 2021-10-09 PROCEDURE — 503036 HCHG GUIDE PIN,OIC: Performed by: ORTHOPAEDIC SURGERY

## 2021-10-09 PROCEDURE — 700102 HCHG RX REV CODE 250 W/ 637 OVERRIDE(OP): Performed by: ORTHOPAEDIC SURGERY

## 2021-10-09 PROCEDURE — 700105 HCHG RX REV CODE 258: Performed by: ANESTHESIOLOGY

## 2021-10-09 PROCEDURE — 84100 ASSAY OF PHOSPHORUS: CPT

## 2021-10-09 PROCEDURE — 160048 HCHG OR STATISTICAL LEVEL 1-5: Performed by: ORTHOPAEDIC SURGERY

## 2021-10-09 PROCEDURE — 700102 HCHG RX REV CODE 250 W/ 637 OVERRIDE(OP): Performed by: NURSE PRACTITIONER

## 2021-10-09 PROCEDURE — 73552 X-RAY EXAM OF FEMUR 2/>: CPT | Mod: RT

## 2021-10-09 PROCEDURE — 93970 EXTREMITY STUDY: CPT

## 2021-10-09 PROCEDURE — A9270 NON-COVERED ITEM OR SERVICE: HCPCS

## 2021-10-09 PROCEDURE — C1713 ANCHOR/SCREW BN/BN,TIS/BN: HCPCS | Performed by: ORTHOPAEDIC SURGERY

## 2021-10-09 PROCEDURE — 160035 HCHG PACU - 1ST 60 MINS PHASE I: Performed by: ORTHOPAEDIC SURGERY

## 2021-10-09 PROCEDURE — L1830 KO IMMOB CANVAS LONG PRE OTS: HCPCS | Performed by: ORTHOPAEDIC SURGERY

## 2021-10-09 PROCEDURE — 99232 SBSQ HOSP IP/OBS MODERATE 35: CPT | Performed by: SURGERY

## 2021-10-09 PROCEDURE — 500054 HCHG BANDAGE, ELASTIC 6: Performed by: ORTHOPAEDIC SURGERY

## 2021-10-09 PROCEDURE — 160036 HCHG PACU - EA ADDL 30 MINS PHASE I: Performed by: ORTHOPAEDIC SURGERY

## 2021-10-09 PROCEDURE — A6454 SELF-ADHER BAND W>=3" <5"/YD: HCPCS | Performed by: ORTHOPAEDIC SURGERY

## 2021-10-09 PROCEDURE — 700111 HCHG RX REV CODE 636 W/ 250 OVERRIDE (IP): Performed by: ORTHOPAEDIC SURGERY

## 2021-10-09 PROCEDURE — 500382 HCHG DRAIN, PENROSE 1X18: Performed by: ORTHOPAEDIC SURGERY

## 2021-10-09 PROCEDURE — 700105 HCHG RX REV CODE 258: Performed by: NURSE PRACTITIONER

## 2021-10-09 PROCEDURE — 700111 HCHG RX REV CODE 636 W/ 250 OVERRIDE (IP): Performed by: NURSE PRACTITIONER

## 2021-10-09 PROCEDURE — 80053 COMPREHEN METABOLIC PANEL: CPT

## 2021-10-09 PROCEDURE — 500891 HCHG PACK, ORTHO MAJOR: Performed by: ORTHOPAEDIC SURGERY

## 2021-10-09 PROCEDURE — 94640 AIRWAY INHALATION TREATMENT: CPT

## 2021-10-09 PROCEDURE — 160041 HCHG SURGERY MINUTES - EA ADDL 1 MIN LEVEL 4: Performed by: ORTHOPAEDIC SURGERY

## 2021-10-09 PROCEDURE — 85730 THROMBOPLASTIN TIME PARTIAL: CPT

## 2021-10-09 PROCEDURE — 501838 HCHG SUTURE GENERAL: Performed by: ORTHOPAEDIC SURGERY

## 2021-10-09 PROCEDURE — 160029 HCHG SURGERY MINUTES - 1ST 30 MINS LEVEL 4: Performed by: ORTHOPAEDIC SURGERY

## 2021-10-09 PROCEDURE — 82803 BLOOD GASES ANY COMBINATION: CPT

## 2021-10-09 PROCEDURE — 85610 PROTHROMBIN TIME: CPT

## 2021-10-09 PROCEDURE — 80307 DRUG TEST PRSMV CHEM ANLYZR: CPT

## 2021-10-09 PROCEDURE — 700101 HCHG RX REV CODE 250: Performed by: ANESTHESIOLOGY

## 2021-10-09 PROCEDURE — 160002 HCHG RECOVERY MINUTES (STAT): Performed by: ORTHOPAEDIC SURGERY

## 2021-10-09 RX ORDER — ACETAMINOPHEN 500 MG
1000 TABLET ORAL EVERY 6 HOURS PRN
Status: DISCONTINUED | OUTPATIENT
Start: 2021-10-14 | End: 2021-10-18

## 2021-10-09 RX ORDER — DIPHENHYDRAMINE HYDROCHLORIDE 50 MG/ML
12.5 INJECTION INTRAMUSCULAR; INTRAVENOUS
Status: DISCONTINUED | OUTPATIENT
Start: 2021-10-09 | End: 2021-10-09 | Stop reason: HOSPADM

## 2021-10-09 RX ORDER — DIPHENHYDRAMINE HYDROCHLORIDE 50 MG/ML
25 INJECTION INTRAMUSCULAR; INTRAVENOUS EVERY 6 HOURS PRN
Status: DISCONTINUED | OUTPATIENT
Start: 2021-10-09 | End: 2021-10-13

## 2021-10-09 RX ORDER — HYDROMORPHONE HYDROCHLORIDE 1 MG/ML
.5-1 INJECTION, SOLUTION INTRAMUSCULAR; INTRAVENOUS; SUBCUTANEOUS
Status: DISCONTINUED | OUTPATIENT
Start: 2021-10-09 | End: 2021-10-13

## 2021-10-09 RX ORDER — AMOXICILLIN 250 MG
1 CAPSULE ORAL
Status: DISCONTINUED | OUTPATIENT
Start: 2021-10-09 | End: 2021-11-01 | Stop reason: HOSPADM

## 2021-10-09 RX ORDER — HEPARIN SODIUM 5000 [USP'U]/100ML
0-30 INJECTION, SOLUTION INTRAVENOUS CONTINUOUS
Status: DISCONTINUED | OUTPATIENT
Start: 2021-10-10 | End: 2021-10-13

## 2021-10-09 RX ORDER — MIDAZOLAM HYDROCHLORIDE 1 MG/ML
2 INJECTION INTRAMUSCULAR; INTRAVENOUS
Status: COMPLETED | OUTPATIENT
Start: 2021-10-09 | End: 2021-10-09

## 2021-10-09 RX ORDER — BISACODYL 10 MG
10 SUPPOSITORY, RECTAL RECTAL
Status: DISCONTINUED | OUTPATIENT
Start: 2021-10-09 | End: 2021-10-09

## 2021-10-09 RX ORDER — HYDROMORPHONE HYDROCHLORIDE 1 MG/ML
0.5 INJECTION, SOLUTION INTRAMUSCULAR; INTRAVENOUS; SUBCUTANEOUS
Status: DISCONTINUED | OUTPATIENT
Start: 2021-10-09 | End: 2021-10-09

## 2021-10-09 RX ORDER — DOCUSATE SODIUM 100 MG/1
100 CAPSULE, LIQUID FILLED ORAL 2 TIMES DAILY
Status: DISCONTINUED | OUTPATIENT
Start: 2021-10-09 | End: 2021-11-01 | Stop reason: HOSPADM

## 2021-10-09 RX ORDER — BISACODYL 10 MG
10 SUPPOSITORY, RECTAL RECTAL
Status: DISCONTINUED | OUTPATIENT
Start: 2021-10-09 | End: 2021-11-01 | Stop reason: HOSPADM

## 2021-10-09 RX ORDER — SODIUM CHLORIDE 9 MG/ML
INJECTION, SOLUTION INTRAVENOUS CONTINUOUS
Status: DISCONTINUED | OUTPATIENT
Start: 2021-10-09 | End: 2021-10-09

## 2021-10-09 RX ORDER — IBUPROFEN 800 MG/1
800 TABLET ORAL 3 TIMES DAILY PRN
Status: DISCONTINUED | OUTPATIENT
Start: 2021-10-12 | End: 2021-10-09

## 2021-10-09 RX ORDER — ALBUTEROL SULFATE 90 UG/1
AEROSOL, METERED RESPIRATORY (INHALATION)
Status: COMPLETED
Start: 2021-10-09 | End: 2021-10-09

## 2021-10-09 RX ORDER — NALOXONE HYDROCHLORIDE 0.4 MG/ML
0.4 INJECTION, SOLUTION INTRAMUSCULAR; INTRAVENOUS; SUBCUTANEOUS ONCE
Status: DISCONTINUED | OUTPATIENT
Start: 2021-10-09 | End: 2021-10-09

## 2021-10-09 RX ORDER — HYDROMORPHONE HYDROCHLORIDE 1 MG/ML
0.4 INJECTION, SOLUTION INTRAMUSCULAR; INTRAVENOUS; SUBCUTANEOUS
Status: DISCONTINUED | OUTPATIENT
Start: 2021-10-09 | End: 2021-10-09 | Stop reason: HOSPADM

## 2021-10-09 RX ORDER — AMOXICILLIN 250 MG
1 CAPSULE ORAL
Status: DISCONTINUED | OUTPATIENT
Start: 2021-10-09 | End: 2021-10-09

## 2021-10-09 RX ORDER — HYDROMORPHONE HYDROCHLORIDE 1 MG/ML
0.1 INJECTION, SOLUTION INTRAMUSCULAR; INTRAVENOUS; SUBCUTANEOUS
Status: DISCONTINUED | OUTPATIENT
Start: 2021-10-09 | End: 2021-10-09 | Stop reason: HOSPADM

## 2021-10-09 RX ORDER — METOCLOPRAMIDE HYDROCHLORIDE 5 MG/ML
INJECTION INTRAMUSCULAR; INTRAVENOUS PRN
Status: DISCONTINUED | OUTPATIENT
Start: 2021-10-09 | End: 2021-10-09 | Stop reason: SURG

## 2021-10-09 RX ORDER — SODIUM CHLORIDE, SODIUM GLUCONATE, SODIUM ACETATE, POTASSIUM CHLORIDE AND MAGNESIUM CHLORIDE 526; 502; 368; 37; 30 MG/100ML; MG/100ML; MG/100ML; MG/100ML; MG/100ML
INJECTION, SOLUTION INTRAVENOUS
Status: DISCONTINUED | OUTPATIENT
Start: 2021-10-08 | End: 2021-10-09 | Stop reason: SURG

## 2021-10-09 RX ORDER — KETOROLAC TROMETHAMINE 30 MG/ML
30 INJECTION, SOLUTION INTRAMUSCULAR; INTRAVENOUS EVERY 6 HOURS
Status: DISCONTINUED | OUTPATIENT
Start: 2021-10-09 | End: 2021-10-09

## 2021-10-09 RX ORDER — ENEMA 19; 7 G/133ML; G/133ML
1 ENEMA RECTAL
Status: DISCONTINUED | OUTPATIENT
Start: 2021-10-09 | End: 2021-11-01 | Stop reason: HOSPADM

## 2021-10-09 RX ORDER — ONDANSETRON 2 MG/ML
4 INJECTION INTRAMUSCULAR; INTRAVENOUS
Status: DISCONTINUED | OUTPATIENT
Start: 2021-10-09 | End: 2021-10-09 | Stop reason: HOSPADM

## 2021-10-09 RX ORDER — ALBUTEROL SULFATE 90 UG/1
2 AEROSOL, METERED RESPIRATORY (INHALATION)
Status: DISCONTINUED | OUTPATIENT
Start: 2021-10-09 | End: 2021-11-01 | Stop reason: HOSPADM

## 2021-10-09 RX ORDER — OXYCODONE HYDROCHLORIDE 10 MG/1
10 TABLET ORAL
Status: DISCONTINUED | OUTPATIENT
Start: 2021-10-09 | End: 2021-10-28

## 2021-10-09 RX ORDER — POLYETHYLENE GLYCOL 3350 17 G/17G
1 POWDER, FOR SOLUTION ORAL 2 TIMES DAILY PRN
Status: DISCONTINUED | OUTPATIENT
Start: 2021-10-09 | End: 2021-11-01 | Stop reason: HOSPADM

## 2021-10-09 RX ORDER — ONDANSETRON 4 MG/1
4 TABLET, ORALLY DISINTEGRATING ORAL EVERY 4 HOURS PRN
Status: DISCONTINUED | OUTPATIENT
Start: 2021-10-09 | End: 2021-11-01 | Stop reason: HOSPADM

## 2021-10-09 RX ORDER — DEXAMETHASONE SODIUM PHOSPHATE 4 MG/ML
4 INJECTION, SOLUTION INTRA-ARTICULAR; INTRALESIONAL; INTRAMUSCULAR; INTRAVENOUS; SOFT TISSUE
Status: DISCONTINUED | OUTPATIENT
Start: 2021-10-09 | End: 2021-10-13

## 2021-10-09 RX ORDER — POLYETHYLENE GLYCOL 3350 17 G/17G
1 POWDER, FOR SOLUTION ORAL 2 TIMES DAILY
Status: DISCONTINUED | OUTPATIENT
Start: 2021-10-09 | End: 2021-11-01 | Stop reason: HOSPADM

## 2021-10-09 RX ORDER — ENEMA 19; 7 G/133ML; G/133ML
1 ENEMA RECTAL
Status: DISCONTINUED | OUTPATIENT
Start: 2021-10-09 | End: 2021-10-09

## 2021-10-09 RX ORDER — PHENYLEPHRINE HYDROCHLORIDE 10 MG/ML
INJECTION, SOLUTION INTRAMUSCULAR; INTRAVENOUS; SUBCUTANEOUS PRN
Status: DISCONTINUED | OUTPATIENT
Start: 2021-10-08 | End: 2021-10-09 | Stop reason: SURG

## 2021-10-09 RX ORDER — AMOXICILLIN 250 MG
1 CAPSULE ORAL NIGHTLY
Status: DISCONTINUED | OUTPATIENT
Start: 2021-10-09 | End: 2021-11-01 | Stop reason: HOSPADM

## 2021-10-09 RX ORDER — ACETAMINOPHEN 500 MG
1000 TABLET ORAL EVERY 6 HOURS
Status: DISPENSED | OUTPATIENT
Start: 2021-10-09 | End: 2021-10-14

## 2021-10-09 RX ORDER — HALOPERIDOL 5 MG/ML
1 INJECTION INTRAMUSCULAR
Status: DISCONTINUED | OUTPATIENT
Start: 2021-10-09 | End: 2021-10-09 | Stop reason: HOSPADM

## 2021-10-09 RX ORDER — AMOXICILLIN 250 MG
1 CAPSULE ORAL NIGHTLY
Status: DISCONTINUED | OUTPATIENT
Start: 2021-10-09 | End: 2021-10-09

## 2021-10-09 RX ORDER — HALOPERIDOL 5 MG/ML
1 INJECTION INTRAMUSCULAR EVERY 6 HOURS PRN
Status: DISCONTINUED | OUTPATIENT
Start: 2021-10-09 | End: 2021-10-13

## 2021-10-09 RX ORDER — IBUPROFEN 800 MG/1
800 TABLET ORAL 3 TIMES DAILY PRN
Status: DISCONTINUED | OUTPATIENT
Start: 2021-10-14 | End: 2021-10-09

## 2021-10-09 RX ORDER — OXYCODONE HYDROCHLORIDE 5 MG/1
5 TABLET ORAL
Status: DISCONTINUED | OUTPATIENT
Start: 2021-10-09 | End: 2021-10-09

## 2021-10-09 RX ORDER — ONDANSETRON 2 MG/ML
INJECTION INTRAMUSCULAR; INTRAVENOUS PRN
Status: DISCONTINUED | OUTPATIENT
Start: 2021-10-09 | End: 2021-10-09 | Stop reason: SURG

## 2021-10-09 RX ORDER — DOCUSATE SODIUM 100 MG/1
100 CAPSULE, LIQUID FILLED ORAL 2 TIMES DAILY
Status: DISCONTINUED | OUTPATIENT
Start: 2021-10-09 | End: 2021-10-09

## 2021-10-09 RX ORDER — IBUPROFEN 800 MG/1
800 TABLET ORAL 3 TIMES DAILY
Status: DISCONTINUED | OUTPATIENT
Start: 2021-10-09 | End: 2021-10-09

## 2021-10-09 RX ORDER — CEFAZOLIN SODIUM 2 G/100ML
2 INJECTION, SOLUTION INTRAVENOUS EVERY 8 HOURS
Status: COMPLETED | OUTPATIENT
Start: 2021-10-09 | End: 2021-10-09

## 2021-10-09 RX ORDER — ONDANSETRON 2 MG/ML
4 INJECTION INTRAMUSCULAR; INTRAVENOUS EVERY 4 HOURS PRN
Status: DISCONTINUED | OUTPATIENT
Start: 2021-10-09 | End: 2021-11-01 | Stop reason: HOSPADM

## 2021-10-09 RX ORDER — SCOLOPAMINE TRANSDERMAL SYSTEM 1 MG/1
1 PATCH, EXTENDED RELEASE TRANSDERMAL
Status: DISCONTINUED | OUTPATIENT
Start: 2021-10-09 | End: 2021-10-13

## 2021-10-09 RX ORDER — HYDROMORPHONE HYDROCHLORIDE 1 MG/ML
0.2 INJECTION, SOLUTION INTRAMUSCULAR; INTRAVENOUS; SUBCUTANEOUS
Status: DISCONTINUED | OUTPATIENT
Start: 2021-10-09 | End: 2021-10-09 | Stop reason: HOSPADM

## 2021-10-09 RX ORDER — OXYCODONE HYDROCHLORIDE 10 MG/1
10 TABLET ORAL
Status: DISCONTINUED | OUTPATIENT
Start: 2021-10-09 | End: 2021-10-09

## 2021-10-09 RX ORDER — OXYCODONE HYDROCHLORIDE 5 MG/1
5 TABLET ORAL
Status: DISCONTINUED | OUTPATIENT
Start: 2021-10-09 | End: 2021-10-28

## 2021-10-09 RX ORDER — ONDANSETRON 2 MG/ML
4 INJECTION INTRAMUSCULAR; INTRAVENOUS EVERY 4 HOURS PRN
Status: DISCONTINUED | OUTPATIENT
Start: 2021-10-09 | End: 2021-10-09

## 2021-10-09 RX ADMIN — FENTANYL CITRATE 25 MCG: 50 INJECTION INTRAMUSCULAR; INTRAVENOUS at 01:58

## 2021-10-09 RX ADMIN — ALBUTEROL SULFATE 2 PUFF: 90 AEROSOL, METERED RESPIRATORY (INHALATION) at 14:29

## 2021-10-09 RX ADMIN — FENTANYL CITRATE 50 MCG: 50 INJECTION INTRAMUSCULAR; INTRAVENOUS at 02:46

## 2021-10-09 RX ADMIN — CEFAZOLIN SODIUM 2 G: 2 INJECTION, SOLUTION INTRAVENOUS at 11:20

## 2021-10-09 RX ADMIN — FENTANYL CITRATE 50 MCG: 50 INJECTION INTRAMUSCULAR; INTRAVENOUS at 01:47

## 2021-10-09 RX ADMIN — CEFAZOLIN SODIUM 2 G: 2 INJECTION, SOLUTION INTRAVENOUS at 03:45

## 2021-10-09 RX ADMIN — SUGAMMADEX 200 MG: 100 INJECTION, SOLUTION INTRAVENOUS at 00:50

## 2021-10-09 RX ADMIN — ACETAMINOPHEN 1000 MG: 500 TABLET ORAL at 17:36

## 2021-10-09 RX ADMIN — OXYCODONE HYDROCHLORIDE 10 MG: 10 TABLET ORAL at 17:36

## 2021-10-09 RX ADMIN — SODIUM CHLORIDE: 9 INJECTION, SOLUTION INTRAVENOUS at 03:45

## 2021-10-09 RX ADMIN — HYDROMORPHONE HYDROCHLORIDE 0.4 MG: 1 INJECTION, SOLUTION INTRAMUSCULAR; INTRAVENOUS; SUBCUTANEOUS at 02:09

## 2021-10-09 RX ADMIN — FENTANYL CITRATE 50 MCG: 50 INJECTION INTRAMUSCULAR; INTRAVENOUS at 02:36

## 2021-10-09 RX ADMIN — ONDANSETRON 4 MG: 2 INJECTION INTRAMUSCULAR; INTRAVENOUS at 00:54

## 2021-10-09 RX ADMIN — MIDAZOLAM HYDROCHLORIDE 2 MG: 1 INJECTION, SOLUTION INTRAMUSCULAR; INTRAVENOUS at 01:23

## 2021-10-09 RX ADMIN — ENOXAPARIN SODIUM 60 MG: 60 INJECTION SUBCUTANEOUS at 17:36

## 2021-10-09 RX ADMIN — POLYETHYLENE GLYCOL 3350 1 PACKET: 17 POWDER, FOR SOLUTION ORAL at 17:35

## 2021-10-09 RX ADMIN — HYDROMORPHONE HYDROCHLORIDE 1 MG: 1 INJECTION, SOLUTION INTRAMUSCULAR; INTRAVENOUS; SUBCUTANEOUS at 05:33

## 2021-10-09 RX ADMIN — DOCUSATE SODIUM 100 MG: 100 CAPSULE ORAL at 17:36

## 2021-10-09 RX ADMIN — FENTANYL CITRATE 50 MCG: 50 INJECTION INTRAMUSCULAR; INTRAVENOUS at 02:30

## 2021-10-09 RX ADMIN — OXYCODONE HYDROCHLORIDE 10 MG: 10 TABLET ORAL at 08:58

## 2021-10-09 RX ADMIN — METOCLOPRAMIDE 20 MG: 5 INJECTION, SOLUTION INTRAMUSCULAR; INTRAVENOUS at 00:33

## 2021-10-09 RX ADMIN — ACETAMINOPHEN 1000 MG: 500 TABLET ORAL at 11:20

## 2021-10-09 RX ADMIN — HYDROMORPHONE HYDROCHLORIDE 0.2 MG: 1 INJECTION, SOLUTION INTRAMUSCULAR; INTRAVENOUS; SUBCUTANEOUS at 02:14

## 2021-10-09 RX ADMIN — HYDROMORPHONE HYDROCHLORIDE 0.4 MG: 1 INJECTION, SOLUTION INTRAMUSCULAR; INTRAVENOUS; SUBCUTANEOUS at 02:03

## 2021-10-09 RX ADMIN — OXYCODONE HYDROCHLORIDE 10 MG: 10 TABLET ORAL at 13:25

## 2021-10-09 RX ADMIN — DOCUSATE SODIUM 50 MG AND SENNOSIDES 8.6 MG 1 TABLET: 8.6; 5 TABLET, FILM COATED ORAL at 20:36

## 2021-10-09 RX ADMIN — KETOROLAC TROMETHAMINE 30 MG: 30 INJECTION, SOLUTION INTRAMUSCULAR at 11:20

## 2021-10-09 RX ADMIN — FENTANYL CITRATE 25 MCG: 50 INJECTION INTRAMUSCULAR; INTRAVENOUS at 01:54

## 2021-10-09 RX ADMIN — IOHEXOL 90 ML: 350 INJECTION, SOLUTION INTRAVENOUS at 23:15

## 2021-10-09 RX ADMIN — HYDROMORPHONE HYDROCHLORIDE 0.5 MG: 1 INJECTION, SOLUTION INTRAMUSCULAR; INTRAVENOUS; SUBCUTANEOUS at 21:21

## 2021-10-09 RX ADMIN — DEXMEDETOMIDINE 50 MCG: 200 INJECTION, SOLUTION INTRAVENOUS at 00:50

## 2021-10-09 RX ADMIN — OXYCODONE HYDROCHLORIDE 10 MG: 10 TABLET ORAL at 03:45

## 2021-10-09 RX ADMIN — ENOXAPARIN SODIUM 40 MG: 40 INJECTION SUBCUTANEOUS at 13:51

## 2021-10-09 RX ADMIN — SODIUM CHLORIDE, SODIUM GLUCONATE, SODIUM ACETATE, POTASSIUM CHLORIDE AND MAGNESIUM CHLORIDE: 526; 502; 368; 37; 30 INJECTION, SOLUTION INTRAVENOUS at 00:02

## 2021-10-09 RX ADMIN — KETOROLAC TROMETHAMINE 30 MG: 30 INJECTION, SOLUTION INTRAMUSCULAR at 05:33

## 2021-10-09 ASSESSMENT — PAIN DESCRIPTION - PAIN TYPE
TYPE: SURGICAL PAIN
TYPE: ACUTE PAIN;SURGICAL PAIN
TYPE: ACUTE PAIN
TYPE: SURGICAL PAIN
TYPE: ACUTE PAIN;SURGICAL PAIN
TYPE: SURGICAL PAIN;CHRONIC PAIN
TYPE: SURGICAL PAIN
TYPE: SURGICAL PAIN;CHRONIC PAIN

## 2021-10-09 ASSESSMENT — ENCOUNTER SYMPTOMS
SPEECH CHANGE: 0
NECK PAIN: 0
SENSORY CHANGE: 0
NAUSEA: 0
DIZZINESS: 0
ABDOMINAL PAIN: 0
VOMITING: 0
HEADACHES: 0
BACK PAIN: 0
SHORTNESS OF BREATH: 0
MYALGIAS: 1
TINGLING: 0
CHILLS: 0
DOUBLE VISION: 0
FEVER: 0
FOCAL WEAKNESS: 0
BLURRED VISION: 0

## 2021-10-09 ASSESSMENT — LIFESTYLE VARIABLES: SUBSTANCE_ABUSE: 0

## 2021-10-09 NOTE — THERAPY
Therapy Contact Note    Patient Name: Wero Thorpe  Age:  46 y.o., Sex:  male  Medical Record #: [id number]  Today's Date: 10/9/2021       10/09/21 1445   Total Time Spent   Total Time Spent (Mins) 5   Interdisciplinary Plan of Care Collaboration   IDT Collaboration with  Nursing;Family / Caregiver   Collaboration Comments 10/9: PT consult received. RN deferring PT due to pt sleeping. Pt's son reports pt waking up every 3 min crying in pain. Will attempt when patient more appriopriate to participate with PT.

## 2021-10-09 NOTE — PROGRESS NOTES
0337 Patient arrived on unit on a hospital bed with PACU RN. Patient woke up screaming in pain, rates it at 10/10. VSS on 10Lpm VIA oxymask on ERAS protocol. Explained to him that pharmacy needs to verify medications first. Yelling at staff. Refusing skin assessment at this time. Provide education.    0345 Medicated per MAR, patient continues to be agitated. Unable to perform a thorough physical assessment d/t patient's refusal and non-compliance. RLE assessed, CMS intact, R pedal pulse 1+, sx dressing CDI.
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
## 2021-10-09 NOTE — PROGRESS NOTES
Pt refused all PO medications and bowel regimen medications. Agreed to administerToradol and dilaudid for pain. Educated provided.    0538 Glucose accu-ck: 124 gm/dL

## 2021-10-09 NOTE — ED NOTES
Med Rec completed: per pt at bedside      No ORAL antibiotics in last 30 days    Preferred Pharmacy: [facility]     Pt confirmed following allergies:  No Known Allergies     Pt's home medications:   No current facility-administered medications on file prior to encounter.     No current outpatient medications on file prior to encounter.
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
## 2021-10-09 NOTE — ED NOTES
36.7 Pt complaining of more pain and anxiety, stating prior dose of morphine didn't help. ERP notified.

## 2021-10-09 NOTE — ASSESSMENT & PLAN NOTE
Intertrochanteric right femoral neck fracture. Mid shaft right femoral diaphyseal fracture. Right midshaft femoral diaphyseal fracture. Distal right femoral intercondylar fracture. Knee joint hemarthrosis.  10/8 ORIF.   10/15 Incision with yellow foul drainage- ortho placed preveena  Staple removal by ortho PA prior to discharge  Weight bearing status - Touch toe weightbearing RLE. x 6 weeks.  Jayme Jacques MD. Orthopedic Surgeon. [facility].

## 2021-10-09 NOTE — PROGRESS NOTES
TRAUMA TERTIARY SURVEY     Mental status adequate for full examination?: No    Spine cleared (radiologically and/or clinically): Yes    PHYSICAL EXAMINATION:  Vitals: /100   Pulse (!) 103   Temp 36.4 °C (97.6 °F) (Temporal)   Resp 12   Ht 1.829 m (6')   Wt 90.7 kg (200 lb)   SpO2 98%   BMI 27.12 kg/m²   Constitutional:     General Appearance: appears stated age, is in no apparent distress, is well developed and well nourished.  HEENT:     No significant external craniofacial trauma. The pupils are equal, round, and reactive to light bilaterally. The extraocular muscles are intact bilaterally.. The nares and oropharynx are clear. The midface and jaw are stable. No malocclusion is evident.  Neck:    The cervical spine is supple and non tender. Normal range of motion. The trachea is midline. No significant abrasions, lacerations, contusions, punctures, or swelling. No crepitance. No jugulovenous distension.  Respiratory:   Inspection: Unlabored respirations, no intercostal retractions, paradoxical motion, or accessory muscle use.   Palpation:  The chest is nontender. The clavicles are non deformed bilaterally..   Auscultation: clear to auscultation.  Cardiovascular:   Auscultation: regular rate and rhythm.   Peripheral Pulses: Normal.   Abdomen:   Abdomen is soft, nontender, without organomegaly or masses.  Genitourinary:   (MALE): Not visualized.  Musculoskeletal:   The pelvis is stable. Right knee surgical dressing in place, generalized edema  Back:   The thoracolumbar spine was examined. Examination is remarkable for no significant tenderness, swelling, or deformity in the thoracolumbar region.  Skin:   The skin is warm and dry.  Neurologic:    [place] Coma Scale (GCS) 13 E3V4M6. Neurologic examination revealed no focal deficits noted.  Psychiatric:   The patient is delayed and inattentive.    IMAGING:  DX-FEMUR-2+ RIGHT   Final Result         1.  Intraoperative changes of right hip ORIF with  intramedullary femoral rodding and dynamic hip screw in progress      CT-CHEST,ABDOMEN,PELVIS WITH   Final Result         1.  Intertrochanteric right femoral neck fracture.   2.  Mid shaft right femoral diaphyseal fracture   3.  Hepatomegaly with irregular hepatic contour appearing changes of cirrhosis.   4.  Fat-containing right inguinal hernia   5.  Fat-containing umbilical hernia   6.  Atherosclerosis      CT-TSPINE W/O PLUS RECONS   Final Result         1.  No acute traumatic bony injury of the thoracic spine.      CT-LSPINE W/O PLUS RECONS   Final Result         1.  No acute traumatic bony injury of the lumbar spine.      CT-HEAD W/O   Final Result         1.  No acute intracranial abnormality.      CT-CSPINE WITHOUT PLUS RECONS   Final Result         1.  Multilevel degenerative changes of the cervical spine limit diagnostic sensitivity of this examination.   2.  Mild anterolisthesis C2 on C3, appears likely associated with visualized severe facet arthrosis, traumatic listhesis could have radiographically similar appearance, otherwise no acute traumatic bony injury of the cervical spine is apparent.      CT-CTA LOWER EXT WITH & W/O-POST PROCESS RIGHT   Final Result         1.  No radiographic evidence of vascular injury   2.  Right intertrochanteric femoral neck fracture.   3.  Right midshaft femoral diaphyseal fracture   4.  Distal right femoral intercondylar fracture.   5.  Knee joint hemarthrosis   6.  Intramuscular hematoma adjacent to the mid shaft femur, evaluation and management for compartment syndrome as clinically appropriate.      DX-CHEST-LIMITED (1 VIEW)   Final Result         1.  No acute cardiopulmonary disease.      DX-FEMUR-1 VIEW RIGHT   Final Result         1.  Comminuted midshaft femoral diaphyseal fracture.   2.  Intertrochanteric right femoral neck fracture      DX-PELVIS-1 OR 2 VIEWS   Final Result         1.  Intertrochanteric right femoral neck fracture      DX-PORTABLE FLUOROSCOPY < 1  HOUR    (Results Pending)   US-TRAUMA VEIN SCREEN LOWER BILAT EXTREMITY    (Results Pending)     All current laboratory studies/radiology exams reviewed: No, Covid antigen in process    Completed Consultations:  Dr. Jacques, orthopedic surgery     Pending Consultations:  None    Newly Identified Diagnoses and Injuries:  None    TOTAL RAP SCORE:  RAP Score Total: 2      ETOH Screening     Assessment complete date: 10/9/2021 (Admission BA negative, CAGE not completed, limited screening)

## 2021-10-09 NOTE — OR NURSING
"0100 Pt from OR, asleep, with O2 support of 6 L/min via mask, respirations regular and spontaneous, vital signs within normal limits. Pt with neck collar in place, secured. Pt with 3 sites at right knee and 1 site at right hip, all dressings are clean-dry and intact.     0120 Dr. Chery at bedside, give versed 2mg IV STAT, medicated per MAR.    0145 Pt grimacing and moaning, medicated per MAR.    0230 Pt states \"I don't get it\" and also states \"Why I'm still hurting\". Pt educated regarding realistic pain expectation after surgery and also with pain medication in safe dosages and interval.    0300 Report given to Aubree GRANT.    0315 Pt to floor with RN and O2 support of 10 L/min via oxymask (O2 level at 545).        "
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
## 2021-10-09 NOTE — PROGRESS NOTES
Trauma Progress Note    Returned page from film room  Case discussed with radiologist  Positive for partial acute DVT of right femoral    Therapeutic enoxaparin initiated
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
## 2021-10-09 NOTE — ANESTHESIA PROCEDURE NOTES
Peripheral IV    Date/Time: 10/8/2021 10:50 PM  Performed by: Jaciel Chery M.D.  Authorized by: Jaciel Chery M.D.     Size:  20 G  Laterality:  Right  Site Prep:  Alcohol  Technique:  Direct puncture  Attempts:  1

## 2021-10-09 NOTE — ANESTHESIA PROCEDURE NOTES
Airway    Date/Time: 10/8/2021 10:55 PM  Performed by: Jaciel Chery M.D.  Authorized by: Jaciel Chery M.D.     Location:  OR  Urgency:  Elective  Indications for Airway Management:  Anesthesia      Spontaneous Ventilation: absent    Sedation Level:  Deep  Preoxygenated: Yes    Patient Position:  Sniffing  Final Airway Type:  Endotracheal airway  Final Endotracheal Airway:  ETT  Cuffed: Yes    Technique Used for Successful ETT Placement:  Direct laryngoscopy    Insertion Site:  Oral  Blade Type:  Glide  Laryngoscope Blade/Videolaryngoscope Blade Size:  4  ETT Size (mm):  8.0  Measured from:  Teeth  ETT to Teeth (cm):  24  Placement Verified by: auscultation and capnometry    Cormack-Lehane Classification:  Grade I - full view of glottis  Number of Attempts at Approach:  1
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
## 2021-10-09 NOTE — ANESTHESIA TIME REPORT
Anesthesia Start and Stop Event Times     Date Time Event    10/8/2021 2242 Ready for Procedure     2244 Anesthesia Start    10/9/2021 0102 Anesthesia Stop        Responsible Staff  10/08/21 to 10/09/21    Name Role Begin End    Jaciel Chery M.D. Anesth 2244 0102        Preop Diagnosis (Free Text):  Pre-op Diagnosis     RIGHT INTERTROCHANTERIC AND FEMORAL SHAFT FRACTURES        Preop Diagnosis (Codes):    Premium Reason  A. 3PM - 7AM    Comments:
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
## 2021-10-09 NOTE — ANESTHESIA PREPROCEDURE EVALUATION
Relevant Problems   No relevant active problems       Physical Exam    Airway   Mallampati: III  TM distance: <3 FB  Neck ROM: limited       Cardiovascular   Rhythm: regular  Rate: normal     Dental    Pulmonary   Breath sounds clear to auscultation     Abdominal    Neurological - normal exam                 Anesthesia Plan    ASA 2- EMERGENT   ASA physical status emergent criteria: displaced fracture with possible neurovascular compromise    Plan - general       Airway plan will be ETT          Induction: intravenous      Pertinent diagnostic labs and testing reviewed    Informed Consent:    Anesthetic plan and risks discussed with patient.
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
## 2021-10-09 NOTE — ANESTHESIA POSTPROCEDURE EVALUATION
Patient: Abena Edge    Procedure Summary     Date: 10/08/21 Room / Location: [place] [address] / SURGERY [facility]    Anesthesia Start: 2244 Anesthesia Stop: 10/09/21 0102    Procedures:       ORIF, FRACTURE, FEMUR (Right Leg Upper)      INSERTION, INTRAMEDULLARY HARINDER, FEMUR (Right Leg Upper)      FIXATION, FRACTURE, HIP, USING DYNAMIC HIP SCREW, WITH COMPRESSION (Right Hip) Diagnosis: (RIGHT INTERTROCHANTERIC AND FEMORAL SHAFT FRACTURES)    Surgeons: Jayme Jacques M.D. Responsible Provider: Jaciel Chery M.D.    Anesthesia Type: general ASA Status: 2 - Emergent          Final Anesthesia Type: general  Last vitals  BP   Blood Pressure: 124/79    Temp   36.6 °C (97.9 °F)    Pulse   (!) 108   Resp   16    SpO2   100 %      Anesthesia Post Evaluation    Patient location during evaluation: PACU  Patient participation: complete - patient participated  Level of consciousness: awake and alert    Airway patency: patent  Anesthetic complications: no  Cardiovascular status: hemodynamically stable  Respiratory status: acceptable  Hydration status: euvolemic    PONV: none          There were no known complications for this encounter.

## 2021-10-09 NOTE — ASSESSMENT & PLAN NOTE
Incidental finding on CT with hepatomegaly with irregular hepatic contour appearing changes of cirrhosis.

## 2021-10-09 NOTE — ASSESSMENT & PLAN NOTE
Prophylactic anticoagulation for thrombotic prevention initially contraindicated secondary to elevated bleeding risk.  10/9 Trauma surveillance venous duplex scanning ordered.  10/9 Prophylactic dose enoxaparin initiated. 40 mg daily per orthopedics.  10/9 Trauma screening bilateral lower extremity venous duplex positive for above knee DVT. Right distal femoral vein is partially compressible with softly echogenic material partially filling the lumen consistent with partial acute deep vein thrombosis.  10/10 Heparin drip - no bolus / pm increase size of thigh - DC heparin drip  10/11 IVC filter placed in IR  10/13 Prophylactic lovenox initiated   10/14 Lovenox on hold   10/15 Lovenox resumed  10/19 Lovenox again held due to active hemorrhage seen on CT of right lower extremity.   10/20 & 10/21 Continue to hold Lovenox.   10/22 Initiation of pharmacological DVT prophylaxis, Lovenox.   10/25 Weight-based Lovenox dosing.   10/27 Transition to Eliquis

## 2021-10-09 NOTE — ASSESSMENT & PLAN NOTE
Intramuscular hematoma adjacent to the mid shaft femur.  10/10 Increase size of thigh - DC heparin drip and reversed with protamine.  10/11 Heparin infusion discontinued secondary to bleeding.  10/15 ultrasound completed, no definitive findings  10/19 CT shows active extravasation. Maintain ACE compression dressing.  10/23 DC ACE compression dressing.     10/29 hemoglobin & hematocrits normalized--lab studies PRN  Serial assessments negative for compartment syndrome and neurovascular compromise.

## 2021-10-10 ENCOUNTER — APPOINTMENT (OUTPATIENT)
Dept: RADIOLOGY | Facility: MEDICAL CENTER | Age: 46
DRG: 481 | End: 2021-10-10
Attending: SURGERY
Payer: MEDICAID

## 2021-10-10 LAB
AMMONIA PLAS-SCNC: 12 UMOL/L (ref 11–45)
ANION GAP SERPL CALC-SCNC: 15 MMOL/L (ref 7–16)
APPEARANCE UR: CLEAR
BACTERIA #/AREA URNS HPF: NEGATIVE /HPF
BASOPHILS # BLD AUTO: 0.1 % (ref 0–1.8)
BASOPHILS # BLD AUTO: 0.1 % (ref 0–1.8)
BASOPHILS # BLD: 0.02 K/UL (ref 0–0.12)
BASOPHILS # BLD: 0.02 K/UL (ref 0–0.12)
BILIRUB UR QL STRIP.AUTO: NEGATIVE
BUN SERPL-MCNC: 22 MG/DL (ref 8–22)
CALCIUM SERPL-MCNC: 7.7 MG/DL (ref 8.5–10.5)
CHLORIDE SERPL-SCNC: 102 MMOL/L (ref 96–112)
CO2 SERPL-SCNC: 18 MMOL/L (ref 20–33)
COLOR UR: YELLOW
CREAT SERPL-MCNC: 0.93 MG/DL (ref 0.5–1.4)
EKG IMPRESSION: NORMAL
EOSINOPHIL # BLD AUTO: 0 K/UL (ref 0–0.51)
EOSINOPHIL # BLD AUTO: 0 K/UL (ref 0–0.51)
EOSINOPHIL NFR BLD: 0 % (ref 0–6.9)
EOSINOPHIL NFR BLD: 0 % (ref 0–6.9)
EPI CELLS #/AREA URNS HPF: NEGATIVE /HPF
ERYTHROCYTE [DISTWIDTH] IN BLOOD BY AUTOMATED COUNT: 38.8 FL (ref 35.9–50)
ERYTHROCYTE [DISTWIDTH] IN BLOOD BY AUTOMATED COUNT: 39.5 FL (ref 35.9–50)
ERYTHROCYTE [DISTWIDTH] IN BLOOD BY AUTOMATED COUNT: 39.8 FL (ref 35.9–50)
ERYTHROCYTE [DISTWIDTH] IN BLOOD BY AUTOMATED COUNT: 40.5 FL (ref 35.9–50)
GLUCOSE BLD-MCNC: 105 MG/DL (ref 65–99)
GLUCOSE SERPL-MCNC: 114 MG/DL (ref 65–99)
GLUCOSE UR STRIP.AUTO-MCNC: NEGATIVE MG/DL
HCT VFR BLD AUTO: 14.7 % (ref 42–52)
HCT VFR BLD AUTO: 15 % (ref 42–52)
HCT VFR BLD AUTO: 22.1 % (ref 42–52)
HCT VFR BLD AUTO: 23.8 % (ref 42–52)
HGB BLD-MCNC: 5 G/DL (ref 14–18)
HGB BLD-MCNC: 5 G/DL (ref 14–18)
HGB BLD-MCNC: 7.5 G/DL (ref 14–18)
HGB BLD-MCNC: 7.8 G/DL (ref 14–18)
HYALINE CASTS #/AREA URNS LPF: ABNORMAL /LPF
IMM GRANULOCYTES # BLD AUTO: 0.11 K/UL (ref 0–0.11)
IMM GRANULOCYTES # BLD AUTO: 0.12 K/UL (ref 0–0.11)
IMM GRANULOCYTES NFR BLD AUTO: 0.7 % (ref 0–0.9)
IMM GRANULOCYTES NFR BLD AUTO: 0.7 % (ref 0–0.9)
KETONES UR STRIP.AUTO-MCNC: 15 MG/DL
LEUKOCYTE ESTERASE UR QL STRIP.AUTO: NEGATIVE
LYMPHOCYTES # BLD AUTO: 2.54 K/UL (ref 1–4.8)
LYMPHOCYTES # BLD AUTO: 3.38 K/UL (ref 1–4.8)
LYMPHOCYTES NFR BLD: 14.9 % (ref 22–41)
LYMPHOCYTES NFR BLD: 21.4 % (ref 22–41)
MAGNESIUM SERPL-MCNC: 2.1 MG/DL (ref 1.5–2.5)
MCH RBC QN AUTO: 27 PG (ref 27–33)
MCH RBC QN AUTO: 27.3 PG (ref 27–33)
MCH RBC QN AUTO: 27.6 PG (ref 27–33)
MCH RBC QN AUTO: 27.9 PG (ref 27–33)
MCHC RBC AUTO-ENTMCNC: 32.8 G/DL (ref 33.7–35.3)
MCHC RBC AUTO-ENTMCNC: 33.3 G/DL (ref 33.7–35.3)
MCHC RBC AUTO-ENTMCNC: 33.9 G/DL (ref 33.7–35.3)
MCHC RBC AUTO-ENTMCNC: 34 G/DL (ref 33.7–35.3)
MCV RBC AUTO: 81.3 FL (ref 81.4–97.8)
MCV RBC AUTO: 82 FL (ref 81.4–97.8)
MCV RBC AUTO: 82.1 FL (ref 81.4–97.8)
MCV RBC AUTO: 82.4 FL (ref 81.4–97.8)
MICRO URNS: ABNORMAL
MONOCYTES # BLD AUTO: 1.24 K/UL (ref 0–0.85)
MONOCYTES # BLD AUTO: 1.42 K/UL (ref 0–0.85)
MONOCYTES NFR BLD AUTO: 7.3 % (ref 0–13.4)
MONOCYTES NFR BLD AUTO: 9 % (ref 0–13.4)
NEUTROPHILS # BLD AUTO: 10.84 K/UL (ref 1.82–7.42)
NEUTROPHILS # BLD AUTO: 13.18 K/UL (ref 1.82–7.42)
NEUTROPHILS NFR BLD: 68.8 % (ref 44–72)
NEUTROPHILS NFR BLD: 77 % (ref 44–72)
NITRITE UR QL STRIP.AUTO: NEGATIVE
NRBC # BLD AUTO: 0 K/UL
NRBC # BLD AUTO: 0 K/UL
NRBC BLD-RTO: 0 /100 WBC
NRBC BLD-RTO: 0 /100 WBC
PH UR STRIP.AUTO: 5 [PH] (ref 5–8)
PHOSPHATE SERPL-MCNC: 2.4 MG/DL (ref 2.5–4.5)
PLATELET # BLD AUTO: 159 K/UL (ref 164–446)
PLATELET # BLD AUTO: 170 K/UL (ref 164–446)
PLATELET # BLD AUTO: 236 K/UL (ref 164–446)
PLATELET # BLD AUTO: 237 K/UL (ref 164–446)
PMV BLD AUTO: 10 FL (ref 9–12.9)
PMV BLD AUTO: 9.4 FL (ref 9–12.9)
PMV BLD AUTO: 9.7 FL (ref 9–12.9)
PMV BLD AUTO: 9.8 FL (ref 9–12.9)
POTASSIUM SERPL-SCNC: 3.2 MMOL/L (ref 3.6–5.5)
PROT UR QL STRIP: NEGATIVE MG/DL
RBC # BLD AUTO: 1.79 M/UL (ref 4.7–6.1)
RBC # BLD AUTO: 1.83 M/UL (ref 4.7–6.1)
RBC # BLD AUTO: 2.72 M/UL (ref 4.7–6.1)
RBC # BLD AUTO: 2.89 M/UL (ref 4.7–6.1)
RBC # URNS HPF: ABNORMAL /HPF
RBC UR QL AUTO: ABNORMAL
SODIUM SERPL-SCNC: 135 MMOL/L (ref 135–145)
SP GR UR STRIP.AUTO: 1.03
UFH PPP CHRO-ACNC: 0.27 IU/ML
UFH PPP CHRO-ACNC: 0.47 IU/ML
UFH PPP CHRO-ACNC: 0.56 IU/ML
UROBILINOGEN UR STRIP.AUTO-MCNC: 0.2 MG/DL
WBC # BLD AUTO: 15.1 K/UL (ref 4.8–10.8)
WBC # BLD AUTO: 15.8 K/UL (ref 4.8–10.8)
WBC # BLD AUTO: 17.1 K/UL (ref 4.8–10.8)
WBC # BLD AUTO: 17.7 K/UL (ref 4.8–10.8)
WBC #/AREA URNS HPF: ABNORMAL /HPF

## 2021-10-10 PROCEDURE — 700102 HCHG RX REV CODE 250 W/ 637 OVERRIDE(OP): Performed by: ORTHOPAEDIC SURGERY

## 2021-10-10 PROCEDURE — 700105 HCHG RX REV CODE 258: Performed by: NURSE PRACTITIONER

## 2021-10-10 PROCEDURE — 84100 ASSAY OF PHOSPHORUS: CPT

## 2021-10-10 PROCEDURE — 83735 ASSAY OF MAGNESIUM: CPT

## 2021-10-10 PROCEDURE — 93010 ELECTROCARDIOGRAM REPORT: CPT | Performed by: INTERNAL MEDICINE

## 2021-10-10 PROCEDURE — 700111 HCHG RX REV CODE 636 W/ 250 OVERRIDE (IP): Performed by: SURGERY

## 2021-10-10 PROCEDURE — 99233 SBSQ HOSP IP/OBS HIGH 50: CPT | Performed by: SURGERY

## 2021-10-10 PROCEDURE — 80048 BASIC METABOLIC PNL TOTAL CA: CPT

## 2021-10-10 PROCEDURE — 86923 COMPATIBILITY TEST ELECTRIC: CPT | Mod: 91

## 2021-10-10 PROCEDURE — 85025 COMPLETE CBC W/AUTO DIFF WBC: CPT

## 2021-10-10 PROCEDURE — A9270 NON-COVERED ITEM OR SERVICE: HCPCS | Performed by: SURGERY

## 2021-10-10 PROCEDURE — P9016 RBC LEUKOCYTES REDUCED: HCPCS | Mod: 91

## 2021-10-10 PROCEDURE — 700101 HCHG RX REV CODE 250: Performed by: SURGERY

## 2021-10-10 PROCEDURE — 30233N1 TRANSFUSION OF NONAUTOLOGOUS RED BLOOD CELLS INTO PERIPHERAL VEIN, PERCUTANEOUS APPROACH: ICD-10-PCS | Performed by: SURGERY

## 2021-10-10 PROCEDURE — 85520 HEPARIN ASSAY: CPT | Mod: 91

## 2021-10-10 PROCEDURE — 770022 HCHG ROOM/CARE - ICU (200)

## 2021-10-10 PROCEDURE — A9270 NON-COVERED ITEM OR SERVICE: HCPCS | Performed by: ORTHOPAEDIC SURGERY

## 2021-10-10 PROCEDURE — 81001 URINALYSIS AUTO W/SCOPE: CPT

## 2021-10-10 PROCEDURE — 700102 HCHG RX REV CODE 250 W/ 637 OVERRIDE(OP): Performed by: NURSE PRACTITIONER

## 2021-10-10 PROCEDURE — 700102 HCHG RX REV CODE 250 W/ 637 OVERRIDE(OP): Performed by: SURGERY

## 2021-10-10 PROCEDURE — 82140 ASSAY OF AMMONIA: CPT

## 2021-10-10 PROCEDURE — 99024 POSTOP FOLLOW-UP VISIT: CPT | Performed by: ORTHOPAEDIC SURGERY

## 2021-10-10 PROCEDURE — 82962 GLUCOSE BLOOD TEST: CPT

## 2021-10-10 PROCEDURE — 85027 COMPLETE CBC AUTOMATED: CPT | Mod: 91

## 2021-10-10 PROCEDURE — 700111 HCHG RX REV CODE 636 W/ 250 OVERRIDE (IP): Performed by: NURSE PRACTITIONER

## 2021-10-10 PROCEDURE — 36430 TRANSFUSION BLD/BLD COMPNT: CPT

## 2021-10-10 PROCEDURE — A9270 NON-COVERED ITEM OR SERVICE: HCPCS | Performed by: NURSE PRACTITIONER

## 2021-10-10 RX ORDER — SODIUM CHLORIDE 9 MG/ML
INJECTION, SOLUTION INTRAVENOUS CONTINUOUS
Status: DISCONTINUED | OUTPATIENT
Start: 2021-10-10 | End: 2021-10-13

## 2021-10-10 RX ORDER — PROTAMINE SULFATE 10 MG/ML
10 INJECTION, SOLUTION INTRAVENOUS ONCE
Status: COMPLETED | OUTPATIENT
Start: 2021-10-10 | End: 2021-10-10

## 2021-10-10 RX ORDER — LORAZEPAM 2 MG/ML
1 INJECTION INTRAMUSCULAR EVERY 4 HOURS PRN
Status: DISCONTINUED | OUTPATIENT
Start: 2021-10-10 | End: 2021-10-10

## 2021-10-10 RX ORDER — QUETIAPINE FUMARATE 25 MG/1
50 TABLET, FILM COATED ORAL 3 TIMES DAILY
Status: DISCONTINUED | OUTPATIENT
Start: 2021-10-10 | End: 2021-10-12

## 2021-10-10 RX ORDER — LORAZEPAM 2 MG/ML
1 INJECTION INTRAMUSCULAR
Status: DISCONTINUED | OUTPATIENT
Start: 2021-10-10 | End: 2021-10-13

## 2021-10-10 RX ADMIN — ACETAMINOPHEN 1000 MG: 500 TABLET ORAL at 11:37

## 2021-10-10 RX ADMIN — SODIUM CHLORIDE: 9 INJECTION, SOLUTION INTRAVENOUS at 00:16

## 2021-10-10 RX ADMIN — HEPARIN SODIUM 18 UNITS/KG/HR: 5000 INJECTION, SOLUTION INTRAVENOUS at 05:01

## 2021-10-10 RX ADMIN — SODIUM CHLORIDE: 9 INJECTION, SOLUTION INTRAVENOUS at 13:58

## 2021-10-10 RX ADMIN — PROTAMINE SULFATE 25 MG: 10 INJECTION, SOLUTION INTRAVENOUS at 20:42

## 2021-10-10 RX ADMIN — LORAZEPAM 1 MG: 2 INJECTION INTRAMUSCULAR; INTRAVENOUS at 05:13

## 2021-10-10 RX ADMIN — OXYCODONE HYDROCHLORIDE 10 MG: 10 TABLET ORAL at 14:51

## 2021-10-10 RX ADMIN — ONDANSETRON 4 MG: 2 INJECTION INTRAMUSCULAR; INTRAVENOUS at 19:44

## 2021-10-10 RX ADMIN — HYDROMORPHONE HYDROCHLORIDE 0.5 MG: 1 INJECTION, SOLUTION INTRAMUSCULAR; INTRAVENOUS; SUBCUTANEOUS at 07:54

## 2021-10-10 RX ADMIN — POTASSIUM PHOSPHATE, MONOBASIC AND POTASSIUM PHOSPHATE, DIBASIC 30 MMOL: 224; 236 INJECTION, SOLUTION, CONCENTRATE INTRAVENOUS at 11:02

## 2021-10-10 RX ADMIN — QUETIAPINE FUMARATE 50 MG: 25 TABLET ORAL at 17:26

## 2021-10-10 RX ADMIN — SODIUM CHLORIDE: 9 INJECTION, SOLUTION INTRAVENOUS at 07:33

## 2021-10-10 RX ADMIN — HYDROMORPHONE HYDROCHLORIDE 0.5 MG: 1 INJECTION, SOLUTION INTRAMUSCULAR; INTRAVENOUS; SUBCUTANEOUS at 23:22

## 2021-10-10 RX ADMIN — QUETIAPINE FUMARATE 50 MG: 25 TABLET ORAL at 11:37

## 2021-10-10 RX ADMIN — ALBUTEROL SULFATE 2 PUFF: 90 AEROSOL, METERED RESPIRATORY (INHALATION) at 21:49

## 2021-10-10 RX ADMIN — ACETAMINOPHEN 1000 MG: 500 TABLET ORAL at 17:25

## 2021-10-10 RX ADMIN — HYDROMORPHONE HYDROCHLORIDE 0.5 MG: 1 INJECTION, SOLUTION INTRAMUSCULAR; INTRAVENOUS; SUBCUTANEOUS at 22:34

## 2021-10-10 RX ADMIN — LORAZEPAM 1 MG: 2 INJECTION INTRAMUSCULAR; INTRAVENOUS at 22:08

## 2021-10-10 RX ADMIN — OXYCODONE HYDROCHLORIDE 10 MG: 10 TABLET ORAL at 20:58

## 2021-10-10 ASSESSMENT — PAIN DESCRIPTION - PAIN TYPE
TYPE: ACUTE PAIN;SURGICAL PAIN

## 2021-10-10 ASSESSMENT — FIBROSIS 4 INDEX: FIB4 SCORE: 2.04

## 2021-10-10 NOTE — PROGRESS NOTES
2047 Entered patient room and patients heart rate was 166 with respirations at 100.  Patient had a significant decline in mental status from start of shift.  Patient wasn't able to respond appropriately to questions and was difficult to arouse.  Called rapid, notified charge nurse and trauma APRN Serena Nettles.     Rapid response team arrived, preformed assessment of patient. It was decided that patient should be transferred to ICU for care.   Patient transferred to ICU by rapid team.

## 2021-10-10 NOTE — PROGRESS NOTES
DVT RLE    /90   Pulse (!) 132   Temp 36.5 °C (97.7 °F) (Temporal)   Resp (!) 21   Ht 1.829 m (6')   Wt 91.3 kg (201 lb 4.5 oz)   SpO2 95%     Recent Labs     10/08/21  2032 10/10/21  0025 10/10/21  0420   WBC 9.1 17.7* 17.1*   RBC 5.12 2.89* 2.72*   HEMOGLOBIN 13.8* 7.8* 7.5*   HEMATOCRIT 42.4 23.8* 22.1*   MCV 82.8 82.4 81.3*   MCH 27.0 27.0 27.6   MCHC 32.5* 32.8* 33.9   RDW 40.0 39.8 38.8   PLATELETCT 296 236 237   MPV 8.8* 9.4 10.0     In ICU  Dressing clean dry and intact  Neurovascularly intact    POD#1  S/P ORIF intertrochanteric right hip fracture and IMN femoral shaft and distal femur    Plan:  DVT Prophylaxis- TEDS/SCDs, lovenox while in hospital, ASA 81 mg PO BID as outpatient  Weight Bearing Status-TTWB RLE x 6 weeks  PT/OT  Antibiotics: 24 hrs post op  Case Coordination

## 2021-10-10 NOTE — PROGRESS NOTES
Patient arrived from Ortho [facility] to SICU S126.   HR = 108  bp = 137/80  RR = 22  Spo2 = 99 % 2 LOxy mask.   no accessory muscles with breathing pattern .   Skin = surgical site at right thigh area intact with surrounding bruising on right leg.

## 2021-10-10 NOTE — PROGRESS NOTES
4977 Received call from lab of critical result of blood gas pH 7.70 and Hgb of 7.9. Asked for redraw of Hgb due to drop from 13.8 to 7.9. Called and updated Serena BRAGG on critical labs.

## 2021-10-11 ENCOUNTER — APPOINTMENT (OUTPATIENT)
Dept: RADIOLOGY | Facility: MEDICAL CENTER | Age: 46
DRG: 481 | End: 2021-10-11
Attending: SURGERY
Payer: MEDICAID

## 2021-10-11 PROBLEM — D62 ACUTE BLOOD LOSS ANEMIA: Status: ACTIVE | Noted: 2021-10-11

## 2021-10-11 LAB
ALBUMIN SERPL BCP-MCNC: 2.7 G/DL (ref 3.2–4.9)
ANION GAP SERPL CALC-SCNC: 9 MMOL/L (ref 7–16)
BARCODED ABORH UBTYP: 600
BARCODED ABORH UBTYP: 6200
BARCODED PRD CODE UBPRD: NORMAL
BARCODED UNIT NUM UBUNT: NORMAL
BASOPHILS # BLD AUTO: 0.2 % (ref 0–1.8)
BASOPHILS # BLD: 0.02 K/UL (ref 0–0.12)
BUN SERPL-MCNC: 19 MG/DL (ref 8–22)
CALCIUM SERPL-MCNC: 6.8 MG/DL (ref 8.5–10.5)
CHLORIDE SERPL-SCNC: 108 MMOL/L (ref 96–112)
CO2 SERPL-SCNC: 20 MMOL/L (ref 20–33)
COMPONENT R 8504R: NORMAL
CREAT SERPL-MCNC: 0.72 MG/DL (ref 0.5–1.4)
EOSINOPHIL # BLD AUTO: 0 K/UL (ref 0–0.51)
EOSINOPHIL NFR BLD: 0 % (ref 0–6.9)
ERYTHROCYTE [DISTWIDTH] IN BLOOD BY AUTOMATED COUNT: 41.3 FL (ref 35.9–50)
ERYTHROCYTE [DISTWIDTH] IN BLOOD BY AUTOMATED COUNT: 42.5 FL (ref 35.9–50)
GLUCOSE BLD-MCNC: 103 MG/DL (ref 65–99)
GLUCOSE SERPL-MCNC: 103 MG/DL (ref 65–99)
HCT VFR BLD AUTO: 16.4 % (ref 42–52)
HCT VFR BLD AUTO: 17.6 % (ref 42–52)
HGB BLD-MCNC: 5.6 G/DL (ref 14–18)
HGB BLD-MCNC: 6 G/DL (ref 14–18)
IMM GRANULOCYTES # BLD AUTO: 0.1 K/UL (ref 0–0.11)
IMM GRANULOCYTES NFR BLD AUTO: 0.8 % (ref 0–0.9)
LYMPHOCYTES # BLD AUTO: 2.11 K/UL (ref 1–4.8)
LYMPHOCYTES NFR BLD: 16.4 % (ref 22–41)
MAGNESIUM SERPL-MCNC: 2.1 MG/DL (ref 1.5–2.5)
MCH RBC QN AUTO: 28.3 PG (ref 27–33)
MCH RBC QN AUTO: 28.3 PG (ref 27–33)
MCHC RBC AUTO-ENTMCNC: 34.1 G/DL (ref 33.7–35.3)
MCHC RBC AUTO-ENTMCNC: 34.1 G/DL (ref 33.7–35.3)
MCV RBC AUTO: 82.8 FL (ref 81.4–97.8)
MCV RBC AUTO: 83 FL (ref 81.4–97.8)
MONOCYTES # BLD AUTO: 1.11 K/UL (ref 0–0.85)
MONOCYTES NFR BLD AUTO: 8.6 % (ref 0–13.4)
NEUTROPHILS # BLD AUTO: 9.52 K/UL (ref 1.82–7.42)
NEUTROPHILS NFR BLD: 74 % (ref 44–72)
NRBC # BLD AUTO: 0 K/UL
NRBC BLD-RTO: 0 /100 WBC
PHOSPHATE SERPL-MCNC: 2.6 MG/DL (ref 2.5–4.5)
PLATELET # BLD AUTO: 130 K/UL (ref 164–446)
PLATELET # BLD AUTO: 137 K/UL (ref 164–446)
PMV BLD AUTO: 10.1 FL (ref 9–12.9)
PMV BLD AUTO: 9.6 FL (ref 9–12.9)
POTASSIUM SERPL-SCNC: 3.1 MMOL/L (ref 3.6–5.5)
PRODUCT TYPE UPROD: NORMAL
RBC # BLD AUTO: 1.98 M/UL (ref 4.7–6.1)
RBC # BLD AUTO: 2.12 M/UL (ref 4.7–6.1)
SODIUM SERPL-SCNC: 137 MMOL/L (ref 135–145)
UNIT STATUS USTAT: NORMAL
WBC # BLD AUTO: 12.5 K/UL (ref 4.8–10.8)
WBC # BLD AUTO: 12.9 K/UL (ref 4.8–10.8)

## 2021-10-11 PROCEDURE — 82962 GLUCOSE BLOOD TEST: CPT

## 2021-10-11 PROCEDURE — 85025 COMPLETE CBC W/AUTO DIFF WBC: CPT

## 2021-10-11 PROCEDURE — 700105 HCHG RX REV CODE 258: Performed by: SURGERY

## 2021-10-11 PROCEDURE — 82040 ASSAY OF SERUM ALBUMIN: CPT

## 2021-10-11 PROCEDURE — 86923 COMPATIBILITY TEST ELECTRIC: CPT

## 2021-10-11 PROCEDURE — A9270 NON-COVERED ITEM OR SERVICE: HCPCS | Performed by: SURGERY

## 2021-10-11 PROCEDURE — A9270 NON-COVERED ITEM OR SERVICE: HCPCS | Performed by: NURSE PRACTITIONER

## 2021-10-11 PROCEDURE — 700111 HCHG RX REV CODE 636 W/ 250 OVERRIDE (IP): Performed by: SURGERY

## 2021-10-11 PROCEDURE — 700102 HCHG RX REV CODE 250 W/ 637 OVERRIDE(OP): Performed by: ORTHOPAEDIC SURGERY

## 2021-10-11 PROCEDURE — 80048 BASIC METABOLIC PNL TOTAL CA: CPT

## 2021-10-11 PROCEDURE — 700111 HCHG RX REV CODE 636 W/ 250 OVERRIDE (IP)

## 2021-10-11 PROCEDURE — 700111 HCHG RX REV CODE 636 W/ 250 OVERRIDE (IP): Performed by: RADIOLOGY

## 2021-10-11 PROCEDURE — 700102 HCHG RX REV CODE 250 W/ 637 OVERRIDE(OP): Performed by: NURSE PRACTITIONER

## 2021-10-11 PROCEDURE — 700102 HCHG RX REV CODE 250 W/ 637 OVERRIDE(OP): Performed by: SURGERY

## 2021-10-11 PROCEDURE — P9016 RBC LEUKOCYTES REDUCED: HCPCS | Mod: 91

## 2021-10-11 PROCEDURE — 85027 COMPLETE CBC AUTOMATED: CPT

## 2021-10-11 PROCEDURE — 700111 HCHG RX REV CODE 636 W/ 250 OVERRIDE (IP): Performed by: NURSE PRACTITIONER

## 2021-10-11 PROCEDURE — 83735 ASSAY OF MAGNESIUM: CPT

## 2021-10-11 PROCEDURE — 770022 HCHG ROOM/CARE - ICU (200)

## 2021-10-11 PROCEDURE — 99153 MOD SED SAME PHYS/QHP EA: CPT

## 2021-10-11 PROCEDURE — A9270 NON-COVERED ITEM OR SERVICE: HCPCS | Performed by: ORTHOPAEDIC SURGERY

## 2021-10-11 PROCEDURE — 99291 CRITICAL CARE FIRST HOUR: CPT | Performed by: SURGERY

## 2021-10-11 PROCEDURE — 84100 ASSAY OF PHOSPHORUS: CPT

## 2021-10-11 PROCEDURE — 06H03DZ INSERTION OF INTRALUMINAL DEVICE INTO INFERIOR VENA CAVA, PERCUTANEOUS APPROACH: ICD-10-PCS | Performed by: RADIOLOGY

## 2021-10-11 PROCEDURE — 700117 HCHG RX CONTRAST REV CODE 255: Performed by: SURGERY

## 2021-10-11 PROCEDURE — 36430 TRANSFUSION BLD/BLD COMPNT: CPT

## 2021-10-11 PROCEDURE — 700101 HCHG RX REV CODE 250: Performed by: SURGERY

## 2021-10-11 PROCEDURE — 99024 POSTOP FOLLOW-UP VISIT: CPT | Performed by: ORTHOPAEDIC SURGERY

## 2021-10-11 RX ORDER — HALOPERIDOL 5 MG/ML
5 INJECTION INTRAMUSCULAR ONCE
Status: COMPLETED | OUTPATIENT
Start: 2021-10-11 | End: 2021-10-11

## 2021-10-11 RX ORDER — ONDANSETRON 2 MG/ML
4 INJECTION INTRAMUSCULAR; INTRAVENOUS PRN
Status: DISCONTINUED | OUTPATIENT
Start: 2021-10-11 | End: 2021-10-11 | Stop reason: HOSPADM

## 2021-10-11 RX ORDER — MIDAZOLAM HYDROCHLORIDE 1 MG/ML
.5-2 INJECTION INTRAMUSCULAR; INTRAVENOUS PRN
Status: DISCONTINUED | OUTPATIENT
Start: 2021-10-11 | End: 2021-10-11 | Stop reason: HOSPADM

## 2021-10-11 RX ORDER — DIPHENHYDRAMINE HYDROCHLORIDE 50 MG/ML
INJECTION INTRAMUSCULAR; INTRAVENOUS
Status: COMPLETED
Start: 2021-10-11 | End: 2021-10-11

## 2021-10-11 RX ORDER — MIDAZOLAM HYDROCHLORIDE 1 MG/ML
INJECTION INTRAMUSCULAR; INTRAVENOUS
Status: DISPENSED
Start: 2021-10-11 | End: 2021-10-12

## 2021-10-11 RX ORDER — SODIUM CHLORIDE 9 MG/ML
500 INJECTION, SOLUTION INTRAVENOUS
Status: DISCONTINUED | OUTPATIENT
Start: 2021-10-11 | End: 2021-10-11 | Stop reason: HOSPADM

## 2021-10-11 RX ORDER — HYDROMORPHONE HYDROCHLORIDE 1 MG/ML
0.5 INJECTION, SOLUTION INTRAMUSCULAR; INTRAVENOUS; SUBCUTANEOUS PRN
Status: DISCONTINUED | OUTPATIENT
Start: 2021-10-11 | End: 2021-10-11 | Stop reason: HOSPADM

## 2021-10-11 RX ORDER — MIDAZOLAM HYDROCHLORIDE 1 MG/ML
INJECTION INTRAMUSCULAR; INTRAVENOUS
Status: COMPLETED
Start: 2021-10-11 | End: 2021-10-11

## 2021-10-11 RX ORDER — HALOPERIDOL 5 MG/ML
INJECTION INTRAMUSCULAR
Status: COMPLETED
Start: 2021-10-11 | End: 2021-10-11

## 2021-10-11 RX ORDER — POTASSIUM CHLORIDE 7.45 MG/ML
10 INJECTION INTRAVENOUS
Status: COMPLETED | OUTPATIENT
Start: 2021-10-11 | End: 2021-10-11

## 2021-10-11 RX ORDER — DIPHENHYDRAMINE HYDROCHLORIDE 50 MG/ML
50 INJECTION INTRAMUSCULAR; INTRAVENOUS ONCE
Status: COMPLETED | OUTPATIENT
Start: 2021-10-11 | End: 2021-10-11

## 2021-10-11 RX ADMIN — HALOPERIDOL LACTATE 5 MG: 5 INJECTION, SOLUTION INTRAMUSCULAR at 17:50

## 2021-10-11 RX ADMIN — DIPHENHYDRAMINE HYDROCHLORIDE 50 MG: 50 INJECTION INTRAMUSCULAR; INTRAVENOUS at 17:57

## 2021-10-11 RX ADMIN — FENTANYL CITRATE 25 MCG: 50 INJECTION, SOLUTION INTRAMUSCULAR; INTRAVENOUS at 17:34

## 2021-10-11 RX ADMIN — QUETIAPINE FUMARATE 50 MG: 25 TABLET ORAL at 12:42

## 2021-10-11 RX ADMIN — OXYCODONE HYDROCHLORIDE 10 MG: 10 TABLET ORAL at 04:29

## 2021-10-11 RX ADMIN — KETAMINE HYDROCHLORIDE 25 MG: 10 INJECTION, SOLUTION INTRAMUSCULAR; INTRAVENOUS at 19:23

## 2021-10-11 RX ADMIN — POTASSIUM CHLORIDE 10 MEQ: 7.45 INJECTION INTRAVENOUS at 10:15

## 2021-10-11 RX ADMIN — MIDAZOLAM HYDROCHLORIDE 1 MG: 1 INJECTION, SOLUTION INTRAMUSCULAR; INTRAVENOUS at 17:25

## 2021-10-11 RX ADMIN — ACETAMINOPHEN 1000 MG: 500 TABLET ORAL at 19:32

## 2021-10-11 RX ADMIN — FENTANYL CITRATE 25 MCG: 50 INJECTION, SOLUTION INTRAMUSCULAR; INTRAVENOUS at 17:25

## 2021-10-11 RX ADMIN — OXYCODONE HYDROCHLORIDE 10 MG: 10 TABLET ORAL at 14:06

## 2021-10-11 RX ADMIN — ACETAMINOPHEN 1000 MG: 500 TABLET ORAL at 23:55

## 2021-10-11 RX ADMIN — MIDAZOLAM HYDROCHLORIDE 2 MG: 1 INJECTION, SOLUTION INTRAMUSCULAR; INTRAVENOUS at 17:10

## 2021-10-11 RX ADMIN — FENTANYL CITRATE 50 MCG: 50 INJECTION, SOLUTION INTRAMUSCULAR; INTRAVENOUS at 17:10

## 2021-10-11 RX ADMIN — POTASSIUM CHLORIDE 10 MEQ: 7.45 INJECTION INTRAVENOUS at 11:30

## 2021-10-11 RX ADMIN — QUETIAPINE FUMARATE 50 MG: 25 TABLET ORAL at 04:29

## 2021-10-11 RX ADMIN — HYDROMORPHONE HYDROCHLORIDE 1 MG: 1 INJECTION, SOLUTION INTRAMUSCULAR; INTRAVENOUS; SUBCUTANEOUS at 12:00

## 2021-10-11 RX ADMIN — FENTANYL CITRATE 50 MCG: 50 INJECTION INTRAMUSCULAR; INTRAVENOUS at 17:10

## 2021-10-11 RX ADMIN — HYDROMORPHONE HYDROCHLORIDE 1 MG: 1 INJECTION, SOLUTION INTRAMUSCULAR; INTRAVENOUS; SUBCUTANEOUS at 09:10

## 2021-10-11 RX ADMIN — MIDAZOLAM HYDROCHLORIDE 1 MG: 1 INJECTION, SOLUTION INTRAMUSCULAR; INTRAVENOUS at 17:34

## 2021-10-11 RX ADMIN — POTASSIUM CHLORIDE 10 MEQ: 7.45 INJECTION INTRAVENOUS at 15:00

## 2021-10-11 RX ADMIN — POTASSIUM CHLORIDE 10 MEQ: 7.45 INJECTION INTRAVENOUS at 12:57

## 2021-10-11 RX ADMIN — HYDROMORPHONE HYDROCHLORIDE 1 MG: 1 INJECTION, SOLUTION INTRAMUSCULAR; INTRAVENOUS; SUBCUTANEOUS at 18:30

## 2021-10-11 RX ADMIN — CALCIUM CHLORIDE 1000 MG: 100 INJECTION, SOLUTION INTRAVENOUS at 10:00

## 2021-10-11 RX ADMIN — DOCUSATE SODIUM 100 MG: 100 CAPSULE ORAL at 19:33

## 2021-10-11 RX ADMIN — DOCUSATE SODIUM 100 MG: 100 CAPSULE ORAL at 04:29

## 2021-10-11 RX ADMIN — ACETAMINOPHEN 1000 MG: 500 TABLET ORAL at 11:30

## 2021-10-11 RX ADMIN — HYDROMORPHONE HYDROCHLORIDE 1 MG: 1 INJECTION, SOLUTION INTRAMUSCULAR; INTRAVENOUS; SUBCUTANEOUS at 23:44

## 2021-10-11 RX ADMIN — ACETAMINOPHEN 1000 MG: 500 TABLET ORAL at 00:11

## 2021-10-11 RX ADMIN — HALOPERIDOL 5 MG: 5 INJECTION INTRAMUSCULAR at 17:50

## 2021-10-11 RX ADMIN — ACETAMINOPHEN 1000 MG: 500 TABLET ORAL at 04:29

## 2021-10-11 RX ADMIN — IOHEXOL 60 ML: 300 INJECTION, SOLUTION INTRAVENOUS at 18:00

## 2021-10-11 RX ADMIN — QUETIAPINE FUMARATE 50 MG: 25 TABLET ORAL at 19:33

## 2021-10-11 RX ADMIN — ONDANSETRON 4 MG: 4 TABLET, ORALLY DISINTEGRATING ORAL at 11:30

## 2021-10-11 RX ADMIN — MIDAZOLAM HYDROCHLORIDE 2 MG: 1 INJECTION, SOLUTION INTRAMUSCULAR; INTRAVENOUS at 17:46

## 2021-10-11 RX ADMIN — LORAZEPAM 1 MG: 2 INJECTION INTRAMUSCULAR; INTRAVENOUS at 00:10

## 2021-10-11 ASSESSMENT — PAIN DESCRIPTION - PAIN TYPE
TYPE: ACUTE PAIN;SURGICAL PAIN
TYPE: ACUTE PAIN;SURGICAL PAIN
TYPE: ACUTE PAIN
TYPE: ACUTE PAIN;SURGICAL PAIN
TYPE: ACUTE PAIN
TYPE: ACUTE PAIN;SURGICAL PAIN
TYPE: ACUTE PAIN;SURGICAL PAIN
TYPE: ACUTE PAIN

## 2021-10-11 NOTE — PROGRESS NOTES
Cleaning patient and providing Q2 hour turns, assessed patient's right leg and noted increased swelling in the thigh, hip dressing now fully saturated with blood and beginning to leak, site is still bruised and warm, skin is taut, and patient reports increased pain that is different than previous pain assessments.    Dr. [place] notified, CBC sent to lab.    1835: Dr. [place] at bedside for evaluation of patient. Orders received, Heparin infusion stopped.    1845: Report given to NOC RN, assessment of site completed with RN for baseline at start of shift. NOC RN updated on plan to report results from CBC back to Dr. [place] once resulted.
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
## 2021-10-11 NOTE — PROGRESS NOTES
Received report from night shift RNAlthea. Assumed care of pt @ 1900. Pt reports no pain at this time. Updated pt on plan of care. Pt resting comfortably in bed. Fall precautions in place. Educated on use of call light. Hourly rounding and continuous monitoring in place.    COVID-19 surge, crisis charting in place.

## 2021-10-11 NOTE — ASSESSMENT & PLAN NOTE
Ongoing blood loss from femur post repair associated with heparin drip for DVT.  10/11 Transfused 1 uPRBC.  10/14 Transfused 1 uPRBC.  10/16 Iron studies low, replacement per pharmacy.  10/18 Transfused 1 uPRBC.  10/20 Transfused 1 uPRBC.  10/26 Transfusion not required  Trend laboratory studies.  Transfuse 1 unit PRBC's for hemoglobin less than 7.

## 2021-10-11 NOTE — PROGRESS NOTES
LAB from Lab called with critical result of HGB 5.0 at 1920. Critical lab result read back to lab.   Dr. ojeda notified of critical lab result at 1922.  Critical lab result read back by Dr. Ojeda.  Order received to redraw hgb.

## 2021-10-11 NOTE — PROGRESS NOTES
IR RN note    Patient underwent a Inferior Vena Cava Filter Placement by Dr. Alvarado.  Procedure site was verified by MD using imaging guidance. Consent was signed over the phone with son Wero.  IR armando Yoon and Safia assisted. Patient was placed in a supine position.  Vitals were taken every 5 minutes and remained stable during procedure (see doc flow sheet for results).  CO2 waveform capnography was monitored throughout procedure, see chart.  Right femoral and Right IJ access site.  During procedure, filter engaged prior to site, retrieved through right IJ and replaced new filter. A gauze and tegaderm dressing was placed over surgical site. Report called to Aleena GRANT. Pt transported by bed with RN to S126, with monitor.     [employer] Option Elite Retrievable Vena Cava Filter  REF # [id number]  LOT # [id number]  Exp. 9/7/24

## 2021-10-11 NOTE — THERAPY
PT consult received. Per EMR, pt transfers to ICU following initial therapy orders.  Pt's hemoglobin currently 5.6. Will defer PT evaluation until more medically stable for mobility. Thanks    Aurora Olvera, PT, DPT [employer]te [phone]

## 2021-10-11 NOTE — PROGRESS NOTES
Patient seen and examined  Arousable    /64   Pulse 95   Temp 37 °C (98.6 °F) (Temporal)   Resp 20   Ht 1.829 m (6')   Wt 91.3 kg (201 lb 4.5 oz)   SpO2 93%     Recent Labs     10/10/21  1810 10/10/21  1920 10/11/21  0400   WBC 15.8* 15.1* 12.9*   RBC 1.83* 1.79* 1.98*   HEMOGLOBIN 5.0* 5.0* 5.6*   HEMATOCRIT 15.0* 14.7* 16.4*   MCV 82.0 82.1 82.8   MCH 27.3 27.9 28.3   MCHC 33.3* 34.0 34.1   RDW 39.5 40.5 41.3   PLATELETCT 170 159* 130*   MPV 9.7 9.8 9.6       No acute distress  Some drainege on dressing  Thigh swollen but soft  Neurovascularly intact    POD#2  S/P ORIF IT femur and IMN femur    Plan:  DVT Prophylaxis- TEDS/SCDs, Chemo per trauma  Weight Bearing Status-TTWb x 6 weeks  PT/OT  Antibiotics: None  Case Coordination

## 2021-10-11 NOTE — CARE PLAN
The patient is watcher    Shift Goals  Clinical Goals: LOC, hemodinamics  Patient Goals: rest; pain relief  Family Goals: no family at bedside    Progress made toward(s) clinical / shift goals:    Problem: Fall Risk  Goal: Patient will remain free from falls  Outcome: Progressing       Patient is not progressing towards the following goals:      Problem: Pain - Standard  Goal: Alleviation of pain or a reduction in pain to the patient’s comfort goal  Outcome: Not Met     Problem: Knowledge Deficit - Standard  Goal: Patient and family/care givers will demonstrate understanding of plan of care, disease process/condition, diagnostic tests and medications  Outcome: Not Met

## 2021-10-12 ENCOUNTER — APPOINTMENT (OUTPATIENT)
Dept: RADIOLOGY | Facility: MEDICAL CENTER | Age: 46
DRG: 481 | End: 2021-10-12
Attending: SURGERY
Payer: MEDICAID

## 2021-10-12 PROBLEM — R41.0 DELIRIUM: Status: ACTIVE | Noted: 2021-10-12

## 2021-10-12 LAB
ANION GAP SERPL CALC-SCNC: 11 MMOL/L (ref 7–16)
BASOPHILS # BLD AUTO: 0.2 % (ref 0–1.8)
BASOPHILS # BLD AUTO: 0.3 % (ref 0–1.8)
BASOPHILS # BLD: 0.02 K/UL (ref 0–0.12)
BASOPHILS # BLD: 0.03 K/UL (ref 0–0.12)
BASOPHILS # BLD: 0.03 K/UL (ref 0–0.12)
BUN SERPL-MCNC: 11 MG/DL (ref 8–22)
CALCIUM SERPL-MCNC: 7.7 MG/DL (ref 8.5–10.5)
CHLORIDE SERPL-SCNC: 102 MMOL/L (ref 96–112)
CO2 SERPL-SCNC: 19 MMOL/L (ref 20–33)
CREAT SERPL-MCNC: 0.59 MG/DL (ref 0.5–1.4)
EOSINOPHIL # BLD AUTO: 0.02 K/UL (ref 0–0.51)
EOSINOPHIL # BLD AUTO: 0.03 K/UL (ref 0–0.51)
EOSINOPHIL # BLD AUTO: 0.04 K/UL (ref 0–0.51)
EOSINOPHIL # BLD AUTO: 0.07 K/UL (ref 0–0.51)
EOSINOPHIL # BLD AUTO: 0.12 K/UL (ref 0–0.51)
EOSINOPHIL NFR BLD: 0.2 % (ref 0–6.9)
EOSINOPHIL NFR BLD: 0.3 % (ref 0–6.9)
EOSINOPHIL NFR BLD: 0.3 % (ref 0–6.9)
EOSINOPHIL NFR BLD: 0.6 % (ref 0–6.9)
EOSINOPHIL NFR BLD: 1 % (ref 0–6.9)
ERYTHROCYTE [DISTWIDTH] IN BLOOD BY AUTOMATED COUNT: 40.6 FL (ref 35.9–50)
ERYTHROCYTE [DISTWIDTH] IN BLOOD BY AUTOMATED COUNT: 40.8 FL (ref 35.9–50)
ERYTHROCYTE [DISTWIDTH] IN BLOOD BY AUTOMATED COUNT: 41.5 FL (ref 35.9–50)
ERYTHROCYTE [DISTWIDTH] IN BLOOD BY AUTOMATED COUNT: 42.8 FL (ref 35.9–50)
ERYTHROCYTE [DISTWIDTH] IN BLOOD BY AUTOMATED COUNT: 43.3 FL (ref 35.9–50)
GLUCOSE SERPL-MCNC: 74 MG/DL (ref 65–99)
HCT VFR BLD AUTO: 20.2 % (ref 42–52)
HCT VFR BLD AUTO: 20.3 % (ref 42–52)
HCT VFR BLD AUTO: 21.4 % (ref 42–52)
HCT VFR BLD AUTO: 22 % (ref 42–52)
HCT VFR BLD AUTO: 23.6 % (ref 42–52)
HGB BLD-MCNC: 7 G/DL (ref 14–18)
HGB BLD-MCNC: 7.1 G/DL (ref 14–18)
HGB BLD-MCNC: 7.1 G/DL (ref 14–18)
HGB BLD-MCNC: 7.6 G/DL (ref 14–18)
HGB BLD-MCNC: 8.3 G/DL (ref 14–18)
IMM GRANULOCYTES # BLD AUTO: 0.19 K/UL (ref 0–0.11)
IMM GRANULOCYTES # BLD AUTO: 0.2 K/UL (ref 0–0.11)
IMM GRANULOCYTES # BLD AUTO: 0.21 K/UL (ref 0–0.11)
IMM GRANULOCYTES # BLD AUTO: 0.23 K/UL (ref 0–0.11)
IMM GRANULOCYTES # BLD AUTO: 0.25 K/UL (ref 0–0.11)
IMM GRANULOCYTES NFR BLD AUTO: 1.5 % (ref 0–0.9)
IMM GRANULOCYTES NFR BLD AUTO: 1.7 % (ref 0–0.9)
IMM GRANULOCYTES NFR BLD AUTO: 1.7 % (ref 0–0.9)
IMM GRANULOCYTES NFR BLD AUTO: 1.8 % (ref 0–0.9)
IMM GRANULOCYTES NFR BLD AUTO: 1.9 % (ref 0–0.9)
LYMPHOCYTES # BLD AUTO: 1.76 K/UL (ref 1–4.8)
LYMPHOCYTES # BLD AUTO: 1.8 K/UL (ref 1–4.8)
LYMPHOCYTES # BLD AUTO: 2.06 K/UL (ref 1–4.8)
LYMPHOCYTES # BLD AUTO: 2.43 K/UL (ref 1–4.8)
LYMPHOCYTES # BLD AUTO: 2.46 K/UL (ref 1–4.8)
LYMPHOCYTES NFR BLD: 12.6 % (ref 22–41)
LYMPHOCYTES NFR BLD: 15.7 % (ref 22–41)
LYMPHOCYTES NFR BLD: 16.4 % (ref 22–41)
LYMPHOCYTES NFR BLD: 19.2 % (ref 22–41)
LYMPHOCYTES NFR BLD: 20.3 % (ref 22–41)
MAGNESIUM SERPL-MCNC: 2 MG/DL (ref 1.5–2.5)
MCH RBC QN AUTO: 28.1 PG (ref 27–33)
MCH RBC QN AUTO: 28.6 PG (ref 27–33)
MCH RBC QN AUTO: 28.9 PG (ref 27–33)
MCH RBC QN AUTO: 29 PG (ref 27–33)
MCH RBC QN AUTO: 29.2 PG (ref 27–33)
MCHC RBC AUTO-ENTMCNC: 33.2 G/DL (ref 33.7–35.3)
MCHC RBC AUTO-ENTMCNC: 34.5 G/DL (ref 33.7–35.3)
MCHC RBC AUTO-ENTMCNC: 34.7 G/DL (ref 33.7–35.3)
MCHC RBC AUTO-ENTMCNC: 35 G/DL (ref 33.7–35.3)
MCHC RBC AUTO-ENTMCNC: 35.2 G/DL (ref 33.7–35.3)
MCV RBC AUTO: 82.2 FL (ref 81.4–97.8)
MCV RBC AUTO: 82.7 FL (ref 81.4–97.8)
MCV RBC AUTO: 83.5 FL (ref 81.4–97.8)
MCV RBC AUTO: 83.8 FL (ref 81.4–97.8)
MCV RBC AUTO: 84.6 FL (ref 81.4–97.8)
MONOCYTES # BLD AUTO: 0.73 K/UL (ref 0–0.85)
MONOCYTES # BLD AUTO: 0.76 K/UL (ref 0–0.85)
MONOCYTES # BLD AUTO: 0.84 K/UL (ref 0–0.85)
MONOCYTES # BLD AUTO: 0.93 K/UL (ref 0–0.85)
MONOCYTES # BLD AUTO: 1.01 K/UL (ref 0–0.85)
MONOCYTES NFR BLD AUTO: 5.5 % (ref 0–13.4)
MONOCYTES NFR BLD AUTO: 6 % (ref 0–13.4)
MONOCYTES NFR BLD AUTO: 7.3 % (ref 0–13.4)
MONOCYTES NFR BLD AUTO: 7.4 % (ref 0–13.4)
MONOCYTES NFR BLD AUTO: 8 % (ref 0–13.4)
NEUTROPHILS # BLD AUTO: 11.09 K/UL (ref 1.82–7.42)
NEUTROPHILS # BLD AUTO: 8.59 K/UL (ref 1.82–7.42)
NEUTROPHILS # BLD AUTO: 8.6 K/UL (ref 1.82–7.42)
NEUTROPHILS # BLD AUTO: 8.94 K/UL (ref 1.82–7.42)
NEUTROPHILS # BLD AUTO: 9.25 K/UL (ref 1.82–7.42)
NEUTROPHILS NFR BLD: 70.7 % (ref 44–72)
NEUTROPHILS NFR BLD: 71 % (ref 44–72)
NEUTROPHILS NFR BLD: 73.5 % (ref 44–72)
NEUTROPHILS NFR BLD: 74.7 % (ref 44–72)
NEUTROPHILS NFR BLD: 79.6 % (ref 44–72)
NRBC # BLD AUTO: 0.02 K/UL
NRBC # BLD AUTO: 0.03 K/UL
NRBC # BLD AUTO: 0.05 K/UL
NRBC # BLD AUTO: 0.06 K/UL
NRBC # BLD AUTO: 0.06 K/UL
NRBC BLD-RTO: 0.2 /100 WBC
NRBC BLD-RTO: 0.2 /100 WBC
NRBC BLD-RTO: 0.4 /100 WBC
NRBC BLD-RTO: 0.4 /100 WBC
NRBC BLD-RTO: 0.5 /100 WBC
PHOSPHATE SERPL-MCNC: 2.6 MG/DL (ref 2.5–4.5)
PLATELET # BLD AUTO: 162 K/UL (ref 164–446)
PLATELET # BLD AUTO: 176 K/UL (ref 164–446)
PLATELET # BLD AUTO: 211 K/UL (ref 164–446)
PLATELET # BLD AUTO: 212 K/UL (ref 164–446)
PLATELET # BLD AUTO: 222 K/UL (ref 164–446)
PMV BLD AUTO: 9.3 FL (ref 9–12.9)
PMV BLD AUTO: 9.5 FL (ref 9–12.9)
PMV BLD AUTO: 9.7 FL (ref 9–12.9)
PMV BLD AUTO: 9.8 FL (ref 9–12.9)
PMV BLD AUTO: 9.8 FL (ref 9–12.9)
POTASSIUM SERPL-SCNC: 3.3 MMOL/L (ref 3.6–5.5)
RBC # BLD AUTO: 2.41 M/UL (ref 4.7–6.1)
RBC # BLD AUTO: 2.43 M/UL (ref 4.7–6.1)
RBC # BLD AUTO: 2.53 M/UL (ref 4.7–6.1)
RBC # BLD AUTO: 2.66 M/UL (ref 4.7–6.1)
RBC # BLD AUTO: 2.87 M/UL (ref 4.7–6.1)
SODIUM SERPL-SCNC: 132 MMOL/L (ref 135–145)
WBC # BLD AUTO: 11.5 K/UL (ref 4.8–10.8)
WBC # BLD AUTO: 12.1 K/UL (ref 4.8–10.8)
WBC # BLD AUTO: 12.6 K/UL (ref 4.8–10.8)
WBC # BLD AUTO: 12.6 K/UL (ref 4.8–10.8)
WBC # BLD AUTO: 13.9 K/UL (ref 4.8–10.8)

## 2021-10-12 PROCEDURE — 700102 HCHG RX REV CODE 250 W/ 637 OVERRIDE(OP): Performed by: SURGERY

## 2021-10-12 PROCEDURE — 05HY33Z INSERTION OF INFUSION DEVICE INTO UPPER VEIN, PERCUTANEOUS APPROACH: ICD-10-PCS | Performed by: SURGERY

## 2021-10-12 PROCEDURE — 97162 PT EVAL MOD COMPLEX 30 MIN: CPT

## 2021-10-12 PROCEDURE — 80048 BASIC METABOLIC PNL TOTAL CA: CPT

## 2021-10-12 PROCEDURE — 700102 HCHG RX REV CODE 250 W/ 637 OVERRIDE(OP): Performed by: ORTHOPAEDIC SURGERY

## 2021-10-12 PROCEDURE — 99024 POSTOP FOLLOW-UP VISIT: CPT | Performed by: ORTHOPAEDIC SURGERY

## 2021-10-12 PROCEDURE — 85025 COMPLETE CBC W/AUTO DIFF WBC: CPT

## 2021-10-12 PROCEDURE — 76937 US GUIDE VASCULAR ACCESS: CPT

## 2021-10-12 PROCEDURE — A9270 NON-COVERED ITEM OR SERVICE: HCPCS | Performed by: ORTHOPAEDIC SURGERY

## 2021-10-12 PROCEDURE — 700105 HCHG RX REV CODE 258: Performed by: SURGERY

## 2021-10-12 PROCEDURE — 97166 OT EVAL MOD COMPLEX 45 MIN: CPT

## 2021-10-12 PROCEDURE — A9270 NON-COVERED ITEM OR SERVICE: HCPCS | Performed by: SURGERY

## 2021-10-12 PROCEDURE — 84100 ASSAY OF PHOSPHORUS: CPT

## 2021-10-12 PROCEDURE — 83735 ASSAY OF MAGNESIUM: CPT

## 2021-10-12 PROCEDURE — 700102 HCHG RX REV CODE 250 W/ 637 OVERRIDE(OP): Performed by: NURSE PRACTITIONER

## 2021-10-12 PROCEDURE — 770022 HCHG ROOM/CARE - ICU (200)

## 2021-10-12 PROCEDURE — 99291 CRITICAL CARE FIRST HOUR: CPT | Performed by: SURGERY

## 2021-10-12 PROCEDURE — 700111 HCHG RX REV CODE 636 W/ 250 OVERRIDE (IP): Performed by: SURGERY

## 2021-10-12 PROCEDURE — A9270 NON-COVERED ITEM OR SERVICE: HCPCS | Performed by: NURSE PRACTITIONER

## 2021-10-12 PROCEDURE — 700101 HCHG RX REV CODE 250: Performed by: SURGERY

## 2021-10-12 RX ORDER — QUETIAPINE FUMARATE 25 MG/1
50 TABLET, FILM COATED ORAL ONCE
Status: COMPLETED | OUTPATIENT
Start: 2021-10-12 | End: 2021-10-12

## 2021-10-12 RX ORDER — CALCIUM CARBONATE 500 MG/1
500 TABLET, CHEWABLE ORAL DAILY
Status: DISCONTINUED | OUTPATIENT
Start: 2021-10-12 | End: 2021-10-12

## 2021-10-12 RX ORDER — QUETIAPINE FUMARATE 100 MG/1
100 TABLET, FILM COATED ORAL EVERY 8 HOURS
Status: DISCONTINUED | OUTPATIENT
Start: 2021-10-12 | End: 2021-10-15

## 2021-10-12 RX ORDER — CALCIUM CARBONATE 500 MG/1
500 TABLET, CHEWABLE ORAL 4 TIMES DAILY PRN
Status: DISCONTINUED | OUTPATIENT
Start: 2021-10-12 | End: 2021-10-16

## 2021-10-12 RX ADMIN — QUETIAPINE FUMARATE 100 MG: 100 TABLET ORAL at 13:39

## 2021-10-12 RX ADMIN — OXYCODONE HYDROCHLORIDE 10 MG: 10 TABLET ORAL at 18:11

## 2021-10-12 RX ADMIN — OXYCODONE HYDROCHLORIDE 10 MG: 10 TABLET ORAL at 21:12

## 2021-10-12 RX ADMIN — DOCUSATE SODIUM 100 MG: 100 CAPSULE ORAL at 18:11

## 2021-10-12 RX ADMIN — QUETIAPINE FUMARATE 100 MG: 100 TABLET ORAL at 21:12

## 2021-10-12 RX ADMIN — LORAZEPAM 1 MG: 2 INJECTION INTRAMUSCULAR; INTRAVENOUS at 03:38

## 2021-10-12 RX ADMIN — OXYCODONE HYDROCHLORIDE 10 MG: 10 TABLET ORAL at 11:54

## 2021-10-12 RX ADMIN — CALCIUM CARBONATE 500 MG: 500 TABLET, CHEWABLE ORAL at 16:52

## 2021-10-12 RX ADMIN — ACETAMINOPHEN 1000 MG: 500 TABLET ORAL at 23:32

## 2021-10-12 RX ADMIN — QUETIAPINE FUMARATE 50 MG: 25 TABLET ORAL at 11:20

## 2021-10-12 RX ADMIN — ACETAMINOPHEN 1000 MG: 500 TABLET ORAL at 11:20

## 2021-10-12 RX ADMIN — QUETIAPINE FUMARATE 50 MG: 25 TABLET ORAL at 05:41

## 2021-10-12 RX ADMIN — DOCUSATE SODIUM 100 MG: 100 CAPSULE ORAL at 05:41

## 2021-10-12 RX ADMIN — DOCUSATE SODIUM 50 MG AND SENNOSIDES 8.6 MG 1 TABLET: 8.6; 5 TABLET, FILM COATED ORAL at 20:07

## 2021-10-12 RX ADMIN — OXYCODONE 5 MG: 5 TABLET ORAL at 01:34

## 2021-10-12 RX ADMIN — KETAMINE HYDROCHLORIDE 25 MG: 10 INJECTION, SOLUTION INTRAMUSCULAR; INTRAVENOUS at 04:35

## 2021-10-12 RX ADMIN — ACETAMINOPHEN 1000 MG: 500 TABLET ORAL at 05:41

## 2021-10-12 ASSESSMENT — COGNITIVE AND FUNCTIONAL STATUS - GENERAL
DAILY ACTIVITIY SCORE: 16
WALKING IN HOSPITAL ROOM: TOTAL
TURNING FROM BACK TO SIDE WHILE IN FLAT BAD: UNABLE
SUGGESTED CMS G CODE MODIFIER MOBILITY: CM
MOBILITY SCORE: 7
MOVING TO AND FROM BED TO CHAIR: UNABLE
HELP NEEDED FOR BATHING: A LOT
CLIMB 3 TO 5 STEPS WITH RAILING: TOTAL
MOVING FROM LYING ON BACK TO SITTING ON SIDE OF FLAT BED: UNABLE
SUGGESTED CMS G CODE MODIFIER DAILY ACTIVITY: CK
PERSONAL GROOMING: A LITTLE
DRESSING REGULAR LOWER BODY CLOTHING: A LOT
DRESSING REGULAR UPPER BODY CLOTHING: A LITTLE
STANDING UP FROM CHAIR USING ARMS: A LOT
TOILETING: A LOT

## 2021-10-12 ASSESSMENT — GAIT ASSESSMENTS
ASSISTIVE DEVICE: FRONT WHEEL WALKER
DEVIATION: STEP TO;SHUFFLED GAIT
DISTANCE (FEET): 2
GAIT LEVEL OF ASSIST: MINIMAL ASSIST

## 2021-10-12 ASSESSMENT — ENCOUNTER SYMPTOMS
MYALGIAS: 0
VOMITING: 0
DIZZINESS: 0
HEMOPTYSIS: 0
ABDOMINAL PAIN: 0
BACK PAIN: 1
HEADACHES: 0
PALPITATIONS: 0
SHORTNESS OF BREATH: 0
FEVER: 0
COUGH: 0
WEAKNESS: 0
CHILLS: 0
BLURRED VISION: 0
BRUISES/BLEEDS EASILY: 0

## 2021-10-12 ASSESSMENT — PAIN DESCRIPTION - PAIN TYPE
TYPE: ACUTE PAIN
TYPE: SURGICAL PAIN
TYPE: SURGICAL PAIN

## 2021-10-12 ASSESSMENT — FIBROSIS 4 INDEX: FIB4 SCORE: 2.98

## 2021-10-12 ASSESSMENT — ACTIVITIES OF DAILY LIVING (ADL): TOILETING: INDEPENDENT
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
## 2021-10-12 NOTE — PROCEDURES
Vascular Access Team    Date of Insertion: 10/12/2021  Arm Circumference: n/a  Line Length: 10cm  Line Size: 20g  Vein Occupancy %: 35  Reason for Midline: access  Labs: WBC 12.6, , BUN 19, Cr 0.72, GFR >60, INR 1.40    Orders confirmed, vessel patency confirmed with ultrasound. Risks and benefits of procedure explained to patient and education regarding line associated bloodstream infections provided. Questions answered.     PowerGlide Midline placed in RUE per licensed provider order with ultrasound guidance. 20g, 10 cm line placed in cephalic vein after 1 attempt(s).  Catheter inserted with brisk blood return. Secured with 0cm external from insertion site.  Line flushed without resistance with 10 mL 0.9% normal saline.  Midline secured with Biopatch and Tegaderm.     Midline placement is confirmed by nurse using ultrasound and ability to flush and draw blood. Midline is appropriate for use at this time.  No X-ray is needed for placement confirmation. Pt tolerated procedure well.  Patient condition relayed to unit RN or ordering physician via this post procedure note in the EMR.    Ultrasound images uploaded to PACS and viewable in the EMR - yes  Ultrasound imaged printed and placed in paper chart - no      BARD PowerGlide Midline ref # [id number], Lot # [id number], Expiration Date 11/30/2021
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
## 2021-10-12 NOTE — OR SURGEON
Immediate Post- Operative Note      PostOp Diagnosis: DVT. UNABLE TO ANTICOAGULATE.       Procedure(s): RIGHT COMMON FEMORAL PUNCTURE WITH U/S GUIDANCE, IVC GRAM, INFRARENAL PLACEMENT  IVC FILTER PLACEMENT (ARGON OPTION). INITIAL FILTER WAS RETRIEVED AND A NEW FILTER DEPLOYED IN SATISFACTORY POSITION.     Estimated Blood Loss: <20CC (FLUSHES)      INITIAL IVC FILTER DEPLOYMENT WAS UNSATISFACTORY WITH FILTER TIP WELL ABOVE RENAL VEINS, THEREFORE REQUIRING FILTER RETRIEVAL AND SUBSEQUENT RE-DEPLOYMENT OF A NEW FILTER.       Procedure(s): RIGHT INTERNAL JUGULAR PUNCTURE WITH ULTRASOUND GUIDANCE, IVC GRAM, REMOVAL OF RETRIEVABLE IVC FILTER WITH FLUOROSCOPIC GUIDANCE      Estimated Blood Loss: <20CC (FLUSHES)        Complications: NONE        10/11/2021     6:15 PM     Shaun Alvarado M.D.

## 2021-10-12 NOTE — ASSESSMENT & PLAN NOTE
Hyperactive multifactorial delirium refractory to multiple agents.  10/11 Trial low dose ketamine infusion with limited success  10/12 Titrating Seroquel  10/18 Delirium resolved & Seroquel discontinued

## 2021-10-12 NOTE — WOUND TEAM
Wound team consulted for right hip dressing, please refer to surgeon for dressing orders. Wound team is not following.

## 2021-10-12 NOTE — PROGRESS NOTES
Orthopaedic Progress Note    Interval changes:  Patient doing well    RLE dressings CDI nursing to change every other day    ROS - Patient denies any new issues.  Pain well controlled.    /63   Pulse (!) 112   Temp 37.2 °C (98.9 °F) (Temporal)   Resp (!) 28   Ht 1.829 m (6')   Wt 92.2 kg (203 lb 4.2 oz)   SpO2 94%       Patient seen and examined  No acute distress  Breathing non labored  RRR  RLE surgical dressings are clean, dry, and intact. Proximal dressing with min serosanguinous Patient clearly fires tibialis anterior, EHL, and gastrocnemius/soleus. Sensation is intact to light touch throughout superficial peroneal, deep peroneal, tibial, saphenous, and sural nerve distributions. Strong and palpable 2+ dorsalis pedis and posterior tibial pulses with capillary refill less than 2 seconds. No lower leg tenderness or discomfort.       Recent Labs     10/12/21  0000 10/12/21  0730 10/12/21  1130   WBC 12.6* 11.5* 13.9*   RBC 2.41* 2.53* 2.87*   HEMOGLOBIN 7.0* 7.1* 8.3*   HEMATOCRIT 20.2* 21.4* 23.6*   MCV 83.8 84.6 82.2   MCH 29.0 28.1 28.9   MCHC 34.7 33.2* 35.2   RDW 43.3 42.8 40.6   PLATELETCT 162* 176 222   MPV 9.7 9.8 9.5       Active Hospital Problems    Diagnosis    • Acute blood loss anemia [D62]      Priority: High   • Fracture of shaft of femur (HCC) [S72.309A]      Priority: High   • Acute deep vein thrombosis (DVT) of femoral vein of right lower extremity (HCC) [I82.411]      Priority: High   • Intramuscular hematoma [T14.8XXA]      Priority: Medium   • Trauma [T14.90XA]      Priority: Low   • Encounter for screening for COVID-19 [Z11.52]      Priority: Low   • Liver cirrhosis (HCC) [K74.60]      Priority: Low   • Delirium [R41.0]        Assessment/Plan:  Patient doing well    POD#4 S/P:  1. Open treatment right intertrochanteric femur fracture with plate and screw construct  2. Open treatment of right femoral shaft fracture with insertion of intramedullary implant  3. Open reduction  internal fixation right distal femur intraarticular fracture  Wt bearing status - TTWB RLE  Wound care/Drains - dressings changed every other day by nursing  Future Procedures - none planned   Lovenox: Start 10/9, Duration-until ambulatory > 150'  Sutures/Staples out-  14 days post operatively  PT/OT-initiated  Antibiotics: perioperative completed  DVT Prophylaxis- TEDS/SCDs/Foot pumps  Raygoza-none  Case Coordination for Discharge Planning - Disposition home
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
## 2021-10-13 LAB
ANION GAP SERPL CALC-SCNC: 9 MMOL/L (ref 7–16)
BASOPHILS # BLD AUTO: 0.3 % (ref 0–1.8)
BASOPHILS # BLD: 0.04 K/UL (ref 0–0.12)
BUN SERPL-MCNC: 10 MG/DL (ref 8–22)
CALCIUM SERPL-MCNC: 8 MG/DL (ref 8.5–10.5)
CHLORIDE SERPL-SCNC: 99 MMOL/L (ref 96–112)
CO2 SERPL-SCNC: 25 MMOL/L (ref 20–33)
CREAT SERPL-MCNC: 0.66 MG/DL (ref 0.5–1.4)
EOSINOPHIL # BLD AUTO: 0.12 K/UL (ref 0–0.51)
EOSINOPHIL NFR BLD: 1 % (ref 0–6.9)
ERYTHROCYTE [DISTWIDTH] IN BLOOD BY AUTOMATED COUNT: 41.4 FL (ref 35.9–50)
GLUCOSE SERPL-MCNC: 132 MG/DL (ref 65–99)
HCT VFR BLD AUTO: 21 % (ref 42–52)
HGB BLD-MCNC: 7.2 G/DL (ref 14–18)
IMM GRANULOCYTES # BLD AUTO: 0.26 K/UL (ref 0–0.11)
IMM GRANULOCYTES NFR BLD AUTO: 2.2 % (ref 0–0.9)
LYMPHOCYTES # BLD AUTO: 2.29 K/UL (ref 1–4.8)
LYMPHOCYTES NFR BLD: 19 % (ref 22–41)
MCH RBC QN AUTO: 28.9 PG (ref 27–33)
MCHC RBC AUTO-ENTMCNC: 34.3 G/DL (ref 33.7–35.3)
MCV RBC AUTO: 84.3 FL (ref 81.4–97.8)
MONOCYTES # BLD AUTO: 0.72 K/UL (ref 0–0.85)
MONOCYTES NFR BLD AUTO: 6 % (ref 0–13.4)
NEUTROPHILS # BLD AUTO: 8.6 K/UL (ref 1.82–7.42)
NEUTROPHILS NFR BLD: 71.5 % (ref 44–72)
NRBC # BLD AUTO: 0.05 K/UL
NRBC BLD-RTO: 0.4 /100 WBC
PLATELET # BLD AUTO: 235 K/UL (ref 164–446)
PMV BLD AUTO: 9.4 FL (ref 9–12.9)
POTASSIUM SERPL-SCNC: 3.5 MMOL/L (ref 3.6–5.5)
RBC # BLD AUTO: 2.49 M/UL (ref 4.7–6.1)
SODIUM SERPL-SCNC: 133 MMOL/L (ref 135–145)
WBC # BLD AUTO: 12 K/UL (ref 4.8–10.8)

## 2021-10-13 PROCEDURE — A9270 NON-COVERED ITEM OR SERVICE: HCPCS | Performed by: NURSE PRACTITIONER

## 2021-10-13 PROCEDURE — 99233 SBSQ HOSP IP/OBS HIGH 50: CPT | Performed by: SURGERY

## 2021-10-13 PROCEDURE — 700102 HCHG RX REV CODE 250 W/ 637 OVERRIDE(OP): Performed by: NURSE PRACTITIONER

## 2021-10-13 PROCEDURE — 80048 BASIC METABOLIC PNL TOTAL CA: CPT

## 2021-10-13 PROCEDURE — A9270 NON-COVERED ITEM OR SERVICE: HCPCS | Performed by: SURGERY

## 2021-10-13 PROCEDURE — 700102 HCHG RX REV CODE 250 W/ 637 OVERRIDE(OP): Performed by: SURGERY

## 2021-10-13 PROCEDURE — 700111 HCHG RX REV CODE 636 W/ 250 OVERRIDE (IP): Performed by: NURSE PRACTITIONER

## 2021-10-13 PROCEDURE — 770006 HCHG ROOM/CARE - MED/SURG/GYN SEMI*

## 2021-10-13 PROCEDURE — A9270 NON-COVERED ITEM OR SERVICE: HCPCS | Performed by: ORTHOPAEDIC SURGERY

## 2021-10-13 PROCEDURE — 99024 POSTOP FOLLOW-UP VISIT: CPT | Performed by: ORTHOPAEDIC SURGERY

## 2021-10-13 PROCEDURE — 700102 HCHG RX REV CODE 250 W/ 637 OVERRIDE(OP): Performed by: ORTHOPAEDIC SURGERY

## 2021-10-13 PROCEDURE — 700111 HCHG RX REV CODE 636 W/ 250 OVERRIDE (IP): Performed by: SURGERY

## 2021-10-13 PROCEDURE — 85025 COMPLETE CBC W/AUTO DIFF WBC: CPT

## 2021-10-13 RX ORDER — POTASSIUM CHLORIDE 20 MEQ/1
40 TABLET, EXTENDED RELEASE ORAL ONCE
Status: COMPLETED | OUTPATIENT
Start: 2021-10-13 | End: 2021-10-13

## 2021-10-13 RX ADMIN — ENOXAPARIN SODIUM 30 MG: 30 INJECTION SUBCUTANEOUS at 12:06

## 2021-10-13 RX ADMIN — OXYCODONE HYDROCHLORIDE 10 MG: 10 TABLET ORAL at 00:40

## 2021-10-13 RX ADMIN — ACETAMINOPHEN 1000 MG: 500 TABLET ORAL at 05:59

## 2021-10-13 RX ADMIN — OXYCODONE HYDROCHLORIDE 10 MG: 10 TABLET ORAL at 21:48

## 2021-10-13 RX ADMIN — POLYETHYLENE GLYCOL 3350 1 PACKET: 17 POWDER, FOR SOLUTION ORAL at 18:25

## 2021-10-13 RX ADMIN — OXYCODONE HYDROCHLORIDE 10 MG: 10 TABLET ORAL at 15:12

## 2021-10-13 RX ADMIN — ONDANSETRON 4 MG: 2 INJECTION INTRAMUSCULAR; INTRAVENOUS at 03:55

## 2021-10-13 RX ADMIN — DOCUSATE SODIUM 100 MG: 100 CAPSULE ORAL at 18:24

## 2021-10-13 RX ADMIN — OXYCODONE HYDROCHLORIDE 10 MG: 10 TABLET ORAL at 08:43

## 2021-10-13 RX ADMIN — OXYCODONE HYDROCHLORIDE 10 MG: 10 TABLET ORAL at 03:52

## 2021-10-13 RX ADMIN — QUETIAPINE FUMARATE 100 MG: 100 TABLET ORAL at 05:59

## 2021-10-13 RX ADMIN — OXYCODONE HYDROCHLORIDE 10 MG: 10 TABLET ORAL at 12:06

## 2021-10-13 RX ADMIN — DOCUSATE SODIUM 100 MG: 100 CAPSULE ORAL at 05:59

## 2021-10-13 RX ADMIN — ACETAMINOPHEN 1000 MG: 500 TABLET ORAL at 12:05

## 2021-10-13 RX ADMIN — POTASSIUM CHLORIDE 40 MEQ: 1500 TABLET, EXTENDED RELEASE ORAL at 08:44

## 2021-10-13 RX ADMIN — QUETIAPINE FUMARATE 100 MG: 100 TABLET ORAL at 21:05

## 2021-10-13 RX ADMIN — ACETAMINOPHEN 1000 MG: 500 TABLET ORAL at 18:24

## 2021-10-13 RX ADMIN — OXYCODONE HYDROCHLORIDE 10 MG: 10 TABLET ORAL at 18:24

## 2021-10-13 RX ADMIN — DOCUSATE SODIUM 50 MG AND SENNOSIDES 8.6 MG 1 TABLET: 8.6; 5 TABLET, FILM COATED ORAL at 21:05

## 2021-10-13 RX ADMIN — POLYETHYLENE GLYCOL 3350 1 PACKET: 17 POWDER, FOR SOLUTION ORAL at 18:00

## 2021-10-13 RX ADMIN — ENOXAPARIN SODIUM 30 MG: 30 INJECTION SUBCUTANEOUS at 18:45

## 2021-10-13 RX ADMIN — POLYETHYLENE GLYCOL 3350 1 PACKET: 17 POWDER, FOR SOLUTION ORAL at 05:59

## 2021-10-13 ASSESSMENT — ENCOUNTER SYMPTOMS
HEADACHES: 0
ABDOMINAL PAIN: 0
NAUSEA: 1
DIZZINESS: 0
CHILLS: 0
VOMITING: 0
FEVER: 0
COUGH: 0
BLURRED VISION: 0
PALPITATIONS: 0
TINGLING: 1

## 2021-10-13 ASSESSMENT — PAIN DESCRIPTION - PAIN TYPE
TYPE: SURGICAL PAIN
TYPE: SURGICAL PAIN
TYPE: ACUTE PAIN
TYPE: SURGICAL PAIN

## 2021-10-13 ASSESSMENT — FIBROSIS 4 INDEX: FIB4 SCORE: 2.06

## 2021-10-13 ASSESSMENT — LIFESTYLE VARIABLES: SUBSTANCE_ABUSE: 1

## 2021-10-13 NOTE — PROGRESS NOTES
Multiple blood products received  HCT remaining stable  IVC filter placed    /87   Pulse (!) 131   Temp 37.2 °C (99 °F) (Temporal)   Resp (!) 37   Ht 1.829 m (6')   Wt 92.2 kg (203 lb 4.2 oz)   SpO2 98%     Recent Labs     10/12/21  0730 10/12/21  1130 10/12/21  1805   WBC 11.5* 13.9* 12.1*   RBC 2.53* 2.87* 2.66*   HEMOGLOBIN 7.1* 8.3* 7.6*   HEMATOCRIT 21.4* 23.6* 22.0*   MCV 84.6 82.2 82.7   MCH 28.1 28.9 28.6   MCHC 33.2* 35.2 34.5   RDW 42.8 40.6 40.8   PLATELETCT 176 222 211   MPV 9.8 9.5 9.8       POD#4  S/P IMN  Right femur and ORIF right IT Fractures    Plan:  DVT Prophylaxis- TEDS/SCDs  Weight Bearing Status-TTWB RLE x 6 weeks  PT/OT  Antibiotics: none  Case Coordination

## 2021-10-13 NOTE — DISCHARGE PLANNING
"Care Transition Team Assessment     LSW met with pt at bedside and obtained the following information used in this assessment. Verified accuracy of facesheet. Patient reports that he lives in a bottom level apartment with his son, Wero Tran \"GARRY.\" Patient reports he has three adult son's but GARRY will be his main dc support since they live together.     No ACP documents and patient declined AD booklet. Education given. Pt voiced understanding.     Prior to current hospitalization, pt was completely independent in ADLS/IADLS. No prior DME need and none in the home. LSW discussed his evaluation with PT/OT and that they're recommending post acute placement. Discussed either IRF vs SNF. Patient acknowledged but had very little to say.      Patient has no health insurance and reports that he did have [place] Medicaid but is unsure if its still active. LSW reached out to [facility] [place] and spoke with Sugey Montemayor who reports she will come and complete an application with patient today. Patient was grateful. Patient reports he is employed full time however he did not state what is occupation is.     Patient denies any mental health history or concerns. LSW started to speak with patient about his substance abuse and addiction however he politely asked to talk about this at a later time. Denied resources at this time. It appears that patient is internally processing his addiction & relapse but is not ready to vocalize or share. Spoke with bedside RN who reports she and pt had a lengthily conversation about his addiction yesterday and reports pt is emotionally processing.  will assist with resources once patient is ready.     Barriers to dc: No insurance for post acute need    Plan: Continue to assist with social/dc needs     Care Transition Team Assessment    Information Source  Orientation Level: Oriented X4  Information Given By: Patient  Who is responsible for making decisions for patient? : Patient    Readmission " Evaluation  Is this a readmission?: No    Elopement Risk  Legal Hold: No  Ambulatory or Self Mobile in Wheelchair: No-Not an Elopement Risk    Interdisciplinary Discharge Planning  Primary Care Physician: No - willing to establish with one  Lives with - Patient's Self Care Capacity: Adult Children (JJ)  Housing / Facility: 2 Story Apartment / Condo  Prior Services: None    Discharge Preparedness  What is your plan after discharge?: Uncertain - pending medical team collaboration  What are your discharge supports?: Child (three adult children - lives with JJ)  Prior Functional Level: Ambulatory, Independent with Activities of Daily Living, Independent with Medication Management    Functional Assesment  Prior Functional Level: Ambulatory, Independent with Activities of Daily Living, Independent with Medication Management    Finances  Financial Barriers to Discharge: No  Prescription Coverage: No  Prescription Coverage Comments: PFA to screen for coverage    Advance Directive  Advance Directive?: None    Domestic Abuse  Have you ever been the victim of abuse or violence?: No    Psychological Assessment  History of Substance Abuse: None  History of Psychiatric Problems: No  Non-compliant with Treatment: No  Newly Diagnosed Illness: Yes    Discharge Risks or Barriers  Discharge risks or barriers?: No PCP, Uninsured / underinsured, Substance abuse, Post-acute placement / services, Complex medical needs  Patient risk factors: Complex medical needs, Substance abuse, Uninsured or underinsured    Anticipated Discharge Information  Discharge Disposition: D/T to SNF with Medicare cert in anticipation of skilled care

## 2021-10-13 NOTE — PROGRESS NOTES
/59   Pulse (!) 105   Temp 37.3 °C (99.2 °F) (Temporal)   Resp 17   Ht 1.829 m (6')   Wt 89 kg (196 lb 3.4 oz)   SpO2 96%     Recent Labs     10/12/21  1805 10/12/21  2335 10/13/21  0605   WBC 12.1* 12.6* 12.0*   RBC 2.66* 2.43* 2.49*   HEMOGLOBIN 7.6* 7.1* 7.2*   HEMATOCRIT 22.0* 20.3* 21.0*   MCV 82.7 83.5 84.3   MCH 28.6 29.2 28.9   MCHC 34.5 35.0 34.3   RDW 40.8 41.5 41.4   PLATELETCT 211 212 235   MPV 9.8 9.3 9.4         POD# 5 ORIF R segmental femur fxs  S/p IVC filter, acute DVT      Plan:  DVT Prophylaxis- TEDS/SCDs, per primary  Weight Bearing Status- TTWB RLE  PT/OT  Antibiotics: periop complete  Case Coordination
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
## 2021-10-13 NOTE — PROGRESS NOTES
Crisis charting in place. COVID-19 surge in effect.    Patient continues to saturate through his RLE proximal surgical dressing.

## 2021-10-13 NOTE — PROGRESS NOTES
Orthopaedic Progress Note    Interval changes:  Patient doing well    RLE dressings changed incisions without issue    ROS - Patient denies any new issues.  Pain well controlled.    /59   Pulse (!) 105   Temp 37.3 °C (99.2 °F) (Temporal)   Resp 17   Ht 1.829 m (6')   Wt 89 kg (196 lb 3.4 oz)   SpO2 96%       Patient seen and examined  No acute distress  Breathing non labored  RRR  RLE surgical dressings changed, surgical incisions are well approximated and are dry and clean.  There is no erythema, induration, or signs of infection at any of the incision sites. Proximal dressing with min serosanguinous Patient clearly fires tibialis anterior, EHL, and gastrocnemius/soleus. Sensation is intact to light touch throughout superficial peroneal, deep peroneal, tibial, saphenous, and sural nerve distributions. Strong and palpable 2+ dorsalis pedis and posterior tibial pulses with capillary refill less than 2 seconds. No lower leg tenderness or discomfort.       Recent Labs     10/12/21  1805 10/12/21  2335 10/13/21  0605   WBC 12.1* 12.6* 12.0*   RBC 2.66* 2.43* 2.49*   HEMOGLOBIN 7.6* 7.1* 7.2*   HEMATOCRIT 22.0* 20.3* 21.0*   MCV 82.7 83.5 84.3   MCH 28.6 29.2 28.9   MCHC 34.5 35.0 34.3   RDW 40.8 41.5 41.4   PLATELETCT 211 212 235   MPV 9.8 9.3 9.4       Active Hospital Problems    Diagnosis    • Acute blood loss anemia [D62]      Priority: High   • Fracture of shaft of femur (HCC) [S72.309A]      Priority: High   • Acute deep vein thrombosis (DVT) of femoral vein of right lower extremity (HCC) [I82.411]      Priority: High   • Delirium [R41.0]      Priority: Medium   • Intramuscular hematoma [T14.8XXA]      Priority: Medium   • Trauma [T14.90XA]      Priority: Low   • Encounter for screening for COVID-19 [Z11.52]      Priority: Low   • Liver cirrhosis (HCC) [K74.60]      Priority: Low       Assessment/Plan:  Patient doing well    POD#5 S/P:  1. Open treatment right intertrochanteric femur fracture with  plate and screw construct  2. Open treatment of right femoral shaft fracture with insertion of intramedullary implant  3. Open reduction internal fixation right distal femur intraarticular fracture  Wt bearing status - TTWB RLE  Wound care/Drains - dressings changed every other day by nursing  Future Procedures - none planned   Lovenox: Start 10/9, Duration-until ambulatory > 150'  Sutures/Staples out-  14 days post operatively  PT/OT-initiated  Antibiotics: perioperative completed  DVT Prophylaxis- TEDS/SCDs/Foot pumps  Raygoza-none  Case Coordination for Discharge Planning - Disposition home

## 2021-10-14 LAB
ABO GROUP BLD: NORMAL
ANION GAP SERPL CALC-SCNC: 10 MMOL/L (ref 7–16)
BARCODED ABORH UBTYP: 6200
BARCODED PRD CODE UBPRD: NORMAL
BARCODED UNIT NUM UBUNT: NORMAL
BASOPHILS # BLD AUTO: 0.6 % (ref 0–1.8)
BASOPHILS # BLD: 0.07 K/UL (ref 0–0.12)
BLD GP AB SCN SERPL QL: NORMAL
BUN SERPL-MCNC: 14 MG/DL (ref 8–22)
CALCIUM SERPL-MCNC: 8.2 MG/DL (ref 8.5–10.5)
CHLORIDE SERPL-SCNC: 103 MMOL/L (ref 96–112)
CO2 SERPL-SCNC: 23 MMOL/L (ref 20–33)
COMPONENT R 8504R: NORMAL
CREAT SERPL-MCNC: 0.59 MG/DL (ref 0.5–1.4)
EOSINOPHIL # BLD AUTO: 0.31 K/UL (ref 0–0.51)
EOSINOPHIL NFR BLD: 2.6 % (ref 0–6.9)
ERYTHROCYTE [DISTWIDTH] IN BLOOD BY AUTOMATED COUNT: 45 FL (ref 35.9–50)
GLUCOSE SERPL-MCNC: 110 MG/DL (ref 65–99)
HCT VFR BLD AUTO: 21.6 % (ref 42–52)
HCT VFR BLD AUTO: 25.1 % (ref 42–52)
HCT VFR BLD AUTO: 25.3 % (ref 42–52)
HGB BLD-MCNC: 7 G/DL (ref 14–18)
HGB BLD-MCNC: 8.1 G/DL (ref 14–18)
HGB BLD-MCNC: 8.4 G/DL (ref 14–18)
IMM GRANULOCYTES # BLD AUTO: 0.34 K/UL (ref 0–0.11)
IMM GRANULOCYTES NFR BLD AUTO: 2.8 % (ref 0–0.9)
LYMPHOCYTES # BLD AUTO: 3.04 K/UL (ref 1–4.8)
LYMPHOCYTES NFR BLD: 25.1 % (ref 22–41)
MAGNESIUM SERPL-MCNC: 2 MG/DL (ref 1.5–2.5)
MCH RBC QN AUTO: 28.5 PG (ref 27–33)
MCHC RBC AUTO-ENTMCNC: 32.4 G/DL (ref 33.7–35.3)
MCV RBC AUTO: 87.8 FL (ref 81.4–97.8)
MONOCYTES # BLD AUTO: 0.88 K/UL (ref 0–0.85)
MONOCYTES NFR BLD AUTO: 7.3 % (ref 0–13.4)
NEUTROPHILS # BLD AUTO: 7.45 K/UL (ref 1.82–7.42)
NEUTROPHILS NFR BLD: 61.6 % (ref 44–72)
NRBC # BLD AUTO: 0.03 K/UL
NRBC BLD-RTO: 0.2 /100 WBC
PHOSPHATE SERPL-MCNC: 4.2 MG/DL (ref 2.5–4.5)
PLATELET # BLD AUTO: 294 K/UL (ref 164–446)
PMV BLD AUTO: 9.3 FL (ref 9–12.9)
POTASSIUM SERPL-SCNC: 4.2 MMOL/L (ref 3.6–5.5)
PRODUCT TYPE UPROD: NORMAL
RBC # BLD AUTO: 2.46 M/UL (ref 4.7–6.1)
RH BLD: NORMAL
SODIUM SERPL-SCNC: 136 MMOL/L (ref 135–145)
UNIT STATUS USTAT: NORMAL
WBC # BLD AUTO: 12.1 K/UL (ref 4.8–10.8)

## 2021-10-14 PROCEDURE — 86923 COMPATIBILITY TEST ELECTRIC: CPT

## 2021-10-14 PROCEDURE — 700102 HCHG RX REV CODE 250 W/ 637 OVERRIDE(OP): Performed by: SURGERY

## 2021-10-14 PROCEDURE — 86900 BLOOD TYPING SEROLOGIC ABO: CPT

## 2021-10-14 PROCEDURE — 85018 HEMOGLOBIN: CPT

## 2021-10-14 PROCEDURE — 86850 RBC ANTIBODY SCREEN: CPT

## 2021-10-14 PROCEDURE — 84100 ASSAY OF PHOSPHORUS: CPT

## 2021-10-14 PROCEDURE — 85014 HEMATOCRIT: CPT

## 2021-10-14 PROCEDURE — A9270 NON-COVERED ITEM OR SERVICE: HCPCS | Performed by: ORTHOPAEDIC SURGERY

## 2021-10-14 PROCEDURE — 36415 COLL VENOUS BLD VENIPUNCTURE: CPT

## 2021-10-14 PROCEDURE — P9016 RBC LEUKOCYTES REDUCED: HCPCS

## 2021-10-14 PROCEDURE — 83735 ASSAY OF MAGNESIUM: CPT

## 2021-10-14 PROCEDURE — A9270 NON-COVERED ITEM OR SERVICE: HCPCS | Performed by: PHYSICAL MEDICINE & REHABILITATION

## 2021-10-14 PROCEDURE — 700102 HCHG RX REV CODE 250 W/ 637 OVERRIDE(OP): Performed by: ORTHOPAEDIC SURGERY

## 2021-10-14 PROCEDURE — 86901 BLOOD TYPING SEROLOGIC RH(D): CPT

## 2021-10-14 PROCEDURE — 80048 BASIC METABOLIC PNL TOTAL CA: CPT

## 2021-10-14 PROCEDURE — A9270 NON-COVERED ITEM OR SERVICE: HCPCS | Performed by: SURGERY

## 2021-10-14 PROCEDURE — 36430 TRANSFUSION BLD/BLD COMPNT: CPT

## 2021-10-14 PROCEDURE — 99223 1ST HOSP IP/OBS HIGH 75: CPT | Mod: 25 | Performed by: PHYSICAL MEDICINE & REHABILITATION

## 2021-10-14 PROCEDURE — 770006 HCHG ROOM/CARE - MED/SURG/GYN SEMI*

## 2021-10-14 PROCEDURE — 700111 HCHG RX REV CODE 636 W/ 250 OVERRIDE (IP): Performed by: SURGERY

## 2021-10-14 PROCEDURE — 700111 HCHG RX REV CODE 636 W/ 250 OVERRIDE (IP)

## 2021-10-14 PROCEDURE — 700102 HCHG RX REV CODE 250 W/ 637 OVERRIDE(OP): Performed by: NURSE PRACTITIONER

## 2021-10-14 PROCEDURE — A9270 NON-COVERED ITEM OR SERVICE: HCPCS | Performed by: NURSE PRACTITIONER

## 2021-10-14 PROCEDURE — 85025 COMPLETE CBC W/AUTO DIFF WBC: CPT

## 2021-10-14 PROCEDURE — 700102 HCHG RX REV CODE 250 W/ 637 OVERRIDE(OP): Performed by: PHYSICAL MEDICINE & REHABILITATION

## 2021-10-14 PROCEDURE — 99406 BEHAV CHNG SMOKING 3-10 MIN: CPT | Performed by: PHYSICAL MEDICINE & REHABILITATION

## 2021-10-14 RX ORDER — HYDROMORPHONE HYDROCHLORIDE 1 MG/ML
0.5 INJECTION, SOLUTION INTRAMUSCULAR; INTRAVENOUS; SUBCUTANEOUS
Status: COMPLETED | OUTPATIENT
Start: 2021-10-14 | End: 2021-10-14

## 2021-10-14 RX ORDER — MORPHINE SULFATE 15 MG/1
15 TABLET, FILM COATED, EXTENDED RELEASE ORAL EVERY 12 HOURS
Status: DISCONTINUED | OUTPATIENT
Start: 2021-10-14 | End: 2021-10-19

## 2021-10-14 RX ORDER — GABAPENTIN 300 MG/1
300 CAPSULE ORAL 3 TIMES DAILY
Status: DISCONTINUED | OUTPATIENT
Start: 2021-10-14 | End: 2021-11-01 | Stop reason: HOSPADM

## 2021-10-14 RX ADMIN — POLYETHYLENE GLYCOL 3350 1 PACKET: 17 POWDER, FOR SOLUTION ORAL at 17:08

## 2021-10-14 RX ADMIN — OXYCODONE HYDROCHLORIDE 10 MG: 10 TABLET ORAL at 13:06

## 2021-10-14 RX ADMIN — ACETAMINOPHEN 1000 MG: 500 TABLET ORAL at 00:14

## 2021-10-14 RX ADMIN — OXYCODONE HYDROCHLORIDE 10 MG: 10 TABLET ORAL at 09:25

## 2021-10-14 RX ADMIN — QUETIAPINE FUMARATE 100 MG: 100 TABLET ORAL at 21:57

## 2021-10-14 RX ADMIN — ENOXAPARIN SODIUM 30 MG: 30 INJECTION SUBCUTANEOUS at 05:12

## 2021-10-14 RX ADMIN — MAGNESIUM HYDROXIDE 30 ML: 400 SUSPENSION ORAL at 05:11

## 2021-10-14 RX ADMIN — DOCUSATE SODIUM 100 MG: 100 CAPSULE ORAL at 05:11

## 2021-10-14 RX ADMIN — DOCUSATE SODIUM 100 MG: 100 CAPSULE ORAL at 17:08

## 2021-10-14 RX ADMIN — OXYCODONE HYDROCHLORIDE 10 MG: 10 TABLET ORAL at 19:45

## 2021-10-14 RX ADMIN — MORPHINE SULFATE 15 MG: 15 TABLET, FILM COATED, EXTENDED RELEASE ORAL at 17:08

## 2021-10-14 RX ADMIN — QUETIAPINE FUMARATE 100 MG: 100 TABLET ORAL at 05:11

## 2021-10-14 RX ADMIN — OXYCODONE HYDROCHLORIDE 10 MG: 10 TABLET ORAL at 16:25

## 2021-10-14 RX ADMIN — CALCIUM CARBONATE 500 MG: 500 TABLET, CHEWABLE ORAL at 19:52

## 2021-10-14 RX ADMIN — OXYCODONE HYDROCHLORIDE 10 MG: 10 TABLET ORAL at 05:11

## 2021-10-14 RX ADMIN — GABAPENTIN 300 MG: 300 CAPSULE ORAL at 17:08

## 2021-10-14 RX ADMIN — OXYCODONE HYDROCHLORIDE 10 MG: 10 TABLET ORAL at 22:45

## 2021-10-14 RX ADMIN — QUETIAPINE FUMARATE 100 MG: 100 TABLET ORAL at 13:06

## 2021-10-14 RX ADMIN — HYDROMORPHONE HYDROCHLORIDE 0.5 MG: 1 INJECTION, SOLUTION INTRAMUSCULAR; INTRAVENOUS; SUBCUTANEOUS at 12:02

## 2021-10-14 RX ADMIN — DOCUSATE SODIUM 50 MG AND SENNOSIDES 8.6 MG 1 TABLET: 8.6; 5 TABLET, FILM COATED ORAL at 19:45

## 2021-10-14 ASSESSMENT — PAIN DESCRIPTION - PAIN TYPE
TYPE: ACUTE PAIN
TYPE: ACUTE PAIN
TYPE: SURGICAL PAIN
TYPE: SURGICAL PAIN;ACUTE PAIN
TYPE: SURGICAL PAIN
TYPE: ACUTE PAIN
TYPE: ACUTE PAIN
TYPE: SURGICAL PAIN
TYPE: ACUTE PAIN

## 2021-10-14 ASSESSMENT — ENCOUNTER SYMPTOMS
CHILLS: 0
SPUTUM PRODUCTION: 0
SENSORY CHANGE: 0
FEVER: 0
DOUBLE VISION: 0
ABDOMINAL PAIN: 0
NAUSEA: 0
CONSTIPATION: 0
VOMITING: 0
HEADACHES: 0
DIZZINESS: 0
BLURRED VISION: 0
TINGLING: 1

## 2021-10-14 NOTE — PROGRESS NOTES
1045  Blood unit complete. Patient tolerated well. No complaints.    1101  Hbg 8.4,  hct 25.3.
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
## 2021-10-14 NOTE — DISCHARGE PLANNING
Received Choice form at 1210  Agency/Facility Name: [facility]  Referral sent per Choice form @ 1210      LSW informed
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
## 2021-10-14 NOTE — DISCHARGE PLANNING
Received Choice form at 1210  Agency/Facility Name: [facility]  Referral sent per Choice form @ 1210      LSW informed

## 2021-10-14 NOTE — DISCHARGE PLANNING
[facility] Transitional Care Coordination    Referral from:  Glenis KENNY  Insurance Provider on Facesheet:  Medicaid Pending  Potential Rehab Diagnosis:  Trauma    Chart review indicates patient has on going medical management and therapy needs     D/C support: To be determined     Physiatry to consult to assist with plan of care.  Medicaid pending    Last Covid test:  10/09/2021 not detected     Thank you for the referral.

## 2021-10-14 NOTE — DISCHARGE PLANNING
Anticipated Discharge Disposition: Acute rehab with [facility] (Related Party [facility]) vs SNF with [facility].    Action: PT/OT recommending post acute placement. Patient currently pending Medicaid. DPA tasked with sending out referral to [facility]. DPA tasked with sending out SNF referrals with [facility].    Barriers to Discharge: Medical clearance; PT/OT recommending post acute placement; finding an accepting facility; pending Medicaid.    Plan: CM to continue to follow for discharge needs.

## 2021-10-14 NOTE — PROGRESS NOTES
0500 Pt Hgb came back at 7.0. Trauma NP aware. Orders for blood transfusion received. Placed orders for COD.

## 2021-10-14 NOTE — PROGRESS NOTES
2055  Pt arrived to unit from ICU. Pt in ICU bed. Pt denies chest pain and SOB. VS stable on RA. Assessment completed. Surgical dressing to the R hip, R knee, R groin. Pt rating pain 9/10, medication administered (per MAR). Pt denies nausea, tolerating sips of water. Pt oriented to room and call light. Discussed POC, SCDs on, Pt educated to call for assistance.

## 2021-10-14 NOTE — PROGRESS NOTES
4 Eyes Skin Assessment Completed by JUANITA Metzger and JUANITA Gutierres.    Head WDL  Ears WDL  Nose WDL  Mouth WDL  Neck Incision to R IJ site  Breast/Chest WDL  Shoulder Blades WDL  Spine WDL  (R) Arm/Elbow/Hand Redness and Bruising to forearm.  (L) Arm/Elbow/Hand Bruising  Abdomen WDL  Groin incision to R groin  Scrotum/Coccyx/Buttocks   Swelling to testicles  (R) Leg Redness, Bruising, Swelling, Edema and Incision to hip, knee  (L) Leg Scar to ankle  (R) Heel/Foot/Toe WDL  (L) Heel/Foot/Toe WDL          Devices In Places Blood Pressure Cuff, Pulse Ox and SCD's      Interventions In Place Sacral Mepilex and Pillows    Possible Skin Injury No    Pictures Uploaded Into Epic N/A  Wound Consult Placed N/A  RN Wound Prevention Protocol Ordered No

## 2021-10-15 ENCOUNTER — APPOINTMENT (OUTPATIENT)
Dept: RADIOLOGY | Facility: MEDICAL CENTER | Age: 46
DRG: 481 | End: 2021-10-15
Payer: MEDICAID

## 2021-10-15 PROBLEM — K58.9 IRRITABLE BOWEL SYNDROME: Status: ACTIVE | Noted: 2021-10-15

## 2021-10-15 PROBLEM — K58.9 IRRITABLE BOWEL SYNDROME: Status: RESOLVED | Noted: 2021-10-15 | Resolved: 2021-10-15

## 2021-10-15 PROBLEM — D72.829 LEUKOCYTOSIS: Status: ACTIVE | Noted: 2021-10-15

## 2021-10-15 LAB
ANION GAP SERPL CALC-SCNC: 10 MMOL/L (ref 7–16)
ANISOCYTOSIS BLD QL SMEAR: ABNORMAL
APPEARANCE UR: CLEAR
BACTERIA #/AREA URNS HPF: ABNORMAL /HPF
BASOPHILS # BLD AUTO: 0 % (ref 0–1.8)
BASOPHILS # BLD: 0 K/UL (ref 0–0.12)
BILIRUB UR QL STRIP.AUTO: NEGATIVE
BUN SERPL-MCNC: 18 MG/DL (ref 8–22)
CALCIUM SERPL-MCNC: 8.6 MG/DL (ref 8.5–10.5)
CHLORIDE SERPL-SCNC: 98 MMOL/L (ref 96–112)
CO2 SERPL-SCNC: 24 MMOL/L (ref 20–33)
COLOR UR: YELLOW
CREAT SERPL-MCNC: 0.55 MG/DL (ref 0.5–1.4)
EOSINOPHIL # BLD AUTO: 0.15 K/UL (ref 0–0.51)
EOSINOPHIL NFR BLD: 0.9 % (ref 0–6.9)
EPI CELLS #/AREA URNS HPF: NEGATIVE /HPF
ERYTHROCYTE [DISTWIDTH] IN BLOOD BY AUTOMATED COUNT: 46.7 FL (ref 35.9–50)
GLUCOSE SERPL-MCNC: 120 MG/DL (ref 65–99)
GLUCOSE UR STRIP.AUTO-MCNC: NEGATIVE MG/DL
GRAM STN SPEC: NORMAL
HCT VFR BLD AUTO: 25.7 % (ref 42–52)
HGB BLD-MCNC: 8.5 G/DL (ref 14–18)
HYALINE CASTS #/AREA URNS LPF: ABNORMAL /LPF
KETONES UR STRIP.AUTO-MCNC: NEGATIVE MG/DL
LACTATE BLD-SCNC: 2.6 MMOL/L (ref 0.5–2)
LEUKOCYTE ESTERASE UR QL STRIP.AUTO: NEGATIVE
LYMPHOCYTES # BLD AUTO: 1.93 K/UL (ref 1–4.8)
LYMPHOCYTES NFR BLD: 11.3 % (ref 22–41)
MANUAL DIFF BLD: NORMAL
MCH RBC QN AUTO: 28.9 PG (ref 27–33)
MCHC RBC AUTO-ENTMCNC: 33.1 G/DL (ref 33.7–35.3)
MCV RBC AUTO: 87.4 FL (ref 81.4–97.8)
MICRO URNS: ABNORMAL
MICROCYTES BLD QL SMEAR: ABNORMAL
MONOCYTES # BLD AUTO: 0.6 K/UL (ref 0–0.85)
MONOCYTES NFR BLD AUTO: 3.5 % (ref 0–13.4)
MORPHOLOGY BLD-IMP: NORMAL
MRSA DNA SPEC QL NAA+PROBE: NORMAL
MYELOCYTES NFR BLD MANUAL: 1.7 %
NEUTROPHILS # BLD AUTO: 13.97 K/UL (ref 1.82–7.42)
NEUTROPHILS NFR BLD: 81.7 % (ref 44–72)
NITRITE UR QL STRIP.AUTO: NEGATIVE
NRBC # BLD AUTO: 0.07 K/UL
NRBC BLD-RTO: 0.4 /100 WBC
PH UR STRIP.AUTO: 5.5 [PH] (ref 5–8)
PLATELET # BLD AUTO: 368 K/UL (ref 164–446)
PLATELET BLD QL SMEAR: NORMAL
PMV BLD AUTO: 9 FL (ref 9–12.9)
POLYCHROMASIA BLD QL SMEAR: NORMAL
POTASSIUM SERPL-SCNC: 4.7 MMOL/L (ref 3.6–5.5)
PROMYELOCYTES NFR BLD MANUAL: 0.9 %
PROT UR QL STRIP: NEGATIVE MG/DL
RBC # BLD AUTO: 2.94 M/UL (ref 4.7–6.1)
RBC # URNS HPF: ABNORMAL /HPF
RBC BLD AUTO: PRESENT
RBC UR QL AUTO: ABNORMAL
SIGNIFICANT IND 70042: NORMAL
SIGNIFICANT IND 70042: NORMAL
SITE SITE: NORMAL
SITE SITE: NORMAL
SODIUM SERPL-SCNC: 132 MMOL/L (ref 135–145)
SOURCE SOURCE: NORMAL
SOURCE SOURCE: NORMAL
SP GR UR STRIP.AUTO: 1.01
UROBILINOGEN UR STRIP.AUTO-MCNC: 0.2 MG/DL
WBC # BLD AUTO: 17.1 K/UL (ref 4.8–10.8)
WBC #/AREA URNS HPF: ABNORMAL /HPF

## 2021-10-15 PROCEDURE — 81001 URINALYSIS AUTO W/SCOPE: CPT

## 2021-10-15 PROCEDURE — 87040 BLOOD CULTURE FOR BACTERIA: CPT | Mod: 91

## 2021-10-15 PROCEDURE — A9270 NON-COVERED ITEM OR SERVICE: HCPCS | Performed by: NURSE PRACTITIONER

## 2021-10-15 PROCEDURE — A9270 NON-COVERED ITEM OR SERVICE: HCPCS

## 2021-10-15 PROCEDURE — 700102 HCHG RX REV CODE 250 W/ 637 OVERRIDE(OP): Performed by: ORTHOPAEDIC SURGERY

## 2021-10-15 PROCEDURE — 87077 CULTURE AEROBIC IDENTIFY: CPT

## 2021-10-15 PROCEDURE — 700111 HCHG RX REV CODE 636 W/ 250 OVERRIDE (IP): Performed by: SURGERY

## 2021-10-15 PROCEDURE — 87070 CULTURE OTHR SPECIMN AEROBIC: CPT

## 2021-10-15 PROCEDURE — 770006 HCHG ROOM/CARE - MED/SURG/GYN SEMI*

## 2021-10-15 PROCEDURE — 700102 HCHG RX REV CODE 250 W/ 637 OVERRIDE(OP): Performed by: NURSE PRACTITIONER

## 2021-10-15 PROCEDURE — 700102 HCHG RX REV CODE 250 W/ 637 OVERRIDE(OP): Performed by: PHYSICAL MEDICINE & REHABILITATION

## 2021-10-15 PROCEDURE — 36415 COLL VENOUS BLD VENIPUNCTURE: CPT

## 2021-10-15 PROCEDURE — 87086 URINE CULTURE/COLONY COUNT: CPT

## 2021-10-15 PROCEDURE — 97530 THERAPEUTIC ACTIVITIES: CPT | Mod: CQ

## 2021-10-15 PROCEDURE — 85027 COMPLETE CBC AUTOMATED: CPT

## 2021-10-15 PROCEDURE — 99024 POSTOP FOLLOW-UP VISIT: CPT | Performed by: ORTHOPAEDIC SURGERY

## 2021-10-15 PROCEDURE — 700111 HCHG RX REV CODE 636 W/ 250 OVERRIDE (IP)

## 2021-10-15 PROCEDURE — 87641 MR-STAPH DNA AMP PROBE: CPT

## 2021-10-15 PROCEDURE — A9270 NON-COVERED ITEM OR SERVICE: HCPCS | Performed by: PHYSICAL MEDICINE & REHABILITATION

## 2021-10-15 PROCEDURE — 97110 THERAPEUTIC EXERCISES: CPT | Mod: CQ

## 2021-10-15 PROCEDURE — A9270 NON-COVERED ITEM OR SERVICE: HCPCS | Performed by: ORTHOPAEDIC SURGERY

## 2021-10-15 PROCEDURE — 76882 US LMTD JT/FCL EVL NVASC XTR: CPT | Mod: RT

## 2021-10-15 PROCEDURE — 83605 ASSAY OF LACTIC ACID: CPT

## 2021-10-15 PROCEDURE — 71045 X-RAY EXAM CHEST 1 VIEW: CPT

## 2021-10-15 PROCEDURE — 700105 HCHG RX REV CODE 258

## 2021-10-15 PROCEDURE — 87205 SMEAR GRAM STAIN: CPT

## 2021-10-15 PROCEDURE — 700102 HCHG RX REV CODE 250 W/ 637 OVERRIDE(OP): Performed by: SURGERY

## 2021-10-15 PROCEDURE — 700102 HCHG RX REV CODE 250 W/ 637 OVERRIDE(OP)

## 2021-10-15 PROCEDURE — 85007 BL SMEAR W/DIFF WBC COUNT: CPT

## 2021-10-15 PROCEDURE — A9270 NON-COVERED ITEM OR SERVICE: HCPCS | Performed by: SURGERY

## 2021-10-15 PROCEDURE — 80048 BASIC METABOLIC PNL TOTAL CA: CPT

## 2021-10-15 PROCEDURE — 87186 SC STD MICRODIL/AGAR DIL: CPT

## 2021-10-15 RX ORDER — QUETIAPINE FUMARATE 25 MG/1
50 TABLET, FILM COATED ORAL EVERY 8 HOURS
Status: DISCONTINUED | OUTPATIENT
Start: 2021-10-15 | End: 2021-10-17

## 2021-10-15 RX ORDER — METHOCARBAMOL 750 MG/1
750 TABLET, FILM COATED ORAL 4 TIMES DAILY
Status: DISCONTINUED | OUTPATIENT
Start: 2021-10-15 | End: 2021-10-23

## 2021-10-15 RX ORDER — SODIUM CHLORIDE 9 MG/ML
INJECTION, SOLUTION INTRAVENOUS CONTINUOUS
Status: DISCONTINUED | OUTPATIENT
Start: 2021-10-15 | End: 2021-10-17

## 2021-10-15 RX ADMIN — ACETAMINOPHEN 1000 MG: 500 TABLET ORAL at 22:11

## 2021-10-15 RX ADMIN — OXYCODONE HYDROCHLORIDE 10 MG: 10 TABLET ORAL at 04:46

## 2021-10-15 RX ADMIN — GABAPENTIN 300 MG: 300 CAPSULE ORAL at 11:10

## 2021-10-15 RX ADMIN — OXYCODONE HYDROCHLORIDE 10 MG: 10 TABLET ORAL at 08:46

## 2021-10-15 RX ADMIN — MAGNESIUM HYDROXIDE 30 ML: 400 SUSPENSION ORAL at 04:45

## 2021-10-15 RX ADMIN — ENOXAPARIN SODIUM 30 MG: 30 INJECTION SUBCUTANEOUS at 16:54

## 2021-10-15 RX ADMIN — QUETIAPINE FUMARATE 100 MG: 100 TABLET ORAL at 04:46

## 2021-10-15 RX ADMIN — CALCIUM CARBONATE 500 MG: 500 TABLET, CHEWABLE ORAL at 20:35

## 2021-10-15 RX ADMIN — DOCUSATE SODIUM 100 MG: 100 CAPSULE ORAL at 04:45

## 2021-10-15 RX ADMIN — MORPHINE SULFATE 15 MG: 15 TABLET, FILM COATED, EXTENDED RELEASE ORAL at 04:45

## 2021-10-15 RX ADMIN — OXYCODONE HYDROCHLORIDE 10 MG: 10 TABLET ORAL at 19:28

## 2021-10-15 RX ADMIN — POLYETHYLENE GLYCOL 3350 1 PACKET: 17 POWDER, FOR SOLUTION ORAL at 04:45

## 2021-10-15 RX ADMIN — OXYCODONE HYDROCHLORIDE 10 MG: 10 TABLET ORAL at 15:09

## 2021-10-15 RX ADMIN — OXYCODONE HYDROCHLORIDE 10 MG: 10 TABLET ORAL at 12:01

## 2021-10-15 RX ADMIN — METHOCARBAMOL 750 MG: 750 TABLET ORAL at 20:35

## 2021-10-15 RX ADMIN — METHOCARBAMOL 750 MG: 750 TABLET ORAL at 15:09

## 2021-10-15 RX ADMIN — QUETIAPINE FUMARATE 50 MG: 25 TABLET ORAL at 20:35

## 2021-10-15 RX ADMIN — MAGNESIUM CITRATE 296 ML: 1.75 LIQUID ORAL at 11:10

## 2021-10-15 RX ADMIN — GABAPENTIN 300 MG: 300 CAPSULE ORAL at 16:48

## 2021-10-15 RX ADMIN — OXYCODONE HYDROCHLORIDE 10 MG: 10 TABLET ORAL at 01:46

## 2021-10-15 RX ADMIN — QUETIAPINE FUMARATE 50 MG: 25 TABLET ORAL at 13:54

## 2021-10-15 RX ADMIN — SODIUM CHLORIDE: 9 INJECTION, SOLUTION INTRAVENOUS at 16:49

## 2021-10-15 RX ADMIN — MORPHINE SULFATE 15 MG: 15 TABLET, FILM COATED, EXTENDED RELEASE ORAL at 16:48

## 2021-10-15 RX ADMIN — GABAPENTIN 300 MG: 300 CAPSULE ORAL at 04:45

## 2021-10-15 RX ADMIN — PIPERACILLIN AND TAZOBACTAM 4.5 G: 4; .5 INJECTION, POWDER, LYOPHILIZED, FOR SOLUTION INTRAVENOUS; PARENTERAL at 15:09

## 2021-10-15 RX ADMIN — OXYCODONE HYDROCHLORIDE 10 MG: 10 TABLET ORAL at 22:55

## 2021-10-15 RX ADMIN — VANCOMYCIN HYDROCHLORIDE 2250 MG: 500 INJECTION, POWDER, LYOPHILIZED, FOR SOLUTION INTRAVENOUS at 16:48

## 2021-10-15 RX ADMIN — PIPERACILLIN AND TAZOBACTAM 4.5 G: 4; .5 INJECTION, POWDER, LYOPHILIZED, FOR SOLUTION INTRAVENOUS; PARENTERAL at 20:35

## 2021-10-15 RX ADMIN — CALCIUM CARBONATE 500 MG: 500 TABLET, CHEWABLE ORAL at 22:56

## 2021-10-15 ASSESSMENT — ENCOUNTER SYMPTOMS
SENSORY CHANGE: 0
WHEEZING: 0
NAUSEA: 0
COUGH: 1
SPUTUM PRODUCTION: 0
CHILLS: 0
EYES NEGATIVE: 1
DIAPHORESIS: 0
VOMITING: 0
FEVER: 0
MYALGIAS: 1
TINGLING: 1
DIARRHEA: 0
ABDOMINAL PAIN: 0
SHORTNESS OF BREATH: 0
CONSTIPATION: 0

## 2021-10-15 ASSESSMENT — PAIN DESCRIPTION - PAIN TYPE
TYPE: SURGICAL PAIN

## 2021-10-15 ASSESSMENT — COGNITIVE AND FUNCTIONAL STATUS - GENERAL
WALKING IN HOSPITAL ROOM: TOTAL
MOVING FROM LYING ON BACK TO SITTING ON SIDE OF FLAT BED: UNABLE
MOVING TO AND FROM BED TO CHAIR: UNABLE
SUGGESTED CMS G CODE MODIFIER MOBILITY: CM
CLIMB 3 TO 5 STEPS WITH RAILING: TOTAL
STANDING UP FROM CHAIR USING ARMS: A LOT
MOBILITY SCORE: 7
TURNING FROM BACK TO SIDE WHILE IN FLAT BAD: UNABLE

## 2021-10-15 ASSESSMENT — GAIT ASSESSMENTS: GAIT LEVEL OF ASSIST: UNABLE TO PARTICIPATE

## 2021-10-15 NOTE — DISCHARGE PLANNING
Following for post acute services per chart review ; No provider Identified for post acute services. Medicaid application submitted. Will follow.

## 2021-10-15 NOTE — PROGRESS NOTES
RN called re: WBC of 17.1 to 12.1. Afebrile. Reviewed with RN if any concerns of urinary tract infection, pneumonia, or sources of infection. He does have history of DVT.He has complaint of leg pain. Dressing was erythematous. The leg is edematous but no changes. Low level of suspicion of compartment syndrome as pain is not worsening and there are pedal pulses detected. Plan is monitor the leg swelling and dressing change given it was difficult to assess her last dressing to rule out cellulitis or wound infection. He was noted to have tachycardia and desaturation when he sleeps, but no concerns of desaturation while he is awake which would make it an unusual presentation for a pulmonary embolus. His vitals are stable at this time. Heartrate 96. The plan is to monitor his vitals, oxygen saturation, and his wound. If he develops any changes or fevers RN is to notify me.

## 2021-10-15 NOTE — THERAPY
Physical Therapy   Daily Treatment     Patient Name: Wero Thorpe  Age:  46 y.o., Sex:  male  Medical Record #: [id number]  Today's Date: 10/15/2021     Precautions  Precautions: Toe Touch Weight Bearing Right Lower Extremity;Fall Risk (ORIF Rt femur 10/8)  Comments: IVC filter 10/11.     Assessment    Pt willing to participate w/PT, did c/o not feeling well. Pt educated on Rt LE positioning and instructed on AP, QS, GS, and IR/ER. Pt found w/Rt LE hip flex/abd w/knee flex'd. Currently pt requiring mod assist w/bed mobility, primarily for Rt LE management on/off the bed, and sit->stands w/FWW. Pt unable to wt shift today to take steps 2* pain. Pt w/Rt LE edema, c/o pain behind the knee. Provided emotional support following PT session.   Pt lives in upstair apartment, not downstairs that is noted in SW notes.   Plan    Continue current treatment plan.    DC Equipment Recommendations: Unable to determine at this time  Discharge Recommendations: Recommend post-acute placement for additional physical therapy services prior to discharge home     Objective       10/15/21 2227   Other Treatments   Other Treatments Provided Pt instructed on Rt LE ex's and positioning. Pt instructed on AP's, QS, GS, and IR/ER.    Balance   Sitting Balance (Static) Fair -   Sitting Balance (Dynamic) Poor   Standing Balance (Static) Poor +   Standing Balance (Dynamic) Poor   Weight Shift Sitting Fair   Weight Shift Standing Poor   Comments standing w/FWW   Gait Analysis   Gait Level Of Assist Unable to Participate   Weight Bearing Status TTWB Rt LE   Comments Pt too fatigued to initiate steps.    Bed Mobility    Supine to Sit Moderate Assist  (HOB partially elevated, railing, to Lft side)   Sit to Supine Moderate Assist   Scooting Minimal Assist  (seated)   Rolling Moderate Assist to Rt.;Moderate Assist to Lt.   Skilled Intervention Verbal Cuing;Postural Facilitation;Compensatory Strategies   Comments Pt needing Rt LE management on/off  the bed. Bed mobility limited by pain. Pt needs to be EOB more often.    Functional Mobility   Sit to Stand Moderate Assist  (from EOB->FWW)   Bed, Chair, Wheelchair Transfer Unable to Participate   Comments Pt unable to wt shift today to initiate transfer to the chair. Discussed w/pt that goal of next week will be transfer to the w/c.    How much difficulty does the patient currently have...   Turning over in bed (including adjusting bedclothes, sheets and blankets)? 1   Sitting down on and standing up from a chair with arms (e.g., wheelchair, bedside commode, etc.) 1   Moving from lying on back to sitting on the side of the bed? 1   How much help from another person does the patient currently need...   Moving to and from a bed to a chair (including a wheelchair)? 2   Need to walk in a hospital room? 1   Climbing 3-5 steps with a railing? 1   6 clicks Mobility Score 7   Short Term Goals    Short Term Goal # 1 pt will perform supine <> sit with HOB flat, no railing and SPV in 6 visits   Goal Outcome # 1 goal not met   Short Term Goal # 2 pt will perform sit <> stand and functional transfers with LRAD and Min A to improve functional mobility in 6 visits   Goal Outcome # 2 Goal not met   Short Term Goal # 3 pt will ambulate > 25 ft with FWW and Mod A to improve function in 6 visits   Goal Outcome # 3 Goal not met   Short Term Goal # 4 pt will negotiate 1 FOS with LRAD / railing and Min A to access home environment in 6 visits   Goal Outcome # 4 Goal not met

## 2021-10-15 NOTE — PROGRESS NOTES
Orthopaedic Progress Note    Interval changes:  Patient doing well    RLE proximal dressing with min serosanguinous exudate,  prevena + incisional vac placed  No wound or infection concerns noted with vac placement by ortho NANDO HINKLE - Patient denies any new issues.  Pain well controlled.    /73   Pulse 98   Temp 36.7 °C (98 °F) (Temporal)   Resp 18   Ht 1.829 m (6')   Wt 89 kg (196 lb 3.4 oz)   SpO2 98%       Patient seen and examined  No acute distress  Breathing non labored  RRR  RLE surgical dressings CDI distally.  Proximal surgical incision is well approximated and has only min serosanguinous exudate.  There is no erythema, induration, or signs of infection at any of the incision sites. Proximal dressing with min serosanguinous Patient clearly fires tibialis anterior, EHL, and gastrocnemius/soleus. Sensation is intact to light touch throughout superficial peroneal, deep peroneal, tibial, saphenous, and sural nerve distributions. Strong and palpable 2+ dorsalis pedis and posterior tibial pulses with capillary refill less than 2 seconds. No lower leg tenderness or discomfort.       Recent Labs     10/13/21  0605 10/13/21  0605 10/14/21  0412 10/14/21  0412 10/14/21  1101 10/14/21  1857 10/15/21  0249   WBC 12.0*  --  12.1*  --   --   --  17.1*   RBC 2.49*  --  2.46*  --   --   --  2.94*   HEMOGLOBIN 7.2*   < > 7.0*   < > 8.4* 8.1* 8.5*   HEMATOCRIT 21.0*   < > 21.6*   < > 25.3* 25.1* 25.7*   MCV 84.3  --  87.8  --   --   --  87.4   MCH 28.9  --  28.5  --   --   --  28.9   MCHC 34.3  --  32.4*  --   --   --  33.1*   RDW 41.4  --  45.0  --   --   --  46.7   PLATELETCT 235  --  294  --   --   --  368   MPV 9.4  --  9.3  --   --   --  9.0    < > = values in this interval not displayed.       Active Hospital Problems    Diagnosis    • Leukocytosis [D72.829]      Priority: High   • Acute blood loss anemia [D62]      Priority: High   • Acute deep vein thrombosis (DVT) of femoral vein of right lower  extremity (HCC) [I82.411]      Priority: High   • Delirium [R41.0]      Priority: Medium   • Fracture of shaft of femur (HCC) [S72.309A]      Priority: Medium   • Intramuscular hematoma [T14.8XXA]      Priority: Medium   • Trauma [T14.90XA]      Priority: Low   • Encounter for screening for COVID-19 [Z11.52]      Priority: Low   • Liver cirrhosis (HCC) [K74.60]      Priority: Low       Assessment/Plan:  Patient doing well    POD#7 S/P:  1. Open treatment right intertrochanteric femur fracture with plate and screw construct  2. Open treatment of right femoral shaft fracture with insertion of intramedullary implant  3. Open reduction internal fixation right distal femur intraarticular fracture  Wt bearing status - TTWB RLE  Wound care/Drains - dressings changed every other day by nursing distally, prevena to be left in place  Future Procedures - none planned   Lovenox: Start 10/9, Duration-until ambulatory > 150'  Sutures/Staples out-  14 days post operatively  PT/OT-initiated  Antibiotics: perioperative completed  DVT Prophylaxis- TEDS/SCDs/Foot pumps  Raygoza-none  Case Coordination for Discharge Planning - Disposition home

## 2021-10-15 NOTE — THERAPY
Occupational Therapy Contact Note    Attempted OT treatment, gathered all supplies for seated sponge bath EOB. RN then notified OT that they called ultrasound for stat ultrasound to rule out DVT R LE. Will attempt later as able.    Amalia Hendricks, OTR/L

## 2021-10-15 NOTE — ASSESSMENT & PLAN NOTE
10/15 Yellow foul drainage from thigh incision.   - UA, wound CX, BC x 2 obtained.  - Ortho placed Prevena.  - Chest xray negative.  - Empiric Vanco/Zosyn initiated.  10/16 MRSA negative, continue Vanco until cultures final.  10/18 Wound culture negative.  - Prelim urine culture positive Enterobacter cloacae complex, susceptibility pending.  - Prelim blood cultures negative.  - Vanco/Zosyn discontinued.  10/20 Persistent leukocytosis. Workup so far negative. Possibly related to right lower extremity bleeding.  10/23 Leukocytosis resolved.
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
## 2021-10-15 NOTE — PROGRESS NOTES
Was informed of hgb 7 from RN and that patient was symptomatic with tachycardia, thus a unit of blood was ordered.
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
## 2021-10-15 NOTE — PROGRESS NOTES
Will have large swollen right leg due to lack of fascia from injury  IVC filter for DVT      /63   Pulse (!) 110   Temp 36.4 °C (97.5 °F) (Temporal)   Resp 18   Ht 1.829 m (6')   Wt 89 kg (196 lb 3.4 oz)   SpO2 96%     Recent Labs     10/13/21  0605 10/13/21  0605 10/14/21  0412 10/14/21  0412 10/14/21  1101 10/14/21  1857 10/15/21  0249   WBC 12.0*  --  12.1*  --   --   --  17.1*   RBC 2.49*  --  2.46*  --   --   --  2.94*   HEMOGLOBIN 7.2*   < > 7.0*   < > 8.4* 8.1* 8.5*   HEMATOCRIT 21.0*   < > 21.6*   < > 25.3* 25.1* 25.7*   MCV 84.3  --  87.8  --   --   --  87.4   MCH 28.9  --  28.5  --   --   --  28.9   MCHC 34.3  --  32.4*  --   --   --  33.1*   RDW 41.4  --  45.0  --   --   --  46.7   PLATELETCT 235  --  294  --   --   --  368   MPV 9.4  --  9.3  --   --   --  9.0    < > = values in this interval not displayed.       No acute distress  Dressing clean dry and intact  Neurovascularly intact    POD#7  S/P ORIF right IT fracture, IMN Right femur shaft and distal femur    Plan:  DVT Prophylaxis- TEDS/SCDs, lovenox while in hospital, ASA 81 mg PO BID as outpatient  Weight Bearing Status-TTWB x 6 weeks  PT/OT  Antibiotics: None  Case Coordination

## 2021-10-16 LAB
ALBUMIN SERPL BCP-MCNC: 2.4 G/DL (ref 3.2–4.9)
ALBUMIN/GLOB SERPL: 1 G/DL
ALP SERPL-CCNC: 52 U/L (ref 30–99)
ALT SERPL-CCNC: 15 U/L (ref 2–50)
ANION GAP SERPL CALC-SCNC: 11 MMOL/L (ref 7–16)
ANION GAP SERPL CALC-SCNC: 8 MMOL/L (ref 7–16)
ANISOCYTOSIS BLD QL SMEAR: ABNORMAL
AST SERPL-CCNC: 16 U/L (ref 12–45)
BASOPHILS # BLD AUTO: 0 % (ref 0–1.8)
BASOPHILS # BLD: 0 K/UL (ref 0–0.12)
BILIRUB SERPL-MCNC: 0.5 MG/DL (ref 0.1–1.5)
BUN SERPL-MCNC: 18 MG/DL (ref 8–22)
BUN SERPL-MCNC: 19 MG/DL (ref 8–22)
CALCIUM SERPL-MCNC: 6.4 MG/DL (ref 8.5–10.5)
CALCIUM SERPL-MCNC: 6.4 MG/DL (ref 8.5–10.5)
CHLORIDE SERPL-SCNC: 107 MMOL/L (ref 96–112)
CHLORIDE SERPL-SCNC: 108 MMOL/L (ref 96–112)
CO2 SERPL-SCNC: 19 MMOL/L (ref 20–33)
CO2 SERPL-SCNC: 20 MMOL/L (ref 20–33)
CREAT SERPL-MCNC: 0.47 MG/DL (ref 0.5–1.4)
CREAT SERPL-MCNC: 0.58 MG/DL (ref 0.5–1.4)
EOSINOPHIL # BLD AUTO: 0.26 K/UL (ref 0–0.51)
EOSINOPHIL NFR BLD: 1.8 % (ref 0–6.9)
ERYTHROCYTE [DISTWIDTH] IN BLOOD BY AUTOMATED COUNT: 50.2 FL (ref 35.9–50)
FERRITIN SERPL-MCNC: 279 NG/ML (ref 22–322)
GLOBULIN SER CALC-MCNC: 2.5 G/DL (ref 1.9–3.5)
GLUCOSE SERPL-MCNC: 126 MG/DL (ref 65–99)
GLUCOSE SERPL-MCNC: 131 MG/DL (ref 65–99)
HCT VFR BLD AUTO: 23.3 % (ref 42–52)
HGB BLD-MCNC: 7.5 G/DL (ref 14–18)
IRON SATN MFR SERPL: 14 % (ref 15–55)
IRON SATN MFR SERPL: 25 % (ref 15–55)
IRON SERPL-MCNC: 23 UG/DL (ref 50–180)
IRON SERPL-MCNC: 52 UG/DL (ref 50–180)
LACTATE BLD-SCNC: 1.2 MMOL/L (ref 0.5–2)
LYMPHOCYTES # BLD AUTO: 2.91 K/UL (ref 1–4.8)
LYMPHOCYTES NFR BLD: 20.5 % (ref 22–41)
MACROCYTES BLD QL SMEAR: ABNORMAL
MANUAL DIFF BLD: NORMAL
MCH RBC QN AUTO: 29 PG (ref 27–33)
MCHC RBC AUTO-ENTMCNC: 32.2 G/DL (ref 33.7–35.3)
MCV RBC AUTO: 90 FL (ref 81.4–97.8)
METAMYELOCYTES NFR BLD MANUAL: 0.9 %
MICROCYTES BLD QL SMEAR: ABNORMAL
MONOCYTES # BLD AUTO: 0.77 K/UL (ref 0–0.85)
MONOCYTES NFR BLD AUTO: 5.4 % (ref 0–13.4)
MORPHOLOGY BLD-IMP: NORMAL
NEUTROPHILS # BLD AUTO: 10.14 K/UL (ref 1.82–7.42)
NEUTROPHILS NFR BLD: 68.7 % (ref 44–72)
NEUTS BAND NFR BLD MANUAL: 2.7 % (ref 0–10)
NRBC # BLD AUTO: 0.03 K/UL
NRBC BLD-RTO: 0.2 /100 WBC
OVALOCYTES BLD QL SMEAR: NORMAL
PLATELET # BLD AUTO: 376 K/UL (ref 164–446)
PLATELET BLD QL SMEAR: NORMAL
PMV BLD AUTO: 9 FL (ref 9–12.9)
POIKILOCYTOSIS BLD QL SMEAR: NORMAL
POLYCHROMASIA BLD QL SMEAR: NORMAL
POTASSIUM SERPL-SCNC: 3.4 MMOL/L (ref 3.6–5.5)
POTASSIUM SERPL-SCNC: 3.5 MMOL/L (ref 3.6–5.5)
PROT SERPL-MCNC: 4.9 G/DL (ref 6–8.2)
RBC # BLD AUTO: 2.59 M/UL (ref 4.7–6.1)
RBC BLD AUTO: PRESENT
SODIUM SERPL-SCNC: 136 MMOL/L (ref 135–145)
SODIUM SERPL-SCNC: 137 MMOL/L (ref 135–145)
TIBC SERPL-MCNC: 161 UG/DL (ref 250–450)
TIBC SERPL-MCNC: 209 UG/DL (ref 250–450)
UIBC SERPL-MCNC: 138 UG/DL (ref 110–370)
UIBC SERPL-MCNC: 157 UG/DL (ref 110–370)
WBC # BLD AUTO: 14.2 K/UL (ref 4.8–10.8)

## 2021-10-16 PROCEDURE — 700102 HCHG RX REV CODE 250 W/ 637 OVERRIDE(OP): Performed by: NURSE PRACTITIONER

## 2021-10-16 PROCEDURE — 700105 HCHG RX REV CODE 258

## 2021-10-16 PROCEDURE — 83605 ASSAY OF LACTIC ACID: CPT

## 2021-10-16 PROCEDURE — 36415 COLL VENOUS BLD VENIPUNCTURE: CPT

## 2021-10-16 PROCEDURE — 700102 HCHG RX REV CODE 250 W/ 637 OVERRIDE(OP)

## 2021-10-16 PROCEDURE — 82728 ASSAY OF FERRITIN: CPT

## 2021-10-16 PROCEDURE — 700111 HCHG RX REV CODE 636 W/ 250 OVERRIDE (IP)

## 2021-10-16 PROCEDURE — 83540 ASSAY OF IRON: CPT | Mod: 91

## 2021-10-16 PROCEDURE — A9270 NON-COVERED ITEM OR SERVICE: HCPCS | Performed by: SPECIALIST

## 2021-10-16 PROCEDURE — 80048 BASIC METABOLIC PNL TOTAL CA: CPT

## 2021-10-16 PROCEDURE — 99024 POSTOP FOLLOW-UP VISIT: CPT | Performed by: ORTHOPAEDIC SURGERY

## 2021-10-16 PROCEDURE — 85007 BL SMEAR W/DIFF WBC COUNT: CPT

## 2021-10-16 PROCEDURE — A9270 NON-COVERED ITEM OR SERVICE: HCPCS | Performed by: PHYSICAL MEDICINE & REHABILITATION

## 2021-10-16 PROCEDURE — 700111 HCHG RX REV CODE 636 W/ 250 OVERRIDE (IP): Performed by: SURGERY

## 2021-10-16 PROCEDURE — 700102 HCHG RX REV CODE 250 W/ 637 OVERRIDE(OP): Performed by: SURGERY

## 2021-10-16 PROCEDURE — A9270 NON-COVERED ITEM OR SERVICE: HCPCS | Performed by: NURSE PRACTITIONER

## 2021-10-16 PROCEDURE — 700102 HCHG RX REV CODE 250 W/ 637 OVERRIDE(OP): Performed by: PHYSICAL MEDICINE & REHABILITATION

## 2021-10-16 PROCEDURE — A9270 NON-COVERED ITEM OR SERVICE: HCPCS

## 2021-10-16 PROCEDURE — 770006 HCHG ROOM/CARE - MED/SURG/GYN SEMI*

## 2021-10-16 PROCEDURE — 80053 COMPREHEN METABOLIC PANEL: CPT

## 2021-10-16 PROCEDURE — A9270 NON-COVERED ITEM OR SERVICE: HCPCS | Performed by: SURGERY

## 2021-10-16 PROCEDURE — 83550 IRON BINDING TEST: CPT | Mod: 91

## 2021-10-16 PROCEDURE — 700102 HCHG RX REV CODE 250 W/ 637 OVERRIDE(OP): Performed by: SPECIALIST

## 2021-10-16 PROCEDURE — 85027 COMPLETE CBC AUTOMATED: CPT

## 2021-10-16 RX ORDER — CALCIUM CARBONATE 500 MG/1
500 TABLET, CHEWABLE ORAL 3 TIMES DAILY
Status: DISCONTINUED | OUTPATIENT
Start: 2021-10-16 | End: 2021-11-01 | Stop reason: HOSPADM

## 2021-10-16 RX ORDER — CALCIUM CARBONATE 500 MG/1
500 TABLET, CHEWABLE ORAL ONCE
Status: COMPLETED | OUTPATIENT
Start: 2021-10-16 | End: 2021-10-16

## 2021-10-16 RX ORDER — POTASSIUM CHLORIDE 20 MEQ/1
20 TABLET, EXTENDED RELEASE ORAL ONCE
Status: COMPLETED | OUTPATIENT
Start: 2021-10-16 | End: 2021-10-16

## 2021-10-16 RX ADMIN — MORPHINE SULFATE 15 MG: 15 TABLET, FILM COATED, EXTENDED RELEASE ORAL at 05:22

## 2021-10-16 RX ADMIN — QUETIAPINE FUMARATE 50 MG: 25 TABLET ORAL at 12:57

## 2021-10-16 RX ADMIN — GABAPENTIN 300 MG: 300 CAPSULE ORAL at 17:19

## 2021-10-16 RX ADMIN — ENOXAPARIN SODIUM 30 MG: 30 INJECTION SUBCUTANEOUS at 17:20

## 2021-10-16 RX ADMIN — METHOCARBAMOL 750 MG: 750 TABLET ORAL at 20:24

## 2021-10-16 RX ADMIN — QUETIAPINE FUMARATE 50 MG: 25 TABLET ORAL at 21:00

## 2021-10-16 RX ADMIN — OXYCODONE HYDROCHLORIDE 10 MG: 10 TABLET ORAL at 17:19

## 2021-10-16 RX ADMIN — OXYCODONE HYDROCHLORIDE 10 MG: 10 TABLET ORAL at 09:07

## 2021-10-16 RX ADMIN — MORPHINE SULFATE 15 MG: 15 TABLET, FILM COATED, EXTENDED RELEASE ORAL at 17:19

## 2021-10-16 RX ADMIN — OXYCODONE HYDROCHLORIDE 10 MG: 10 TABLET ORAL at 20:24

## 2021-10-16 RX ADMIN — PIPERACILLIN AND TAZOBACTAM 4.5 G: 4; .5 INJECTION, POWDER, LYOPHILIZED, FOR SOLUTION INTRAVENOUS; PARENTERAL at 21:01

## 2021-10-16 RX ADMIN — QUETIAPINE FUMARATE 50 MG: 25 TABLET ORAL at 05:22

## 2021-10-16 RX ADMIN — OXYCODONE HYDROCHLORIDE 10 MG: 10 TABLET ORAL at 02:24

## 2021-10-16 RX ADMIN — POTASSIUM CHLORIDE 20 MEQ: 1500 TABLET, EXTENDED RELEASE ORAL at 06:37

## 2021-10-16 RX ADMIN — SODIUM CHLORIDE 125 MG: 9 INJECTION, SOLUTION INTRAVENOUS at 17:33

## 2021-10-16 RX ADMIN — PIPERACILLIN AND TAZOBACTAM 4.5 G: 4; .5 INJECTION, POWDER, LYOPHILIZED, FOR SOLUTION INTRAVENOUS; PARENTERAL at 05:23

## 2021-10-16 RX ADMIN — METHOCARBAMOL 750 MG: 750 TABLET ORAL at 09:07

## 2021-10-16 RX ADMIN — CALCIUM CARBONATE 500 MG: 500 TABLET, CHEWABLE ORAL at 06:37

## 2021-10-16 RX ADMIN — METHOCARBAMOL 750 MG: 750 TABLET ORAL at 17:19

## 2021-10-16 RX ADMIN — VANCOMYCIN HYDROCHLORIDE 1500 MG: 500 INJECTION, POWDER, LYOPHILIZED, FOR SOLUTION INTRAVENOUS at 17:33

## 2021-10-16 RX ADMIN — METHOCARBAMOL 750 MG: 750 TABLET ORAL at 12:57

## 2021-10-16 RX ADMIN — OXYCODONE HYDROCHLORIDE 10 MG: 10 TABLET ORAL at 12:57

## 2021-10-16 RX ADMIN — CALCIUM CARBONATE 500 MG: 500 TABLET, CHEWABLE ORAL at 05:37

## 2021-10-16 RX ADMIN — ENOXAPARIN SODIUM 30 MG: 30 INJECTION SUBCUTANEOUS at 05:23

## 2021-10-16 RX ADMIN — VANCOMYCIN HYDROCHLORIDE 1500 MG: 500 INJECTION, POWDER, LYOPHILIZED, FOR SOLUTION INTRAVENOUS at 09:08

## 2021-10-16 RX ADMIN — CALCIUM CARBONATE 500 MG: 500 TABLET, CHEWABLE ORAL at 17:19

## 2021-10-16 RX ADMIN — PIPERACILLIN AND TAZOBACTAM 4.5 G: 4; .5 INJECTION, POWDER, LYOPHILIZED, FOR SOLUTION INTRAVENOUS; PARENTERAL at 12:58

## 2021-10-16 RX ADMIN — VANCOMYCIN HYDROCHLORIDE 1500 MG: 500 INJECTION, POWDER, LYOPHILIZED, FOR SOLUTION INTRAVENOUS at 01:09

## 2021-10-16 RX ADMIN — GABAPENTIN 300 MG: 300 CAPSULE ORAL at 05:22

## 2021-10-16 RX ADMIN — GABAPENTIN 300 MG: 300 CAPSULE ORAL at 12:57

## 2021-10-16 RX ADMIN — CALCIUM CARBONATE 500 MG: 500 TABLET, CHEWABLE ORAL at 12:57

## 2021-10-16 ASSESSMENT — ENCOUNTER SYMPTOMS
HEADACHES: 0
BLURRED VISION: 0
TINGLING: 0
SENSORY CHANGE: 0
WHEEZING: 0
DIZZINESS: 0
VOMITING: 0
FEVER: 0
SPUTUM PRODUCTION: 0
DOUBLE VISION: 0
CONSTIPATION: 0
NAUSEA: 0
COUGH: 1
SORE THROAT: 0
DIAPHORESIS: 0
DIARRHEA: 0
SHORTNESS OF BREATH: 0
CHILLS: 0
ABDOMINAL PAIN: 0
MYALGIAS: 1

## 2021-10-16 ASSESSMENT — COGNITIVE AND FUNCTIONAL STATUS - GENERAL
SUGGESTED CMS G CODE MODIFIER MOBILITY: CM
MOBILITY SCORE: 9
TURNING FROM BACK TO SIDE WHILE IN FLAT BAD: A LOT
CLIMB 3 TO 5 STEPS WITH RAILING: TOTAL
MOVING FROM LYING ON BACK TO SITTING ON SIDE OF FLAT BED: UNABLE
MOVING TO AND FROM BED TO CHAIR: A LOT
WALKING IN HOSPITAL ROOM: TOTAL
STANDING UP FROM CHAIR USING ARMS: A LOT

## 2021-10-16 ASSESSMENT — PAIN DESCRIPTION - PAIN TYPE
TYPE: ACUTE PAIN;SURGICAL PAIN
TYPE: SURGICAL PAIN
TYPE: ACUTE PAIN;SURGICAL PAIN

## 2021-10-16 NOTE — CARE PLAN
The patient is Watcher - Medium risk of patient condition declining or worsening    Shift Goals  Clinical Goals: safety  Patient Goals: pain management  Family Goals: no family present    Progress made toward(s) clinical / shift goals:      Problem: Pain - Standard  Goal: Alleviation of pain or a reduction in pain to the patient’s comfort goal  Outcome: Progressing   Pt states pain 9/10. Medications administered per MAR, ice pack applied, pillows for comfort and repositioning.     Problem: Knowledge Deficit - Standard  Goal: Patient and family/care givers will demonstrate understanding of plan of care, disease process/condition, diagnostic tests and medications  Outcome: Progressing   POC discussed with pt at bedside. Pt asks appropriate questions, no additional concerns at this time.     Patient is not progressing towards the following goals:

## 2021-10-16 NOTE — PROGRESS NOTES
Orthopaedic Progress Note    Interval changes:  Patient doing well    RLE prevena incisional vac in place without leak  Patient leukocytosis improving with initiation of zosyn and vanco- recommend 3-5 day course for prophylaxis of potential infective process not identified  No wound or infection concerns noted with ortho incisions- growth likely skin contaminant but undetermined      ROS - Patient denies any new issues.  Pain well controlled.    /76   Pulse 100   Temp 37 °C (98.6 °F) (Temporal)   Resp 18   Ht 1.829 m (6')   Wt 89 kg (196 lb 3.4 oz)   SpO2 97%       Patient seen and examined  No acute distress  Breathing non labored  RRR  RLE surgical dressings CDI distally.  Proximal prevena in place without leak or exudate noted.  There is no erythema, induration, or signs of infection at any of the incision sites. Proximal dressing with min serosanguinous Patient clearly fires tibialis anterior, EHL, and gastrocnemius/soleus. Sensation is intact to light touch throughout superficial peroneal, deep peroneal, tibial, saphenous, and sural nerve distributions. Strong and palpable 2+ dorsalis pedis and posterior tibial pulses with capillary refill less than 2 seconds. No lower leg tenderness or discomfort.       Recent Labs     10/14/21  0412 10/14/21  1101 10/14/21  1857 10/15/21  0249 10/16/21  0257   WBC 12.1*  --   --  17.1* 14.2*   RBC 2.46*  --   --  2.94* 2.59*   HEMOGLOBIN 7.0*   < > 8.1* 8.5* 7.5*   HEMATOCRIT 21.6*   < > 25.1* 25.7* 23.3*   MCV 87.8  --   --  87.4 90.0   MCH 28.5  --   --  28.9 29.0   MCHC 32.4*  --   --  33.1* 32.2*   RDW 45.0  --   --  46.7 50.2*   PLATELETCT 294  --   --  368 376   MPV 9.3  --   --  9.0 9.0    < > = values in this interval not displayed.       Active Hospital Problems    Diagnosis    • Leukocytosis [D72.829]      Priority: High   • Intramuscular hematoma [T14.8XXA]      Priority: High   • Acute deep vein thrombosis (DVT) of femoral vein of right lower  extremity (HCC) [I82.411]      Priority: High   • Delirium [R41.0]      Priority: Medium   • Acute blood loss anemia [D62]      Priority: Medium   • Fracture of shaft of femur (HCC) [S72.309A]      Priority: Medium   • Trauma [T14.90XA]      Priority: Low   • Encounter for screening for COVID-19 [Z11.52]      Priority: Low   • Liver cirrhosis (HCC) [K74.60]      Priority: Low       Assessment/Plan:  Patient doing well    Continue Abx for 3-5 days for prophylaxis  POD#8 S/P:  1. Open treatment right intertrochanteric femur fracture with plate and screw construct  2. Open treatment of right femoral shaft fracture with insertion of intramedullary implant  3. Open reduction internal fixation right distal femur intraarticular fracture  Wt bearing status - TTWB RLE  Wound care/Drains - dressings changed every other day by nursing distally, prevena to be left in place  Future Procedures - none planned   Lovenox: Start 10/9, Duration-until ambulatory > 150'  Sutures/Staples out-  14 days post operatively  PT/OT-initiated  Antibiotics: perioperative completed  DVT Prophylaxis- TEDS/SCDs/Foot pumps  Raygoza-none  Case Coordination for Discharge Planning - Disposition home

## 2021-10-16 NOTE — PROGRESS NOTES
Diego from Lab called with critical result of calcium 6.4 at 0343. Critical lab result read back to Diego.     Corrected calcium calculated: 7.68    Magda Trauma NP notified of critical lab result at 0418.  Critical lab result read back by Magda.
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
## 2021-10-16 NOTE — DISCHARGE PLANNING
Per physiatry, patient is a good candidate for inpatient rehab. Will need to verify patient's insurance and submit for authorization. TCC to continue to follow.

## 2021-10-17 LAB
ANION GAP SERPL CALC-SCNC: 11 MMOL/L (ref 7–16)
ANISOCYTOSIS BLD QL SMEAR: ABNORMAL
BACTERIA WND AEROBE CULT: NORMAL
BASOPHILS # BLD AUTO: 0.9 % (ref 0–1.8)
BASOPHILS # BLD: 0.1 K/UL (ref 0–0.12)
BUN SERPL-MCNC: 24 MG/DL (ref 8–22)
BURR CELLS BLD QL SMEAR: NORMAL
CALCIUM SERPL-MCNC: 8.4 MG/DL (ref 8.5–10.5)
CHLORIDE SERPL-SCNC: 99 MMOL/L (ref 96–112)
CO2 SERPL-SCNC: 21 MMOL/L (ref 20–33)
CREAT SERPL-MCNC: 0.67 MG/DL (ref 0.5–1.4)
EOSINOPHIL # BLD AUTO: 0.28 K/UL (ref 0–0.51)
EOSINOPHIL NFR BLD: 2.6 % (ref 0–6.9)
ERYTHROCYTE [DISTWIDTH] IN BLOOD BY AUTOMATED COUNT: 53.8 FL (ref 35.9–50)
GLUCOSE SERPL-MCNC: 122 MG/DL (ref 65–99)
GRAM STN SPEC: NORMAL
HCT VFR BLD AUTO: 23.7 % (ref 42–52)
HGB BLD-MCNC: 7.2 G/DL (ref 14–18)
HYPOCHROMIA BLD QL SMEAR: ABNORMAL
LYMPHOCYTES # BLD AUTO: 2.27 K/UL (ref 1–4.8)
LYMPHOCYTES NFR BLD: 21 % (ref 22–41)
MAGNESIUM SERPL-MCNC: 2 MG/DL (ref 1.5–2.5)
MANUAL DIFF BLD: NORMAL
MCH RBC QN AUTO: 28.6 PG (ref 27–33)
MCHC RBC AUTO-ENTMCNC: 30.4 G/DL (ref 33.7–35.3)
MCV RBC AUTO: 94 FL (ref 81.4–97.8)
MICROCYTES BLD QL SMEAR: ABNORMAL
MONOCYTES # BLD AUTO: 0.57 K/UL (ref 0–0.85)
MONOCYTES NFR BLD AUTO: 5.3 % (ref 0–13.4)
MORPHOLOGY BLD-IMP: NORMAL
MYELOCYTES NFR BLD MANUAL: 0.9 %
NEUTROPHILS # BLD AUTO: 7.48 K/UL (ref 1.82–7.42)
NEUTROPHILS NFR BLD: 69.3 % (ref 44–72)
NRBC # BLD AUTO: 0 K/UL
NRBC BLD-RTO: 0 /100 WBC
OVALOCYTES BLD QL SMEAR: NORMAL
PLATELET # BLD AUTO: 382 K/UL (ref 164–446)
PLATELET BLD QL SMEAR: NORMAL
PMV BLD AUTO: 9.6 FL (ref 9–12.9)
POIKILOCYTOSIS BLD QL SMEAR: NORMAL
POLYCHROMASIA BLD QL SMEAR: NORMAL
POTASSIUM SERPL-SCNC: 3.9 MMOL/L (ref 3.6–5.5)
RBC # BLD AUTO: 2.52 M/UL (ref 4.7–6.1)
RBC BLD AUTO: PRESENT
SIGNIFICANT IND 70042: NORMAL
SITE SITE: NORMAL
SODIUM SERPL-SCNC: 131 MMOL/L (ref 135–145)
SOURCE SOURCE: NORMAL
VANCOMYCIN PEAK SERPL-MCNC: 34 UG/ML (ref 20–40)
VANCOMYCIN TROUGH SERPL-MCNC: 16 UG/ML (ref 10–20)
WBC # BLD AUTO: 10.8 K/UL (ref 4.8–10.8)

## 2021-10-17 PROCEDURE — 700102 HCHG RX REV CODE 250 W/ 637 OVERRIDE(OP): Performed by: SPECIALIST

## 2021-10-17 PROCEDURE — A9270 NON-COVERED ITEM OR SERVICE: HCPCS | Performed by: PHYSICAL MEDICINE & REHABILITATION

## 2021-10-17 PROCEDURE — 85007 BL SMEAR W/DIFF WBC COUNT: CPT

## 2021-10-17 PROCEDURE — A9270 NON-COVERED ITEM OR SERVICE: HCPCS | Performed by: ORTHOPAEDIC SURGERY

## 2021-10-17 PROCEDURE — 700111 HCHG RX REV CODE 636 W/ 250 OVERRIDE (IP): Performed by: SURGERY

## 2021-10-17 PROCEDURE — 80048 BASIC METABOLIC PNL TOTAL CA: CPT

## 2021-10-17 PROCEDURE — 36415 COLL VENOUS BLD VENIPUNCTURE: CPT

## 2021-10-17 PROCEDURE — 700111 HCHG RX REV CODE 636 W/ 250 OVERRIDE (IP)

## 2021-10-17 PROCEDURE — A9270 NON-COVERED ITEM OR SERVICE: HCPCS

## 2021-10-17 PROCEDURE — 700105 HCHG RX REV CODE 258

## 2021-10-17 PROCEDURE — 770006 HCHG ROOM/CARE - MED/SURG/GYN SEMI*

## 2021-10-17 PROCEDURE — A9270 NON-COVERED ITEM OR SERVICE: HCPCS | Performed by: NURSE PRACTITIONER

## 2021-10-17 PROCEDURE — 99024 POSTOP FOLLOW-UP VISIT: CPT | Performed by: ORTHOPAEDIC SURGERY

## 2021-10-17 PROCEDURE — 85027 COMPLETE CBC AUTOMATED: CPT

## 2021-10-17 PROCEDURE — A9270 NON-COVERED ITEM OR SERVICE: HCPCS | Performed by: SPECIALIST

## 2021-10-17 PROCEDURE — 99232 SBSQ HOSP IP/OBS MODERATE 35: CPT | Performed by: SURGERY

## 2021-10-17 PROCEDURE — 83735 ASSAY OF MAGNESIUM: CPT

## 2021-10-17 PROCEDURE — 700102 HCHG RX REV CODE 250 W/ 637 OVERRIDE(OP): Performed by: NURSE PRACTITIONER

## 2021-10-17 PROCEDURE — 700102 HCHG RX REV CODE 250 W/ 637 OVERRIDE(OP): Performed by: PHYSICAL MEDICINE & REHABILITATION

## 2021-10-17 PROCEDURE — 700102 HCHG RX REV CODE 250 W/ 637 OVERRIDE(OP)

## 2021-10-17 PROCEDURE — 80202 ASSAY OF VANCOMYCIN: CPT | Mod: 91

## 2021-10-17 PROCEDURE — 700102 HCHG RX REV CODE 250 W/ 637 OVERRIDE(OP): Performed by: ORTHOPAEDIC SURGERY

## 2021-10-17 RX ORDER — QUETIAPINE FUMARATE 25 MG/1
50 TABLET, FILM COATED ORAL
Status: DISCONTINUED | OUTPATIENT
Start: 2021-10-17 | End: 2021-10-18

## 2021-10-17 RX ADMIN — CALCIUM CARBONATE 500 MG: 500 TABLET, CHEWABLE ORAL at 19:00

## 2021-10-17 RX ADMIN — GABAPENTIN 300 MG: 300 CAPSULE ORAL at 17:21

## 2021-10-17 RX ADMIN — GABAPENTIN 300 MG: 300 CAPSULE ORAL at 12:25

## 2021-10-17 RX ADMIN — VANCOMYCIN HYDROCHLORIDE 1750 MG: 500 INJECTION, POWDER, LYOPHILIZED, FOR SOLUTION INTRAVENOUS at 12:25

## 2021-10-17 RX ADMIN — OXYCODONE HYDROCHLORIDE 10 MG: 10 TABLET ORAL at 03:08

## 2021-10-17 RX ADMIN — DOCUSATE SODIUM 50 MG AND SENNOSIDES 8.6 MG 1 TABLET: 8.6; 5 TABLET, FILM COATED ORAL at 20:34

## 2021-10-17 RX ADMIN — OXYCODONE HYDROCHLORIDE 10 MG: 10 TABLET ORAL at 14:10

## 2021-10-17 RX ADMIN — OXYCODONE HYDROCHLORIDE 10 MG: 10 TABLET ORAL at 08:14

## 2021-10-17 RX ADMIN — METHOCARBAMOL 750 MG: 750 TABLET ORAL at 17:21

## 2021-10-17 RX ADMIN — VANCOMYCIN HYDROCHLORIDE 1500 MG: 500 INJECTION, POWDER, LYOPHILIZED, FOR SOLUTION INTRAVENOUS at 01:12

## 2021-10-17 RX ADMIN — METHOCARBAMOL 750 MG: 750 TABLET ORAL at 09:50

## 2021-10-17 RX ADMIN — CALCIUM CARBONATE 500 MG: 500 TABLET, CHEWABLE ORAL at 04:48

## 2021-10-17 RX ADMIN — DOCUSATE SODIUM 100 MG: 100 CAPSULE ORAL at 17:22

## 2021-10-17 RX ADMIN — SODIUM CHLORIDE 125 MG: 9 INJECTION, SOLUTION INTRAVENOUS at 17:20

## 2021-10-17 RX ADMIN — METHOCARBAMOL 750 MG: 750 TABLET ORAL at 12:25

## 2021-10-17 RX ADMIN — PIPERACILLIN AND TAZOBACTAM 4.5 G: 4; .5 INJECTION, POWDER, LYOPHILIZED, FOR SOLUTION INTRAVENOUS; PARENTERAL at 12:26

## 2021-10-17 RX ADMIN — GABAPENTIN 300 MG: 300 CAPSULE ORAL at 04:48

## 2021-10-17 RX ADMIN — PIPERACILLIN AND TAZOBACTAM 4.5 G: 4; .5 INJECTION, POWDER, LYOPHILIZED, FOR SOLUTION INTRAVENOUS; PARENTERAL at 04:48

## 2021-10-17 RX ADMIN — QUETIAPINE FUMARATE 50 MG: 25 TABLET ORAL at 20:34

## 2021-10-17 RX ADMIN — ENOXAPARIN SODIUM 30 MG: 30 INJECTION SUBCUTANEOUS at 17:21

## 2021-10-17 RX ADMIN — OXYCODONE HYDROCHLORIDE 10 MG: 10 TABLET ORAL at 20:34

## 2021-10-17 RX ADMIN — CALCIUM CARBONATE 500 MG: 500 TABLET, CHEWABLE ORAL at 12:25

## 2021-10-17 RX ADMIN — ENOXAPARIN SODIUM 30 MG: 30 INJECTION SUBCUTANEOUS at 04:48

## 2021-10-17 RX ADMIN — OXYCODONE HYDROCHLORIDE 10 MG: 10 TABLET ORAL at 23:39

## 2021-10-17 RX ADMIN — PIPERACILLIN AND TAZOBACTAM 4.5 G: 4; .5 INJECTION, POWDER, LYOPHILIZED, FOR SOLUTION INTRAVENOUS; PARENTERAL at 20:34

## 2021-10-17 RX ADMIN — QUETIAPINE FUMARATE 50 MG: 25 TABLET ORAL at 04:48

## 2021-10-17 RX ADMIN — MORPHINE SULFATE 15 MG: 15 TABLET, FILM COATED, EXTENDED RELEASE ORAL at 17:20

## 2021-10-17 RX ADMIN — MORPHINE SULFATE 15 MG: 15 TABLET, FILM COATED, EXTENDED RELEASE ORAL at 04:48

## 2021-10-17 ASSESSMENT — COGNITIVE AND FUNCTIONAL STATUS - GENERAL
MOVING TO AND FROM BED TO CHAIR: A LOT
SUGGESTED CMS G CODE MODIFIER MOBILITY: CM
MOVING FROM LYING ON BACK TO SITTING ON SIDE OF FLAT BED: UNABLE
STANDING UP FROM CHAIR USING ARMS: A LOT
TURNING FROM BACK TO SIDE WHILE IN FLAT BAD: A LOT
MOBILITY SCORE: 9
WALKING IN HOSPITAL ROOM: TOTAL
CLIMB 3 TO 5 STEPS WITH RAILING: TOTAL

## 2021-10-17 ASSESSMENT — ENCOUNTER SYMPTOMS
EYES NEGATIVE: 1
SHORTNESS OF BREATH: 0
CONSTIPATION: 0
COUGH: 1
FEVER: 0
DIAPHORESIS: 0
DIARRHEA: 0
MYALGIAS: 1
NAUSEA: 0
NEUROLOGICAL NEGATIVE: 1
ABDOMINAL PAIN: 0
CHILLS: 0
VOMITING: 0

## 2021-10-17 ASSESSMENT — PAIN DESCRIPTION - PAIN TYPE
TYPE: ACUTE PAIN
TYPE: ACUTE PAIN;SURGICAL PAIN
TYPE: ACUTE PAIN
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
## 2021-10-17 NOTE — PROGRESS NOTES
Started Vanco and Zosyn infusion together through midline-confirmed compatibility through Dionicio Long, PharmD.

## 2021-10-17 NOTE — CARE PLAN
The patient is Stable - Low risk of patient condition declining or worsening    Shift Goals  Clinical Goals: Pain management, positioning, rest  Patient Goals: Pain management, sleep  Family Goals: no family present    Progress made toward(s) clinical / shift goals:    Problem: Pain - Standard  Goal: Alleviation of pain or a reduction in pain to the patient’s comfort goal  Outcome: Progressing   The patient stated that during this shift he caught back up on his pain management, and has been feeling better. He has been communicating his pain appropriately and has been using non-pharmacologic pain management methods in addition to his prescribed pain regimen.     Problem: Knowledge Deficit - Standard  Goal: Patient and family/care givers will demonstrate understanding of plan of care, disease process/condition, diagnostic tests and medications  Outcome: Progressing  I discussed with the patient the plan to continue pain management and physical therapy, and antibiotic therapy. The patient indicated understanding and had no questions at the time.
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
## 2021-10-17 NOTE — PROGRESS NOTES
Orthopaedic Progress Note    Interval changes:  Patient doing well    RLE prevena incisional vac in place without leak  Urinary infection found trauma team to address and stop adjust prior Abx     ROS - Patient denies any new issues.  Pain well controlled.    /66   Pulse (!) 104   Temp 36.7 °C (98 °F) (Temporal)   Resp 17   Ht 1.829 m (6')   Wt 89 kg (196 lb 3.4 oz)   SpO2 96%       Patient seen and examined  No acute distress  Breathing non labored  RRR  RLE surgical dressings CDI distally.  Proximal prevena in place without leak or exudate noted.  There is no erythema, induration, or signs of infection at any of the incision sites. Proximal dressing with min serosanguinous Patient clearly fires tibialis anterior, EHL, and gastrocnemius/soleus. Sensation is intact to light touch throughout superficial peroneal, deep peroneal, tibial, saphenous, and sural nerve distributions. Strong and palpable 2+ dorsalis pedis and posterior tibial pulses with capillary refill less than 2 seconds. No lower leg tenderness or discomfort.       Recent Labs     10/15/21  0249 10/16/21  0257 10/17/21  0425   WBC 17.1* 14.2* 10.8   RBC 2.94* 2.59* 2.52*   HEMOGLOBIN 8.5* 7.5* 7.2*   HEMATOCRIT 25.7* 23.3* 23.7*   MCV 87.4 90.0 94.0   MCH 28.9 29.0 28.6   MCHC 33.1* 32.2* 30.4*   RDW 46.7 50.2* 53.8*   PLATELETCT 368 376 382   MPV 9.0 9.0 9.6       Active Hospital Problems    Diagnosis    • Leukocytosis [D72.829]      Priority: High   • Intramuscular hematoma [T14.8XXA]      Priority: High   • Acute deep vein thrombosis (DVT) of femoral vein of right lower extremity (HCC) [I82.411]      Priority: High   • Delirium [R41.0]      Priority: Medium   • Acute blood loss anemia [D62]      Priority: Medium   • Fracture of shaft of femur (HCC) [S72.309A]      Priority: Medium   • Trauma [T14.90XA]      Priority: Low   • Encounter for screening for COVID-19 [Z11.52]      Priority: Low   • Liver cirrhosis (HCC) [K74.60]       Priority: Low       Assessment/Plan:  Patient doing well    Abx to be changed to treat urinary infection  POD#9 S/P:  1. Open treatment right intertrochanteric femur fracture with plate and screw construct  2. Open treatment of right femoral shaft fracture with insertion of intramedullary implant  3. Open reduction internal fixation right distal femur intraarticular fracture  Wt bearing status - TTWB RLE  Wound care/Drains - dressings changed every other day by nursing distally, prevena to be left in place  Future Procedures - none planned   Lovenox: Start 10/9, Duration-until ambulatory > 150'  Sutures/Staples out-  14 days post operatively  PT/OT-initiated  Antibiotics: perioperative completed  DVT Prophylaxis- TEDS/SCDs/Foot pumps  Raygoza-none  Case Coordination for Discharge Planning - Disposition home

## 2021-10-17 NOTE — CARE PLAN
Problem: Pain - Standard  Goal: Alleviation of pain or a reduction in pain to the patient’s comfort goal  Outcome: Progressing     Problem: Urinary - Renal Perfusion  Goal: Ability to achieve and maintain adequate renal perfusion and functioning will improve  Outcome: Progressing   The patient is Stable - Low risk of patient condition declining or worsening    Shift Goals  Clinical Goals: pain management   Patient Goals: bed bath, pain management  Family Goals: no family present    Progress made toward(s) clinical / shift goals:  Patients pain is being controled on PRN and scheduled medications. Renal perfusion shows adequate urine output .    Patient is not progressing towards the following goals:N/A    Assumed care of patient at change of shift.   Report received at bedside rounding  Patient is calm, in bed, with no distress noted.  Call light and patient belongings are in reach, bed is locked and in lowest position- hourly rounding is in place. See flow sheets for further assessment.

## 2021-10-18 PROBLEM — R41.0 DELIRIUM: Status: RESOLVED | Noted: 2021-10-12 | Resolved: 2021-10-18

## 2021-10-18 LAB
ABO GROUP BLD: NORMAL
ANION GAP SERPL CALC-SCNC: 10 MMOL/L (ref 7–16)
ANISOCYTOSIS BLD QL SMEAR: ABNORMAL
BARCODED ABORH UBTYP: 600
BARCODED ABORH UBTYP: 9500
BARCODED PRD CODE UBPRD: NORMAL
BARCODED PRD CODE UBPRD: NORMAL
BARCODED UNIT NUM UBUNT: NORMAL
BARCODED UNIT NUM UBUNT: NORMAL
BASOPHILS # BLD AUTO: 0.9 % (ref 0–1.8)
BASOPHILS # BLD: 0.11 K/UL (ref 0–0.12)
BLD GP AB SCN SERPL QL: NORMAL
BUN SERPL-MCNC: 23 MG/DL (ref 8–22)
CALCIUM SERPL-MCNC: 8.2 MG/DL (ref 8.5–10.5)
CHLORIDE SERPL-SCNC: 98 MMOL/L (ref 96–112)
CO2 SERPL-SCNC: 24 MMOL/L (ref 20–33)
COMPONENT R 8504R: NORMAL
COMPONENT R 8504R: NORMAL
CREAT SERPL-MCNC: 0.78 MG/DL (ref 0.5–1.4)
EOSINOPHIL # BLD AUTO: 0.33 K/UL (ref 0–0.51)
EOSINOPHIL NFR BLD: 2.7 % (ref 0–6.9)
ERYTHROCYTE [DISTWIDTH] IN BLOOD BY AUTOMATED COUNT: 54.1 FL (ref 35.9–50)
GLUCOSE SERPL-MCNC: 105 MG/DL (ref 65–99)
HCT VFR BLD AUTO: 21.6 % (ref 42–52)
HGB BLD-MCNC: 6.8 G/DL (ref 14–18)
HYPOCHROMIA BLD QL SMEAR: ABNORMAL
LYMPHOCYTES # BLD AUTO: 2.48 K/UL (ref 1–4.8)
LYMPHOCYTES NFR BLD: 20 % (ref 22–41)
MACROCYTES BLD QL SMEAR: ABNORMAL
MANUAL DIFF BLD: NORMAL
MCH RBC QN AUTO: 29.4 PG (ref 27–33)
MCHC RBC AUTO-ENTMCNC: 31.5 G/DL (ref 33.7–35.3)
MCV RBC AUTO: 93.5 FL (ref 81.4–97.8)
METAMYELOCYTES NFR BLD MANUAL: 0.9 %
MONOCYTES # BLD AUTO: 0.79 K/UL (ref 0–0.85)
MONOCYTES NFR BLD AUTO: 6.4 % (ref 0–13.4)
MORPHOLOGY BLD-IMP: NORMAL
MYELOCYTES NFR BLD MANUAL: 0.9 %
NEUTROPHILS # BLD AUTO: 8.46 K/UL (ref 1.82–7.42)
NEUTROPHILS NFR BLD: 66.4 % (ref 44–72)
NEUTS BAND NFR BLD MANUAL: 1.8 % (ref 0–10)
NRBC # BLD AUTO: 0.08 K/UL
NRBC BLD-RTO: 0.6 /100 WBC
PLATELET # BLD AUTO: 534 K/UL (ref 164–446)
PLATELET BLD QL SMEAR: NORMAL
PMV BLD AUTO: 8.8 FL (ref 9–12.9)
POLYCHROMASIA BLD QL SMEAR: NORMAL
POTASSIUM SERPL-SCNC: 4.2 MMOL/L (ref 3.6–5.5)
PRODUCT TYPE UPROD: NORMAL
PRODUCT TYPE UPROD: NORMAL
RBC # BLD AUTO: 2.31 M/UL (ref 4.7–6.1)
RBC BLD AUTO: PRESENT
RH BLD: NORMAL
SODIUM SERPL-SCNC: 132 MMOL/L (ref 135–145)
UNIT STATUS USTAT: NORMAL
UNIT STATUS USTAT: NORMAL
WBC # BLD AUTO: 12.4 K/UL (ref 4.8–10.8)

## 2021-10-18 PROCEDURE — 86901 BLOOD TYPING SEROLOGIC RH(D): CPT

## 2021-10-18 PROCEDURE — 700102 HCHG RX REV CODE 250 W/ 637 OVERRIDE(OP): Performed by: NURSE PRACTITIONER

## 2021-10-18 PROCEDURE — P9016 RBC LEUKOCYTES REDUCED: HCPCS

## 2021-10-18 PROCEDURE — 80048 BASIC METABOLIC PNL TOTAL CA: CPT

## 2021-10-18 PROCEDURE — 700102 HCHG RX REV CODE 250 W/ 637 OVERRIDE(OP): Performed by: PHYSICAL MEDICINE & REHABILITATION

## 2021-10-18 PROCEDURE — A9270 NON-COVERED ITEM OR SERVICE: HCPCS | Performed by: NURSE PRACTITIONER

## 2021-10-18 PROCEDURE — 700105 HCHG RX REV CODE 258

## 2021-10-18 PROCEDURE — 700102 HCHG RX REV CODE 250 W/ 637 OVERRIDE(OP)

## 2021-10-18 PROCEDURE — 770006 HCHG ROOM/CARE - MED/SURG/GYN SEMI*

## 2021-10-18 PROCEDURE — 700102 HCHG RX REV CODE 250 W/ 637 OVERRIDE(OP): Performed by: SPECIALIST

## 2021-10-18 PROCEDURE — A9270 NON-COVERED ITEM OR SERVICE: HCPCS

## 2021-10-18 PROCEDURE — A9270 NON-COVERED ITEM OR SERVICE: HCPCS | Performed by: PHYSICAL MEDICINE & REHABILITATION

## 2021-10-18 PROCEDURE — 85007 BL SMEAR W/DIFF WBC COUNT: CPT

## 2021-10-18 PROCEDURE — 99232 SBSQ HOSP IP/OBS MODERATE 35: CPT | Performed by: SURGERY

## 2021-10-18 PROCEDURE — 86850 RBC ANTIBODY SCREEN: CPT

## 2021-10-18 PROCEDURE — 36430 TRANSFUSION BLD/BLD COMPNT: CPT

## 2021-10-18 PROCEDURE — 99024 POSTOP FOLLOW-UP VISIT: CPT | Performed by: ORTHOPAEDIC SURGERY

## 2021-10-18 PROCEDURE — 36415 COLL VENOUS BLD VENIPUNCTURE: CPT

## 2021-10-18 PROCEDURE — A9270 NON-COVERED ITEM OR SERVICE: HCPCS | Performed by: SPECIALIST

## 2021-10-18 PROCEDURE — 700111 HCHG RX REV CODE 636 W/ 250 OVERRIDE (IP): Performed by: SURGERY

## 2021-10-18 PROCEDURE — 700111 HCHG RX REV CODE 636 W/ 250 OVERRIDE (IP)

## 2021-10-18 PROCEDURE — 85027 COMPLETE CBC AUTOMATED: CPT

## 2021-10-18 PROCEDURE — 86900 BLOOD TYPING SEROLOGIC ABO: CPT

## 2021-10-18 PROCEDURE — 86923 COMPATIBILITY TEST ELECTRIC: CPT

## 2021-10-18 RX ORDER — DIPHENHYDRAMINE HCL 25 MG
25 TABLET ORAL NIGHTLY PRN
Status: DISCONTINUED | OUTPATIENT
Start: 2021-10-18 | End: 2021-11-01 | Stop reason: HOSPADM

## 2021-10-18 RX ORDER — CHOLECALCIFEROL (VITAMIN D3) 125 MCG
5 CAPSULE ORAL NIGHTLY
Status: DISCONTINUED | OUTPATIENT
Start: 2021-10-18 | End: 2021-11-01 | Stop reason: HOSPADM

## 2021-10-18 RX ORDER — ACETAMINOPHEN 325 MG/1
650 TABLET ORAL EVERY 6 HOURS PRN
Status: DISCONTINUED | OUTPATIENT
Start: 2021-10-18 | End: 2021-11-01 | Stop reason: HOSPADM

## 2021-10-18 RX ADMIN — OXYCODONE HYDROCHLORIDE 10 MG: 10 TABLET ORAL at 21:40

## 2021-10-18 RX ADMIN — GABAPENTIN 300 MG: 300 CAPSULE ORAL at 16:58

## 2021-10-18 RX ADMIN — OXYCODONE HYDROCHLORIDE 10 MG: 10 TABLET ORAL at 04:22

## 2021-10-18 RX ADMIN — METHOCARBAMOL 750 MG: 750 TABLET ORAL at 08:14

## 2021-10-18 RX ADMIN — OXYCODONE HYDROCHLORIDE 10 MG: 10 TABLET ORAL at 17:56

## 2021-10-18 RX ADMIN — GABAPENTIN 300 MG: 300 CAPSULE ORAL at 04:22

## 2021-10-18 RX ADMIN — METHOCARBAMOL 750 MG: 750 TABLET ORAL at 11:57

## 2021-10-18 RX ADMIN — Medication 5 MG: at 21:37

## 2021-10-18 RX ADMIN — METHOCARBAMOL 750 MG: 750 TABLET ORAL at 00:38

## 2021-10-18 RX ADMIN — DOCUSATE SODIUM 100 MG: 100 CAPSULE ORAL at 04:22

## 2021-10-18 RX ADMIN — CALCIUM CARBONATE 500 MG: 500 TABLET, CHEWABLE ORAL at 16:58

## 2021-10-18 RX ADMIN — DOCUSATE SODIUM 100 MG: 100 CAPSULE ORAL at 16:58

## 2021-10-18 RX ADMIN — MORPHINE SULFATE 15 MG: 15 TABLET, FILM COATED, EXTENDED RELEASE ORAL at 04:22

## 2021-10-18 RX ADMIN — CALCIUM CARBONATE 500 MG: 500 TABLET, CHEWABLE ORAL at 11:57

## 2021-10-18 RX ADMIN — MORPHINE SULFATE 15 MG: 15 TABLET, FILM COATED, EXTENDED RELEASE ORAL at 16:58

## 2021-10-18 RX ADMIN — CALCIUM CARBONATE 500 MG: 500 TABLET, CHEWABLE ORAL at 04:22

## 2021-10-18 RX ADMIN — OXYCODONE HYDROCHLORIDE 10 MG: 10 TABLET ORAL at 08:54

## 2021-10-18 RX ADMIN — VANCOMYCIN HYDROCHLORIDE 1750 MG: 500 INJECTION, POWDER, LYOPHILIZED, FOR SOLUTION INTRAVENOUS at 14:23

## 2021-10-18 RX ADMIN — PIPERACILLIN AND TAZOBACTAM 4.5 G: 4; .5 INJECTION, POWDER, LYOPHILIZED, FOR SOLUTION INTRAVENOUS; PARENTERAL at 04:22

## 2021-10-18 RX ADMIN — VANCOMYCIN HYDROCHLORIDE 1750 MG: 500 INJECTION, POWDER, LYOPHILIZED, FOR SOLUTION INTRAVENOUS at 00:39

## 2021-10-18 RX ADMIN — SODIUM CHLORIDE 250 MG: 9 INJECTION, SOLUTION INTRAVENOUS at 18:02

## 2021-10-18 RX ADMIN — METHOCARBAMOL 750 MG: 750 TABLET ORAL at 21:38

## 2021-10-18 RX ADMIN — OXYCODONE HYDROCHLORIDE 10 MG: 10 TABLET ORAL at 14:23

## 2021-10-18 RX ADMIN — PIPERACILLIN AND TAZOBACTAM 4.5 G: 4; .5 INJECTION, POWDER, LYOPHILIZED, FOR SOLUTION INTRAVENOUS; PARENTERAL at 14:50

## 2021-10-18 RX ADMIN — ENOXAPARIN SODIUM 30 MG: 30 INJECTION SUBCUTANEOUS at 16:58

## 2021-10-18 RX ADMIN — DOCUSATE SODIUM 50 MG AND SENNOSIDES 8.6 MG 1 TABLET: 8.6; 5 TABLET, FILM COATED ORAL at 08:54

## 2021-10-18 RX ADMIN — ENOXAPARIN SODIUM 30 MG: 30 INJECTION SUBCUTANEOUS at 04:23

## 2021-10-18 RX ADMIN — METHOCARBAMOL 750 MG: 750 TABLET ORAL at 16:58

## 2021-10-18 RX ADMIN — GABAPENTIN 300 MG: 300 CAPSULE ORAL at 11:57

## 2021-10-18 ASSESSMENT — ENCOUNTER SYMPTOMS
VOMITING: 0
NAUSEA: 0
EYES NEGATIVE: 1
INSOMNIA: 1
ABDOMINAL PAIN: 0
DIAPHORESIS: 0
NEUROLOGICAL NEGATIVE: 1
CHILLS: 0
FEVER: 0
SHORTNESS OF BREATH: 0
COUGH: 1
MYALGIAS: 1
CONSTIPATION: 0

## 2021-10-18 ASSESSMENT — PAIN DESCRIPTION - PAIN TYPE
TYPE: ACUTE PAIN;SURGICAL PAIN
TYPE: ACUTE PAIN;SURGICAL PAIN
TYPE: ACUTE PAIN
TYPE: ACUTE PAIN;SURGICAL PAIN
TYPE: ACUTE PAIN
TYPE: ACUTE PAIN;SURGICAL PAIN
TYPE: ACUTE PAIN
TYPE: ACUTE PAIN;SURGICAL PAIN
TYPE: ACUTE PAIN

## 2021-10-18 NOTE — DISCHARGE PLANNING
Anticipated Discharge Disposition: Acute rehab.     Action: Medicaid FFS approved. [facility] following and are requesting updated PT/OT notes. Per medical provider, patient likely to be medically clear at the end of the week.     Barriers to Discharge: Medical clearance; PT/OT recommending post acute placement; limited by Medicaid FFS.     Plan: CM to continue to follow for discharge needs.

## 2021-10-18 NOTE — THERAPY
10/18/21 1500   Other Treatments   Other Treatments Provided Walked into pt shaving and doing his own bedbath. Pt stated he was fatigued from having BM and wanted to finish getting cleaned up. Pt willing to participate w/PT tomorrow morning, set up time for 9:30 for w/c transfers.
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
## 2021-10-18 NOTE — THERAPY
Occupational Therapy Contact Note    Attempted OT treatment, pt still receiving blood products. Will attempt tomorrow.    Amalia Hendricks, OTR/L

## 2021-10-18 NOTE — PROGRESS NOTES
[id number] Paged DEWAYNE Linder in regards to patient's hemoglobin of 6.8. DEWAYNE Gross ordered 1 unit of RBC to be transfused. COD order placed, called Jonh at blood bank to confirm COD order.

## 2021-10-18 NOTE — PROGRESS NOTES
Trauma Progress Note    Culture results discussed with Dr. Babcock  Antibiotics discontinued    Labs in AM
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
## 2021-10-18 NOTE — DISCHARGE PLANNING
[facility] Transitional [facility] Coordination    Follow up for Rehab.  Would welcome PT/OT updates for PMR/potential insurance review.  TCC following.

## 2021-10-18 NOTE — CARE PLAN
The patient is Stable - Low risk of patient condition declining or worsening    Shift Goals  Clinical Goals: Pain management, sleep  Patient Goals: Pain management  Family Goals: no family present    Progress made toward(s) clinical / shift goals:    Problem: Pain - Standard  Goal: Alleviation of pain or a reduction in pain to the patient’s comfort goal  Outcome: Progressing   Patient states that his knee pain from earlier in the day has been reduced.     Problem: Knowledge Deficit - Standard  Goal: Patient and family/care givers will demonstrate understanding of plan of care, disease process/condition, diagnostic tests and medications  Outcome: Progressing   Discussed with patient continued plan for progressive mobilization/therapy, pain management, antibiotics and hip precautions. Patient indicated understanding and had no questions at the time.

## 2021-10-18 NOTE — PROGRESS NOTES
Transfusion today    /65   Pulse (!) 108   Temp 36.8 °C (98.3 °F) (Temporal)   Resp 18   Ht 1.829 m (6')   Wt 89 kg (196 lb 3.4 oz)   SpO2 94%     Recent Labs     10/16/21  0257 10/17/21  0425 10/18/21  0412   WBC 14.2* 10.8 12.4*   RBC 2.59* 2.52* 2.31*   HEMOGLOBIN 7.5* 7.2* 6.8*   HEMATOCRIT 23.3* 23.7* 21.6*   MCV 90.0 94.0 93.5   MCH 29.0 28.6 29.4   MCHC 32.2* 30.4* 31.5*   RDW 50.2* 53.8* 54.1*   PLATELETCT 376 382 534*   MPV 9.0 9.6 8.8*       No acute distress  Dressing clean dry and intact  Neurovascularly intact    POD#10  S/P IMN and ORIF Femur    Plan:  DVT Prophylaxis- TEDS/SCDs, lovenox while in hospital, ASA 81 mg PO BID as outpatient  Weight Bearing Status-TTWB x 6 weeks  PT/OT  Antibiotics: None  Case Coordination

## 2021-10-19 ENCOUNTER — APPOINTMENT (OUTPATIENT)
Dept: RADIOLOGY | Facility: MEDICAL CENTER | Age: 46
DRG: 481 | End: 2021-10-19
Payer: MEDICAID

## 2021-10-19 ENCOUNTER — APPOINTMENT (OUTPATIENT)
Dept: RADIOLOGY | Facility: MEDICAL CENTER | Age: 46
DRG: 481 | End: 2021-10-19
Attending: SURGERY
Payer: MEDICAID

## 2021-10-19 PROBLEM — I80.8 THROMBOPHLEBITIS ARM: Status: ACTIVE | Noted: 2021-10-19

## 2021-10-19 LAB
ANION GAP SERPL CALC-SCNC: 10 MMOL/L (ref 7–16)
ANISOCYTOSIS BLD QL SMEAR: ABNORMAL
APTT PPP: 31.6 SEC (ref 24.7–36)
BACTERIA UR CULT: ABNORMAL
BACTERIA UR CULT: ABNORMAL
BASOPHILS # BLD AUTO: 0.9 % (ref 0–1.8)
BASOPHILS # BLD: 0.16 K/UL (ref 0–0.12)
BUN SERPL-MCNC: 23 MG/DL (ref 8–22)
CALCIUM SERPL-MCNC: 8.3 MG/DL (ref 8.5–10.5)
CFT BLD TEG: 5.7 MIN (ref 4.6–9.1)
CFT P HPASE BLD TEG: 5.8 MIN (ref 4.3–8.3)
CHLORIDE SERPL-SCNC: 100 MMOL/L (ref 96–112)
CLOT ANGLE BLD TEG: 80 DEGREES (ref 63–78)
CO2 SERPL-SCNC: 22 MMOL/L (ref 20–33)
CREAT SERPL-MCNC: 0.55 MG/DL (ref 0.5–1.4)
CT.EXTRINSIC BLD ROTEM: 0.8 MIN (ref 0.8–2.1)
EOSINOPHIL # BLD AUTO: 0.3 K/UL (ref 0–0.51)
EOSINOPHIL NFR BLD: 1.7 % (ref 0–6.9)
ERYTHROCYTE [DISTWIDTH] IN BLOOD BY AUTOMATED COUNT: 52.6 FL (ref 35.9–50)
GLUCOSE SERPL-MCNC: 115 MG/DL (ref 65–99)
HCT VFR BLD AUTO: 20 % (ref 42–52)
HCT VFR BLD AUTO: 21.5 % (ref 42–52)
HCT VFR BLD AUTO: 22.2 % (ref 42–52)
HGB BLD-MCNC: 6.4 G/DL (ref 14–18)
HGB BLD-MCNC: 6.9 G/DL (ref 14–18)
HGB BLD-MCNC: 7.1 G/DL (ref 14–18)
HYPOCHROMIA BLD QL SMEAR: ABNORMAL
INR PPP: 1.08 (ref 0.87–1.13)
LYMPHOCYTES # BLD AUTO: 2.36 K/UL (ref 1–4.8)
LYMPHOCYTES NFR BLD: 13.2 % (ref 22–41)
MACROCYTES BLD QL SMEAR: ABNORMAL
MANUAL DIFF BLD: NORMAL
MCF BLD TEG: 71.8 MM (ref 52–69)
MCF.PLATELET INHIB BLD ROTEM: 40 MM (ref 15–32)
MCH RBC QN AUTO: 29.8 PG (ref 27–33)
MCHC RBC AUTO-ENTMCNC: 32 G/DL (ref 33.7–35.3)
MCV RBC AUTO: 93.3 FL (ref 81.4–97.8)
METAMYELOCYTES NFR BLD MANUAL: 0.9 %
MONOCYTES # BLD AUTO: 1.41 K/UL (ref 0–0.85)
MONOCYTES NFR BLD AUTO: 7.9 % (ref 0–13.4)
MORPHOLOGY BLD-IMP: NORMAL
MYELOCYTES NFR BLD MANUAL: 1.7 %
NEUTROPHILS # BLD AUTO: 13.19 K/UL (ref 1.82–7.42)
NEUTROPHILS NFR BLD: 73.7 % (ref 44–72)
NRBC # BLD AUTO: 0.36 K/UL
NRBC BLD-RTO: 2 /100 WBC
PA AA BLD-ACNC: ABNORMAL % (ref 0–11)
PA ADP BLD-ACNC: ABNORMAL % (ref 0–17)
PLATELET # BLD AUTO: 529 K/UL (ref 164–446)
PLATELET BLD QL SMEAR: NORMAL
PMV BLD AUTO: 9 FL (ref 9–12.9)
POLYCHROMASIA BLD QL SMEAR: NORMAL
POTASSIUM SERPL-SCNC: 4.2 MMOL/L (ref 3.6–5.5)
PROTHROMBIN TIME: 13.7 SEC (ref 12–14.6)
RBC # BLD AUTO: 2.38 M/UL (ref 4.7–6.1)
RBC BLD AUTO: PRESENT
SIGNIFICANT IND 70042: ABNORMAL
SITE SITE: ABNORMAL
SODIUM SERPL-SCNC: 132 MMOL/L (ref 135–145)
SOURCE SOURCE: ABNORMAL
TEG ALGORITHM TGALG: ABNORMAL
WBC # BLD AUTO: 17.9 K/UL (ref 4.8–10.8)

## 2021-10-19 PROCEDURE — 85014 HEMATOCRIT: CPT

## 2021-10-19 PROCEDURE — A9270 NON-COVERED ITEM OR SERVICE: HCPCS | Performed by: ORTHOPAEDIC SURGERY

## 2021-10-19 PROCEDURE — 85018 HEMOGLOBIN: CPT | Mod: 91

## 2021-10-19 PROCEDURE — 770006 HCHG ROOM/CARE - MED/SURG/GYN SEMI*

## 2021-10-19 PROCEDURE — 85730 THROMBOPLASTIN TIME PARTIAL: CPT

## 2021-10-19 PROCEDURE — 99232 SBSQ HOSP IP/OBS MODERATE 35: CPT | Performed by: PHYSICAL MEDICINE & REHABILITATION

## 2021-10-19 PROCEDURE — 80048 BASIC METABOLIC PNL TOTAL CA: CPT

## 2021-10-19 PROCEDURE — A9270 NON-COVERED ITEM OR SERVICE: HCPCS | Performed by: NURSE PRACTITIONER

## 2021-10-19 PROCEDURE — 85576 BLOOD PLATELET AGGREGATION: CPT

## 2021-10-19 PROCEDURE — 73701 CT LOWER EXTREMITY W/DYE: CPT | Mod: RT

## 2021-10-19 PROCEDURE — 700102 HCHG RX REV CODE 250 W/ 637 OVERRIDE(OP): Performed by: SPECIALIST

## 2021-10-19 PROCEDURE — 700111 HCHG RX REV CODE 636 W/ 250 OVERRIDE (IP): Performed by: SURGERY

## 2021-10-19 PROCEDURE — 36415 COLL VENOUS BLD VENIPUNCTURE: CPT

## 2021-10-19 PROCEDURE — 700102 HCHG RX REV CODE 250 W/ 637 OVERRIDE(OP)

## 2021-10-19 PROCEDURE — 700102 HCHG RX REV CODE 250 W/ 637 OVERRIDE(OP): Performed by: ORTHOPAEDIC SURGERY

## 2021-10-19 PROCEDURE — 97110 THERAPEUTIC EXERCISES: CPT | Mod: CQ

## 2021-10-19 PROCEDURE — A9270 NON-COVERED ITEM OR SERVICE: HCPCS | Performed by: PHYSICAL MEDICINE & REHABILITATION

## 2021-10-19 PROCEDURE — 99232 SBSQ HOSP IP/OBS MODERATE 35: CPT | Performed by: SURGERY

## 2021-10-19 PROCEDURE — A9270 NON-COVERED ITEM OR SERVICE: HCPCS | Performed by: SPECIALIST

## 2021-10-19 PROCEDURE — 99024 POSTOP FOLLOW-UP VISIT: CPT | Performed by: ORTHOPAEDIC SURGERY

## 2021-10-19 PROCEDURE — 93971 EXTREMITY STUDY: CPT | Mod: RT

## 2021-10-19 PROCEDURE — 85007 BL SMEAR W/DIFF WBC COUNT: CPT

## 2021-10-19 PROCEDURE — 700102 HCHG RX REV CODE 250 W/ 637 OVERRIDE(OP): Performed by: NURSE PRACTITIONER

## 2021-10-19 PROCEDURE — 700111 HCHG RX REV CODE 636 W/ 250 OVERRIDE (IP)

## 2021-10-19 PROCEDURE — A9270 NON-COVERED ITEM OR SERVICE: HCPCS

## 2021-10-19 PROCEDURE — 85027 COMPLETE CBC AUTOMATED: CPT

## 2021-10-19 PROCEDURE — 700117 HCHG RX CONTRAST REV CODE 255

## 2021-10-19 PROCEDURE — 700105 HCHG RX REV CODE 258

## 2021-10-19 PROCEDURE — 85347 COAGULATION TIME ACTIVATED: CPT

## 2021-10-19 PROCEDURE — 85384 FIBRINOGEN ACTIVITY: CPT

## 2021-10-19 PROCEDURE — 85610 PROTHROMBIN TIME: CPT

## 2021-10-19 PROCEDURE — 700102 HCHG RX REV CODE 250 W/ 637 OVERRIDE(OP): Performed by: PHYSICAL MEDICINE & REHABILITATION

## 2021-10-19 RX ORDER — MORPHINE SULFATE 15 MG/1
15 TABLET, FILM COATED, EXTENDED RELEASE ORAL
Status: DISCONTINUED | OUTPATIENT
Start: 2021-10-19 | End: 2021-10-23

## 2021-10-19 RX ADMIN — METHOCARBAMOL 750 MG: 750 TABLET ORAL at 12:33

## 2021-10-19 RX ADMIN — SODIUM CHLORIDE 250 MG: 9 INJECTION, SOLUTION INTRAVENOUS at 17:14

## 2021-10-19 RX ADMIN — GABAPENTIN 300 MG: 300 CAPSULE ORAL at 12:33

## 2021-10-19 RX ADMIN — METHOCARBAMOL 750 MG: 750 TABLET ORAL at 21:24

## 2021-10-19 RX ADMIN — MORPHINE SULFATE 15 MG: 15 TABLET, FILM COATED, EXTENDED RELEASE ORAL at 21:24

## 2021-10-19 RX ADMIN — CALCIUM CARBONATE 500 MG: 500 TABLET, CHEWABLE ORAL at 12:33

## 2021-10-19 RX ADMIN — OXYCODONE HYDROCHLORIDE 10 MG: 10 TABLET ORAL at 09:12

## 2021-10-19 RX ADMIN — MORPHINE SULFATE 15 MG: 15 TABLET, FILM COATED, EXTENDED RELEASE ORAL at 04:05

## 2021-10-19 RX ADMIN — OXYCODONE HYDROCHLORIDE 10 MG: 10 TABLET ORAL at 00:56

## 2021-10-19 RX ADMIN — OXYCODONE HYDROCHLORIDE 10 MG: 10 TABLET ORAL at 19:23

## 2021-10-19 RX ADMIN — OXYCODONE HYDROCHLORIDE 10 MG: 10 TABLET ORAL at 12:33

## 2021-10-19 RX ADMIN — OXYCODONE HYDROCHLORIDE 10 MG: 10 TABLET ORAL at 15:23

## 2021-10-19 RX ADMIN — GABAPENTIN 300 MG: 300 CAPSULE ORAL at 17:13

## 2021-10-19 RX ADMIN — Medication 5 MG: at 21:24

## 2021-10-19 RX ADMIN — GABAPENTIN 300 MG: 300 CAPSULE ORAL at 05:56

## 2021-10-19 RX ADMIN — IOHEXOL 75 ML: 350 INJECTION, SOLUTION INTRAVENOUS at 09:53

## 2021-10-19 RX ADMIN — DOCUSATE SODIUM 50 MG AND SENNOSIDES 8.6 MG 1 TABLET: 8.6; 5 TABLET, FILM COATED ORAL at 21:24

## 2021-10-19 RX ADMIN — CALCIUM CARBONATE 500 MG: 500 TABLET, CHEWABLE ORAL at 17:14

## 2021-10-19 RX ADMIN — DOCUSATE SODIUM 100 MG: 100 CAPSULE ORAL at 17:21

## 2021-10-19 RX ADMIN — CALCIUM CARBONATE 500 MG: 500 TABLET, CHEWABLE ORAL at 05:56

## 2021-10-19 RX ADMIN — DOCUSATE SODIUM 100 MG: 100 CAPSULE ORAL at 05:56

## 2021-10-19 RX ADMIN — OXYCODONE HYDROCHLORIDE 10 MG: 10 TABLET ORAL at 05:56

## 2021-10-19 RX ADMIN — ENOXAPARIN SODIUM 30 MG: 30 INJECTION SUBCUTANEOUS at 05:56

## 2021-10-19 RX ADMIN — METHOCARBAMOL 750 MG: 750 TABLET ORAL at 17:13

## 2021-10-19 RX ADMIN — METHOCARBAMOL 750 MG: 750 TABLET ORAL at 09:12

## 2021-10-19 ASSESSMENT — ENCOUNTER SYMPTOMS
CONSTIPATION: 0
NAUSEA: 0
ABDOMINAL PAIN: 0
FEVER: 0
CHILLS: 0
INSOMNIA: 1
SHORTNESS OF BREATH: 0
EYES NEGATIVE: 1
DIARRHEA: 0
VOMITING: 0
NEUROLOGICAL NEGATIVE: 1
MYALGIAS: 1
DIAPHORESIS: 0

## 2021-10-19 ASSESSMENT — COGNITIVE AND FUNCTIONAL STATUS - GENERAL
MOVING TO AND FROM BED TO CHAIR: A LOT
STANDING UP FROM CHAIR USING ARMS: A LOT
MOVING FROM LYING ON BACK TO SITTING ON SIDE OF FLAT BED: UNABLE
CLIMB 3 TO 5 STEPS WITH RAILING: TOTAL
WALKING IN HOSPITAL ROOM: TOTAL
SUGGESTED CMS G CODE MODIFIER MOBILITY: CM
TURNING FROM BACK TO SIDE WHILE IN FLAT BAD: A LOT
MOBILITY SCORE: 9

## 2021-10-19 ASSESSMENT — PAIN DESCRIPTION - PAIN TYPE
TYPE: ACUTE PAIN
TYPE: ACUTE PAIN
TYPE: ACUTE PAIN;SURGICAL PAIN
TYPE: ACUTE PAIN;SURGICAL PAIN
TYPE: ACUTE PAIN
TYPE: ACUTE PAIN
TYPE: ACUTE PAIN;SURGICAL PAIN
TYPE: ACUTE PAIN
TYPE: ACUTE PAIN;SURGICAL PAIN

## 2021-10-19 NOTE — CARE PLAN
Problem: Pain - Standard  Goal: Alleviation of pain or a reduction in pain to the patient’s comfort goal  Outcome: Progressing     Problem: Skin Integrity  Goal: Skin integrity is maintained or improved  Outcome: Progressing       The patient is Stable - Low risk of patient condition declining or worsening    Shift Goals  Clinical Goals: Pain control  Patient Goals: Pain control  Family Goals: no family present    Progress made toward(s) clinical / shift goals:  PRN pain medication given per MAR. Pt educated about non-pharmacological pain control interventions. Pt turns self from side to side. Pt on low air loss mattress.    Patient is not progressing towards the following goals:
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
## 2021-10-19 NOTE — CARE PLAN
Problem: Pain - Standard  Goal: Alleviation of pain or a reduction in pain to the patient’s comfort goal  Outcome: Progressing     Problem: Skin Integrity  Goal: Skin integrity is maintained or improved  Outcome: Progressing     Problem: Fall Risk  Goal: Patient will remain free from falls  Outcome: Progressing   The patient is Stable - Low risk of patient condition declining or worsening    Shift Goals  Clinical Goals: rest; pain management  Patient Goals: pain control  Family Goals: no family present    Progress made toward(s) clinical / shift goals:  Pt turns self from side to side and asks for assistance to reposition and use pillows for offloading.  Pt. Calls appropriately for assistance to mobilize.     Patient is not progressing towards the following goals:

## 2021-10-19 NOTE — PROGRESS NOTES
Orthopaedic Progress Note    Interval changes:  Patient doing well    RLE prevena incisional vac in place without leak  RLE with continued bleeding in hematoma- leg poorly wrapped for compression- leg rewrapped  Sand bag not to be wrapped to leg due to compromising ortho incision OK to place on exterior of wrap    ROS - Patient denies any new issues.  Pain well controlled.    BP (!) 97/60   Pulse 100   Temp 36.2 °C (97.2 °F) (Temporal)   Resp 18   Ht 1.829 m (6')   Wt 89 kg (196 lb 3.4 oz)   SpO2 90%       Patient seen and examined  No acute distress  Breathing non labored  RRR  RLE surgical dressings CDI distally.  Proximal prevena in place without leak or exudate noted.  There is no erythema, induration, or signs of infection at any of the incision sites. Proximal dressing with min serosanguinous Patient clearly fires tibialis anterior, EHL, and gastrocnemius/soleus. Sensation is intact to light touch throughout superficial peroneal, deep peroneal, tibial, saphenous, and sural nerve distributions. Strong and palpable 2+ dorsalis pedis and posterior tibial pulses with capillary refill less than 2 seconds. No lower leg tenderness or discomfort.       Recent Labs     10/17/21  0425 10/17/21  0425 10/18/21  0412 10/19/21  0412 10/19/21  1133   WBC 10.8  --  12.4* 17.9*  --    RBC 2.52*  --  2.31* 2.38*  --    HEMOGLOBIN 7.2*   < > 6.8* 7.1* 6.9*   HEMATOCRIT 23.7*   < > 21.6* 22.2* 21.5*   MCV 94.0  --  93.5 93.3  --    MCH 28.6  --  29.4 29.8  --    MCHC 30.4*  --  31.5* 32.0*  --    RDW 53.8*  --  54.1* 52.6*  --    PLATELETCT 382  --  534* 529*  --    MPV 9.6  --  8.8* 9.0  --     < > = values in this interval not displayed.       Active Hospital Problems    Diagnosis    • Leukocytosis [D72.829]      Priority: High   • Intramuscular hematoma [T14.8XXA]      Priority: High   • Acute deep vein thrombosis (DVT) of femoral vein of right lower extremity (HCC) [I82.411]      Priority: High   • Acute blood loss  anemia [D62]      Priority: Medium   • Fracture of shaft of femur (HCC) [S72.309A]      Priority: Medium   • Trauma [T14.90XA]      Priority: Low   • Encounter for screening for COVID-19 [Z11.52]      Priority: Low   • Liver cirrhosis (HCC) [K74.60]      Priority: Low       Assessment/Plan:  Patient doing well    POD#11 S/P:  1. Open treatment right intertrochanteric femur fracture with plate and screw construct  2. Open treatment of right femoral shaft fracture with insertion of intramedullary implant  3. Open reduction internal fixation right distal femur intraarticular fracture  Wt bearing status - TTWB RLE  Wound care/Drains - dressings changed every other day by nursing distally, prevena to be left in place  Future Procedures - none planned   Lovenox: Start 10/9, Duration-until ambulatory > 150'  Sutures/Staples out-  14 days post operatively  PT/OT-initiated  Antibiotics: perioperative completed  DVT Prophylaxis- TEDS/SCDs/Foot pumps  Raygoza-none  Case Coordination for Discharge Planning - Disposition home
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
## 2021-10-19 NOTE — ASSESSMENT & PLAN NOTE
10/19 Edema at midline site.  Duplex with superficial thrombophlebitis involving the cephalic vein to the mid-distal bicep.  Contraindication to NSAIDS.  Heat packs.

## 2021-10-19 NOTE — THERAPY
"Occupational Therapy Contact Note    Pt had CT this AM of R thigh, per CT \"Right thigh hematoma about the right femoral diaphyseal fracture with multiple areas of active contrast extravasation.\" Possible need for hematoma embolization. Reached out to Trauma APN to OK therapy session today, she requested hold off for today and attempt tomorrow. Will attempt tomorrow as appropriate.    Amalia Hendricks, OTR/L  "

## 2021-10-19 NOTE — PROGRESS NOTES
Called EFRAÍN Larios regarding nursing communication to remove R arm midline and have it replaced in the L arm. Per EFRAÍN Larios, okay to try to place PIV in L arm before placing midline.

## 2021-10-19 NOTE — THERAPY
Physical Therapy   Daily Treatment     Patient Name: Wero Thorpe  Age:  46 y.o., Sex:  male  Medical Record #: [id number]  Today's Date: 10/19/2021     Precautions  Precautions: (P) Toe Touch Weight Bearing Right Lower Extremity  Comments: (P) IVC filter 10/11    Assessment    Pt just back from CT, nrsg cleared pt for PT. Did manage to initiate Rt LE AAROM/positioning before mobilizing to EOB and transfer to w/c. Tx session stopped 2* CT results and r/o Rt UE DVT. PT will resume tomorrow @ 9:30 if pt cleared to participate.     Plan    Continue current treatment plan.    DC Equipment Recommendations: Unable to determine at this time  Discharge Recommendations: Recommend post-acute placement for additional physical therapy services prior to discharge home     Objective       10/19/21 1033   Other Treatments   Other Treatments Provided Initiated tx session w/supine AA-AROM Rt LE and positioning of Rt LE. Tx session ended 2* CT results. NP needing to provide compression wrap to Rt thigh and ordered US of Rt UE to r/o DVT. PT will attempt to check back in afteroon if time permits. If not will plan to see in again @ 9:30.    How much difficulty does the patient currently have...   Turning over in bed (including adjusting bedclothes, sheets and blankets)? 2   Sitting down on and standing up from a chair with arms (e.g., wheelchair, bedside commode, etc.) 1   Moving from lying on back to sitting on the side of the bed? 2   How much help from another person does the patient currently need...   Moving to and from a bed to a chair (including a wheelchair)? 2   Need to walk in a hospital room? 1   Climbing 3-5 steps with a railing? 1   6 clicks Mobility Score 9   Short Term Goals    Short Term Goal # 1 pt will perform supine <> sit with HOB flat, no railing and SPV in 6 visits   Goal Outcome # 1 goal not met   Short Term Goal # 2 pt will perform sit <> stand and functional transfers with LRAD and Min A to improve  functional mobility in 6 visits   Goal Outcome # 2 Goal not met   Short Term Goal # 3 pt will ambulate > 25 ft with FWW and Mod A to improve function in 6 visits   Goal Outcome # 3 Goal not met   Short Term Goal # 4 pt will negotiate 1 FOS with LRAD / railing and Min A to access home environment in 6 visits   Goal Outcome # 4 Goal not met

## 2021-10-19 NOTE — PROGRESS NOTES
Trauma Progress Note      Interval Events:  Radiology called  CT of the patient's right leg shows active extravasation of right thigh hematoma.   Dr. Babcock updated.     Assessment: Right lateral thigh ecchymosis hard and tender to palpation (no significant change from previous assessment) Incision with staples clean, dry, and intact without erythema or drainage. Pedal pulse present. Right leg is warm with sensation intact. Movement to right leg and foot intact with weakness due to pain.    Plan:  Serial hemoglobin and hematocrits x 24 hours    Lovenox discontinued  Compression applied at hematoma site  Patient will remain NPO until no procedure needs are identified for right thigh hematoma.

## 2021-10-19 NOTE — PROGRESS NOTES
Notified EFRAÍN Larios about pt requesting increased pain medications. Per EFRAÍN Larios, okay to give PRN oxycodone now and see how patient does.

## 2021-10-20 LAB
ANION GAP SERPL CALC-SCNC: 10 MMOL/L (ref 7–16)
ANISOCYTOSIS BLD QL SMEAR: ABNORMAL
BACTERIA BLD CULT: NORMAL
BACTERIA BLD CULT: NORMAL
BASOPHILS # BLD AUTO: 0 % (ref 0–1.8)
BASOPHILS # BLD AUTO: 0.9 % (ref 0–1.8)
BASOPHILS # BLD: 0 K/UL (ref 0–0.12)
BASOPHILS # BLD: 0.16 K/UL (ref 0–0.12)
BUN SERPL-MCNC: 15 MG/DL (ref 8–22)
CALCIUM SERPL-MCNC: 8.1 MG/DL (ref 8.5–10.5)
CHLORIDE SERPL-SCNC: 101 MMOL/L (ref 96–112)
CO2 SERPL-SCNC: 24 MMOL/L (ref 20–33)
CREAT SERPL-MCNC: 0.61 MG/DL (ref 0.5–1.4)
EOSINOPHIL # BLD AUTO: 0.14 K/UL (ref 0–0.51)
EOSINOPHIL # BLD AUTO: 0.3 K/UL (ref 0–0.51)
EOSINOPHIL # BLD AUTO: 0.33 K/UL (ref 0–0.51)
EOSINOPHIL # BLD AUTO: 0.56 K/UL (ref 0–0.51)
EOSINOPHIL NFR BLD: 0.9 % (ref 0–6.9)
EOSINOPHIL NFR BLD: 1.7 % (ref 0–6.9)
EOSINOPHIL NFR BLD: 1.9 % (ref 0–6.9)
EOSINOPHIL NFR BLD: 3.6 % (ref 0–6.9)
ERYTHROCYTE [DISTWIDTH] IN BLOOD BY AUTOMATED COUNT: 55.6 FL (ref 35.9–50)
ERYTHROCYTE [DISTWIDTH] IN BLOOD BY AUTOMATED COUNT: 57.4 FL (ref 35.9–50)
ERYTHROCYTE [DISTWIDTH] IN BLOOD BY AUTOMATED COUNT: 58.1 FL (ref 35.9–50)
ERYTHROCYTE [DISTWIDTH] IN BLOOD BY AUTOMATED COUNT: 58.2 FL (ref 35.9–50)
GLUCOSE SERPL-MCNC: 110 MG/DL (ref 65–99)
HCT VFR BLD AUTO: 22.2 % (ref 42–52)
HCT VFR BLD AUTO: 22.4 % (ref 42–52)
HCT VFR BLD AUTO: 23.3 % (ref 42–52)
HCT VFR BLD AUTO: 24.1 % (ref 42–52)
HGB BLD-MCNC: 6.9 G/DL (ref 14–18)
HGB BLD-MCNC: 6.9 G/DL (ref 14–18)
HGB BLD-MCNC: 7.3 G/DL (ref 14–18)
HGB BLD-MCNC: 7.3 G/DL (ref 14–18)
LYMPHOCYTES # BLD AUTO: 1.47 K/UL (ref 1–4.8)
LYMPHOCYTES # BLD AUTO: 1.66 K/UL (ref 1–4.8)
LYMPHOCYTES # BLD AUTO: 1.76 K/UL (ref 1–4.8)
LYMPHOCYTES # BLD AUTO: 2.14 K/UL (ref 1–4.8)
LYMPHOCYTES NFR BLD: 10.7 % (ref 22–41)
LYMPHOCYTES NFR BLD: 11.4 % (ref 22–41)
LYMPHOCYTES NFR BLD: 12.2 % (ref 22–41)
LYMPHOCYTES NFR BLD: 8.4 % (ref 22–41)
MACROCYTES BLD QL SMEAR: ABNORMAL
MANUAL DIFF BLD: NORMAL
MCH RBC QN AUTO: 29 PG (ref 27–33)
MCH RBC QN AUTO: 29.1 PG (ref 27–33)
MCH RBC QN AUTO: 29.2 PG (ref 27–33)
MCH RBC QN AUTO: 30.2 PG (ref 27–33)
MCHC RBC AUTO-ENTMCNC: 30.3 G/DL (ref 33.7–35.3)
MCHC RBC AUTO-ENTMCNC: 30.8 G/DL (ref 33.7–35.3)
MCHC RBC AUTO-ENTMCNC: 31.1 G/DL (ref 33.7–35.3)
MCHC RBC AUTO-ENTMCNC: 31.3 G/DL (ref 33.7–35.3)
MCV RBC AUTO: 93.7 FL (ref 81.4–97.8)
MCV RBC AUTO: 94.9 FL (ref 81.4–97.8)
MCV RBC AUTO: 95.6 FL (ref 81.4–97.8)
MCV RBC AUTO: 96.3 FL (ref 81.4–97.8)
METAMYELOCYTES NFR BLD MANUAL: 0.9 %
METAMYELOCYTES NFR BLD MANUAL: 1.8 %
MICROCYTES BLD QL SMEAR: ABNORMAL
MONOCYTES # BLD AUTO: 0.33 K/UL (ref 0–0.85)
MONOCYTES # BLD AUTO: 0.61 K/UL (ref 0–0.85)
MONOCYTES # BLD AUTO: 0.94 K/UL (ref 0–0.85)
MONOCYTES # BLD AUTO: 1.1 K/UL (ref 0–0.85)
MONOCYTES NFR BLD AUTO: 1.9 % (ref 0–13.4)
MONOCYTES NFR BLD AUTO: 3.5 % (ref 0–13.4)
MONOCYTES NFR BLD AUTO: 6.1 % (ref 0–13.4)
MONOCYTES NFR BLD AUTO: 7.1 % (ref 0–13.4)
MORPHOLOGY BLD-IMP: NORMAL
MYELOCYTES NFR BLD MANUAL: 0.9 %
MYELOCYTES NFR BLD MANUAL: 1.7 %
MYELOCYTES NFR BLD MANUAL: 3.5 %
MYELOCYTES NFR BLD MANUAL: 5.4 %
NEUTROPHILS # BLD AUTO: 11.35 K/UL (ref 1.82–7.42)
NEUTROPHILS # BLD AUTO: 11.75 K/UL (ref 1.82–7.42)
NEUTROPHILS # BLD AUTO: 13.84 K/UL (ref 1.82–7.42)
NEUTROPHILS # BLD AUTO: 15.21 K/UL (ref 1.82–7.42)
NEUTROPHILS NFR BLD: 72.3 % (ref 44–72)
NEUTROPHILS NFR BLD: 75.4 % (ref 44–72)
NEUTROPHILS NFR BLD: 75.6 % (ref 44–72)
NEUTROPHILS NFR BLD: 86.9 % (ref 44–72)
NEUTS BAND NFR BLD MANUAL: 0.9 % (ref 0–10)
NEUTS BAND NFR BLD MANUAL: 0.9 % (ref 0–10)
NEUTS BAND NFR BLD MANUAL: 3.5 % (ref 0–10)
NRBC # BLD AUTO: 0.18 K/UL
NRBC # BLD AUTO: 0.23 K/UL
NRBC # BLD AUTO: 0.24 K/UL
NRBC # BLD AUTO: 0.35 K/UL
NRBC BLD-RTO: 1.2 /100 WBC
NRBC BLD-RTO: 1.3 /100 WBC
NRBC BLD-RTO: 1.6 /100 WBC
NRBC BLD-RTO: 2 /100 WBC
PLATELET # BLD AUTO: 469 K/UL (ref 164–446)
PLATELET # BLD AUTO: 477 K/UL (ref 164–446)
PLATELET # BLD AUTO: 488 K/UL (ref 164–446)
PLATELET # BLD AUTO: 538 K/UL (ref 164–446)
PLATELET BLD QL SMEAR: NORMAL
PMV BLD AUTO: 8.5 FL (ref 9–12.9)
PMV BLD AUTO: 8.5 FL (ref 9–12.9)
PMV BLD AUTO: 8.8 FL (ref 9–12.9)
PMV BLD AUTO: 8.8 FL (ref 9–12.9)
POLYCHROMASIA BLD QL SMEAR: NORMAL
POTASSIUM SERPL-SCNC: 4.1 MMOL/L (ref 3.6–5.5)
RBC # BLD AUTO: 2.36 M/UL (ref 4.7–6.1)
RBC # BLD AUTO: 2.37 M/UL (ref 4.7–6.1)
RBC # BLD AUTO: 2.42 M/UL (ref 4.7–6.1)
RBC # BLD AUTO: 2.52 M/UL (ref 4.7–6.1)
RBC BLD AUTO: PRESENT
SIGNIFICANT IND 70042: NORMAL
SIGNIFICANT IND 70042: NORMAL
SITE SITE: NORMAL
SITE SITE: NORMAL
SODIUM SERPL-SCNC: 135 MMOL/L (ref 135–145)
SOURCE SOURCE: NORMAL
SOURCE SOURCE: NORMAL
WBC # BLD AUTO: 15.4 K/UL (ref 4.8–10.8)
WBC # BLD AUTO: 15.5 K/UL (ref 4.8–10.8)
WBC # BLD AUTO: 17.5 K/UL (ref 4.8–10.8)
WBC # BLD AUTO: 17.5 K/UL (ref 4.8–10.8)

## 2021-10-20 PROCEDURE — A9270 NON-COVERED ITEM OR SERVICE: HCPCS | Performed by: ORTHOPAEDIC SURGERY

## 2021-10-20 PROCEDURE — 99232 SBSQ HOSP IP/OBS MODERATE 35: CPT | Performed by: SURGERY

## 2021-10-20 PROCEDURE — 97530 THERAPEUTIC ACTIVITIES: CPT | Mod: CQ

## 2021-10-20 PROCEDURE — A9270 NON-COVERED ITEM OR SERVICE: HCPCS | Performed by: NURSE PRACTITIONER

## 2021-10-20 PROCEDURE — A9270 NON-COVERED ITEM OR SERVICE: HCPCS | Performed by: PHYSICAL MEDICINE & REHABILITATION

## 2021-10-20 PROCEDURE — A9270 NON-COVERED ITEM OR SERVICE: HCPCS | Performed by: SPECIALIST

## 2021-10-20 PROCEDURE — 86923 COMPATIBILITY TEST ELECTRIC: CPT

## 2021-10-20 PROCEDURE — 85027 COMPLETE CBC AUTOMATED: CPT

## 2021-10-20 PROCEDURE — 700102 HCHG RX REV CODE 250 W/ 637 OVERRIDE(OP): Performed by: SPECIALIST

## 2021-10-20 PROCEDURE — 97110 THERAPEUTIC EXERCISES: CPT | Mod: CQ

## 2021-10-20 PROCEDURE — 700105 HCHG RX REV CODE 258

## 2021-10-20 PROCEDURE — 700102 HCHG RX REV CODE 250 W/ 637 OVERRIDE(OP)

## 2021-10-20 PROCEDURE — 85007 BL SMEAR W/DIFF WBC COUNT: CPT

## 2021-10-20 PROCEDURE — 700102 HCHG RX REV CODE 250 W/ 637 OVERRIDE(OP): Performed by: NURSE PRACTITIONER

## 2021-10-20 PROCEDURE — 700102 HCHG RX REV CODE 250 W/ 637 OVERRIDE(OP): Performed by: ORTHOPAEDIC SURGERY

## 2021-10-20 PROCEDURE — A9270 NON-COVERED ITEM OR SERVICE: HCPCS

## 2021-10-20 PROCEDURE — 80048 BASIC METABOLIC PNL TOTAL CA: CPT

## 2021-10-20 PROCEDURE — 700102 HCHG RX REV CODE 250 W/ 637 OVERRIDE(OP): Performed by: PHYSICAL MEDICINE & REHABILITATION

## 2021-10-20 PROCEDURE — 99024 POSTOP FOLLOW-UP VISIT: CPT | Performed by: ORTHOPAEDIC SURGERY

## 2021-10-20 PROCEDURE — P9016 RBC LEUKOCYTES REDUCED: HCPCS

## 2021-10-20 PROCEDURE — 36415 COLL VENOUS BLD VENIPUNCTURE: CPT

## 2021-10-20 PROCEDURE — 36430 TRANSFUSION BLD/BLD COMPNT: CPT

## 2021-10-20 PROCEDURE — 700111 HCHG RX REV CODE 636 W/ 250 OVERRIDE (IP)

## 2021-10-20 PROCEDURE — 770006 HCHG ROOM/CARE - MED/SURG/GYN SEMI*

## 2021-10-20 RX ADMIN — OXYCODONE HYDROCHLORIDE 10 MG: 10 TABLET ORAL at 08:03

## 2021-10-20 RX ADMIN — CALCIUM CARBONATE 500 MG: 500 TABLET, CHEWABLE ORAL at 04:39

## 2021-10-20 RX ADMIN — DOCUSATE SODIUM 100 MG: 100 CAPSULE ORAL at 17:09

## 2021-10-20 RX ADMIN — MORPHINE SULFATE 15 MG: 15 TABLET, FILM COATED, EXTENDED RELEASE ORAL at 21:25

## 2021-10-20 RX ADMIN — GABAPENTIN 300 MG: 300 CAPSULE ORAL at 04:40

## 2021-10-20 RX ADMIN — METHOCARBAMOL 750 MG: 750 TABLET ORAL at 08:55

## 2021-10-20 RX ADMIN — OXYCODONE HYDROCHLORIDE 10 MG: 10 TABLET ORAL at 16:36

## 2021-10-20 RX ADMIN — CALCIUM CARBONATE 500 MG: 500 TABLET, CHEWABLE ORAL at 11:14

## 2021-10-20 RX ADMIN — METHOCARBAMOL 750 MG: 750 TABLET ORAL at 14:29

## 2021-10-20 RX ADMIN — OXYCODONE HYDROCHLORIDE 10 MG: 10 TABLET ORAL at 21:25

## 2021-10-20 RX ADMIN — OXYCODONE HYDROCHLORIDE 10 MG: 10 TABLET ORAL at 01:32

## 2021-10-20 RX ADMIN — DOCUSATE SODIUM 100 MG: 100 CAPSULE ORAL at 04:39

## 2021-10-20 RX ADMIN — METHOCARBAMOL 750 MG: 750 TABLET ORAL at 21:25

## 2021-10-20 RX ADMIN — GABAPENTIN 300 MG: 300 CAPSULE ORAL at 17:09

## 2021-10-20 RX ADMIN — SODIUM CHLORIDE 250 MG: 9 INJECTION, SOLUTION INTRAVENOUS at 17:51

## 2021-10-20 RX ADMIN — METHOCARBAMOL 750 MG: 750 TABLET ORAL at 16:36

## 2021-10-20 RX ADMIN — CALCIUM CARBONATE 500 MG: 500 TABLET, CHEWABLE ORAL at 17:09

## 2021-10-20 RX ADMIN — OXYCODONE HYDROCHLORIDE 10 MG: 10 TABLET ORAL at 04:40

## 2021-10-20 RX ADMIN — DOCUSATE SODIUM 50 MG AND SENNOSIDES 8.6 MG 1 TABLET: 8.6; 5 TABLET, FILM COATED ORAL at 21:25

## 2021-10-20 RX ADMIN — Medication 5 MG: at 21:24

## 2021-10-20 RX ADMIN — ACETAMINOPHEN 650 MG: 325 TABLET ORAL at 18:58

## 2021-10-20 RX ADMIN — OXYCODONE HYDROCHLORIDE 10 MG: 10 TABLET ORAL at 11:15

## 2021-10-20 RX ADMIN — GABAPENTIN 300 MG: 300 CAPSULE ORAL at 11:14

## 2021-10-20 ASSESSMENT — PAIN DESCRIPTION - PAIN TYPE
TYPE: ACUTE PAIN;SURGICAL PAIN
TYPE: ACUTE PAIN
TYPE: ACUTE PAIN;SURGICAL PAIN
TYPE: ACUTE PAIN;SURGICAL PAIN

## 2021-10-20 ASSESSMENT — GAIT ASSESSMENTS
ASSISTIVE DEVICE: FRONT WHEEL WALKER
DISTANCE (FEET): 5
GAIT LEVEL OF ASSIST: MINIMAL ASSIST

## 2021-10-20 ASSESSMENT — COGNITIVE AND FUNCTIONAL STATUS - GENERAL
STANDING UP FROM CHAIR USING ARMS: A LOT
TURNING FROM BACK TO SIDE WHILE IN FLAT BAD: A LOT
SUGGESTED CMS G CODE MODIFIER MOBILITY: CM
WALKING IN HOSPITAL ROOM: A LOT
MOVING FROM LYING ON BACK TO SITTING ON SIDE OF FLAT BED: UNABLE
MOBILITY SCORE: 9
CLIMB 3 TO 5 STEPS WITH RAILING: TOTAL
MOVING TO AND FROM BED TO CHAIR: UNABLE

## 2021-10-20 NOTE — PROGRESS NOTES
Called phlebotomist Eran about hemoglobin and hematocrit pending collection. Per Eran, will send someone to collect now.

## 2021-10-20 NOTE — DISCHARGE PLANNING
Anticipated Discharge Disposition: Acute rehab.     Action: PT continues to recommend post acute placement. [facility] referral remains pending, but they have concerns regarding discharge support and a safe transition back to the community. Per medical provider, patient likely to be medically clear at the end of the week.     Barriers to Discharge: Medical clearance; recommending post acute placement; limited by Medicaid FFS.     Plan: CM to continue to follow for discharge needs.

## 2021-10-20 NOTE — THERAPY
Physical Therapy   Daily Treatment     Patient Name: Wero Thorpe  Age:  46 y.o., Sex:  male  Medical Record #: [id number]  Today's Date: 10/20/2021     Precautions  Precautions: (P) Toe Touch Weight Bearing Right Lower Extremity  Comments: (P) IVC filter 10/11    Assessment    Pt stated he had a long night w/the compression wrap on Rt thigh. Initiated tx efforts w/AAROM Rt LE, did not tolerated knee over bolster for stretch of quad. Pt requiring mod assist w/bed mobility and coming to stand from w/c height->FWW. Pt managed 5' w/his transfer, holds Rt LE NWB. Pt tolerated 1 hr up in the w/c before assist BTB. Pt struggling w/the compression wrap, not wanting to move the LE. Conts to have popping in knee joint w/ROM.     Plan    Continue current treatment plan.    DC Equipment Recommendations: Unable to determine at this time  Discharge Recommendations: Recommend post-acute placement for additional physical therapy services prior to discharge home     Objective       10/20/21 1132   Other Treatments   Other Treatments Provided Supine AAROM Rt LE and positioning. Pt transferred BTB from w/c, tolerated 1 hr up in the w/c.    Balance   Sitting Balance (Static) Poor +   Sitting Balance (Dynamic) Poor -   Standing Balance (Static) Poor +   Standing Balance (Dynamic) Poor -   Weight Shift Sitting Fair   Weight Shift Standing Absent   Comments standing w/FWW   Gait Analysis   Gait Level Of Assist Minimal Assist   Assistive Device Front Wheel Walker   Distance (Feet) 5   # of Times Distance was Traveled 2   Weight Bearing Status TTWB Rt LE   Comments Pt managed to take steps to transfer from bed<->w/c. Pt keeps his Rt LE NWB w/his efforts.    Bed Mobility    Supine to Sit Moderate Assist  (HOB partially elevated)   Sit to Supine Moderate Assist  (HOB flat and no railing)   Scooting Minimal Assist  (seated)   Comments Pt conts to require Rt LE management on/off the bed.    Functional Mobility   Sit to Stand Moderate  Assist  (from w/c height->FWW. Min from the EOB->FWW)   Bed, Chair, Wheelchair Transfer Minimal Assist  (bed<->w/c w/FWW)   Comments Initiated transfer to the w/c today, taking steps to/from the chair w/FWW. Pt needing more assist to get up from w/c height vs EOB.    How much difficulty does the patient currently have...   Turning over in bed (including adjusting bedclothes, sheets and blankets)? 2   Sitting down on and standing up from a chair with arms (e.g., wheelchair, bedside commode, etc.) 1   Moving from lying on back to sitting on the side of the bed? 1   How much help from another person does the patient currently need...   Moving to and from a bed to a chair (including a wheelchair)? 2   Need to walk in a hospital room? 2   Climbing 3-5 steps with a railing? 1   6 clicks Mobility Score 9   Short Term Goals    Short Term Goal # 1 pt will perform supine <> sit with HOB flat, no railing and SPV in 6 visits   Goal Outcome # 1 goal not met   Short Term Goal # 2 pt will perform sit <> stand and functional transfers with LRAD and Min A to improve functional mobility in 6 visits   Goal Outcome # 2 Progressing slower than expected   Short Term Goal # 3 pt will ambulate > 25 ft with FWW and Mod A to improve function in 6 visits   Goal Outcome # 3 Goal not met   Short Term Goal # 4 pt will negotiate 1 FOS with LRAD / railing and Min A to access home environment in 6 visits   Goal Outcome # 4 Goal not met

## 2021-10-20 NOTE — DISCHARGE PLANNING
[facility] Transitional Care Coordination    Follow up with client at bedside to discuss IRF referral.  Explained specifics of inpatient rehab, answered questions/concerns.  Reviewed discharge support/destination to facilitate safe transition back to community.  Patient shares condo with one of his son's.  Second story unit, 18 stairs to enter.  Son has variable work schedule, morning start time changes, typically home after 12p.  Bathroom has tub/shower combo.  Patient now appears to have Medicaid FFS, aware IRF admission would require prior.  Provided [facility] contact information for additional IRF questions.    Anticipate review with [facility] admission team Wednesday for recommendations regarding admission.

## 2021-10-20 NOTE — THERAPY
Occupational Therapy Contact Note    Attempted OT treatment, pt still feeling fatigued from PT session earlier and has already completed his ADL routine for the day. Discussed what he would like to work on w/ OT and he reported increased independence with being able to use the BSC, wipe himself, and dress himself. Plan made to see him in the AM before PT session to work on getting dressed and ready for the day.    Amalia Hendricks, OTR/L
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
## 2021-10-20 NOTE — PROGRESS NOTES
Orthopaedic Progress Note    Interval changes:  Patient doing well    RLE prevena incisional vac in place without leak  RLE wrapped for compression     ROS - Patient denies any new issues.  Pain well controlled.    /66   Pulse 100   Temp 36.9 °C (98.4 °F) (Oral)   Resp 18   Ht 1.829 m (6')   Wt 89 kg (196 lb 3.4 oz)   SpO2 96%       Patient seen and examined  No acute distress  Breathing non labored  RRR  RLE surgical dressings CDI distally.  Proximal prevena in place without leak or exudate noted.  There is no erythema, induration, or signs of infection at any of the incision sites. Proximal dressing with min serosanguinous Patient clearly fires tibialis anterior, EHL, and gastrocnemius/soleus. Sensation is intact to light touch throughout superficial peroneal, deep peroneal, tibial, saphenous, and sural nerve distributions. Strong and palpable 2+ dorsalis pedis and posterior tibial pulses with capillary refill less than 2 seconds. No lower leg tenderness or discomfort.       Recent Labs     10/19/21  0412 10/19/21  1133 10/19/21  2102 10/20/21  0359 10/20/21  1053   WBC 17.9*  --   --  17.5* 17.5*   RBC 2.38*  --   --  2.42* 2.52*   HEMOGLOBIN 7.1*   < > 6.4* 7.3* 7.3*   HEMATOCRIT 22.2*   < > 20.0* 23.3* 24.1*   MCV 93.3  --   --  96.3 95.6   MCH 29.8  --   --  30.2 29.0   MCHC 32.0*  --   --  31.3* 30.3*   RDW 52.6*  --   --  55.6* 58.1*   PLATELETCT 529*  --   --  477* 538*   MPV 9.0  --   --  8.8* 8.8*    < > = values in this interval not displayed.       Active Hospital Problems    Diagnosis    • Leukocytosis [D72.829]      Priority: High   • Intramuscular hematoma [T14.8XXA]      Priority: High   • Acute deep vein thrombosis (DVT) of femoral vein of right lower extremity (HCC) [I82.411]      Priority: High   • Thrombophlebitis arm [I80.8]      Priority: Medium   • Acute blood loss anemia [D62]      Priority: Medium   • Fracture of shaft of femur (HCC) [S72.568A]      Priority: Medium   •  Trauma [T14.90XA]      Priority: Low   • Encounter for screening for COVID-19 [Z11.52]      Priority: Low   • Liver cirrhosis (HCC) [K74.60]      Priority: Low       Assessment/Plan:  Patient doing well    POD#12 S/P:  1. Open treatment right intertrochanteric femur fracture with plate and screw construct  2. Open treatment of right femoral shaft fracture with insertion of intramedullary implant  3. Open reduction internal fixation right distal femur intraarticular fracture  Wt bearing status - TTWB RLE  Wound care/Drains - dressings changed every other day by nursing distally, prevena to be left in place  Future Procedures - none planned   Lovenox: Start 10/9, Duration-until ambulatory > 150'  Sutures/Staples out-  14 days post operatively  PT/OT-initiated  Antibiotics: perioperative completed  DVT Prophylaxis- TEDS/SCDs/Foot pumps  Raygoza-none  Case Coordination for Discharge Planning - Disposition home

## 2021-10-20 NOTE — CARE PLAN
Problem: Pain - Standard  Goal: Alleviation of pain or a reduction in pain to the patient’s comfort goal  Outcome: Progressing     Problem: Knowledge Deficit - Standard  Goal: Patient and family/care givers will demonstrate understanding of plan of care, disease process/condition, diagnostic tests and medications  Outcome: Progressing   The patient is Stable - Low risk of patient condition declining or worsening    Shift Goals  Clinical Goals: Hbg/Hct levels, dafety   Patient Goals: comfort/ pain management   Family Goals: no family present    Progress made toward(s) clinical / shift goals:  Patient's pain is alleviating with medications, complains of ACE wrap being too tight, Marvin Daryn PA notified.    Patient is not progressing towards the following goals:    Assumed care of patient at change of shift.   Report received at bedside rounding  Patient is calm, in bed, with no distress noted.  Call light and patient belongings are in reach, bed is locked and in lowest position- hourly rounding is in place. See flow sheets for further assessment.

## 2021-10-20 NOTE — CARE PLAN
Problem: Pain - Standard  Goal: Alleviation of pain or a reduction in pain to the patient’s comfort goal  Outcome: Not Progressing     Problem: Knowledge Deficit - Standard  Goal: Patient and family/care givers will demonstrate understanding of plan of care, disease process/condition, diagnostic tests and medications  Outcome: Progressing     Problem: Skin Integrity  Goal: Skin integrity is maintained or improved  Outcome: Progressing     Problem: Fall Risk  Goal: Patient will remain free from falls  Outcome: Progressing   The patient is Stable - Low risk of patient condition declining or worsening    Shift Goals  Clinical Goals: Hgb/Hct levels; safety  Patient Goals: pain control safety  Family Goals: no family present    Progress made toward(s) clinical / shift goals:  Pt educated on need for blood transfusion; pt is tolerating well.  Pt turns self from side to side and asks for assistance to reposition using pillows.      Patient is not progressing towards the following goals:Pt pain remains in the 7-9 range. Pain medication is given scheduled and PRN. Pt pain only decreases for 30-60 minute post administration.      Problem: Pain - Standard  Goal: Alleviation of pain or a reduction in pain to the patient’s comfort goal  Outcome: Not Progressing

## 2021-10-20 NOTE — PROGRESS NOTES
Still some active bleeding in thigh  ACE wrap applied  WBC Increasing    /64   Pulse (!) 108   Temp 36.8 °C (98.2 °F) (Temporal)   Resp 16   Ht 1.829 m (6')   Wt 89 kg (196 lb 3.4 oz)   SpO2 95%     Recent Labs     10/18/21  0412 10/18/21  0412 10/19/21  0412 10/19/21  0412 10/19/21  1133 10/19/21  2102 10/20/21  0359   WBC 12.4*  --  17.9*  --   --   --  17.5*   RBC 2.31*  --  2.38*  --   --   --  2.42*   HEMOGLOBIN 6.8*   < > 7.1*   < > 6.9* 6.4* 7.3*   HEMATOCRIT 21.6*   < > 22.2*   < > 21.5* 20.0* 23.3*   MCV 93.5  --  93.3  --   --   --  96.3   MCH 29.4  --  29.8  --   --   --  30.2   MCHC 31.5*  --  32.0*  --   --   --  31.3*   RDW 54.1*  --  52.6*  --   --   --  55.6*   PLATELETCT 534*  --  529*  --   --   --  477*   MPV 8.8*  --  9.0  --   --   --  8.8*    < > = values in this interval not displayed.       POD#12  S/P IMN femur and ORIF hip    Plan:  DVT Prophylaxis- TEDS/SCDs,lovenox held currently  Weight Bearing Status- TTWB x 6 weeks  Will follow leg swelling and HCT  PT/OT  Antibiotics: None  Case Coordination

## 2021-10-21 PROBLEM — I80.8 THROMBOPHLEBITIS ARM: Status: RESOLVED | Noted: 2021-10-19 | Resolved: 2021-10-21

## 2021-10-21 PROBLEM — Z02.9 DISCHARGE PLANNING ISSUES: Status: ACTIVE | Noted: 2021-10-21

## 2021-10-21 PROBLEM — Z11.52 ENCOUNTER FOR SCREENING FOR COVID-19: Status: RESOLVED | Noted: 2021-10-08 | Resolved: 2021-10-21

## 2021-10-21 LAB
ANION GAP SERPL CALC-SCNC: 10 MMOL/L (ref 7–16)
ANISOCYTOSIS BLD QL SMEAR: ABNORMAL
ANISOCYTOSIS BLD QL SMEAR: ABNORMAL
BASOPHILS # BLD AUTO: 0 % (ref 0–1.8)
BASOPHILS # BLD AUTO: 0.9 % (ref 0–1.8)
BASOPHILS # BLD: 0 K/UL (ref 0–0.12)
BASOPHILS # BLD: 0.12 K/UL (ref 0–0.12)
BUN SERPL-MCNC: 17 MG/DL (ref 8–22)
CALCIUM SERPL-MCNC: 8.2 MG/DL (ref 8.5–10.5)
CHLORIDE SERPL-SCNC: 103 MMOL/L (ref 96–112)
CO2 SERPL-SCNC: 23 MMOL/L (ref 20–33)
CREAT SERPL-MCNC: 0.58 MG/DL (ref 0.5–1.4)
EOSINOPHIL # BLD AUTO: 0.23 K/UL (ref 0–0.51)
EOSINOPHIL # BLD AUTO: 0.48 K/UL (ref 0–0.51)
EOSINOPHIL NFR BLD: 1.7 % (ref 0–6.9)
EOSINOPHIL NFR BLD: 3.3 % (ref 0–6.9)
ERYTHROCYTE [DISTWIDTH] IN BLOOD BY AUTOMATED COUNT: 61.5 FL (ref 35.9–50)
ERYTHROCYTE [DISTWIDTH] IN BLOOD BY AUTOMATED COUNT: 63.2 FL (ref 35.9–50)
GLUCOSE SERPL-MCNC: 124 MG/DL (ref 65–99)
HCT VFR BLD AUTO: 24.1 % (ref 42–52)
HCT VFR BLD AUTO: 24.4 % (ref 42–52)
HGB BLD-MCNC: 7.1 G/DL (ref 14–18)
HGB BLD-MCNC: 7.5 G/DL (ref 14–18)
HYPOCHROMIA BLD QL SMEAR: ABNORMAL
HYPOCHROMIA BLD QL SMEAR: ABNORMAL
LYMPHOCYTES # BLD AUTO: 1.39 K/UL (ref 1–4.8)
LYMPHOCYTES # BLD AUTO: 2.84 K/UL (ref 1–4.8)
LYMPHOCYTES NFR BLD: 10.3 % (ref 22–41)
LYMPHOCYTES NFR BLD: 19.6 % (ref 22–41)
MACROCYTES BLD QL SMEAR: ABNORMAL
MACROCYTES BLD QL SMEAR: ABNORMAL
MANUAL DIFF BLD: NORMAL
MANUAL DIFF BLD: NORMAL
MCH RBC QN AUTO: 28.9 PG (ref 27–33)
MCH RBC QN AUTO: 30.1 PG (ref 27–33)
MCHC RBC AUTO-ENTMCNC: 29.5 G/DL (ref 33.7–35.3)
MCHC RBC AUTO-ENTMCNC: 30.7 G/DL (ref 33.7–35.3)
MCV RBC AUTO: 98 FL (ref 81.4–97.8)
MCV RBC AUTO: 98 FL (ref 81.4–97.8)
METAMYELOCYTES NFR BLD MANUAL: 1.1 %
MICROCYTES BLD QL SMEAR: ABNORMAL
MICROCYTES BLD QL SMEAR: ABNORMAL
MONOCYTES # BLD AUTO: 0.23 K/UL (ref 0–0.85)
MONOCYTES # BLD AUTO: 0.78 K/UL (ref 0–0.85)
MONOCYTES NFR BLD AUTO: 1.7 % (ref 0–13.4)
MONOCYTES NFR BLD AUTO: 5.4 % (ref 0–13.4)
MORPHOLOGY BLD-IMP: NORMAL
MORPHOLOGY BLD-IMP: NORMAL
MYELOCYTES NFR BLD MANUAL: 0.9 %
NEUTROPHILS # BLD AUTO: 10.24 K/UL (ref 1.82–7.42)
NEUTROPHILS # BLD AUTO: 11.41 K/UL (ref 1.82–7.42)
NEUTROPHILS NFR BLD: 70.6 % (ref 44–72)
NEUTROPHILS NFR BLD: 82.8 % (ref 44–72)
NEUTS BAND NFR BLD MANUAL: 1.7 % (ref 0–10)
NRBC # BLD AUTO: 0.12 K/UL
NRBC # BLD AUTO: 0.17 K/UL
NRBC BLD-RTO: 0.9 /100 WBC
NRBC BLD-RTO: 1.2 /100 WBC
PLATELET # BLD AUTO: 475 K/UL (ref 164–446)
PLATELET # BLD AUTO: 519 K/UL (ref 164–446)
PLATELET BLD QL SMEAR: NORMAL
PLATELET BLD QL SMEAR: NORMAL
PMV BLD AUTO: 8.6 FL (ref 9–12.9)
PMV BLD AUTO: 8.9 FL (ref 9–12.9)
POLYCHROMASIA BLD QL SMEAR: NORMAL
POLYCHROMASIA BLD QL SMEAR: NORMAL
POTASSIUM SERPL-SCNC: 4.2 MMOL/L (ref 3.6–5.5)
RBC # BLD AUTO: 2.46 M/UL (ref 4.7–6.1)
RBC # BLD AUTO: 2.49 M/UL (ref 4.7–6.1)
RBC BLD AUTO: PRESENT
RBC BLD AUTO: PRESENT
SODIUM SERPL-SCNC: 136 MMOL/L (ref 135–145)
WBC # BLD AUTO: 13.5 K/UL (ref 4.8–10.8)
WBC # BLD AUTO: 14.5 K/UL (ref 4.8–10.8)

## 2021-10-21 PROCEDURE — 700102 HCHG RX REV CODE 250 W/ 637 OVERRIDE(OP): Performed by: SPECIALIST

## 2021-10-21 PROCEDURE — A9270 NON-COVERED ITEM OR SERVICE: HCPCS | Performed by: SPECIALIST

## 2021-10-21 PROCEDURE — 97535 SELF CARE MNGMENT TRAINING: CPT

## 2021-10-21 PROCEDURE — 700102 HCHG RX REV CODE 250 W/ 637 OVERRIDE(OP): Performed by: PHYSICAL MEDICINE & REHABILITATION

## 2021-10-21 PROCEDURE — 700102 HCHG RX REV CODE 250 W/ 637 OVERRIDE(OP)

## 2021-10-21 PROCEDURE — 700102 HCHG RX REV CODE 250 W/ 637 OVERRIDE(OP): Performed by: ORTHOPAEDIC SURGERY

## 2021-10-21 PROCEDURE — 700102 HCHG RX REV CODE 250 W/ 637 OVERRIDE(OP): Performed by: NURSE PRACTITIONER

## 2021-10-21 PROCEDURE — A9270 NON-COVERED ITEM OR SERVICE: HCPCS

## 2021-10-21 PROCEDURE — A9270 NON-COVERED ITEM OR SERVICE: HCPCS | Performed by: ORTHOPAEDIC SURGERY

## 2021-10-21 PROCEDURE — 80048 BASIC METABOLIC PNL TOTAL CA: CPT

## 2021-10-21 PROCEDURE — A9270 NON-COVERED ITEM OR SERVICE: HCPCS | Performed by: PHYSICAL MEDICINE & REHABILITATION

## 2021-10-21 PROCEDURE — 85007 BL SMEAR W/DIFF WBC COUNT: CPT | Mod: 91

## 2021-10-21 PROCEDURE — 85027 COMPLETE CBC AUTOMATED: CPT

## 2021-10-21 PROCEDURE — A9270 NON-COVERED ITEM OR SERVICE: HCPCS | Performed by: NURSE PRACTITIONER

## 2021-10-21 PROCEDURE — 770006 HCHG ROOM/CARE - MED/SURG/GYN SEMI*

## 2021-10-21 PROCEDURE — 99232 SBSQ HOSP IP/OBS MODERATE 35: CPT | Performed by: PHYSICAL MEDICINE & REHABILITATION

## 2021-10-21 PROCEDURE — 97530 THERAPEUTIC ACTIVITIES: CPT | Mod: CQ

## 2021-10-21 PROCEDURE — 97110 THERAPEUTIC EXERCISES: CPT | Mod: CQ

## 2021-10-21 PROCEDURE — 36415 COLL VENOUS BLD VENIPUNCTURE: CPT

## 2021-10-21 PROCEDURE — 99024 POSTOP FOLLOW-UP VISIT: CPT | Performed by: ORTHOPAEDIC SURGERY

## 2021-10-21 RX ADMIN — GABAPENTIN 300 MG: 300 CAPSULE ORAL at 11:21

## 2021-10-21 RX ADMIN — Medication 5 MG: at 22:02

## 2021-10-21 RX ADMIN — CALCIUM CARBONATE 500 MG: 500 TABLET, CHEWABLE ORAL at 11:21

## 2021-10-21 RX ADMIN — CALCIUM CARBONATE 500 MG: 500 TABLET, CHEWABLE ORAL at 04:35

## 2021-10-21 RX ADMIN — GABAPENTIN 300 MG: 300 CAPSULE ORAL at 17:48

## 2021-10-21 RX ADMIN — MORPHINE SULFATE 15 MG: 15 TABLET, FILM COATED, EXTENDED RELEASE ORAL at 20:12

## 2021-10-21 RX ADMIN — OXYCODONE HYDROCHLORIDE 10 MG: 10 TABLET ORAL at 15:01

## 2021-10-21 RX ADMIN — OXYCODONE HYDROCHLORIDE 10 MG: 10 TABLET ORAL at 08:26

## 2021-10-21 RX ADMIN — METHOCARBAMOL 750 MG: 750 TABLET ORAL at 17:48

## 2021-10-21 RX ADMIN — METHOCARBAMOL 750 MG: 750 TABLET ORAL at 13:48

## 2021-10-21 RX ADMIN — DOCUSATE SODIUM 100 MG: 100 CAPSULE ORAL at 17:48

## 2021-10-21 RX ADMIN — DOCUSATE SODIUM 100 MG: 100 CAPSULE ORAL at 04:35

## 2021-10-21 RX ADMIN — OXYCODONE HYDROCHLORIDE 10 MG: 10 TABLET ORAL at 04:35

## 2021-10-21 RX ADMIN — DOCUSATE SODIUM 50 MG AND SENNOSIDES 8.6 MG 1 TABLET: 8.6; 5 TABLET, FILM COATED ORAL at 22:02

## 2021-10-21 RX ADMIN — CALCIUM CARBONATE 500 MG: 500 TABLET, CHEWABLE ORAL at 17:48

## 2021-10-21 RX ADMIN — OXYCODONE HYDROCHLORIDE 10 MG: 10 TABLET ORAL at 00:34

## 2021-10-21 RX ADMIN — METHOCARBAMOL 750 MG: 750 TABLET ORAL at 22:02

## 2021-10-21 RX ADMIN — GABAPENTIN 300 MG: 300 CAPSULE ORAL at 04:35

## 2021-10-21 RX ADMIN — METHOCARBAMOL 750 MG: 750 TABLET ORAL at 08:26

## 2021-10-21 RX ADMIN — OXYCODONE HYDROCHLORIDE 10 MG: 10 TABLET ORAL at 11:21

## 2021-10-21 ASSESSMENT — PAIN DESCRIPTION - PAIN TYPE
TYPE: ACUTE PAIN
TYPE: ACUTE PAIN;SURGICAL PAIN
TYPE: ACUTE PAIN
TYPE: ACUTE PAIN;SURGICAL PAIN
TYPE: ACUTE PAIN
TYPE: ACUTE PAIN;SURGICAL PAIN
TYPE: ACUTE PAIN

## 2021-10-21 ASSESSMENT — ENCOUNTER SYMPTOMS
NEUROLOGICAL NEGATIVE: 1
DIARRHEA: 0
MYALGIAS: 1
NAUSEA: 0
VOMITING: 0
ABDOMINAL PAIN: 0
FEVER: 0
SHORTNESS OF BREATH: 0
CONSTIPATION: 0
DIAPHORESIS: 0
CHILLS: 0
EYES NEGATIVE: 1
SPEECH CHANGE: 0

## 2021-10-21 ASSESSMENT — COGNITIVE AND FUNCTIONAL STATUS - GENERAL
EATING MEALS: A LITTLE
MOVING FROM LYING ON BACK TO SITTING ON SIDE OF FLAT BED: UNABLE
MOVING TO AND FROM BED TO CHAIR: UNABLE
TOILETING: A LITTLE
DRESSING REGULAR LOWER BODY CLOTHING: A LOT
TURNING FROM BACK TO SIDE WHILE IN FLAT BAD: A LOT
PERSONAL GROOMING: A LITTLE
SUGGESTED CMS G CODE MODIFIER DAILY ACTIVITY: CK
DAILY ACTIVITIY SCORE: 16
WALKING IN HOSPITAL ROOM: A LOT
SUGGESTED CMS G CODE MODIFIER MOBILITY: CM
CLIMB 3 TO 5 STEPS WITH RAILING: TOTAL
DRESSING REGULAR UPPER BODY CLOTHING: A LITTLE
MOBILITY SCORE: 9
STANDING UP FROM CHAIR USING ARMS: A LOT
HELP NEEDED FOR BATHING: A LOT

## 2021-10-21 ASSESSMENT — GAIT ASSESSMENTS
ASSISTIVE DEVICE: FRONT WHEEL WALKER
GAIT LEVEL OF ASSIST: MINIMAL ASSIST
DISTANCE (FEET): 5

## 2021-10-21 NOTE — CARE PLAN
"  Problem: Pain - Standard  Goal: Alleviation of pain or a reduction in pain to the patient’s comfort goal  Outcome: Progressing     Problem: Knowledge Deficit - Standard  Goal: Patient and family/care givers will demonstrate understanding of plan of care, disease process/condition, diagnostic tests and medications  Outcome: Progressing     The patient is Stable - Low risk of patient condition declining or worsening    Shift Goals  Clinical Goals: monitor HGB/HCT  Patient Goals: rest, pain control  Family Goals: no family present    Progress made toward(s) clinical / shift goals:  Patient states that pain is being controlled on PRN pain medication, states new pain in his right knee that feels like its \"out of place.\" Ortho PA aware and moved his ACE wrap further down to support knee. Patient educated on plan of care for today.    Patient is not progressing towards the following goals:    Assumed care of patient at change of shift.   Report received at bedside rounding  Patient is calm, in bed, with no distress noted.  Call light and patient belongings are in reach, bed is locked and in lowest position- hourly rounding is in place. See flow sheets for further assessment.    "

## 2021-10-21 NOTE — DISCHARGE PLANNING
Following for post acute services discharge destination may be a barrier with current weight bearing restriction, will need to negotiate 18 step into and out of the apartment. Will follow per SW do not anticipate medical clearance the week.

## 2021-10-21 NOTE — THERAPY
Physical Therapy   Daily Treatment     Patient Name: Wero Thorpe  Age:  46 y.o., Sex:  male  Medical Record #: [id number]  Today's Date: 10/21/2021     Precautions  Precautions: Toe Touch Weight Bearing Right Lower Extremity  Comments: as of now, ace wrap on thigh during mobility    Assessment    Pt in better spirits today, still c/o Rt knee pain w/ROM. Improvement w/getting to the EOB, still unable to static sit wo/UE support. Improvement w/sit->stands and his transfers w/FWW. PT to initiate hallway ambulation tx session. Pt still w/Rt LE weakness, does not tolerate LE over bolster for quad stretch and AAROM 2* knee pain. Pt is showing improvement w/managing Rt LE off the EOB.     Plan    Continue current treatment plan.    DC Equipment Recommendations: Unable to determine at this time  Discharge Recommendations: Recommend post-acute placement for additional physical therapy services prior to discharge home     Objective       10/21/21 1029   Other Treatments   Other Treatments Provided Ongoing work for ROM/positioning supine and seated. Pt conts to struggle with knee pain limited full knee extension or toleratance w/LE over bolster.    Balance   Sitting Balance (Static) Fair   Sitting Balance (Dynamic) Fair   Standing Balance (Static) Fair -   Standing Balance (Dynamic) Poor   Weight Shift Sitting Fair   Weight Shift Standing Absent   Gait Analysis   Gait Level Of Assist Minimal Assist   Assistive Device Front Wheel Walker   Distance (Feet) 5   # of Times Distance was Traveled 1   Weight Bearing Status TTWB Rt LE   Comments Improvement w/his amb efforts, will initiate hallway amb next tx session. Pt conts to keep Rt LE NWB.    Bed Mobility    Supine to Sit Minimal Assist   Sit to Supine   (pt left seated in w/c)   Scooting Minimal Assist  (seated)   Skilled Intervention Verbal Cuing;Postural Facilitation   Comments Pt conts to require Rt LE management toward the EOB. Working on static sit, getting wt  through his Rt hip and knee flexion.    Functional Mobility   Sit to Stand Minimal Assist  (from EOB->FWW)   Bed, Chair, Wheelchair Transfer Minimal Assist   Comments Improvement w/his efforts to take steps to the w/c today, better control. Had pt static stand w/TTWB through Rt LE working on foot placement.    How much difficulty does the patient currently have...   Turning over in bed (including adjusting bedclothes, sheets and blankets)? 2   Sitting down on and standing up from a chair with arms (e.g., wheelchair, bedside commode, etc.) 1   Moving from lying on back to sitting on the side of the bed? 1   How much help from another person does the patient currently need...   Moving to and from a bed to a chair (including a wheelchair)? 2   Need to walk in a hospital room? 2   Climbing 3-5 steps with a railing? 1   6 clicks Mobility Score 9   Short Term Goals    Short Term Goal # 1 pt will perform supine <> sit with HOB flat, no railing and SPV in 6 visits   Goal Outcome # 1 goal not met   Short Term Goal # 2 pt will perform sit <> stand and functional transfers with LRAD and Min A to improve functional mobility in 6 visits   Goal Outcome # 2 Goal met   Short Term Goal # 3 pt will ambulate > 25 ft with FWW and Mod A to improve function in 6 visits   Goal Outcome # 3 Goal not met   Short Term Goal # 4 pt will negotiate 1 FOS with LRAD / railing and Min A to access home environment in 6 visits   Goal Outcome # 4 Goal not met

## 2021-10-21 NOTE — ASSESSMENT & PLAN NOTE
Date of admission: 10/8/2021.  Date: 10/14  Rehab consult completed  Date: 10/26  [facility] submitting to Medicaid for consideration of IRF level care  Date: 10/28  Medicaid HPN declined acute rehab. [place] [facility]F referrals.  Date: 10/29  Patient declined SNF acceptance--wants to discharge to hotel with friend support. Wheelchair ordered.  Cleared for discharge: Yes - Date: 10/26.  Discharge delayed: Yes - Reason: Financial.   Discharge date: 11/1  Friend to pick patient up at 2PM

## 2021-10-21 NOTE — CARE PLAN
The patient is Watcher - Medium risk of patient condition declining or worsening    Shift Goals  Clinical Goals: monitor HGB/HCT  Patient Goals: rest, pain control  Family Goals: no family present    Progress made toward(s) clinical / shift goals:  yes      Problem: Pain - Standard  Goal: Alleviation of pain or a reduction in pain to the patient’s comfort goal  10/21/2021 0105 by Jon Uriarte, R.N.  Outcome: Progressing  10/21/2021 0105 by Jon Uriarte, R.N.  Outcome: Progressing     Problem: Knowledge Deficit - Standard  Goal: Patient and family/care givers will demonstrate understanding of plan of care, disease process/condition, diagnostic tests and medications  10/21/2021 0105 by Jon Uriarte, R.N.  Outcome: Progressing  10/21/2021 0105 by Jon Uriarte, R.N.  Outcome: Progressing     Problem: Skin Integrity  Goal: Skin integrity is maintained or improved  10/21/2021 0105 by Jon Uriarte, R.N.  Outcome: Progressing  10/21/2021 0105 by Jon Uriarte, R.N.  Outcome: Progressing     Problem: Fall Risk  Goal: Patient will remain free from falls  10/21/2021 0105 by Jon Uriarte, R.N.  Outcome: Progressing  10/21/2021 0105 by Jon Uriarte, R.N.  Outcome: Progressing     Problem: Safety - Medical Restraint  Goal: Remains free of injury from restraints (Restraint for Interference with Medical Device)  10/21/2021 0105 by Jon Uriarte, R.N.  Outcome: Progressing  10/21/2021 0105 by Jon Uriarte, R.N.  Outcome: Progressing  Goal: Free from restraint(s) (Restraint for Interference with Medical Device)  10/21/2021 0105 by Jon Uriarte, R.N.  Outcome: Progressing  10/21/2021 0105 by Jon Uriarte, R.N.  Outcome: Progressing     Problem: Hemodynamics  Goal: Patient's hemodynamics, fluid balance and neurologic status will be stable or improve  10/21/2021 0105 by Jon Uriarte, R.N.  Outcome: Progressing  10/21/2021 0105 by Jon Uriarte R.N.  Outcome: Progressing     Problem:  Fluid Volume  Goal: Fluid volume balance will be maintained  10/21/2021 0105 by Jon Uriarte R.ALONZO.  Outcome: Progressing  10/21/2021 0105 by HANS Dodson.[email or web address].  Outcome: Progressing     Problem: Urinary - Renal Perfusion  Goal: Ability to achieve and maintain adequate renal perfusion and functioning will improve  10/21/2021 0105 by Jon Uriarte R.N.  Outcome: Progressing  10/21/2021 0105 by HANS Dodson.ALONZO.  Outcome: Progressing     Problem: Respiratory  Goal: Patient will achieve/maintain optimum respiratory ventilation and gas exchange  10/21/2021 0105 by Jon Uriarte R.N.  Outcome: Progressing  10/21/2021 0105 by HANS Dodson.[email or web address].  Outcome: Progressing     Problem: Mechanical Ventilation  Goal: Safe management of artificial airway and ventilation  10/21/2021 0105 by Jon Uriarte R.N.  Outcome: Progressing  10/21/2021 0105 by Jon Uriarte R.ALONZO.  Outcome: Progressing  Goal: Successful weaning off mechanical ventilator, spontaneously maintains adequate gas exchange  10/21/2021 0105 by Jon Uriarte R.N.  Outcome: Progressing  10/21/2021 0105 by Jon Uriarte R.N.  Outcome: Progressing  Goal: Patient will be able to express needs and understand communication  10/21/2021 0105 by HANS Dodson.ALONZO.  Outcome: Progressing  10/21/2021 0105 by Jon Uriarte R.ALONZO.  Outcome: Progressing     Problem: Physical Regulation  Goal: Diagnostic test results will improve  10/21/2021 0105 by HANS Dodson.[email or web address].  Outcome: Progressing  10/21/2021 0105 by HANS Dodson.N.  Outcome: Progressing  Goal: Signs and symptoms of infection will decrease  10/21/2021 0105 by Jon Uriarte R.N.  Outcome: Progressing  10/21/2021 0105 by Jon Uriarte R.N.  Outcome: Progressing

## 2021-10-21 NOTE — PROGRESS NOTES
Call from RN re: hgb 6.9. Had one unit of blood this AM for hgb of 6.4. He is getting q 6 h labs. Hgb was 6.9 at 5:35 PM and 6.9 @ 9:30. Hgb remains stable and his vitals are stable for patient. He is asymptomatic. Will hold on unit on blood and await repeat labs in 6 hours. Last Lovenox given was on 10/19 in AM. RN aware if he becomes symptomatic to notify me to discuss blood transfusion.
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
## 2021-10-21 NOTE — THERAPY
"Occupational Therapy  Daily Treatment     Patient Name: Wero Thorpe  Age:  46 y.o., Sex:  male  Medical Record #: [id number]  Today's Date: 10/21/2021     Precautions  Precautions: Toe Touch Weight Bearing Right Lower Extremity  Comments: as of now, ace wrap on thigh during mobility    Assessment    Pt participated in seated ADLs EOB for about 45 min. Receptive to education and tolerated activity well.    Plan    Continue current treatment plan.    DC Equipment Recommendations: Unable to determine at this time  Discharge Recommendations: Recommend post-acute placement for additional occupational therapy services prior to discharge home    Subjective    \"I like to feel clean.\"     Objective       10/21/21 0947   Cognition    Cognition / Consciousness WDL   Level of Consciousness Alert   Comments pleasant and cooperative   Balance   Sitting Balance (Static) Fair   Sitting Balance (Dynamic) Fair   Weight Shift Sitting Fair   Skilled Intervention Verbal Cuing   Comments w/ RLE propped up on EOB   Bed Mobility    Supine to Sit Supervised   Sit to Supine Supervised   Scooting Supervised   Skilled Intervention Verbal Cuing;Tactile Cuing;Sequencing   Comments w/ use of pillow under leg to assist w/ mobility; leg did not fully get in dependent position   Activities of Daily Living   Grooming Supervision;Seated   Bathing Supervision  (wash bottom in supine via roll/bridge)   Upper Body Dressing Supervision  (don/doff gown)   Lower Body Dressing Maximal Assist   Toileting Supervision  (urinate w/ urinal)   Skilled Intervention Verbal Cuing;Tactile Cuing;Sequencing   How much help from another person does the patient currently need...   Putting on and taking off regular lower body clothing? 2   Bathing (including washing, rinsing, and drying)? 2   Toileting, which includes using a toilet, bedpan, or urinal? 3   Putting on and taking off regular upper body clothing? 3   Taking care of personal grooming such as brushing " teeth? 3   Eating meals? 3   6 Clicks Daily Activity Score 16   Functional Mobility   Mobility EOB only today for seated ADL routine   Patient / Family Goals   Patient / Family Goal #1 to do my own toileting   Short Term Goals   Short Term Goal # 1 Pt will demo BSC txf w/ min A   Goal Outcome # 1 Progressing as expected   Short Term Goal # 2 Pt will demo seated/standing sponge bath w/ setup   Goal Outcome # 2 Progressing as expected   Short Term Goal # 3 Pt will demo LB dressing w/ min A   Goal Outcome # 3 Progressing as expected
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
## 2021-10-22 LAB
BASOPHILS # BLD AUTO: 0.3 % (ref 0–1.8)
BASOPHILS # BLD AUTO: 0.4 % (ref 0–1.8)
BASOPHILS # BLD: 0.03 K/UL (ref 0–0.12)
BASOPHILS # BLD: 0.05 K/UL (ref 0–0.12)
EOSINOPHIL # BLD AUTO: 0.17 K/UL (ref 0–0.51)
EOSINOPHIL # BLD AUTO: 0.29 K/UL (ref 0–0.51)
EOSINOPHIL NFR BLD: 1.5 % (ref 0–6.9)
EOSINOPHIL NFR BLD: 2.4 % (ref 0–6.9)
ERYTHROCYTE [DISTWIDTH] IN BLOOD BY AUTOMATED COUNT: 65.5 FL (ref 35.9–50)
ERYTHROCYTE [DISTWIDTH] IN BLOOD BY AUTOMATED COUNT: 66.1 FL (ref 35.9–50)
HCT VFR BLD AUTO: 24.9 % (ref 42–52)
HCT VFR BLD AUTO: 25.7 % (ref 42–52)
HGB BLD-MCNC: 7.4 G/DL (ref 14–18)
HGB BLD-MCNC: 7.8 G/DL (ref 14–18)
IMM GRANULOCYTES # BLD AUTO: 0.42 K/UL (ref 0–0.11)
IMM GRANULOCYTES # BLD AUTO: 0.68 K/UL (ref 0–0.11)
IMM GRANULOCYTES NFR BLD AUTO: 3.7 % (ref 0–0.9)
IMM GRANULOCYTES NFR BLD AUTO: 5.5 % (ref 0–0.9)
LYMPHOCYTES # BLD AUTO: 1.44 K/UL (ref 1–4.8)
LYMPHOCYTES # BLD AUTO: 1.47 K/UL (ref 1–4.8)
LYMPHOCYTES NFR BLD: 11.7 % (ref 22–41)
LYMPHOCYTES NFR BLD: 13 % (ref 22–41)
MCH RBC QN AUTO: 29 PG (ref 27–33)
MCH RBC QN AUTO: 30.2 PG (ref 27–33)
MCHC RBC AUTO-ENTMCNC: 29.7 G/DL (ref 33.7–35.3)
MCHC RBC AUTO-ENTMCNC: 30.4 G/DL (ref 33.7–35.3)
MCV RBC AUTO: 97.6 FL (ref 81.4–97.8)
MCV RBC AUTO: 99.6 FL (ref 81.4–97.8)
MONOCYTES # BLD AUTO: 0.83 K/UL (ref 0–0.85)
MONOCYTES # BLD AUTO: 0.87 K/UL (ref 0–0.85)
MONOCYTES NFR BLD AUTO: 7.1 % (ref 0–13.4)
MONOCYTES NFR BLD AUTO: 7.3 % (ref 0–13.4)
NEUTROPHILS # BLD AUTO: 8.4 K/UL (ref 1.82–7.42)
NEUTROPHILS # BLD AUTO: 8.99 K/UL (ref 1.82–7.42)
NEUTROPHILS NFR BLD: 72.9 % (ref 44–72)
NEUTROPHILS NFR BLD: 74.2 % (ref 44–72)
NRBC # BLD AUTO: 0.05 K/UL
NRBC # BLD AUTO: 0.1 K/UL
NRBC BLD-RTO: 0.4 /100 WBC
NRBC BLD-RTO: 0.8 /100 WBC
PLATELET # BLD AUTO: 501 K/UL (ref 164–446)
PLATELET # BLD AUTO: 545 K/UL (ref 164–446)
PMV BLD AUTO: 8.6 FL (ref 9–12.9)
PMV BLD AUTO: 8.6 FL (ref 9–12.9)
RBC # BLD AUTO: 2.55 M/UL (ref 4.7–6.1)
RBC # BLD AUTO: 2.58 M/UL (ref 4.7–6.1)
WBC # BLD AUTO: 11.3 K/UL (ref 4.8–10.8)
WBC # BLD AUTO: 12.3 K/UL (ref 4.8–10.8)

## 2021-10-22 PROCEDURE — A9270 NON-COVERED ITEM OR SERVICE: HCPCS | Performed by: NURSE PRACTITIONER

## 2021-10-22 PROCEDURE — 94760 N-INVAS EAR/PLS OXIMETRY 1: CPT

## 2021-10-22 PROCEDURE — A9270 NON-COVERED ITEM OR SERVICE: HCPCS | Performed by: PHYSICAL MEDICINE & REHABILITATION

## 2021-10-22 PROCEDURE — 99232 SBSQ HOSP IP/OBS MODERATE 35: CPT | Performed by: SURGERY

## 2021-10-22 PROCEDURE — A9270 NON-COVERED ITEM OR SERVICE: HCPCS | Performed by: ORTHOPAEDIC SURGERY

## 2021-10-22 PROCEDURE — 700102 HCHG RX REV CODE 250 W/ 637 OVERRIDE(OP): Performed by: ORTHOPAEDIC SURGERY

## 2021-10-22 PROCEDURE — 700102 HCHG RX REV CODE 250 W/ 637 OVERRIDE(OP): Performed by: SPECIALIST

## 2021-10-22 PROCEDURE — 700102 HCHG RX REV CODE 250 W/ 637 OVERRIDE(OP): Performed by: PHYSICAL MEDICINE & REHABILITATION

## 2021-10-22 PROCEDURE — 700102 HCHG RX REV CODE 250 W/ 637 OVERRIDE(OP): Performed by: NURSE PRACTITIONER

## 2021-10-22 PROCEDURE — 99024 POSTOP FOLLOW-UP VISIT: CPT | Performed by: ORTHOPAEDIC SURGERY

## 2021-10-22 PROCEDURE — 97530 THERAPEUTIC ACTIVITIES: CPT | Mod: CQ

## 2021-10-22 PROCEDURE — 700102 HCHG RX REV CODE 250 W/ 637 OVERRIDE(OP)

## 2021-10-22 PROCEDURE — 770006 HCHG ROOM/CARE - MED/SURG/GYN SEMI*

## 2021-10-22 PROCEDURE — A9270 NON-COVERED ITEM OR SERVICE: HCPCS

## 2021-10-22 PROCEDURE — 700111 HCHG RX REV CODE 636 W/ 250 OVERRIDE (IP): Performed by: NURSE PRACTITIONER

## 2021-10-22 PROCEDURE — 97110 THERAPEUTIC EXERCISES: CPT | Mod: CQ

## 2021-10-22 PROCEDURE — A9270 NON-COVERED ITEM OR SERVICE: HCPCS | Performed by: SPECIALIST

## 2021-10-22 PROCEDURE — 97116 GAIT TRAINING THERAPY: CPT | Mod: CQ

## 2021-10-22 PROCEDURE — 36415 COLL VENOUS BLD VENIPUNCTURE: CPT

## 2021-10-22 PROCEDURE — 85025 COMPLETE CBC W/AUTO DIFF WBC: CPT

## 2021-10-22 RX ADMIN — GABAPENTIN 300 MG: 300 CAPSULE ORAL at 17:34

## 2021-10-22 RX ADMIN — CALCIUM CARBONATE 500 MG: 500 TABLET, CHEWABLE ORAL at 05:46

## 2021-10-22 RX ADMIN — MORPHINE SULFATE 15 MG: 15 TABLET, FILM COATED, EXTENDED RELEASE ORAL at 20:41

## 2021-10-22 RX ADMIN — CALCIUM CARBONATE 500 MG: 500 TABLET, CHEWABLE ORAL at 17:33

## 2021-10-22 RX ADMIN — GABAPENTIN 300 MG: 300 CAPSULE ORAL at 12:52

## 2021-10-22 RX ADMIN — OXYCODONE HYDROCHLORIDE 10 MG: 10 TABLET ORAL at 17:33

## 2021-10-22 RX ADMIN — OXYCODONE 5 MG: 5 TABLET ORAL at 05:46

## 2021-10-22 RX ADMIN — OXYCODONE HYDROCHLORIDE 10 MG: 10 TABLET ORAL at 09:09

## 2021-10-22 RX ADMIN — METHOCARBAMOL 750 MG: 750 TABLET ORAL at 17:33

## 2021-10-22 RX ADMIN — CALCIUM CARBONATE 500 MG: 500 TABLET, CHEWABLE ORAL at 12:52

## 2021-10-22 RX ADMIN — OXYCODONE HYDROCHLORIDE 10 MG: 10 TABLET ORAL at 12:52

## 2021-10-22 RX ADMIN — METHOCARBAMOL 750 MG: 750 TABLET ORAL at 12:52

## 2021-10-22 RX ADMIN — METHOCARBAMOL 750 MG: 750 TABLET ORAL at 09:09

## 2021-10-22 RX ADMIN — ENOXAPARIN SODIUM 30 MG: 30 INJECTION SUBCUTANEOUS at 17:33

## 2021-10-22 RX ADMIN — GABAPENTIN 300 MG: 300 CAPSULE ORAL at 05:46

## 2021-10-22 RX ADMIN — METHOCARBAMOL 750 MG: 750 TABLET ORAL at 20:42

## 2021-10-22 RX ADMIN — DOCUSATE SODIUM 100 MG: 100 CAPSULE ORAL at 05:45

## 2021-10-22 RX ADMIN — DOCUSATE SODIUM 50 MG AND SENNOSIDES 8.6 MG 1 TABLET: 8.6; 5 TABLET, FILM COATED ORAL at 20:42

## 2021-10-22 RX ADMIN — DOCUSATE SODIUM 100 MG: 100 CAPSULE ORAL at 17:33

## 2021-10-22 RX ADMIN — Medication 5 MG: at 20:42

## 2021-10-22 ASSESSMENT — ENCOUNTER SYMPTOMS
FEVER: 0
CONSTIPATION: 0
DIAPHORESIS: 0
ABDOMINAL PAIN: 0
MYALGIAS: 1
NEUROLOGICAL NEGATIVE: 1
NAUSEA: 0
VOMITING: 0
EYES NEGATIVE: 1
SHORTNESS OF BREATH: 0
DIARRHEA: 0
SPEECH CHANGE: 0
CHILLS: 0

## 2021-10-22 ASSESSMENT — PAIN DESCRIPTION - PAIN TYPE
TYPE: ACUTE PAIN
TYPE: ACUTE PAIN

## 2021-10-22 ASSESSMENT — COGNITIVE AND FUNCTIONAL STATUS - GENERAL
STANDING UP FROM CHAIR USING ARMS: A LITTLE
MOBILITY SCORE: 17
SUGGESTED CMS G CODE MODIFIER MOBILITY: CK
MOVING FROM LYING ON BACK TO SITTING ON SIDE OF FLAT BED: A LITTLE
TURNING FROM BACK TO SIDE WHILE IN FLAT BAD: A LITTLE
MOVING TO AND FROM BED TO CHAIR: A LITTLE
CLIMB 3 TO 5 STEPS WITH RAILING: A LOT
WALKING IN HOSPITAL ROOM: A LITTLE

## 2021-10-22 ASSESSMENT — GAIT ASSESSMENTS
DISTANCE (FEET): 75
GAIT LEVEL OF ASSIST: MINIMAL ASSIST
ASSISTIVE DEVICE: FRONT WHEEL WALKER

## 2021-10-22 NOTE — CARE PLAN
Problem: Pain - Standard  Goal: Alleviation of pain or a reduction in pain to the patient’s comfort goal  Outcome: Progressing     Problem: Knowledge Deficit - Standard  Goal: Patient and family/care givers will demonstrate understanding of plan of care, disease process/condition, diagnostic tests and medications  Outcome: Progressing   The patient is Stable - Low risk of patient condition declining or worsening    Shift Goals  Clinical Goals: pain control  Patient Goals: rest  Family Goals: no family present    Progress made toward(s) clinical / shift goals:  Patients pain is alleviated with PRN Oxy 10, calls appropriately for pain management. Patient educated about plan of care for today.    Patient is not progressing towards the following goals:    Assumed care of patient at change of shift.   Report received at bedside rounding  Patient is calm, in bed, with no distress noted.  Call light and patient belongings are in reach, bed is locked and in lowest position- hourly rounding is in place. See flow sheets for further assessment.
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
## 2021-10-22 NOTE — THERAPY
Physical Therapy   Daily Treatment     Patient Name: Wero Thorpe  Age:  46 y.o., Sex:  male  Medical Record #: [id number]  Today's Date: 10/22/2021     Precautions  Precautions: Toe Touch Weight Bearing Right Lower Extremity    Assessment    Pt finishing up w/shaving. In good spirits today, w/better Rt LE pain control. Pt still lacking full knee extension, encouraged not to keep pillow under the knee. Making improvement w/Rt LE strength and tolerating neutral Rt LE position in bed. Pt manages his Rt LE off the EOB using a pillow to support his leg, improvement w/his static sit sitting equally through both hips. Min assist w/his functional transfers using the FWW and managed hallway amb w/FWW, 75' w/min assist. Pt lives in upstairs apartment, stated he is currently looking for something downstairs. Pt lives w/his son who works, pt will need to be functionally indep w/mobility, ADL's, and manage a FOS.     Plan    Continue current treatment plan.    DC Equipment Recommendations: FWW vs crutches  Discharge Recommendations: Recommend post-acute placement for additional physical therapy services prior to discharge home     Objective       10/22/21 1105   Other Treatments   Other Treatments Provided Initiated supine AAROM and knee extension before getting OOB. Pt instructed on Rt HC stretch and no pillows under the Rt knee and encouraged to start amb to the BR.    Balance   Sitting Balance (Static) Fair   Sitting Balance (Dynamic) Fair   Standing Balance (Static) Fair -   Standing Balance (Dynamic) Fair -   Weight Shift Sitting Fair   Weight Shift Standing Absent   Comments standing w/FWW   Gait Analysis   Gait Level Of Assist Minimal Assist   Assistive Device Front Wheel Walker   Distance (Feet) 75   # of Times Distance was Traveled 1   Weight Bearing Status TTWB Rt LE   Comments Pt managed hallway amb today w/FWW, 1 LOB w/change in direction. Pt cued to slow down gait speed. Standing rests for Rt LE positioning  while maintaining his WB.    Bed Mobility    Supine to Sit Supervised   Scooting Supervised  (seated)   Comments Pt uses a pillow under his Rt LE to assist it off the bed. Worked on static sit getting wt through both hips.    Functional Mobility   Sit to Stand Minimal Assist  (from EOB->FWW)   Bed, Chair, Wheelchair Transfer Minimal Assist  (w/FWW)   Comments Pt self propelling in w/c around nrsg unit.    How much difficulty does the patient currently have...   Turning over in bed (including adjusting bedclothes, sheets and blankets)? 3   Sitting down on and standing up from a chair with arms (e.g., wheelchair, bedside commode, etc.) 3   Moving from lying on back to sitting on the side of the bed? 3   How much help from another person does the patient currently need...   Moving to and from a bed to a chair (including a wheelchair)? 3   Need to walk in a hospital room? 3   Climbing 3-5 steps with a railing? 2   6 clicks Mobility Score 17   Short Term Goals    Short Term Goal # 1 pt will perform supine <> sit with HOB flat, no railing and SPV in 6 visits   Goal Outcome # 1 Progressing slower than expected   Short Term Goal # 3 pt will ambulate > 25 ft with FWW and Mod A to improve function in 6 visits   Goal Outcome # 3 Goal met   Short Term Goal # 4 pt will negotiate 1 FOS with LRAD / railing and Min A to access home environment in 6 visits   Goal Outcome # 4 Goal not met

## 2021-10-22 NOTE — PROGRESS NOTES
Orthopaedic Progress Note    Interval changes:  Patient doing well    RLE dressings changed simple dressing placed - continue staples not ready to be removed  RLE wrapped for compression     ROS - Patient denies any new issues.  Pain well controlled.    /70   Pulse 93   Temp 36.3 °C (97.4 °F) (Temporal)   Resp 18   Ht 1.829 m (6')   Wt 89 kg (196 lb 3.4 oz)   SpO2 93%       Patient seen and examined  No acute distress  Breathing non labored  RRR  RLE dressings changed, simple dressings placed.  Patient clearly fires tibialis anterior, EHL, and gastrocnemius/soleus. Sensation is intact to light touch throughout superficial peroneal, deep peroneal, tibial, saphenous, and sural nerve distributions. Strong and palpable 2+ dorsalis pedis and posterior tibial pulses with capillary refill less than 2 seconds. No lower leg tenderness or discomfort.       Recent Labs     10/21/21  0324 10/21/21  1605 10/22/21  0530   WBC 14.5* 13.5* 12.3*   RBC 2.46* 2.49* 2.58*   HEMOGLOBIN 7.1* 7.5* 7.8*   HEMATOCRIT 24.1* 24.4* 25.7*   MCV 98.0* 98.0* 99.6*   MCH 28.9 30.1 30.2   MCHC 29.5* 30.7* 30.4*   RDW 61.5* 63.2* 66.1*   PLATELETCT 475* 519* 545*   MPV 8.9* 8.6* 8.6*       Active Hospital Problems    Diagnosis    • Leukocytosis [D72.829]      Priority: High   • Intramuscular hematoma [T14.8XXA]      Priority: High   • Acute deep vein thrombosis (DVT) of femoral vein of right lower extremity (HCC) [I82.411]      Priority: High   • Discharge planning issues [Z02.9]      Priority: Medium   • Acute blood loss anemia [D62]      Priority: Medium   • Fracture of shaft of femur (HCC) [S72.309A]      Priority: Medium   • Trauma [T14.90XA]      Priority: Low   • Liver cirrhosis (HCC) [K74.60]      Priority: Low       Assessment/Plan:  Patient doing well    POD#14 S/P:  1. Open treatment right intertrochanteric femur fracture with plate and screw construct  2. Open treatment of right femoral shaft fracture with insertion of  intramedullary implant  3. Open reduction internal fixation right distal femur intraarticular fracture  Wt bearing status - TTWB RLE  Wound care/Drains - dressings changed every other day by nursing    Future Procedures - none planned   Lovenox: Start 10/9, Duration-until ambulatory > 150'  Sutures/Staples out-  21 days post operatively  PT/OT-initiated  Antibiotics: perioperative completed  DVT Prophylaxis- TEDS/SCDs/Foot pumps  Raygoza-none  Case Coordination for Discharge Planning - Disposition home
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
## 2021-10-22 NOTE — PROGRESS NOTES
Trauma    Hemoglobin/hematocrit trend up.  Leukocytosis improved.    -Initate pharmacological DVT prophylaxis, Lovenox.

## 2021-10-23 LAB
BASOPHILS # BLD AUTO: 0.4 % (ref 0–1.8)
BASOPHILS # BLD: 0.04 K/UL (ref 0–0.12)
EOSINOPHIL # BLD AUTO: 0.2 K/UL (ref 0–0.51)
EOSINOPHIL NFR BLD: 2.1 % (ref 0–6.9)
ERYTHROCYTE [DISTWIDTH] IN BLOOD BY AUTOMATED COUNT: 68.3 FL (ref 35.9–50)
HCT VFR BLD AUTO: 26.8 % (ref 42–52)
HGB BLD-MCNC: 7.9 G/DL (ref 14–18)
IMM GRANULOCYTES # BLD AUTO: 0.4 K/UL (ref 0–0.11)
IMM GRANULOCYTES NFR BLD AUTO: 4.2 % (ref 0–0.9)
LYMPHOCYTES # BLD AUTO: 2 K/UL (ref 1–4.8)
LYMPHOCYTES NFR BLD: 20.8 % (ref 22–41)
MCH RBC QN AUTO: 29.4 PG (ref 27–33)
MCHC RBC AUTO-ENTMCNC: 29.5 G/DL (ref 33.7–35.3)
MCV RBC AUTO: 99.6 FL (ref 81.4–97.8)
MONOCYTES # BLD AUTO: 0.7 K/UL (ref 0–0.85)
MONOCYTES NFR BLD AUTO: 7.3 % (ref 0–13.4)
MORPHOLOGY BLD-IMP: NORMAL
NEUTROPHILS # BLD AUTO: 6.28 K/UL (ref 1.82–7.42)
NEUTROPHILS NFR BLD: 65.2 % (ref 44–72)
NRBC # BLD AUTO: 0.04 K/UL
NRBC BLD-RTO: 0.4 /100 WBC
PLATELET # BLD AUTO: 538 K/UL (ref 164–446)
PMV BLD AUTO: 8.6 FL (ref 9–12.9)
RBC # BLD AUTO: 2.69 M/UL (ref 4.7–6.1)
WBC # BLD AUTO: 9.6 K/UL (ref 4.8–10.8)

## 2021-10-23 PROCEDURE — 700102 HCHG RX REV CODE 250 W/ 637 OVERRIDE(OP)

## 2021-10-23 PROCEDURE — A9270 NON-COVERED ITEM OR SERVICE: HCPCS | Performed by: PHYSICAL MEDICINE & REHABILITATION

## 2021-10-23 PROCEDURE — 700102 HCHG RX REV CODE 250 W/ 637 OVERRIDE(OP): Performed by: SPECIALIST

## 2021-10-23 PROCEDURE — A9270 NON-COVERED ITEM OR SERVICE: HCPCS | Performed by: SPECIALIST

## 2021-10-23 PROCEDURE — A9270 NON-COVERED ITEM OR SERVICE: HCPCS | Performed by: NURSE PRACTITIONER

## 2021-10-23 PROCEDURE — 99024 POSTOP FOLLOW-UP VISIT: CPT | Performed by: ORTHOPAEDIC SURGERY

## 2021-10-23 PROCEDURE — 85025 COMPLETE CBC W/AUTO DIFF WBC: CPT

## 2021-10-23 PROCEDURE — A9270 NON-COVERED ITEM OR SERVICE: HCPCS | Performed by: ORTHOPAEDIC SURGERY

## 2021-10-23 PROCEDURE — 700102 HCHG RX REV CODE 250 W/ 637 OVERRIDE(OP): Performed by: NURSE PRACTITIONER

## 2021-10-23 PROCEDURE — 36415 COLL VENOUS BLD VENIPUNCTURE: CPT

## 2021-10-23 PROCEDURE — 770006 HCHG ROOM/CARE - MED/SURG/GYN SEMI*

## 2021-10-23 PROCEDURE — 700111 HCHG RX REV CODE 636 W/ 250 OVERRIDE (IP): Performed by: NURSE PRACTITIONER

## 2021-10-23 PROCEDURE — 700102 HCHG RX REV CODE 250 W/ 637 OVERRIDE(OP): Performed by: ORTHOPAEDIC SURGERY

## 2021-10-23 PROCEDURE — 700102 HCHG RX REV CODE 250 W/ 637 OVERRIDE(OP): Performed by: PHYSICAL MEDICINE & REHABILITATION

## 2021-10-23 PROCEDURE — A9270 NON-COVERED ITEM OR SERVICE: HCPCS

## 2021-10-23 RX ORDER — METHOCARBAMOL 750 MG/1
750 TABLET, FILM COATED ORAL 4 TIMES DAILY PRN
Status: DISCONTINUED | OUTPATIENT
Start: 2021-10-23 | End: 2021-10-31

## 2021-10-23 RX ADMIN — ENOXAPARIN SODIUM 30 MG: 30 INJECTION SUBCUTANEOUS at 06:24

## 2021-10-23 RX ADMIN — DOCUSATE SODIUM 100 MG: 100 CAPSULE ORAL at 06:21

## 2021-10-23 RX ADMIN — Medication 5 MG: at 21:24

## 2021-10-23 RX ADMIN — DOCUSATE SODIUM 50 MG AND SENNOSIDES 8.6 MG 1 TABLET: 8.6; 5 TABLET, FILM COATED ORAL at 21:24

## 2021-10-23 RX ADMIN — CALCIUM CARBONATE 500 MG: 500 TABLET, CHEWABLE ORAL at 12:23

## 2021-10-23 RX ADMIN — OXYCODONE HYDROCHLORIDE 10 MG: 10 TABLET ORAL at 17:40

## 2021-10-23 RX ADMIN — METHOCARBAMOL 750 MG: 750 TABLET ORAL at 12:22

## 2021-10-23 RX ADMIN — GABAPENTIN 300 MG: 300 CAPSULE ORAL at 17:39

## 2021-10-23 RX ADMIN — GABAPENTIN 300 MG: 300 CAPSULE ORAL at 12:22

## 2021-10-23 RX ADMIN — CALCIUM CARBONATE 500 MG: 500 TABLET, CHEWABLE ORAL at 06:20

## 2021-10-23 RX ADMIN — CALCIUM CARBONATE 500 MG: 500 TABLET, CHEWABLE ORAL at 17:40

## 2021-10-23 RX ADMIN — OXYCODONE HYDROCHLORIDE 10 MG: 10 TABLET ORAL at 14:14

## 2021-10-23 RX ADMIN — OXYCODONE HYDROCHLORIDE 10 MG: 10 TABLET ORAL at 06:23

## 2021-10-23 RX ADMIN — OXYCODONE HYDROCHLORIDE 10 MG: 10 TABLET ORAL at 09:50

## 2021-10-23 RX ADMIN — OXYCODONE HYDROCHLORIDE 10 MG: 10 TABLET ORAL at 00:54

## 2021-10-23 RX ADMIN — METHOCARBAMOL 750 MG: 750 TABLET ORAL at 21:24

## 2021-10-23 RX ADMIN — MAGNESIUM HYDROXIDE 30 ML: 400 SUSPENSION ORAL at 06:21

## 2021-10-23 RX ADMIN — DOCUSATE SODIUM 100 MG: 100 CAPSULE ORAL at 17:40

## 2021-10-23 RX ADMIN — ENOXAPARIN SODIUM 30 MG: 30 INJECTION SUBCUTANEOUS at 17:40

## 2021-10-23 RX ADMIN — GABAPENTIN 300 MG: 300 CAPSULE ORAL at 06:21

## 2021-10-23 RX ADMIN — OXYCODONE HYDROCHLORIDE 10 MG: 10 TABLET ORAL at 21:24

## 2021-10-23 ASSESSMENT — PAIN DESCRIPTION - PAIN TYPE
TYPE: ACUTE PAIN
TYPE: ACUTE PAIN;SURGICAL PAIN
TYPE: ACUTE PAIN

## 2021-10-23 ASSESSMENT — ENCOUNTER SYMPTOMS
DIARRHEA: 0
DIAPHORESIS: 0
CHILLS: 0
VOMITING: 0
FEVER: 0
EYES NEGATIVE: 1
CONSTIPATION: 0
NAUSEA: 0
NEUROLOGICAL NEGATIVE: 1
SPEECH CHANGE: 0
MYALGIAS: 1
SHORTNESS OF BREATH: 0
ABDOMINAL PAIN: 0

## 2021-10-23 NOTE — CARE PLAN
The patient is Stable - Low risk of patient condition declining or worsening    Shift Goals  Clinical Goals: pain management  Patient Goals: shower  Family Goals: not present    Progress made toward(s) clinical / shift goals:  Yes    Problem: Pain - Standard  Goal: Alleviation of pain or a reduction in pain to the patient’s comfort goal  Outcome: Progressing     Problem: Knowledge Deficit - Standard  Goal: Patient and family/care givers will demonstrate understanding of plan of care, disease process/condition, diagnostic tests and medications  Outcome: Progressing     Problem: Skin Integrity  Goal: Skin integrity is maintained or improved  Outcome: Progressing     Problem: Fall Risk  Goal: Patient will remain free from falls  Outcome: Progressing

## 2021-10-23 NOTE — CARE PLAN
The patient is Stable - Low risk of patient condition declining or worsening    Shift Goals  Clinical Goals: pain control  Patient Goals: rest  Family Goals: no family present    Progress made toward(s) clinical / shift goals:  yes        Problem: Pain - Standard  Goal: Alleviation of pain or a reduction in pain to the patient’s comfort goal  Outcome: Progressing  Pain well controlled with oxy 10.      Problem: Knowledge Deficit - Standard  Goal: Patient and family/care givers will demonstrate understanding of plan of care, disease process/condition, diagnostic tests and medications  Outcome: Progressing  Pt. Using buzzer for assistance, dressing intact.
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
## 2021-10-24 LAB
HCT VFR BLD AUTO: 27.6 % (ref 42–52)
HGB BLD-MCNC: 8 G/DL (ref 14–18)

## 2021-10-24 PROCEDURE — 85018 HEMOGLOBIN: CPT

## 2021-10-24 PROCEDURE — 700102 HCHG RX REV CODE 250 W/ 637 OVERRIDE(OP): Performed by: NURSE PRACTITIONER

## 2021-10-24 PROCEDURE — 700102 HCHG RX REV CODE 250 W/ 637 OVERRIDE(OP): Performed by: ORTHOPAEDIC SURGERY

## 2021-10-24 PROCEDURE — 770006 HCHG ROOM/CARE - MED/SURG/GYN SEMI*

## 2021-10-24 PROCEDURE — 85014 HEMATOCRIT: CPT

## 2021-10-24 PROCEDURE — A9270 NON-COVERED ITEM OR SERVICE: HCPCS | Performed by: ORTHOPAEDIC SURGERY

## 2021-10-24 PROCEDURE — 700102 HCHG RX REV CODE 250 W/ 637 OVERRIDE(OP): Performed by: PHYSICAL MEDICINE & REHABILITATION

## 2021-10-24 PROCEDURE — 99024 POSTOP FOLLOW-UP VISIT: CPT | Performed by: ORTHOPAEDIC SURGERY

## 2021-10-24 PROCEDURE — 700111 HCHG RX REV CODE 636 W/ 250 OVERRIDE (IP): Performed by: NURSE PRACTITIONER

## 2021-10-24 PROCEDURE — 700102 HCHG RX REV CODE 250 W/ 637 OVERRIDE(OP)

## 2021-10-24 PROCEDURE — 36415 COLL VENOUS BLD VENIPUNCTURE: CPT

## 2021-10-24 PROCEDURE — A9270 NON-COVERED ITEM OR SERVICE: HCPCS | Performed by: PHYSICAL MEDICINE & REHABILITATION

## 2021-10-24 PROCEDURE — 700102 HCHG RX REV CODE 250 W/ 637 OVERRIDE(OP): Performed by: SPECIALIST

## 2021-10-24 PROCEDURE — A9270 NON-COVERED ITEM OR SERVICE: HCPCS | Performed by: SPECIALIST

## 2021-10-24 PROCEDURE — A9270 NON-COVERED ITEM OR SERVICE: HCPCS | Performed by: NURSE PRACTITIONER

## 2021-10-24 PROCEDURE — A9270 NON-COVERED ITEM OR SERVICE: HCPCS

## 2021-10-24 RX ADMIN — DOCUSATE SODIUM 50 MG AND SENNOSIDES 8.6 MG 1 TABLET: 8.6; 5 TABLET, FILM COATED ORAL at 21:04

## 2021-10-24 RX ADMIN — OXYCODONE HYDROCHLORIDE 10 MG: 10 TABLET ORAL at 09:14

## 2021-10-24 RX ADMIN — CALCIUM CARBONATE 500 MG: 500 TABLET, CHEWABLE ORAL at 16:49

## 2021-10-24 RX ADMIN — ENOXAPARIN SODIUM 30 MG: 30 INJECTION SUBCUTANEOUS at 05:34

## 2021-10-24 RX ADMIN — CALCIUM CARBONATE 500 MG: 500 TABLET, CHEWABLE ORAL at 05:34

## 2021-10-24 RX ADMIN — OXYCODONE HYDROCHLORIDE 10 MG: 10 TABLET ORAL at 01:12

## 2021-10-24 RX ADMIN — OXYCODONE HYDROCHLORIDE 10 MG: 10 TABLET ORAL at 13:01

## 2021-10-24 RX ADMIN — GABAPENTIN 300 MG: 300 CAPSULE ORAL at 05:34

## 2021-10-24 RX ADMIN — DOCUSATE SODIUM 100 MG: 100 CAPSULE ORAL at 05:34

## 2021-10-24 RX ADMIN — Medication 5 MG: at 21:03

## 2021-10-24 RX ADMIN — OXYCODONE HYDROCHLORIDE 10 MG: 10 TABLET ORAL at 16:49

## 2021-10-24 RX ADMIN — ENOXAPARIN SODIUM 30 MG: 30 INJECTION SUBCUTANEOUS at 16:49

## 2021-10-24 RX ADMIN — GABAPENTIN 300 MG: 300 CAPSULE ORAL at 13:01

## 2021-10-24 RX ADMIN — OXYCODONE HYDROCHLORIDE 10 MG: 10 TABLET ORAL at 21:04

## 2021-10-24 RX ADMIN — CALCIUM CARBONATE 500 MG: 500 TABLET, CHEWABLE ORAL at 13:01

## 2021-10-24 RX ADMIN — DOCUSATE SODIUM 100 MG: 100 CAPSULE ORAL at 16:49

## 2021-10-24 RX ADMIN — DIPHENHYDRAMINE HYDROCHLORIDE 25 MG: 25 TABLET ORAL at 01:12

## 2021-10-24 RX ADMIN — GABAPENTIN 300 MG: 300 CAPSULE ORAL at 16:49

## 2021-10-24 RX ADMIN — OXYCODONE HYDROCHLORIDE 10 MG: 10 TABLET ORAL at 05:38

## 2021-10-24 ASSESSMENT — ENCOUNTER SYMPTOMS
NAUSEA: 0
MYALGIAS: 1
CHILLS: 0
EYES NEGATIVE: 1
FEVER: 0
SHORTNESS OF BREATH: 0
ABDOMINAL PAIN: 0
CONSTIPATION: 0
VOMITING: 0
DIARRHEA: 0
NEUROLOGICAL NEGATIVE: 1
DIAPHORESIS: 0
SPEECH CHANGE: 0

## 2021-10-24 ASSESSMENT — PAIN DESCRIPTION - PAIN TYPE
TYPE: ACUTE PAIN

## 2021-10-24 NOTE — CARE PLAN
The patient is Stable - Low risk of patient condition declining or worsening    Shift Goals  Clinical Goals: pain control  Patient Goals: shower  Family Goals: not present    Progress made toward(s) clinical / shift goals:  Plan of care and pain mgmt reviewed.     Patient is not progressing towards the following goals:
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
## 2021-10-24 NOTE — PROGRESS NOTES
Orthopaedic Progress Note    Interval changes:  Patient doing well    RLE dressings CDI  RLE wrapped for compression     ROS - Patient denies any new issues.  Pain well controlled.    /68   Pulse 99   Temp 36.9 °C (98.4 °F) (Temporal)   Resp 16   Ht 1.829 m (6')   Wt 89 kg (196 lb 3.4 oz)   SpO2 98%       Patient seen and examined  No acute distress  Breathing non labored  RRR  RLE dressings CDI.  Patient clearly fires tibialis anterior, EHL, and gastrocnemius/soleus. Sensation is intact to light touch throughout superficial peroneal, deep peroneal, tibial, saphenous, and sural nerve distributions. Strong and palpable 2+ dorsalis pedis and posterior tibial pulses with capillary refill less than 2 seconds. No lower leg tenderness or discomfort.       Recent Labs     10/22/21  0530 10/22/21  1622 10/23/21  0416   WBC 12.3* 11.3* 9.6   RBC 2.58* 2.55* 2.69*   HEMOGLOBIN 7.8* 7.4* 7.9*   HEMATOCRIT 25.7* 24.9* 26.8*   MCV 99.6* 97.6 99.6*   MCH 30.2 29.0 29.4   MCHC 30.4* 29.7* 29.5*   RDW 66.1* 65.5* 68.3*   PLATELETCT 545* 501* 538*   MPV 8.6* 8.6* 8.6*       Active Hospital Problems    Diagnosis    • Leukocytosis [D72.829]      Priority: High   • Intramuscular hematoma [T14.8XXA]      Priority: High   • Acute deep vein thrombosis (DVT) of femoral vein of right lower extremity (HCC) [I82.411]      Priority: High   • Discharge planning issues [Z02.9]      Priority: Medium   • Acute blood loss anemia [D62]      Priority: Medium   • Fracture of shaft of femur (HCC) [S72.309A]      Priority: Medium   • Trauma [T14.90XA]      Priority: Low   • Liver cirrhosis (HCC) [K74.60]      Priority: Low       Assessment/Plan:  Patient doing well    POD#16 S/P:  1. Open treatment right intertrochanteric femur fracture with plate and screw construct  2. Open treatment of right femoral shaft fracture with insertion of intramedullary implant  3. Open reduction internal fixation right distal femur intraarticular  fracture  Wt bearing status - TTWB RLE  Wound care/Drains - dressings changed every other day by nursing    Future Procedures - none planned   Lovenox: Start 10/9, Duration-until ambulatory > 150'  Sutures/Staples out-  21 days post operatively  PT/OT-initiated  Antibiotics: perioperative completed  DVT Prophylaxis- TEDS/SCDs/Foot pumps  Raygoza-none  Case Coordination for Discharge Planning - Disposition home

## 2021-10-24 NOTE — PROGRESS NOTES
Noted okay to remove compression wrap on RLE, patient is hesitant will await orthopedic PA to see patient and d/w on rounds.   Patient is without IV access declining replacement at this time, will d/w trauma on rounds appears to be awaiting rehab will d/w Gonzalo BENAVIDES via voalte.

## 2021-10-24 NOTE — CARE PLAN
The patient is Stable - Low risk of patient condition declining or worsening    Shift Goals  Clinical Goals: pain control    Progress made toward(s) clinical / shift goals:  pt medicated with oxycodone for RLE pain.    Patient is not progressing towards the following goals:

## 2021-10-24 NOTE — PROGRESS NOTES
Orthopaedic Progress Note    Interval changes:  Patient doing well    RLE dressings CDI  RLE wrapped for compression     ROS - Patient denies any new issues.  Pain well controlled.    /66   Pulse (!) 102   Temp 36.3 °C (97.4 °F) (Temporal)   Resp 18   Ht 1.829 m (6')   Wt 89 kg (196 lb 3.4 oz)   SpO2 97%       Patient seen and examined  No acute distress  Breathing non labored  RRR  RLE dressings CDI.  Patient clearly fires tibialis anterior, EHL, and gastrocnemius/soleus. Sensation is intact to light touch throughout superficial peroneal, deep peroneal, tibial, saphenous, and sural nerve distributions. Strong and palpable 2+ dorsalis pedis and posterior tibial pulses with capillary refill less than 2 seconds. No lower leg tenderness or discomfort.       Recent Labs     10/22/21  0530 10/22/21  0530 10/22/21  1622 10/23/21  0416 10/24/21  0529   WBC 12.3*  --  11.3* 9.6  --    RBC 2.58*  --  2.55* 2.69*  --    HEMOGLOBIN 7.8*   < > 7.4* 7.9* 8.0*   HEMATOCRIT 25.7*   < > 24.9* 26.8* 27.6*   MCV 99.6*  --  97.6 99.6*  --    MCH 30.2  --  29.0 29.4  --    MCHC 30.4*  --  29.7* 29.5*  --    RDW 66.1*  --  65.5* 68.3*  --    PLATELETCT 545*  --  501* 538*  --    MPV 8.6*  --  8.6* 8.6*  --     < > = values in this interval not displayed.       Active Hospital Problems    Diagnosis    • Intramuscular hematoma [T14.8XXA]      Priority: High   • Acute deep vein thrombosis (DVT) of femoral vein of right lower extremity (HCC) [I82.411]      Priority: High   • Discharge planning issues [Z02.9]      Priority: Medium   • Acute blood loss anemia [D62]      Priority: Medium   • Fracture of shaft of femur (HCC) [S72.309A]      Priority: Medium   • Leukocytosis [D72.829]      Priority: Low   • Trauma [T14.90XA]      Priority: Low   • Liver cirrhosis (HCC) [K74.60]      Priority: Low       Assessment/Plan:  Patient doing well    POD#17 S/P:  1. Open treatment right intertrochanteric femur fracture with plate and screw  construct  2. Open treatment of right femoral shaft fracture with insertion of intramedullary implant  3. Open reduction internal fixation right distal femur intraarticular fracture  Wt bearing status - TTWB RLE  Wound care/Drains - dressings changed every other day by nursing    Future Procedures - none planned   Lovenox: Start 10/9, Duration-until ambulatory > 150'  Sutures/Staples out-  21 days post operatively  PT/OT-initiated  Antibiotics: perioperative completed  DVT Prophylaxis- TEDS/SCDs/Foot pumps  Raygoza-none  Case Coordination for Discharge Planning - Disposition home

## 2021-10-24 NOTE — PROGRESS NOTES
Pt alert/oriented x4, medicated for RLE pain per MAR. RLE ace wrap CDI, no drainage noted. Pt fatigued, resting quietly in bed watching television, needs met. Call light within reach, personal belongings available, bed in lowest position, treaded socks on, and hourly rounding in place.

## 2021-10-25 PROBLEM — D72.829 LEUKOCYTOSIS: Status: RESOLVED | Noted: 2021-10-15 | Resolved: 2021-10-25

## 2021-10-25 LAB
BASOPHILS # BLD AUTO: 0.5 % (ref 0–1.8)
BASOPHILS # BLD: 0.04 K/UL (ref 0–0.12)
EOSINOPHIL # BLD AUTO: 0.2 K/UL (ref 0–0.51)
EOSINOPHIL NFR BLD: 2.5 % (ref 0–6.9)
ERYTHROCYTE [DISTWIDTH] IN BLOOD BY AUTOMATED COUNT: 63.5 FL (ref 35.9–50)
HCT VFR BLD AUTO: 29.7 % (ref 42–52)
HGB BLD-MCNC: 9 G/DL (ref 14–18)
IMM GRANULOCYTES # BLD AUTO: 0.16 K/UL (ref 0–0.11)
IMM GRANULOCYTES NFR BLD AUTO: 2 % (ref 0–0.9)
LYMPHOCYTES # BLD AUTO: 1.66 K/UL (ref 1–4.8)
LYMPHOCYTES NFR BLD: 20.5 % (ref 22–41)
MCH RBC QN AUTO: 29.9 PG (ref 27–33)
MCHC RBC AUTO-ENTMCNC: 30.3 G/DL (ref 33.7–35.3)
MCV RBC AUTO: 98.7 FL (ref 81.4–97.8)
MONOCYTES # BLD AUTO: 0.6 K/UL (ref 0–0.85)
MONOCYTES NFR BLD AUTO: 7.4 % (ref 0–13.4)
NEUTROPHILS # BLD AUTO: 5.43 K/UL (ref 1.82–7.42)
NEUTROPHILS NFR BLD: 67.1 % (ref 44–72)
NRBC # BLD AUTO: 0.03 K/UL
NRBC BLD-RTO: 0.4 /100 WBC
PLATELET # BLD AUTO: 529 K/UL (ref 164–446)
PMV BLD AUTO: 8.5 FL (ref 9–12.9)
RBC # BLD AUTO: 3.01 M/UL (ref 4.7–6.1)
WBC # BLD AUTO: 8.1 K/UL (ref 4.8–10.8)

## 2021-10-25 PROCEDURE — 97116 GAIT TRAINING THERAPY: CPT | Mod: CQ

## 2021-10-25 PROCEDURE — 97110 THERAPEUTIC EXERCISES: CPT | Mod: CQ

## 2021-10-25 PROCEDURE — A9270 NON-COVERED ITEM OR SERVICE: HCPCS

## 2021-10-25 PROCEDURE — 700102 HCHG RX REV CODE 250 W/ 637 OVERRIDE(OP)

## 2021-10-25 PROCEDURE — 99024 POSTOP FOLLOW-UP VISIT: CPT | Performed by: ORTHOPAEDIC SURGERY

## 2021-10-25 PROCEDURE — 97530 THERAPEUTIC ACTIVITIES: CPT | Mod: CQ

## 2021-10-25 PROCEDURE — 700102 HCHG RX REV CODE 250 W/ 637 OVERRIDE(OP): Performed by: PHYSICAL MEDICINE & REHABILITATION

## 2021-10-25 PROCEDURE — 85025 COMPLETE CBC W/AUTO DIFF WBC: CPT

## 2021-10-25 PROCEDURE — 700102 HCHG RX REV CODE 250 W/ 637 OVERRIDE(OP): Performed by: SPECIALIST

## 2021-10-25 PROCEDURE — A9270 NON-COVERED ITEM OR SERVICE: HCPCS | Performed by: NURSE PRACTITIONER

## 2021-10-25 PROCEDURE — 700111 HCHG RX REV CODE 636 W/ 250 OVERRIDE (IP): Performed by: NURSE PRACTITIONER

## 2021-10-25 PROCEDURE — 36415 COLL VENOUS BLD VENIPUNCTURE: CPT

## 2021-10-25 PROCEDURE — A9270 NON-COVERED ITEM OR SERVICE: HCPCS | Performed by: PHYSICAL MEDICINE & REHABILITATION

## 2021-10-25 PROCEDURE — 700102 HCHG RX REV CODE 250 W/ 637 OVERRIDE(OP): Performed by: NURSE PRACTITIONER

## 2021-10-25 PROCEDURE — A9270 NON-COVERED ITEM OR SERVICE: HCPCS | Performed by: SPECIALIST

## 2021-10-25 PROCEDURE — 770006 HCHG ROOM/CARE - MED/SURG/GYN SEMI*

## 2021-10-25 RX ADMIN — CALCIUM CARBONATE 500 MG: 500 TABLET, CHEWABLE ORAL at 07:57

## 2021-10-25 RX ADMIN — GABAPENTIN 300 MG: 300 CAPSULE ORAL at 17:34

## 2021-10-25 RX ADMIN — CALCIUM CARBONATE 500 MG: 500 TABLET, CHEWABLE ORAL at 11:24

## 2021-10-25 RX ADMIN — DOCUSATE SODIUM 100 MG: 100 CAPSULE ORAL at 05:27

## 2021-10-25 RX ADMIN — ENOXAPARIN SODIUM 80 MG: 80 INJECTION SUBCUTANEOUS at 17:34

## 2021-10-25 RX ADMIN — DOCUSATE SODIUM 100 MG: 100 CAPSULE ORAL at 17:34

## 2021-10-25 RX ADMIN — OXYCODONE HYDROCHLORIDE 10 MG: 10 TABLET ORAL at 07:57

## 2021-10-25 RX ADMIN — GABAPENTIN 300 MG: 300 CAPSULE ORAL at 05:27

## 2021-10-25 RX ADMIN — OXYCODONE HYDROCHLORIDE 10 MG: 10 TABLET ORAL at 04:07

## 2021-10-25 RX ADMIN — OXYCODONE HYDROCHLORIDE 10 MG: 10 TABLET ORAL at 20:20

## 2021-10-25 RX ADMIN — OXYCODONE HYDROCHLORIDE 10 MG: 10 TABLET ORAL at 00:55

## 2021-10-25 RX ADMIN — Medication 5 MG: at 21:20

## 2021-10-25 RX ADMIN — OXYCODONE HYDROCHLORIDE 10 MG: 10 TABLET ORAL at 11:23

## 2021-10-25 RX ADMIN — GABAPENTIN 300 MG: 300 CAPSULE ORAL at 11:23

## 2021-10-25 RX ADMIN — DIPHENHYDRAMINE HYDROCHLORIDE 25 MG: 25 TABLET ORAL at 00:55

## 2021-10-25 RX ADMIN — ENOXAPARIN SODIUM 30 MG: 30 INJECTION SUBCUTANEOUS at 05:27

## 2021-10-25 RX ADMIN — OXYCODONE HYDROCHLORIDE 10 MG: 10 TABLET ORAL at 23:40

## 2021-10-25 RX ADMIN — CALCIUM CARBONATE 500 MG: 500 TABLET, CHEWABLE ORAL at 17:34

## 2021-10-25 ASSESSMENT — ENCOUNTER SYMPTOMS
NEUROLOGICAL NEGATIVE: 1
NAUSEA: 0
DIARRHEA: 0
MYALGIAS: 1
SPEECH CHANGE: 0
SHORTNESS OF BREATH: 0
ABDOMINAL PAIN: 0
DIAPHORESIS: 0
CHILLS: 0
EYES NEGATIVE: 1
VOMITING: 0
CONSTIPATION: 0
FEVER: 0

## 2021-10-25 ASSESSMENT — COGNITIVE AND FUNCTIONAL STATUS - GENERAL
MOVING TO AND FROM BED TO CHAIR: A LITTLE
MOBILITY SCORE: 18
MOVING FROM LYING ON BACK TO SITTING ON SIDE OF FLAT BED: A LITTLE
CLIMB 3 TO 5 STEPS WITH RAILING: A LITTLE
SUGGESTED CMS G CODE MODIFIER MOBILITY: CK
TURNING FROM BACK TO SIDE WHILE IN FLAT BAD: A LITTLE
STANDING UP FROM CHAIR USING ARMS: A LITTLE
WALKING IN HOSPITAL ROOM: A LITTLE

## 2021-10-25 ASSESSMENT — GAIT ASSESSMENTS
ASSISTIVE DEVICE: FRONT WHEEL WALKER
DISTANCE (FEET): 100

## 2021-10-25 ASSESSMENT — PAIN DESCRIPTION - PAIN TYPE
TYPE: ACUTE PAIN;SURGICAL PAIN
TYPE: ACUTE PAIN
TYPE: ACUTE PAIN;SURGICAL PAIN
TYPE: ACUTE PAIN

## 2021-10-25 NOTE — CARE PLAN
Problem: Pain - Standard  Goal: Alleviation of pain or a reduction in pain to the patient’s comfort goal  Outcome: Discussed with pt pain scale and required pain rating for prn pain medications. Administered prn medications according to MAR.     The patient is Stable - Low risk of patient condition declining or worsening    Shift Goals  Clinical Goals: pain control  Patient Goals: pain control  Family Goals: N/A
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
## 2021-10-25 NOTE — DISCHARGE PLANNING
[facility]itional [facility] Coordination    Would welcome PT/OT updates for PMR review.  TCC following.
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
## 2021-10-26 LAB
ANION GAP SERPL CALC-SCNC: 11 MMOL/L (ref 7–16)
BUN SERPL-MCNC: 19 MG/DL (ref 8–22)
CALCIUM SERPL-MCNC: 9 MG/DL (ref 8.5–10.5)
CHLORIDE SERPL-SCNC: 103 MMOL/L (ref 96–112)
CO2 SERPL-SCNC: 21 MMOL/L (ref 20–33)
CREAT SERPL-MCNC: 0.65 MG/DL (ref 0.5–1.4)
ERYTHROCYTE [DISTWIDTH] IN BLOOD BY AUTOMATED COUNT: 67 FL (ref 35.9–50)
GLUCOSE SERPL-MCNC: 127 MG/DL (ref 65–99)
HCT VFR BLD AUTO: 33.5 % (ref 42–52)
HGB BLD-MCNC: 9.7 G/DL (ref 14–18)
MCH RBC QN AUTO: 29.3 PG (ref 27–33)
MCHC RBC AUTO-ENTMCNC: 29 G/DL (ref 33.7–35.3)
MCV RBC AUTO: 101.2 FL (ref 81.4–97.8)
PLATELET # BLD AUTO: 438 K/UL (ref 164–446)
PMV BLD AUTO: 8.6 FL (ref 9–12.9)
POTASSIUM SERPL-SCNC: 4.3 MMOL/L (ref 3.6–5.5)
RBC # BLD AUTO: 3.31 M/UL (ref 4.7–6.1)
SODIUM SERPL-SCNC: 135 MMOL/L (ref 135–145)
WBC # BLD AUTO: 7.8 K/UL (ref 4.8–10.8)

## 2021-10-26 PROCEDURE — A9270 NON-COVERED ITEM OR SERVICE: HCPCS | Performed by: NURSE PRACTITIONER

## 2021-10-26 PROCEDURE — 700102 HCHG RX REV CODE 250 W/ 637 OVERRIDE(OP)

## 2021-10-26 PROCEDURE — 99232 SBSQ HOSP IP/OBS MODERATE 35: CPT | Performed by: SURGERY

## 2021-10-26 PROCEDURE — 700102 HCHG RX REV CODE 250 W/ 637 OVERRIDE(OP): Performed by: PHYSICAL MEDICINE & REHABILITATION

## 2021-10-26 PROCEDURE — A9270 NON-COVERED ITEM OR SERVICE: HCPCS | Performed by: SPECIALIST

## 2021-10-26 PROCEDURE — A9270 NON-COVERED ITEM OR SERVICE: HCPCS

## 2021-10-26 PROCEDURE — 700102 HCHG RX REV CODE 250 W/ 637 OVERRIDE(OP): Performed by: ORTHOPAEDIC SURGERY

## 2021-10-26 PROCEDURE — 700102 HCHG RX REV CODE 250 W/ 637 OVERRIDE(OP): Performed by: SPECIALIST

## 2021-10-26 PROCEDURE — 770006 HCHG ROOM/CARE - MED/SURG/GYN SEMI*

## 2021-10-26 PROCEDURE — 85027 COMPLETE CBC AUTOMATED: CPT

## 2021-10-26 PROCEDURE — A9270 NON-COVERED ITEM OR SERVICE: HCPCS | Performed by: ORTHOPAEDIC SURGERY

## 2021-10-26 PROCEDURE — 97116 GAIT TRAINING THERAPY: CPT

## 2021-10-26 PROCEDURE — 80048 BASIC METABOLIC PNL TOTAL CA: CPT

## 2021-10-26 PROCEDURE — 36415 COLL VENOUS BLD VENIPUNCTURE: CPT

## 2021-10-26 PROCEDURE — 97535 SELF CARE MNGMENT TRAINING: CPT

## 2021-10-26 PROCEDURE — 700102 HCHG RX REV CODE 250 W/ 637 OVERRIDE(OP): Performed by: NURSE PRACTITIONER

## 2021-10-26 PROCEDURE — 99024 POSTOP FOLLOW-UP VISIT: CPT | Performed by: ORTHOPAEDIC SURGERY

## 2021-10-26 PROCEDURE — 700111 HCHG RX REV CODE 636 W/ 250 OVERRIDE (IP): Performed by: NURSE PRACTITIONER

## 2021-10-26 PROCEDURE — A9270 NON-COVERED ITEM OR SERVICE: HCPCS | Performed by: PHYSICAL MEDICINE & REHABILITATION

## 2021-10-26 RX ADMIN — CALCIUM CARBONATE 500 MG: 500 TABLET, CHEWABLE ORAL at 13:26

## 2021-10-26 RX ADMIN — OXYCODONE HYDROCHLORIDE 10 MG: 10 TABLET ORAL at 09:06

## 2021-10-26 RX ADMIN — GABAPENTIN 300 MG: 300 CAPSULE ORAL at 06:02

## 2021-10-26 RX ADMIN — GABAPENTIN 300 MG: 300 CAPSULE ORAL at 13:26

## 2021-10-26 RX ADMIN — OXYCODONE HYDROCHLORIDE 10 MG: 10 TABLET ORAL at 22:08

## 2021-10-26 RX ADMIN — CALCIUM CARBONATE 500 MG: 500 TABLET, CHEWABLE ORAL at 06:02

## 2021-10-26 RX ADMIN — Medication 5 MG: at 22:09

## 2021-10-26 RX ADMIN — ENOXAPARIN SODIUM 80 MG: 80 INJECTION SUBCUTANEOUS at 06:02

## 2021-10-26 RX ADMIN — OXYCODONE HYDROCHLORIDE 10 MG: 10 TABLET ORAL at 03:36

## 2021-10-26 RX ADMIN — DOCUSATE SODIUM 50 MG AND SENNOSIDES 8.6 MG 1 TABLET: 8.6; 5 TABLET, FILM COATED ORAL at 22:09

## 2021-10-26 RX ADMIN — OXYCODONE HYDROCHLORIDE 10 MG: 10 TABLET ORAL at 13:26

## 2021-10-26 ASSESSMENT — ENCOUNTER SYMPTOMS
FEVER: 0
ABDOMINAL PAIN: 0
SHORTNESS OF BREATH: 0
SPEECH CHANGE: 0
EYES NEGATIVE: 1
DIARRHEA: 0
NEUROLOGICAL NEGATIVE: 1
DIAPHORESIS: 0
CHILLS: 0
MYALGIAS: 1
CONSTIPATION: 0
NAUSEA: 0
VOMITING: 0

## 2021-10-26 ASSESSMENT — PAIN DESCRIPTION - PAIN TYPE
TYPE: ACUTE PAIN;SURGICAL PAIN

## 2021-10-26 ASSESSMENT — COGNITIVE AND FUNCTIONAL STATUS - GENERAL
WALKING IN HOSPITAL ROOM: A LITTLE
DRESSING REGULAR LOWER BODY CLOTHING: A LITTLE
MOVING TO AND FROM BED TO CHAIR: A LITTLE
DRESSING REGULAR UPPER BODY CLOTHING: A LITTLE
MOVING FROM LYING ON BACK TO SITTING ON SIDE OF FLAT BED: UNABLE
SUGGESTED CMS G CODE MODIFIER DAILY ACTIVITY: CK
DAILY ACTIVITIY SCORE: 18
PERSONAL GROOMING: A LITTLE
MOBILITY SCORE: 16
STANDING UP FROM CHAIR USING ARMS: A LITTLE
TURNING FROM BACK TO SIDE WHILE IN FLAT BAD: A LITTLE
TOILETING: A LITTLE
SUGGESTED CMS G CODE MODIFIER MOBILITY: CK
CLIMB 3 TO 5 STEPS WITH RAILING: A LITTLE
HELP NEEDED FOR BATHING: A LITTLE
EATING MEALS: A LITTLE

## 2021-10-26 ASSESSMENT — GAIT ASSESSMENTS
ASSISTIVE DEVICE: FRONT WHEEL WALKER
DEVIATION: STEP TO;BRADYKINETIC
GAIT LEVEL OF ASSIST: MINIMAL ASSIST

## 2021-10-26 NOTE — DISCHARGE PLANNING
[facility] Coordination    Follow up with client at bedside to review discharge support/destination.  Wero voiced apprehension at prospect of returning home to second story apt due to stairs required to enter/exit.  Pt is hoping to find ground floor apt, has not yet identified/secured new rental.  Wero also tells me his Mother will assist financially with weekly air b&b rental if he is unable to negotiate stairs at discharge, provided Mother's contact number and consent for TCC to verify this.      Telephone call to Christianne Thorpe #[phone].  No answer.  Left voice message request for return call to [facility].       Updates reviewed with [facility].  Submitting to Medicaid for consideration of IRF level care.  Will follow for auth determination.

## 2021-10-26 NOTE — THERAPY
Physical Therapy   Daily Treatment     Patient Name: Wero Thorpe  Age:  46 y.o., Sex:  male  Medical Record #: [id number]  Today's Date: 10/26/2021          Assessment    Pt eager to participate w/PT. Improvement w/Rt LE strength and edema allowing for improvement w/ROM. Pt functioning @ supervised level w/bed mobility, CGA w/functional transfers and his amb efforts w/FWW. Pt managed 3 steps w/min assist using both rails. Pt needs to be able to manage a FOS w/single rail to enter his apartment.     Plan    Continue current treatment plan.    DC Equipment Recommendations: Unable to determine at this time  Discharge Recommendations: Recommend post-acute placement for additional physical therapy services prior to discharge home     Objective       10/25/21 1328   Other Treatments   Other Treatments Provided Supine/seated AA-AROM Rt LE.    Balance   Sitting Balance (Static) Fair +   Sitting Balance (Dynamic) Fair   Standing Balance (Static) Fair   Standing Balance (Dynamic) Fair -   Weight Shift Sitting Fair   Weight Shift Standing Absent   Comments standing w/FWW   Gait Analysis   Gait Level Of Assist   (CGA)   Assistive Device Front Wheel Walker   Distance (Feet) 100   # of Times Distance was Traveled 1   # of Stairs Climbed 3  (w/2 rails)   Level of Assist with Stairs Minimal Assist   Weight Bearing Status TTWB Rt LE   Comments Improvement w/amb tolerance today. Initiated stair training, pt managed 3 w/fatigue. Pt needs to be able to manage a FOS w/single rail to enter into his apartment.    Bed Mobility    Supine to Sit Supervised   Sit to Supine Supervised   Scooting Supervised   Rolling Supervised  (w/railing)   Skilled Intervention Verbal Cuing;Postural Facilitation;Compensatory Strategies   Comments Pt uses sheet to for LE management. Pt managed HOB flat and no railing.    Functional Mobility   Sit to Stand   (CGA)   Bed, Chair, Wheelchair Transfer   (CGA w/FWW)   Comments Pt indep w/self propelling w/c  around the unit   How much difficulty does the patient currently have...   Turning over in bed (including adjusting bedclothes, sheets and blankets)? 3   Sitting down on and standing up from a chair with arms (e.g., wheelchair, bedside commode, etc.) 3   Moving from lying on back to sitting on the side of the bed? 3   How much help from another person does the patient currently need...   Moving to and from a bed to a chair (including a wheelchair)? 3   Need to walk in a hospital room? 3   Climbing 3-5 steps with a railing? 3   6 clicks Mobility Score 18   Short Term Goals    Short Term Goal # 1 pt will perform supine <> sit with HOB flat, no railing and SPV in 6 visits   Goal Outcome # 1 Goal met   Short Term Goal # 3 B Pt will ambulate 100ft with FWW at a spv level within 6 visits in order to progress functional mobility.   (progessing toward goal)   Short Term Goal # 4 pt will negotiate 1 FOS with LRAD / railing and Min A to access home environment in 6 visits   Goal Outcome # 4 Progressing slower than expected

## 2021-10-26 NOTE — THERAPY
"Occupational Therapy  Daily Treatment     Patient Name: Wero Thorpe  Age:  46 y.o., Sex:  male  Medical Record #: [id number]  Today's Date: 10/26/2021     Precautions  Precautions: Fall Risk, Toe Touch Weight Bearing Right Lower Extremity  Comments: as of now, ace wrap on thigh during mobility    Assessment    Making progress, able to participate in functional mobility to/from BR and participate in standing grooming. Remains limited by weakness, fatigue, impaired balance.    Plan    Continue current treatment plan.    DC Equipment Recommendations: Unable to determine at this time  Discharge Recommendations: Recommend post-acute placement for additional occupational therapy services prior to discharge home    Subjective    \"I really want to go to rehab.\"     Objective       10/26/21 0940   Cognition    Cognition / Consciousness WDL   Level of Consciousness Alert   Comments pleasant and cooperative   Balance   Sitting Balance (Static) Fair +   Sitting Balance (Dynamic) Fair   Standing Balance (Static) Fair   Standing Balance (Dynamic) Fair -   Weight Shift Sitting Fair   Weight Shift Standing Absent   Skilled Intervention Verbal Cuing;Tactile Cuing;Sequencing   Comments w/ FWW, able to maintain TTWB appropriately   Activities of Daily Living   Grooming Minimal Assist;Standing  (wash hands)   Lower Body Dressing   (already had shorts on, donned in bed)   Toileting Minimal Assist   Skilled Intervention Verbal Cuing;Tactile Cuing;Sequencing   Comments reports he donned shorts in bed after his shower last night w/o assist, discussed trialing EOB with reacher and pt interested in trying that next session   How much help from another person does the patient currently need...   Putting on and taking off regular lower body clothing? 3   Bathing (including washing, rinsing, and drying)? 3   Toileting, which includes using a toilet, bedpan, or urinal? 3   Putting on and taking off regular upper body clothing? 3   Taking " care of personal grooming such as brushing teeth? 3   Eating meals? 3   6 Clicks Daily Activity Score 18   Functional Mobility   Sit to Stand Minimal Assist  (CGA)   Bed, Chair, Wheelchair Transfer Minimal Assist  (CGA)   Toilet Transfers Minimal Assist  (CGA)   Mobility BR>EOB   Skilled Intervention Verbal Cuing;Tactile Cuing;Sequencing   Comments w/ FWW   Patient / Family Goals   Patient / Family Goal #1 to do my own toileting   Goal #1 Outcome Progressing as expected   Short Term Goals   Short Term Goal # 1 Pt will demo toilet txf with supv   Goal Outcome # 1 Progressing as expected   Short Term Goal # 2 Pt will demo seated/standing sponge bath w/ setup   Goal Outcome # 2 Progressing as expected   Short Term Goal # 3 Pt will demo LB dressing w/ min A   Goal Outcome # 3 Progressing as expected

## 2021-10-26 NOTE — CARE PLAN
Problem: Pain - Standard  Goal: Alleviation of pain or a reduction in pain to the patient’s comfort goal  Outcome: Progressing     Problem: Skin Integrity  Goal: Skin integrity is maintained or improved  Outcome: Progressing     Problem: Fall Risk  Goal: Patient will remain free from falls  Outcome: Progressing   The patient is Stable - Low risk of patient condition declining or worsening    Shift Goals  Clinical Goals: pain control; mobility  Patient Goals: pain control; rest  Family Goals: N/A    Progress made toward(s) clinical / shift goals:  Patient turns self in bed and uses lotion and barrier cream.  Patient calls for assistance to mobilize.  Patient pain is well controlled with PRN oxycodone.
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
## 2021-10-26 NOTE — DISCHARGE PLANNING
[facility] Transitional Care Coordination    Submitted to Medicaid for consideration of IRF level care.  Auth tracking #[id number].  Will follow for determination.

## 2021-10-26 NOTE — DISCHARGE PLANNING
Anticipated Discharge Disposition: Acute rehab.     Action: [facility] following . Admissions coordinator submitted for insurance authorization today.     Barriers to Discharge: Acute rehab insurance authorization; limited by Medicaid FFS.     Plan: CM to continue to follow for discharge needs.

## 2021-10-27 LAB
ANISOCYTOSIS BLD QL SMEAR: ABNORMAL
BASOPHILS # BLD AUTO: 0.9 % (ref 0–1.8)
BASOPHILS # BLD: 0.05 K/UL (ref 0–0.12)
COMMENT 1642: NORMAL
EOSINOPHIL # BLD AUTO: 0.21 K/UL (ref 0–0.51)
EOSINOPHIL NFR BLD: 3.8 % (ref 0–6.9)
ERYTHROCYTE [DISTWIDTH] IN BLOOD BY AUTOMATED COUNT: 65.2 FL (ref 35.9–50)
HCT VFR BLD AUTO: 34.5 % (ref 42–52)
HGB BLD-MCNC: 9.9 G/DL (ref 14–18)
IMM GRANULOCYTES # BLD AUTO: 0.08 K/UL (ref 0–0.11)
IMM GRANULOCYTES NFR BLD AUTO: 1.5 % (ref 0–0.9)
LYMPHOCYTES # BLD AUTO: 1.74 K/UL (ref 1–4.8)
LYMPHOCYTES NFR BLD: 31.6 % (ref 22–41)
MCH RBC QN AUTO: 29.1 PG (ref 27–33)
MCHC RBC AUTO-ENTMCNC: 28.7 G/DL (ref 33.7–35.3)
MCV RBC AUTO: 101.5 FL (ref 81.4–97.8)
MONOCYTES # BLD AUTO: 0.45 K/UL (ref 0–0.85)
MONOCYTES NFR BLD AUTO: 8.2 % (ref 0–13.4)
MORPHOLOGY BLD-IMP: NORMAL
NEUTROPHILS # BLD AUTO: 2.98 K/UL (ref 1.82–7.42)
NEUTROPHILS NFR BLD: 54 % (ref 44–72)
NRBC # BLD AUTO: 0 K/UL
NRBC BLD-RTO: 0 /100 WBC
PLATELET # BLD AUTO: 451 K/UL (ref 164–446)
PLATELET BLD QL SMEAR: NORMAL
PMV BLD AUTO: 8.6 FL (ref 9–12.9)
RBC # BLD AUTO: 3.4 M/UL (ref 4.7–6.1)
RBC BLD AUTO: PRESENT
WBC # BLD AUTO: 5.5 K/UL (ref 4.8–10.8)

## 2021-10-27 PROCEDURE — 700102 HCHG RX REV CODE 250 W/ 637 OVERRIDE(OP): Performed by: SPECIALIST

## 2021-10-27 PROCEDURE — A9270 NON-COVERED ITEM OR SERVICE: HCPCS | Performed by: PHYSICAL MEDICINE & REHABILITATION

## 2021-10-27 PROCEDURE — 85025 COMPLETE CBC W/AUTO DIFF WBC: CPT

## 2021-10-27 PROCEDURE — 700111 HCHG RX REV CODE 636 W/ 250 OVERRIDE (IP): Performed by: NURSE PRACTITIONER

## 2021-10-27 PROCEDURE — A9270 NON-COVERED ITEM OR SERVICE: HCPCS

## 2021-10-27 PROCEDURE — 99024 POSTOP FOLLOW-UP VISIT: CPT | Performed by: ORTHOPAEDIC SURGERY

## 2021-10-27 PROCEDURE — 700102 HCHG RX REV CODE 250 W/ 637 OVERRIDE(OP): Performed by: NURSE PRACTITIONER

## 2021-10-27 PROCEDURE — 770006 HCHG ROOM/CARE - MED/SURG/GYN SEMI*

## 2021-10-27 PROCEDURE — A9270 NON-COVERED ITEM OR SERVICE: HCPCS | Performed by: SPECIALIST

## 2021-10-27 PROCEDURE — 700102 HCHG RX REV CODE 250 W/ 637 OVERRIDE(OP): Performed by: PHYSICAL MEDICINE & REHABILITATION

## 2021-10-27 PROCEDURE — A9270 NON-COVERED ITEM OR SERVICE: HCPCS | Performed by: ORTHOPAEDIC SURGERY

## 2021-10-27 PROCEDURE — 700102 HCHG RX REV CODE 250 W/ 637 OVERRIDE(OP)

## 2021-10-27 PROCEDURE — A9270 NON-COVERED ITEM OR SERVICE: HCPCS | Performed by: NURSE PRACTITIONER

## 2021-10-27 PROCEDURE — 99232 SBSQ HOSP IP/OBS MODERATE 35: CPT | Performed by: SURGERY

## 2021-10-27 PROCEDURE — 97116 GAIT TRAINING THERAPY: CPT

## 2021-10-27 PROCEDURE — 36415 COLL VENOUS BLD VENIPUNCTURE: CPT

## 2021-10-27 PROCEDURE — 700102 HCHG RX REV CODE 250 W/ 637 OVERRIDE(OP): Performed by: ORTHOPAEDIC SURGERY

## 2021-10-27 RX ADMIN — GABAPENTIN 300 MG: 300 CAPSULE ORAL at 18:20

## 2021-10-27 RX ADMIN — CALCIUM CARBONATE 500 MG: 500 TABLET, CHEWABLE ORAL at 18:20

## 2021-10-27 RX ADMIN — CALCIUM CARBONATE 500 MG: 500 TABLET, CHEWABLE ORAL at 05:27

## 2021-10-27 RX ADMIN — OXYCODONE HYDROCHLORIDE 10 MG: 10 TABLET ORAL at 05:27

## 2021-10-27 RX ADMIN — GABAPENTIN 300 MG: 300 CAPSULE ORAL at 05:26

## 2021-10-27 RX ADMIN — CALCIUM CARBONATE 500 MG: 500 TABLET, CHEWABLE ORAL at 10:34

## 2021-10-27 RX ADMIN — APIXABAN 10 MG: 5 TABLET, FILM COATED ORAL at 18:19

## 2021-10-27 RX ADMIN — GABAPENTIN 300 MG: 300 CAPSULE ORAL at 10:34

## 2021-10-27 RX ADMIN — OXYCODONE HYDROCHLORIDE 10 MG: 10 TABLET ORAL at 02:12

## 2021-10-27 RX ADMIN — DOCUSATE SODIUM 50 MG AND SENNOSIDES 8.6 MG 1 TABLET: 8.6; 5 TABLET, FILM COATED ORAL at 20:31

## 2021-10-27 RX ADMIN — DOCUSATE SODIUM 100 MG: 100 CAPSULE ORAL at 18:19

## 2021-10-27 RX ADMIN — OXYCODONE HYDROCHLORIDE 10 MG: 10 TABLET ORAL at 10:23

## 2021-10-27 RX ADMIN — Medication 5 MG: at 20:31

## 2021-10-27 RX ADMIN — OXYCODONE HYDROCHLORIDE 10 MG: 10 TABLET ORAL at 22:01

## 2021-10-27 RX ADMIN — ENOXAPARIN SODIUM 80 MG: 80 INJECTION SUBCUTANEOUS at 05:27

## 2021-10-27 RX ADMIN — OXYCODONE HYDROCHLORIDE 10 MG: 10 TABLET ORAL at 14:31

## 2021-10-27 RX ADMIN — OXYCODONE HYDROCHLORIDE 10 MG: 10 TABLET ORAL at 18:19

## 2021-10-27 RX ADMIN — DOCUSATE SODIUM 100 MG: 100 CAPSULE ORAL at 05:26

## 2021-10-27 ASSESSMENT — ENCOUNTER SYMPTOMS
SHORTNESS OF BREATH: 0
NEUROLOGICAL NEGATIVE: 1
CHILLS: 0
FEVER: 0
ABDOMINAL PAIN: 0
NAUSEA: 0
CONSTIPATION: 0
EYES NEGATIVE: 1
MYALGIAS: 1
VOMITING: 0

## 2021-10-27 ASSESSMENT — PAIN DESCRIPTION - PAIN TYPE
TYPE: ACUTE PAIN
TYPE: ACUTE PAIN;SURGICAL PAIN
TYPE: SURGICAL PAIN
TYPE: ACUTE PAIN
TYPE: ACUTE PAIN;SURGICAL PAIN
TYPE: SURGICAL PAIN
TYPE: ACUTE PAIN
TYPE: ACUTE PAIN
TYPE: SURGICAL PAIN
TYPE: ACUTE PAIN

## 2021-10-27 NOTE — PROGRESS NOTES
Orthopaedic Progress Note    Interval changes:  Patient doing well    RLE dressings CDI  RLE wrapped for compression   Staples out Thursday or sooner if patient is going to DC     ROS - Patient denies any new issues.  Pain well controlled.    /61   Pulse (!) 106   Temp 36.8 °C (98.3 °F) (Temporal)   Resp 18   Ht 1.829 m (6')   Wt 89 kg (196 lb 3.4 oz)   SpO2 95%       Patient seen and examined  No acute distress  Breathing non labored  RRR  RLE dressings CDI.  Patient clearly fires tibialis anterior, EHL, and gastrocnemius/soleus. Sensation is intact to light touch throughout superficial peroneal, deep peroneal, tibial, saphenous, and sural nerve distributions. Strong and palpable 2+ dorsalis pedis and posterior tibial pulses with capillary refill less than 2 seconds. No lower leg tenderness or discomfort.       Recent Labs     10/25/21  0648 10/26/21  0359 10/27/21  0812   WBC 8.1 7.8 5.5   RBC 3.01* 3.31* 3.40*   HEMOGLOBIN 9.0* 9.7* 9.9*   HEMATOCRIT 29.7* 33.5* 34.5*   MCV 98.7* 101.2* 101.5*   MCH 29.9 29.3 29.1   MCHC 30.3* 29.0* 28.7*   RDW 63.5* 67.0* 65.2*   PLATELETCT 529* 438 451*   MPV 8.5* 8.6* 8.6*       Active Hospital Problems    Diagnosis    • Discharge planning issues [Z02.9]      Priority: High   • Acute deep vein thrombosis (DVT) of femoral vein of right lower extremity (HCC) [I82.411]      Priority: High   • Acute blood loss anemia [D62]      Priority: Medium   • Fracture of shaft of femur (HCC) [S72.309A]      Priority: Medium   • Intramuscular hematoma [T14.8XXA]      Priority: Medium   • Trauma [T14.90XA]      Priority: Low   • Liver cirrhosis (HCC) [K74.60]      Priority: Low       Assessment/Plan:  Patient doing well    Staples to be removed Thursday or sooner if going to DC   POD#19 S/P:  1. Open treatment right intertrochanteric femur fracture with plate and screw construct  2. Open treatment of right femoral shaft fracture with insertion of intramedullary implant  3. Open  reduction internal fixation right distal femur intraarticular fracture  Wt bearing status - TTWB RLE  Wound care/Drains - dressings changed every other day by nursing    Future Procedures - none planned   Lovenox: Start 10/9, Duration-until ambulatory > 150'  Sutures/Staples out-  21 days post operatively  PT/OT-initiated  Antibiotics: perioperative completed  DVT Prophylaxis- TEDS/SCDs/Foot pumps  Raygoza-none  Case Coordination for Discharge Planning - Disposition home

## 2021-10-27 NOTE — THERAPY
Physical Therapy   Daily Treatment     Patient Name: Wero Thorpe  Age:  46 y.o., Sex:  male  Medical Record #: [id number]  Today's Date: 10/27/2021     Precautions  Precautions: Fall Risk;Toe Touch Weight Bearing Right Lower Extremity    Assessment    Pt demonstrating improvement in activity tolerance and endurance today without seated rest break with gait and stair navigation. Pt able to transfer and ambulate with CGA and navigate a few stairs with Brent. Pt maintaining TTWBing status throughout session. Pt is highly motivated to progress his functional independence and will continue to rec post acute rehab placement as pt has an 18 step FOS to enter his apartment which he is not able to manage at this time. Will continue to follow while in house.     Plan    Continue current treatment plan.    DC Equipment Recommendations: Unable to determine at this time  Discharge Recommendations: Recommend post-acute placement for additional physical therapy services prior to discharge home      Subjective    Pt greeted at EOB with OT and agreeable to PT session.      Objective       10/27/21 0957   Total Time Spent   Total Time Spent (Mins) 23   Charge Group   Charges  Yes   PT Gait Training 2   Precautions   Precautions Fall Risk;Toe Touch Weight Bearing Right Lower Extremity   Vitals   O2 (LPM) 0   O2 Delivery Device None - Room Air   Pain 0 - 10 Group   Therapist Pain Assessment During Activity;Nurse Notified  (RLE soreness)   Cognition    Cognition / Consciousness WDL   Level of Consciousness Alert   Comments highly motivated to progress with therapy    Other Treatments   Other Treatments Provided improved endurance without seated rest breaks during this session    Balance   Sitting Balance (Static) Fair +   Sitting Balance (Dynamic) Fair   Standing Balance (Static) Fair   Standing Balance (Dynamic) Fair -   Weight Shift Sitting Fair   Weight Shift Standing Absent   Skilled Intervention Verbal Cuing   Comments w/  FWW   Gait Analysis   Gait Level Of Assist Minimal Assist   Assistive Device Front Wheel Walker   Distance (Feet) 50   # of Times Distance was Traveled 2   Deviation Step To;Bradykinetic  (hop-to with FWW)   # of Stairs Climbed 5  (w/ B rails)   Level of Assist with Stairs Minimal Assist   Weight Bearing Status TTWB RLE   Skilled Intervention Verbal Cuing   Comments improved endurance with gait/stairs without rest breaks, however pt very fatigued following    Bed Mobility    Supine to Sit Supervised   Sit to Supine Supervised   Scooting Supervised   Skilled Intervention Verbal Cuing   Comments sheet for RLE in/out of bed,otherwise no bed features   Functional Mobility   Sit to Stand Minimal Assist   Bed, Chair, Wheelchair Transfer Minimal Assist   Skilled Intervention Verbal Cuing   Comments w/ FWW   How much difficulty does the patient currently have...   Turning over in bed (including adjusting bedclothes, sheets and blankets)? 3   Sitting down on and standing up from a chair with arms (e.g., wheelchair, bedside commode, etc.) 1   Moving from lying on back to sitting on the side of the bed? 3   How much help from another person does the patient currently need...   Moving to and from a bed to a chair (including a wheelchair)? 3   Need to walk in a hospital room? 3   Climbing 3-5 steps with a railing? 3   6 clicks Mobility Score 16   Activity Tolerance   Sitting in Chair NT   Sitting Edge of Bed 10mins   Standing 10mins    Comments limited by fatigue and RLE pain    Patient / Family Goals    Patient / Family Goal #1 to return home   Short Term Goals    Short Term Goal # 3 B Pt will ambulate 100ft with FWW at a spv level within 6 visits in order to progress functional mobility.    Goal Outcome # 3 B Progressing as expected   Short Term Goal # 4 pt will negotiate 1 FOS with LRAD / railing and Min A to access home environment in 6 visits   Goal Outcome # 4 Progressing slower than expected   Education Group   Education  Provided Role of Physical Therapist   Role of Physical Therapist Patient Response Patient;Acceptance;Explanation;Action Demonstration;Reinforcement Needed   Weight Bearing Status Patient Response Patient;Acceptance;Explanation;Verbal Demonstration   Anticipated Discharge Equipment and Recommendations   DC Equipment Recommendations Unable to determine at this time   Discharge Recommendations Recommend post-acute placement for additional physical therapy services prior to discharge home   Interdisciplinary Plan of Care Collaboration   IDT Collaboration with  Nursing   Patient Position at End of Therapy In Bed;Call Light within Reach;Tray Table within Reach;Phone within Reach   Collaboration Comments report given    Session Information   Date / Session Number  10/26 -8(3/8, 10/28) [email or web address]

## 2021-10-27 NOTE — DISCHARGE PLANNING
[facility] Transitional Care Coordination    Submitting case to [facility] Medicaid for consideration of IRF level care.
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
## 2021-10-27 NOTE — CARE PLAN
The patient is Stable - Low risk of patient condition declining or worsening    Shift Goals  Clinical Goals: pain control  Patient Goals: pain control  Family Goals: N/A    Progress made toward(s) clinical / shift goals:  yes      Problem: Pain - Standard  Goal: Alleviation of pain or a reduction in pain to the patient’s comfort goal  Outcome: Progressing   Pain well controlled with oxy prn.  Problem: Knowledge Deficit - Standard  Goal: Patient and family/care givers will demonstrate understanding of plan of care, disease process/condition, diagnostic tests and medications  Outcome: Progressing   Pt. Independent with ACE dressing wrap.    Patient is not progressing towards the following goals:

## 2021-10-27 NOTE — DISCHARGE PLANNING
[facility] Transitional Care Coordination    Follow up for Rehab.  Medicaid FFS has denied IRF level care.  Per Medicaid website -     Medical Citation  3693 - The services are either excluded as an MCO covered benefit or have coverage limitations.  Notes To Provider  Denied based on a determination that the recipient is a member of an MCO (Managed Care Organization) [email or web address]/15/21 to 12/31/99 verified 10/27/21. For prior authorization of this requested service, please contact the MCO to which the recipient is enrolled. Current [facility] Care [facility] is processed by: [employer] [phone] or [employer] [facility] [phone] or [employer] [facility] [phone] or [facility] [phone].    Volate update to T3 RACQUEL Whaley.  Will await clarification of insurance provider for post acute care.

## 2021-10-27 NOTE — DISCHARGE PLANNING
Anticipated Discharge Disposition: Acute rehab.     Action: [facility] received denial by Medicaid FFS for IRF level of care as patient is a recipient a member of a O Medicaid. LSW emailed PFA to request information on which O patient is enrolled in.     Barriers to Discharge: Acute rehab insurance authorization; limited by Medicaid.     Plan: CM to continue to follow for discharge needs.

## 2021-10-27 NOTE — CARE PLAN
The patient is Stable - Low risk of patient condition declining or worsening    Shift Goals  Clinical Goals: pain control  Patient Goals: pain control  Family Goals: N/A    Progress made toward(s) clinical / shift goals:  pain controlled with prn medication.     Patient is not progressing towards the following goals:

## 2021-10-28 LAB
BASOPHILS # BLD AUTO: 0.3 % (ref 0–1.8)
BASOPHILS # BLD: 0.02 K/UL (ref 0–0.12)
EOSINOPHIL # BLD AUTO: 0.16 K/UL (ref 0–0.51)
EOSINOPHIL NFR BLD: 2.5 % (ref 0–6.9)
ERYTHROCYTE [DISTWIDTH] IN BLOOD BY AUTOMATED COUNT: 62.2 FL (ref 35.9–50)
HCT VFR BLD AUTO: 29.5 % (ref 42–52)
HGB BLD-MCNC: 8.9 G/DL (ref 14–18)
IMM GRANULOCYTES # BLD AUTO: 0.08 K/UL (ref 0–0.11)
IMM GRANULOCYTES NFR BLD AUTO: 1.3 % (ref 0–0.9)
LYMPHOCYTES # BLD AUTO: 1.64 K/UL (ref 1–4.8)
LYMPHOCYTES NFR BLD: 26.1 % (ref 22–41)
MCH RBC QN AUTO: 29.7 PG (ref 27–33)
MCHC RBC AUTO-ENTMCNC: 30.2 G/DL (ref 33.7–35.3)
MCV RBC AUTO: 98.3 FL (ref 81.4–97.8)
MONOCYTES # BLD AUTO: 0.58 K/UL (ref 0–0.85)
MONOCYTES NFR BLD AUTO: 9.2 % (ref 0–13.4)
NEUTROPHILS # BLD AUTO: 3.81 K/UL (ref 1.82–7.42)
NEUTROPHILS NFR BLD: 60.6 % (ref 44–72)
NRBC # BLD AUTO: 0 K/UL
NRBC BLD-RTO: 0 /100 WBC
PLATELET # BLD AUTO: 431 K/UL (ref 164–446)
PMV BLD AUTO: 8.4 FL (ref 9–12.9)
RBC # BLD AUTO: 3 M/UL (ref 4.7–6.1)
WBC # BLD AUTO: 6.3 K/UL (ref 4.8–10.8)

## 2021-10-28 PROCEDURE — A9270 NON-COVERED ITEM OR SERVICE: HCPCS | Performed by: PHYSICAL MEDICINE & REHABILITATION

## 2021-10-28 PROCEDURE — 700102 HCHG RX REV CODE 250 W/ 637 OVERRIDE(OP)

## 2021-10-28 PROCEDURE — 700102 HCHG RX REV CODE 250 W/ 637 OVERRIDE(OP): Performed by: ORTHOPAEDIC SURGERY

## 2021-10-28 PROCEDURE — 700102 HCHG RX REV CODE 250 W/ 637 OVERRIDE(OP): Performed by: SPECIALIST

## 2021-10-28 PROCEDURE — A9270 NON-COVERED ITEM OR SERVICE: HCPCS

## 2021-10-28 PROCEDURE — 36415 COLL VENOUS BLD VENIPUNCTURE: CPT

## 2021-10-28 PROCEDURE — 770006 HCHG ROOM/CARE - MED/SURG/GYN SEMI*

## 2021-10-28 PROCEDURE — 700102 HCHG RX REV CODE 250 W/ 637 OVERRIDE(OP): Performed by: PHYSICAL MEDICINE & REHABILITATION

## 2021-10-28 PROCEDURE — A9270 NON-COVERED ITEM OR SERVICE: HCPCS | Performed by: ORTHOPAEDIC SURGERY

## 2021-10-28 PROCEDURE — A9270 NON-COVERED ITEM OR SERVICE: HCPCS | Performed by: NURSE PRACTITIONER

## 2021-10-28 PROCEDURE — A9270 NON-COVERED ITEM OR SERVICE: HCPCS | Performed by: SPECIALIST

## 2021-10-28 PROCEDURE — 97110 THERAPEUTIC EXERCISES: CPT | Mod: CQ

## 2021-10-28 PROCEDURE — 97530 THERAPEUTIC ACTIVITIES: CPT | Mod: CQ

## 2021-10-28 PROCEDURE — 99232 SBSQ HOSP IP/OBS MODERATE 35: CPT | Performed by: SURGERY

## 2021-10-28 PROCEDURE — 85025 COMPLETE CBC W/AUTO DIFF WBC: CPT

## 2021-10-28 PROCEDURE — 94760 N-INVAS EAR/PLS OXIMETRY 1: CPT

## 2021-10-28 PROCEDURE — 700102 HCHG RX REV CODE 250 W/ 637 OVERRIDE(OP): Performed by: NURSE PRACTITIONER

## 2021-10-28 PROCEDURE — 99024 POSTOP FOLLOW-UP VISIT: CPT | Performed by: ORTHOPAEDIC SURGERY

## 2021-10-28 PROCEDURE — 97116 GAIT TRAINING THERAPY: CPT | Mod: CQ

## 2021-10-28 RX ORDER — OXYCODONE HYDROCHLORIDE 10 MG/1
10 TABLET ORAL EVERY 4 HOURS PRN
Status: DISCONTINUED | OUTPATIENT
Start: 2021-10-28 | End: 2021-10-31

## 2021-10-28 RX ORDER — OXYCODONE HYDROCHLORIDE 5 MG/1
5 TABLET ORAL EVERY 4 HOURS PRN
Status: DISCONTINUED | OUTPATIENT
Start: 2021-10-28 | End: 2021-11-01 | Stop reason: HOSPADM

## 2021-10-28 RX ADMIN — APIXABAN 10 MG: 5 TABLET, FILM COATED ORAL at 17:23

## 2021-10-28 RX ADMIN — OXYCODONE HYDROCHLORIDE 10 MG: 10 TABLET ORAL at 14:18

## 2021-10-28 RX ADMIN — APIXABAN 10 MG: 5 TABLET, FILM COATED ORAL at 06:00

## 2021-10-28 RX ADMIN — CALCIUM CARBONATE 500 MG: 500 TABLET, CHEWABLE ORAL at 13:06

## 2021-10-28 RX ADMIN — CALCIUM CARBONATE 500 MG: 500 TABLET, CHEWABLE ORAL at 17:24

## 2021-10-28 RX ADMIN — DOCUSATE SODIUM 100 MG: 100 CAPSULE ORAL at 17:23

## 2021-10-28 RX ADMIN — CALCIUM CARBONATE 500 MG: 500 TABLET, CHEWABLE ORAL at 06:00

## 2021-10-28 RX ADMIN — Medication 5 MG: at 21:37

## 2021-10-28 RX ADMIN — DOCUSATE SODIUM 100 MG: 100 CAPSULE ORAL at 06:01

## 2021-10-28 RX ADMIN — OXYCODONE HYDROCHLORIDE 10 MG: 10 TABLET ORAL at 14:19

## 2021-10-28 RX ADMIN — GABAPENTIN 300 MG: 300 CAPSULE ORAL at 17:23

## 2021-10-28 RX ADMIN — OXYCODONE HYDROCHLORIDE 10 MG: 10 TABLET ORAL at 06:01

## 2021-10-28 RX ADMIN — OXYCODONE HYDROCHLORIDE 10 MG: 10 TABLET ORAL at 09:44

## 2021-10-28 RX ADMIN — OXYCODONE HYDROCHLORIDE 10 MG: 10 TABLET ORAL at 19:34

## 2021-10-28 RX ADMIN — DOCUSATE SODIUM 50 MG AND SENNOSIDES 8.6 MG 1 TABLET: 8.6; 5 TABLET, FILM COATED ORAL at 21:37

## 2021-10-28 RX ADMIN — GABAPENTIN 300 MG: 300 CAPSULE ORAL at 13:06

## 2021-10-28 RX ADMIN — GABAPENTIN 300 MG: 300 CAPSULE ORAL at 06:01

## 2021-10-28 ASSESSMENT — PAIN DESCRIPTION - PAIN TYPE
TYPE: SURGICAL PAIN;ACUTE PAIN
TYPE: ACUTE PAIN
TYPE: ACUTE PAIN
TYPE: ACUTE PAIN;SURGICAL PAIN

## 2021-10-28 ASSESSMENT — COGNITIVE AND FUNCTIONAL STATUS - GENERAL
CLIMB 3 TO 5 STEPS WITH RAILING: A LITTLE
MOVING TO AND FROM BED TO CHAIR: A LITTLE
TURNING FROM BACK TO SIDE WHILE IN FLAT BAD: A LITTLE
MOBILITY SCORE: 18
SUGGESTED CMS G CODE MODIFIER MOBILITY: CK
MOVING FROM LYING ON BACK TO SITTING ON SIDE OF FLAT BED: A LITTLE
STANDING UP FROM CHAIR USING ARMS: A LITTLE
WALKING IN HOSPITAL ROOM: A LITTLE

## 2021-10-28 ASSESSMENT — ENCOUNTER SYMPTOMS
MYALGIAS: 1
NAUSEA: 0
CONSTIPATION: 0
ABDOMINAL PAIN: 0
EYES NEGATIVE: 1
SHORTNESS OF BREATH: 0
CONSTITUTIONAL NEGATIVE: 1
NEUROLOGICAL NEGATIVE: 1
VOMITING: 0

## 2021-10-28 ASSESSMENT — GAIT ASSESSMENTS
DISTANCE (FEET): 125
ASSISTIVE DEVICE: FRONT WHEEL WALKER
GAIT LEVEL OF ASSIST: SUPERVISED

## 2021-10-28 NOTE — DISCHARGE PLANNING
Anticipated Discharge Disposition: SNF.    Action: PFA reporting that patient has Medicaid FFS from 10/1 to 10/14 and Medicaid HPN from 10/15 ongoing. LSW received call from Modesta from Medicaid HPN who reported that authorization for acute rehab will be declined and suggested referrals to SNFs be made. DPA tasked with sending out blanket SNF referral.    Barriers to Discharge: Limited by Medicaid; SNF acceptance and bed availability.    Plan: CM to continue to follow for discharge needs.

## 2021-10-28 NOTE — DISCHARGE PLANNING
Received Choice form at 1300  Agency/Facility Name: [facility]  Referral sent per Choice form @ 1300      LSW informed

## 2021-10-28 NOTE — PROGRESS NOTES
Orthopaedic Progress Note    Interval changes:  Patient doing well    RLE dressings CDI  RLE wrapped for compression   Staples out tomorrow     ROS - Patient denies any new issues.  Pain well controlled.    /68   Pulse (!) 109   Temp 37 °C (98.6 °F) (Temporal)   Resp 18   Ht 1.829 m (6')   Wt 89 kg (196 lb 3.4 oz)   SpO2 93%     Patient seen and examined  No acute distress  Breathing non labored  RRR  RLE dressings CDI.  Patient clearly fires tibialis anterior, EHL, and gastrocnemius/soleus. Sensation is intact to light touch throughout superficial peroneal, deep peroneal, tibial, saphenous, and sural nerve distributions. Strong and palpable 2+ dorsalis pedis and posterior tibial pulses with capillary refill less than 2 seconds. No lower leg tenderness or discomfort.     Recent Labs     10/25/21  0648 10/26/21  0359 10/27/21  0812   WBC 8.1 7.8 5.5   RBC 3.01* 3.31* 3.40*   HEMOGLOBIN 9.0* 9.7* 9.9*   HEMATOCRIT 29.7* 33.5* 34.5*   MCV 98.7* 101.2* 101.5*   MCH 29.9 29.3 29.1   MCHC 30.3* 29.0* 28.7*   RDW 63.5* 67.0* 65.2*   PLATELETCT 529* 438 451*   MPV 8.5* 8.6* 8.6*     Active Hospital Problems    Diagnosis    • Discharge planning issues [Z02.9]      Priority: High   • Acute deep vein thrombosis (DVT) of femoral vein of right lower extremity (HCC) [I82.411]      Priority: High   • Acute blood loss anemia [D62]      Priority: Medium   • Fracture of shaft of femur (HCC) [S72.309A]      Priority: Medium   • Intramuscular hematoma [T14.8XXA]      Priority: Medium   • Trauma [T14.90XA]      Priority: Low   • Liver cirrhosis (HCC) [K74.60]      Priority: Low     Assessment/Plan:  Patient doing well    Staples to be removed Thursday or sooner if going to DC   POD#20 S/P:  1. Open treatment right intertrochanteric femur fracture with plate and screw construct  2. Open treatment of right femoral shaft fracture with insertion of intramedullary implant  3. Open reduction internal fixation right distal femur  intraarticular fracture  Wt bearing status - TTWB RLE  Wound care/Drains - dressings changed every other day by nursing    Future Procedures - none planned   Lovenox: Start 10/9, Duration-until ambulatory > 150'  Sutures/Staples out-  21 days post operatively  PT/OT-initiated  Antibiotics: perioperative completed  DVT Prophylaxis- TEDS/SCDs/Foot pumps  Raygoza-none  Case Coordination for Discharge Planning - Disposition home

## 2021-10-28 NOTE — PROGRESS NOTES
Orthopaedic Progress Note    Interval changes:  Patient doing well    RLE dressings CDI  RLE wrapped for compression   Crocker out before DC    ROS - Patient denies any new issues.  Pain well controlled.    /71   Pulse 94   Temp 36.5 °C (97.7 °F) (Temporal)   Resp 16   Ht 1.829 m (6')   Wt 89 kg (196 lb 3.4 oz)   SpO2 96%     Patient seen and examined  No acute distress  Breathing non labored  RRR  RLE dressings CDI.  Patient clearly fires tibialis anterior, EHL, and gastrocnemius/soleus. Sensation is intact to light touch throughout superficial peroneal, deep peroneal, tibial, saphenous, and sural nerve distributions. Strong and palpable 2+ dorsalis pedis and posterior tibial pulses with capillary refill less than 2 seconds. No lower leg tenderness or discomfort.     Recent Labs     10/26/21  0359 10/27/21  0812 10/28/21  0358   WBC 7.8 5.5 6.3   RBC 3.31* 3.40* 3.00*   HEMOGLOBIN 9.7* 9.9* 8.9*   HEMATOCRIT 33.5* 34.5* 29.5*   .2* 101.5* 98.3*   MCH 29.3 29.1 29.7   MCHC 29.0* 28.7* 30.2*   RDW 67.0* 65.2* 62.2*   PLATELETCT 438 451* 431   MPV 8.6* 8.6* 8.4*     Active Hospital Problems    Diagnosis    • Discharge planning issues [Z02.9]      Priority: High   • Acute blood loss anemia [D62]      Priority: Medium   • Fracture of shaft of femur (HCC) [S72.309A]      Priority: Medium   • Intramuscular hematoma [T14.8XXA]      Priority: Medium   • Acute deep vein thrombosis (DVT) of femoral vein of right lower extremity (HCC) [I82.411]      Priority: Medium   • Trauma [T14.90XA]      Priority: Low   • Liver cirrhosis (HCC) [K74.60]      Priority: Low     Assessment/Plan:  Patient doing well    Staples to be removed Thursday or sooner if going to DC   POD#21 S/P:  1. Open treatment right intertrochanteric femur fracture with plate and screw construct  2. Open treatment of right femoral shaft fracture with insertion of intramedullary implant  3. Open reduction internal fixation right distal femur  intraarticular fracture  Wt bearing status - TTWB RLE  Wound care/Drains - dressings changed every other day by nursing    Future Procedures - none planned   Lovenox: Start 10/9, Duration-until ambulatory > 150'  Sutures/Staples out-  21 days post operatively  PT/OT-initiated  Antibiotics: perioperative completed  DVT Prophylaxis- TEDS/SCDs/Foot pumps  Raygoza-none  Case Coordination for Discharge Planning - Disposition home
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
## 2021-10-28 NOTE — THERAPY
Physical Therapy   Daily Treatment     Patient Name: Wero Thorpe  Age:  46 y.o., Sex:  male  Medical Record #: [id number]  Today's Date: 10/28/2021     Precautions  Precautions: Fall Risk;Toe Touch Weight Bearing Right Lower Extremity    Assessment    Pt demonstrating improvement in activity tolerance and endurance today without seated rest break with gait and stair navigation. Pt able to transfer and ambulate with CGA and navigate a few stairs with Brent. Pt maintaining TTWBing status throughout session. Pt is highly motivated to progress his functional independence and will continue to rec post acute rehab placement as pt has an 18 step FOS to enter his apartment which he is not able to manage at this time. Will continue to follow while in house.     Plan    Continue current treatment plan.    DC Equipment Recommendations: Unable to determine at this time  Discharge Recommendations: Recommend post-acute placement for additional physical therapy services prior to discharge home      Subjective    Pt greeted at EOB with OT and agreeable to PT session.      Objective       10/26/21 0957   Total Time Spent   Total Time Spent (Mins) 23   Charge Group   Charges  Yes   PT Gait Training 2   Precautions   Precautions Fall Risk;Toe Touch Weight Bearing Right Lower Extremity   Vitals   O2 Delivery Device None - Room Air   Pain 0 - 10 Group   Therapist Pain Assessment During Activity;Nurse Notified  (RLE soreness)   Cognition    Cognition / Consciousness WDL   Level of Consciousness Alert   Other Treatments   Other Treatments Provided improved endurance without seated rest breaks during this session    Balance   Sitting Balance (Static) Fair +   Sitting Balance (Dynamic) Fair   Standing Balance (Static) Fair   Standing Balance (Dynamic) Fair -   Weight Shift Sitting Fair   Weight Shift Standing Absent   Gait Analysis   Gait Level Of Assist Minimal Assist   Assistive Device Front Wheel Walker   # of Times Distance was  Traveled 2   Deviation Step To;Bradykinetic  (hop-to with FWW)   # of Stairs Climbed 5  (w/ B rails)   Level of Assist with Stairs Minimal Assist   Weight Bearing Status TTWB RLE   Skilled Intervention Verbal Cuing   Comments improved endurance with gait and stairs this session    Bed Mobility    Supine to Sit Supervised   Sit to Supine Supervised   Scooting Supervised   Skilled Intervention Verbal Cuing   Comments sheet for RLE in/out of bed    Functional Mobility   Sit to Stand Minimal Assist   Bed, Chair, Wheelchair Transfer Minimal Assist   Skilled Intervention Verbal Cuing   Comments w/ FWW   How much difficulty does the patient currently have...   Turning over in bed (including adjusting bedclothes, sheets and blankets)? 3   Sitting down on and standing up from a chair with arms (e.g., wheelchair, bedside commode, etc.) 1   Moving from lying on back to sitting on the side of the bed? 3   How much help from another person does the patient currently need...   Moving to and from a bed to a chair (including a wheelchair)? 3   Need to walk in a hospital room? 3   Climbing 3-5 steps with a railing? 3   6 clicks Mobility Score 16   Activity Tolerance   Sitting in Chair NT   Sitting Edge of Bed 10mins   Standing 10mins   Comments limited by fatigue    Patient / Family Goals    Patient / Family Goal #1 to return home   Short Term Goals    Short Term Goal # 3 B Pt will ambulate 100ft with FWW at a spv level within 6 visits in order to progress functional mobility.    Goal Outcome # 3 B Progressing as expected   Short Term Goal # 4 pt will negotiate 1 FOS with LRAD / railing and Min A to access home environment in 6 visits   Goal Outcome # 4 Progressing slower than expected   Education Group   Education Provided Role of Physical Therapist;Gait Training;Stair Training   Role of Physical Therapist Patient Response Patient;Acceptance;Explanation;Action Demonstration;Reinforcement Needed   Gait Training Patient Response  Patient;Acceptance;Explanation;Demonstration;Verbal Demonstration;Action Demonstration   Stair Training Patient Response Patient;Acceptance;Explanation;Demonstration;Verbal Demonstration;Action Demonstration   Weight Bearing Status Patient Response Patient;Acceptance;Explanation;Verbal Demonstration   Anticipated Discharge Equipment and Recommendations   DC Equipment Recommendations Unable to determine at this time   Discharge Recommendations Recommend post-acute placement for additional physical therapy services prior to discharge home   Interdisciplinary Plan of Care Collaboration   IDT Collaboration with  Nursing   Patient Position at End of Therapy In Bed;Call Light within Reach;Tray Table within Reach;Phone within Reach   Collaboration Comments report given    Session Information   Date / Session Number  10/26 -8(3/4, 10/28) [place]

## 2021-10-28 NOTE — CARE PLAN
The patient is Stable - Low risk of patient condition declining or worsening    Shift Goals  Clinical Goals: pain control  Patient Goals: pain control  Family Goals: N/A    Progress made toward(s) clinical / shift goals:  yes    Problem: Pain - Standard  Goal: Alleviation of pain or a reduction in pain to the patient’s comfort goal  Outcome: Progressing  Pt pain well controlled with oxy prn.   Problem: Knowledge Deficit - Standard  Goal: Patient and family/care givers will demonstrate understanding of plan of care, disease process/condition, diagnostic tests and medications  Outcome: Progressing     Pt aware of situation - pt. States ongoing pt/ot stair practice to be complete prior to rehab.

## 2021-10-28 NOTE — THERAPY
Physical Therapy   Daily Treatment     Patient Name: Wero Thorpe  Age:  46 y.o., Sex:  male  Medical Record #: [id number]  Today's Date: 10/28/2021     Precautions  Precautions: (P) Fall Risk;Toe Touch Weight Bearing Right Lower Extremity    Assessment    Pt working on finding a place to stay that is single level. Pt conts to c/o Rt knee w/lateral movements of Rt LE. Strength slowly improving, still w/weak SAQ and SLR. Pt functioning @ supervised level w/bed mobility, functional transfers and his amb efforts w/FWW. Introduced scooting up/down the steps on his buttocks, pt managed the task w/min assist. Pt does need Rt LE assist to clear heel during his stair efforts.     Plan    Continue current treatment plan.    DC Equipment Recommendations: FWW and wheelchair  Discharge Recommendations: Recommend post-acute placement for additional physical therapy services prior to discharge home     Objective       10/28/21 1122   Other Treatments   Other Treatments Provided Supine AA-AROM Rt LE. Pt still needing SAQ and SLR assistance. Pt instructed on heel prop w/QS working on knee extension and not to have pillows under the knee.    Balance   Sitting Balance (Static) Fair +   Sitting Balance (Dynamic) Fair   Standing Balance (Static) Fair   Standing Balance (Dynamic) Fair -   Weight Shift Sitting Fair   Weight Shift Standing Absent   Comments standing w/FWW   Gait Analysis   Gait Level Of Assist Supervised   Assistive Device Front Wheel Walker   Distance (Feet) 125   # of Times Distance was Traveled 1   # of Stairs Climbed 6   Level of Assist with Stairs Minimal Assist   Weight Bearing Status TTWB Rt LE   Skilled Intervention Verbal Cuing;Compensatory Strategies   Comments Improvement w/pts activity tolerance w/his amb efforts. Introduced scooting up/down on his buttocks, needing Rt LE assist @ times w/descending the steps. Pt managed 6, does have a FOS @ home. Pt stated he preferred this method over hopping w/single  crutch and rail.    Bed Mobility    Supine to Sit Supervised   Sit to Supine Supervised   Scooting Supervised   Rolling Supervised   Comments HOB flat and no railing. Pt uses the sheet for Rt LE management in/OOB   Functional Mobility   Sit to Stand Supervised  (from w/c height surface->FWW)   Bed, Chair, Wheelchair Transfer Supervised   Comments Pt was able to get to standing from step surface using railing, maintaining his WB precautions.    How much difficulty does the patient currently have...   Turning over in bed (including adjusting bedclothes, sheets and blankets)? 3   Sitting down on and standing up from a chair with arms (e.g., wheelchair, bedside commode, etc.) 3   Moving from lying on back to sitting on the side of the bed? 3   How much help from another person does the patient currently need...   Moving to and from a bed to a chair (including a wheelchair)? 3   Need to walk in a hospital room? 3   Climbing 3-5 steps with a railing? 3   6 clicks Mobility Score 18   Short Term Goals    Short Term Goal # 3 B Pt will ambulate 100ft with FWW at a spv level within 6 visits in order to progress functional mobility.    Goal Outcome # 3 B Goal met   Short Term Goal # 4 pt will negotiate 1 FOS with LRAD / railing and Min A to access home environment in 6 visits   Goal Outcome # 4 Progressing as expected

## 2021-10-28 NOTE — CARE PLAN
The patient is Stable - Low risk of patient condition declining or worsening    Shift Goals  Clinical Goals: pain control  Patient Goals: pain control  Family Goals: N/A    Progress made toward(s) clinical / shift goals:  Skin Integrity and Pain control interventions in place     Patient is not progressing towards the following goals:      Problem: Skin Integrity  Goal: Skin integrity is maintained or improved  Outcome: Progressing  Note: Assessing surgical incision dressing, Dressing CD & I, assessing all bony prominences for skin breakdown     Problem: Pain - Standard  Goal: Alleviation of pain or a reduction in pain to the patient’s comfort goal  Outcome: Progressing  Note: Educated about the pain scale and non pharmacological pain methods, check the MAR

## 2021-10-28 NOTE — DISCHARGE PLANNING
[facility] Transitional Care Coordination    HPN has denied IRF level care.  Voalte update to T3 RACQUEL Whaley.   TCC will no longer follow.

## 2021-10-29 LAB
BASOPHILS # BLD AUTO: 0.5 % (ref 0–1.8)
BASOPHILS # BLD: 0.03 K/UL (ref 0–0.12)
EOSINOPHIL # BLD AUTO: 0.2 K/UL (ref 0–0.51)
EOSINOPHIL NFR BLD: 3.3 % (ref 0–6.9)
ERYTHROCYTE [DISTWIDTH] IN BLOOD BY AUTOMATED COUNT: 62.1 FL (ref 35.9–50)
HCT VFR BLD AUTO: 34.9 % (ref 42–52)
HGB BLD-MCNC: 10.6 G/DL (ref 14–18)
IMM GRANULOCYTES # BLD AUTO: 0.06 K/UL (ref 0–0.11)
IMM GRANULOCYTES NFR BLD AUTO: 1 % (ref 0–0.9)
LYMPHOCYTES # BLD AUTO: 1.96 K/UL (ref 1–4.8)
LYMPHOCYTES NFR BLD: 31.9 % (ref 22–41)
MCH RBC QN AUTO: 30.1 PG (ref 27–33)
MCHC RBC AUTO-ENTMCNC: 30.4 G/DL (ref 33.7–35.3)
MCV RBC AUTO: 99.1 FL (ref 81.4–97.8)
MONOCYTES # BLD AUTO: 0.73 K/UL (ref 0–0.85)
MONOCYTES NFR BLD AUTO: 11.9 % (ref 0–13.4)
NEUTROPHILS # BLD AUTO: 3.17 K/UL (ref 1.82–7.42)
NEUTROPHILS NFR BLD: 51.4 % (ref 44–72)
NRBC # BLD AUTO: 0 K/UL
NRBC BLD-RTO: 0 /100 WBC
PLATELET # BLD AUTO: 385 K/UL (ref 164–446)
PMV BLD AUTO: 8.6 FL (ref 9–12.9)
RBC # BLD AUTO: 3.52 M/UL (ref 4.7–6.1)
WBC # BLD AUTO: 6.2 K/UL (ref 4.8–10.8)

## 2021-10-29 PROCEDURE — A9270 NON-COVERED ITEM OR SERVICE: HCPCS | Performed by: NURSE PRACTITIONER

## 2021-10-29 PROCEDURE — 36415 COLL VENOUS BLD VENIPUNCTURE: CPT

## 2021-10-29 PROCEDURE — 97530 THERAPEUTIC ACTIVITIES: CPT | Mod: CQ

## 2021-10-29 PROCEDURE — A9270 NON-COVERED ITEM OR SERVICE: HCPCS | Performed by: SPECIALIST

## 2021-10-29 PROCEDURE — 770006 HCHG ROOM/CARE - MED/SURG/GYN SEMI*

## 2021-10-29 PROCEDURE — 99232 SBSQ HOSP IP/OBS MODERATE 35: CPT | Performed by: SURGERY

## 2021-10-29 PROCEDURE — A9270 NON-COVERED ITEM OR SERVICE: HCPCS | Performed by: PHYSICAL MEDICINE & REHABILITATION

## 2021-10-29 PROCEDURE — A9270 NON-COVERED ITEM OR SERVICE: HCPCS | Performed by: ORTHOPAEDIC SURGERY

## 2021-10-29 PROCEDURE — A9270 NON-COVERED ITEM OR SERVICE: HCPCS

## 2021-10-29 PROCEDURE — 700102 HCHG RX REV CODE 250 W/ 637 OVERRIDE(OP): Performed by: PHYSICAL MEDICINE & REHABILITATION

## 2021-10-29 PROCEDURE — 700102 HCHG RX REV CODE 250 W/ 637 OVERRIDE(OP): Performed by: SPECIALIST

## 2021-10-29 PROCEDURE — 97116 GAIT TRAINING THERAPY: CPT | Mod: CQ

## 2021-10-29 PROCEDURE — 700102 HCHG RX REV CODE 250 W/ 637 OVERRIDE(OP): Performed by: NURSE PRACTITIONER

## 2021-10-29 PROCEDURE — 97110 THERAPEUTIC EXERCISES: CPT | Mod: CQ

## 2021-10-29 PROCEDURE — 700102 HCHG RX REV CODE 250 W/ 637 OVERRIDE(OP): Performed by: ORTHOPAEDIC SURGERY

## 2021-10-29 PROCEDURE — 700102 HCHG RX REV CODE 250 W/ 637 OVERRIDE(OP)

## 2021-10-29 PROCEDURE — 85025 COMPLETE CBC W/AUTO DIFF WBC: CPT

## 2021-10-29 RX ADMIN — GABAPENTIN 300 MG: 300 CAPSULE ORAL at 05:52

## 2021-10-29 RX ADMIN — GABAPENTIN 300 MG: 300 CAPSULE ORAL at 17:44

## 2021-10-29 RX ADMIN — APIXABAN 10 MG: 5 TABLET, FILM COATED ORAL at 05:53

## 2021-10-29 RX ADMIN — OXYCODONE HYDROCHLORIDE 10 MG: 10 TABLET ORAL at 22:31

## 2021-10-29 RX ADMIN — CALCIUM CARBONATE 500 MG: 500 TABLET, CHEWABLE ORAL at 17:44

## 2021-10-29 RX ADMIN — CALCIUM CARBONATE 500 MG: 500 TABLET, CHEWABLE ORAL at 05:53

## 2021-10-29 RX ADMIN — CALCIUM CARBONATE 500 MG: 500 TABLET, CHEWABLE ORAL at 11:01

## 2021-10-29 RX ADMIN — GABAPENTIN 300 MG: 300 CAPSULE ORAL at 11:01

## 2021-10-29 RX ADMIN — OXYCODONE HYDROCHLORIDE 10 MG: 10 TABLET ORAL at 15:31

## 2021-10-29 RX ADMIN — DOCUSATE SODIUM 100 MG: 100 CAPSULE ORAL at 17:44

## 2021-10-29 RX ADMIN — DOCUSATE SODIUM 100 MG: 100 CAPSULE ORAL at 05:53

## 2021-10-29 RX ADMIN — Medication 5 MG: at 22:31

## 2021-10-29 RX ADMIN — OXYCODONE HYDROCHLORIDE 10 MG: 10 TABLET ORAL at 05:53

## 2021-10-29 RX ADMIN — OXYCODONE HYDROCHLORIDE 10 MG: 10 TABLET ORAL at 11:01

## 2021-10-29 RX ADMIN — APIXABAN 10 MG: 5 TABLET, FILM COATED ORAL at 17:44

## 2021-10-29 RX ADMIN — DOCUSATE SODIUM 50 MG AND SENNOSIDES 8.6 MG 1 TABLET: 8.6; 5 TABLET, FILM COATED ORAL at 22:31

## 2021-10-29 RX ADMIN — OXYCODONE HYDROCHLORIDE 10 MG: 10 TABLET ORAL at 00:27

## 2021-10-29 ASSESSMENT — COGNITIVE AND FUNCTIONAL STATUS - GENERAL
TURNING FROM BACK TO SIDE WHILE IN FLAT BAD: A LITTLE
CLIMB 3 TO 5 STEPS WITH RAILING: A LITTLE
STANDING UP FROM CHAIR USING ARMS: A LITTLE
SUGGESTED CMS G CODE MODIFIER MOBILITY: CK
MOVING FROM LYING ON BACK TO SITTING ON SIDE OF FLAT BED: A LITTLE
MOVING TO AND FROM BED TO CHAIR: A LITTLE
MOBILITY SCORE: 18
WALKING IN HOSPITAL ROOM: A LITTLE

## 2021-10-29 ASSESSMENT — PAIN DESCRIPTION - PAIN TYPE
TYPE: ACUTE PAIN
TYPE: SURGICAL PAIN

## 2021-10-29 ASSESSMENT — GAIT ASSESSMENTS
DISTANCE (FEET): 150
GAIT LEVEL OF ASSIST: SUPERVISED
ASSISTIVE DEVICE: FRONT WHEEL WALKER

## 2021-10-29 ASSESSMENT — ENCOUNTER SYMPTOMS
ROS GI COMMENTS: LAST BM 10/27
SHORTNESS OF BREATH: 0
MYALGIAS: 1
NEUROLOGICAL NEGATIVE: 1
EYES NEGATIVE: 1
CONSTITUTIONAL NEGATIVE: 1

## 2021-10-29 NOTE — FACE TO FACE
Face to Face Note  -  Durable Medical Equipment    JAMES Hinton - NPI: [id number]  I certify that this patient is under my care and that they had a durable medical equipment(DME)face to face encounter by myself that meets the physician DME face-to-face encounter requirements with this patient on:    Date of encounter:   Patient:                    MRN:                       YOB: 2021  Wero Stricklandarline Thorpe  [id number]  1975     The encounter with the patient was in whole, or in part, for the following medical condition, which is the primary reason for durable medical equipment:  Wound Care and Post-Op Surgery    I certify that, based on my findings, the following durable medical equipment is medically necessary:  Wheel Chair.

## 2021-10-29 NOTE — THERAPY
Physical Therapy   Daily Treatment     Patient Name: Wero Thorpe  Age:  46 y.o., Sex:  male  Medical Record #: [id number]  Today's Date: 10/29/2021     Precautions  Precautions: Fall Risk;Toe Touch Weight Bearing Right Lower Extremity    Assessment    Pt is setting up a handicap hotel for d/c post discharge from hospital, not going to his apartment that entails a FOS. Pt currently requiring no assist w/bed mobility, functional transfers, or w/his amb efforts using the FWW. Pt has a good understanding of his Rt LE HEP, seated and supine. Pt is cleared for d/c to handicap hotel setting w/friends and sons support. Pt did state he did not want to d/c to SNF, would prefer to go to a hotel room. Pt is functioning @ a level to be able to manage a handicap hotel room w/his social support.   DME needs: w/c w/removable leg rests. FWW delivered BS for d/c.   PT will be available for any further d/c ?'s or needs.     Plan    Continue current treatment plan.    DC Equipment Recommendations: Front-Wheel Walker, Wheelchair         Objective       10/29/21 1123   Other Treatments   Other Treatments Provided Supine/seated AROM Rt LE. Discussion regarding living situation post d/c. Pt is planning on staying in handicap accessable hotel room until he can find a single level living area that will accomodate him and his son.    Balance   Sitting Balance (Static) Good   Sitting Balance (Dynamic) Fair +   Standing Balance (Static) Fair +   Standing Balance (Dynamic) Fair   Weight Shift Sitting Good   Weight Shift Standing Absent   Comments standing w/FWW   Gait Analysis   Gait Level Of Assist Supervised   Assistive Device Front Wheel Walker   Distance (Feet) 150   # of Times Distance was Traveled 1   Weight Bearing Status TTWB Rt LE   Skilled Intervention Verbal Cuing   Comments Pt stated he is not going to be going to his upstairs apartment, did not want to cont w/stair training. Pt is going to stay in handicap accessable hotel  room @ time of d/c w/friends and sons assist.    Bed Mobility    Supine to Sit Supervised   Sit to Supine Supervised   Scooting Supervised   Rolling Supervised   Comments HOB flat and no railing. Pt assisted his Rt LE off the bed w/UE support today.    Functional Mobility   Sit to Stand Supervised   Bed, Chair, Wheelchair Transfer Supervised   Toilet Transfers Supervised   How much difficulty does the patient currently have...   Turning over in bed (including adjusting bedclothes, sheets and blankets)? 3   Sitting down on and standing up from a chair with arms (e.g., wheelchair, bedside commode, etc.) 3   Moving from lying on back to sitting on the side of the bed? 3   How much help from another person does the patient currently need...   Moving to and from a bed to a chair (including a wheelchair)? 3   Need to walk in a hospital room? 3   Climbing 3-5 steps with a railing? 3   6 clicks Mobility Score 18   Short Term Goals    Short Term Goal # 4 pt will negotiate 1 FOS with LRAD / railing and Min A to access home environment in 6 visits   Goal Outcome # 4 Other (see comments)  (not going home to his apartment)
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
## 2021-10-29 NOTE — CARE PLAN
The patient is Stable - Low risk of patient condition declining or worsening    Shift Goals  Clinical Goals: mobility  Patient Goals: pain control  Family Goals: n/a    Progress made toward(s) clinical / shift goals:  Respiratory and Pain control interventions in place     Patient is not progressing towards the following goals:      Problem: Respiratory  Goal: Patient will achieve/maintain optimum respiratory ventilation and gas exchange  Outcome: Progressing  Note: Room air, continuing to use IS, assessing lung sounds      Problem: Pain - Standard  Goal: Alleviation of pain or a reduction in pain to the patient’s comfort goal  Outcome: Progressing  Note: Educated about the pain scale and non pharmacological pain methods, check the MAR, Pain meds adjusted 10/28/21

## 2021-10-29 NOTE — DISCHARGE PLANNING
Anticipated Discharge Disposition: Discharge to hotel with w/c.    Action: Patient declining discharge to [facility] and is instead planning to discharge to hotel on Monday. Patient requesting assistance with obtaining w/c. DPA tasked with sending out blanket referral for DME. LSW met with patient at bedside who confirmed plan and stated that FWW already obtained.    Barriers to Discharge: Medical clearance; obtain w/c.    Plan: CM to continue to follow for discharge needs.

## 2021-10-29 NOTE — CARE PLAN
The patient is Stable - Low risk of patient condition declining or worsening    Shift Goals  Clinical Goals: mobility  Patient Goals: pain control  Family Goals: n/a    Progress made toward(s) clinical / shift goals:  yes      Problem: Pain - Standard  Goal: Alleviation of pain or a reduction in pain to the patient’s comfort goal  Outcome: Progressing   Pain well controlled with oxy 10 Q4    Problem: Knowledge Deficit - Standard  Goal: Patient and family/care givers will demonstrate understanding of plan of care, disease process/condition, diagnostic tests and medications  Outcome: Progressing   Pt. Aware of plan, will continue to work with therapy.

## 2021-10-30 PROCEDURE — 700102 HCHG RX REV CODE 250 W/ 637 OVERRIDE(OP): Performed by: PHYSICAL MEDICINE & REHABILITATION

## 2021-10-30 PROCEDURE — 99232 SBSQ HOSP IP/OBS MODERATE 35: CPT | Performed by: SURGERY

## 2021-10-30 PROCEDURE — A9270 NON-COVERED ITEM OR SERVICE: HCPCS | Performed by: PHYSICAL MEDICINE & REHABILITATION

## 2021-10-30 PROCEDURE — A9270 NON-COVERED ITEM OR SERVICE: HCPCS | Performed by: SPECIALIST

## 2021-10-30 PROCEDURE — 99024 POSTOP FOLLOW-UP VISIT: CPT | Performed by: ORTHOPAEDIC SURGERY

## 2021-10-30 PROCEDURE — A9270 NON-COVERED ITEM OR SERVICE: HCPCS

## 2021-10-30 PROCEDURE — 700102 HCHG RX REV CODE 250 W/ 637 OVERRIDE(OP): Performed by: ORTHOPAEDIC SURGERY

## 2021-10-30 PROCEDURE — 700102 HCHG RX REV CODE 250 W/ 637 OVERRIDE(OP): Performed by: SPECIALIST

## 2021-10-30 PROCEDURE — 770006 HCHG ROOM/CARE - MED/SURG/GYN SEMI*

## 2021-10-30 PROCEDURE — 700102 HCHG RX REV CODE 250 W/ 637 OVERRIDE(OP): Performed by: NURSE PRACTITIONER

## 2021-10-30 PROCEDURE — A9270 NON-COVERED ITEM OR SERVICE: HCPCS | Performed by: NURSE PRACTITIONER

## 2021-10-30 PROCEDURE — 700102 HCHG RX REV CODE 250 W/ 637 OVERRIDE(OP)

## 2021-10-30 PROCEDURE — A9270 NON-COVERED ITEM OR SERVICE: HCPCS | Performed by: ORTHOPAEDIC SURGERY

## 2021-10-30 RX ADMIN — CALCIUM CARBONATE 500 MG: 500 TABLET, CHEWABLE ORAL at 04:40

## 2021-10-30 RX ADMIN — DOCUSATE SODIUM 50 MG AND SENNOSIDES 8.6 MG 1 TABLET: 8.6; 5 TABLET, FILM COATED ORAL at 20:06

## 2021-10-30 RX ADMIN — APIXABAN 10 MG: 5 TABLET, FILM COATED ORAL at 17:56

## 2021-10-30 RX ADMIN — GABAPENTIN 300 MG: 300 CAPSULE ORAL at 04:40

## 2021-10-30 RX ADMIN — OXYCODONE HYDROCHLORIDE 10 MG: 10 TABLET ORAL at 16:22

## 2021-10-30 RX ADMIN — CALCIUM CARBONATE 500 MG: 500 TABLET, CHEWABLE ORAL at 12:55

## 2021-10-30 RX ADMIN — OXYCODONE HYDROCHLORIDE 10 MG: 10 TABLET ORAL at 09:52

## 2021-10-30 RX ADMIN — Medication 5 MG: at 20:06

## 2021-10-30 RX ADMIN — GABAPENTIN 300 MG: 300 CAPSULE ORAL at 17:57

## 2021-10-30 RX ADMIN — CALCIUM CARBONATE 500 MG: 500 TABLET, CHEWABLE ORAL at 17:57

## 2021-10-30 RX ADMIN — DOCUSATE SODIUM 100 MG: 100 CAPSULE ORAL at 04:39

## 2021-10-30 RX ADMIN — APIXABAN 10 MG: 5 TABLET, FILM COATED ORAL at 04:39

## 2021-10-30 RX ADMIN — GABAPENTIN 300 MG: 300 CAPSULE ORAL at 12:55

## 2021-10-30 RX ADMIN — OXYCODONE HYDROCHLORIDE 10 MG: 10 TABLET ORAL at 20:05

## 2021-10-30 ASSESSMENT — ENCOUNTER SYMPTOMS
MYALGIAS: 1
EYES NEGATIVE: 1
CONSTITUTIONAL NEGATIVE: 1
NEUROLOGICAL NEGATIVE: 1
SHORTNESS OF BREATH: 0

## 2021-10-30 ASSESSMENT — PAIN DESCRIPTION - PAIN TYPE: TYPE: ACUTE PAIN

## 2021-10-30 NOTE — PROGRESS NOTES
Orthopaedic Progress Note    Interval changes:  Patient doing well    RLE dressings CDI  RLE wrapped for compression   Staples out yesterday - tolerated well    ROS - Patient denies any new issues.  Pain well controlled.    /71   Pulse 94   Temp 36.1 °C (97 °F) (Temporal)   Resp 18   Ht 1.829 m (6')   Wt 89 kg (196 lb 3.4 oz)   SpO2 97%     Patient seen and examined  No acute distress  Breathing non labored  RRR  RLE dressings CDI.  Patient clearly fires tibialis anterior, EHL, and gastrocnemius/soleus. Sensation is intact to light touch throughout superficial peroneal, deep peroneal, tibial, saphenous, and sural nerve distributions. Strong and palpable 2+ dorsalis pedis and posterior tibial pulses with capillary refill less than 2 seconds. No lower leg tenderness or discomfort.     Recent Labs     10/28/21  0358 10/29/21  0517   WBC 6.3 6.2   RBC 3.00* 3.52*   HEMOGLOBIN 8.9* 10.6*   HEMATOCRIT 29.5* 34.9*   MCV 98.3* 99.1*   MCH 29.7 30.1   MCHC 30.2* 30.4*   RDW 62.2* 62.1*   PLATELETCT 431 385   MPV 8.4* 8.6*     Active Hospital Problems    Diagnosis    • Discharge planning issues [Z02.9]      Priority: High   • Fracture of shaft of femur (HCC) [S72.309A]      Priority: Medium   • Intramuscular hematoma [T14.8XXA]      Priority: Medium   • Acute deep vein thrombosis (DVT) of femoral vein of right lower extremity (HCC) [I82.411]      Priority: Medium   • Acute blood loss anemia [D62]      Priority: Low   • Trauma [T14.90XA]      Priority: Low   • Liver cirrhosis (HCC) [K74.60]      Priority: Low     Assessment/Plan:  Patient doing well    Staples to be removed Thursday or sooner if going to DC   POD#23 S/P:  1. Open treatment right intertrochanteric femur fracture with plate and screw construct  2. Open treatment of right femoral shaft fracture with insertion of intramedullary implant  3. Open reduction internal fixation right distal femur intraarticular fracture  Wt bearing status - TTWB  RLE  Wound care/Drains - dressings changed every other day by nursing    Future Procedures - none planned   Lovenox: Start 10/9, Duration-until ambulatory > 150'  Sutures/Staples out yesterday  PT/OT-initiated  Antibiotics: perioperative completed  DVT Prophylaxis- TEDS/SCDs/Foot pumps  Raygoza-none  Case Coordination for Discharge Planning - Disposition home

## 2021-10-30 NOTE — CARE PLAN
The patient is Stable - Low risk of patient condition declining or worsening    Shift Goals  Clinical Goals: Pain management  Patient Goals: Pain managment, placement/ discharge    Plan of care reviewed with pt, all questions and concerns addressed. Pain managed with PRN medications per MAR and cold packs. Pain evaluated using the 0-10 pain scale.     Progress made toward(s) clinical / shift goals:    Problem: Pain - Standard  Goal: Alleviation of pain or a reduction in pain to the patient’s comfort goal  Outcome: Progressing     Problem: Knowledge Deficit - Standard  Goal: Patient and family/care givers will demonstrate understanding of plan of care, disease process/condition, diagnostic tests and medications  Outcome: Progressing     Problem: Skin Integrity  Goal: Skin integrity is maintained or improved  Outcome: Progressing

## 2021-10-30 NOTE — CARE PLAN
The patient is Stable - Low risk of patient condition declining or worsening    Shift Goals  Clinical Goals: Pain management  Patient Goals: Pain managment, placement/ discharge  Family Goals: n/a    Progress made toward(s) clinical / shift goals:  Went over POC.    Patient is not progressing towards the following goals:
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
## 2021-10-31 PROCEDURE — A9270 NON-COVERED ITEM OR SERVICE: HCPCS

## 2021-10-31 PROCEDURE — RXMED WILLOW AMBULATORY MEDICATION CHARGE

## 2021-10-31 PROCEDURE — A9270 NON-COVERED ITEM OR SERVICE: HCPCS | Performed by: NURSE PRACTITIONER

## 2021-10-31 PROCEDURE — 700102 HCHG RX REV CODE 250 W/ 637 OVERRIDE(OP)

## 2021-10-31 PROCEDURE — 700102 HCHG RX REV CODE 250 W/ 637 OVERRIDE(OP): Performed by: SPECIALIST

## 2021-10-31 PROCEDURE — 700102 HCHG RX REV CODE 250 W/ 637 OVERRIDE(OP): Performed by: ORTHOPAEDIC SURGERY

## 2021-10-31 PROCEDURE — 700102 HCHG RX REV CODE 250 W/ 637 OVERRIDE(OP): Performed by: PHYSICAL MEDICINE & REHABILITATION

## 2021-10-31 PROCEDURE — A9270 NON-COVERED ITEM OR SERVICE: HCPCS | Performed by: ORTHOPAEDIC SURGERY

## 2021-10-31 PROCEDURE — 99232 SBSQ HOSP IP/OBS MODERATE 35: CPT | Performed by: SURGERY

## 2021-10-31 PROCEDURE — A9270 NON-COVERED ITEM OR SERVICE: HCPCS | Performed by: SPECIALIST

## 2021-10-31 PROCEDURE — 99024 POSTOP FOLLOW-UP VISIT: CPT | Performed by: ORTHOPAEDIC SURGERY

## 2021-10-31 PROCEDURE — A9270 NON-COVERED ITEM OR SERVICE: HCPCS | Performed by: PHYSICAL MEDICINE & REHABILITATION

## 2021-10-31 PROCEDURE — 770006 HCHG ROOM/CARE - MED/SURG/GYN SEMI*

## 2021-10-31 PROCEDURE — 700102 HCHG RX REV CODE 250 W/ 637 OVERRIDE(OP): Performed by: NURSE PRACTITIONER

## 2021-10-31 RX ORDER — GABAPENTIN 300 MG/1
300 CAPSULE ORAL 3 TIMES DAILY
Qty: 42 CAPSULE | Refills: 0 | Status: SHIPPED | OUTPATIENT
Start: 2021-10-31 | End: 2021-11-15

## 2021-10-31 RX ORDER — ACETAMINOPHEN 325 MG/1
650 TABLET ORAL EVERY 6 HOURS PRN
Qty: 30 TABLET | Refills: 0 | COMMUNITY
Start: 2021-10-31

## 2021-10-31 RX ORDER — OXYCODONE HYDROCHLORIDE 5 MG/1
5 TABLET ORAL EVERY 6 HOURS PRN
Qty: 20 TABLET | Refills: 0 | Status: SHIPPED | OUTPATIENT
Start: 2021-10-31 | End: 2021-11-08

## 2021-10-31 RX ADMIN — ACETAMINOPHEN 650 MG: 325 TABLET ORAL at 21:14

## 2021-10-31 RX ADMIN — GABAPENTIN 300 MG: 300 CAPSULE ORAL at 04:41

## 2021-10-31 RX ADMIN — DOCUSATE SODIUM 50 MG AND SENNOSIDES 8.6 MG 1 TABLET: 8.6; 5 TABLET, FILM COATED ORAL at 21:13

## 2021-10-31 RX ADMIN — OXYCODONE HYDROCHLORIDE 10 MG: 10 TABLET ORAL at 09:40

## 2021-10-31 RX ADMIN — OXYCODONE 5 MG: 5 TABLET ORAL at 13:49

## 2021-10-31 RX ADMIN — OXYCODONE HYDROCHLORIDE 10 MG: 10 TABLET ORAL at 04:46

## 2021-10-31 RX ADMIN — OXYCODONE 5 MG: 5 TABLET ORAL at 22:14

## 2021-10-31 RX ADMIN — APIXABAN 10 MG: 5 TABLET, FILM COATED ORAL at 17:12

## 2021-10-31 RX ADMIN — Medication 5 MG: at 21:13

## 2021-10-31 RX ADMIN — DOCUSATE SODIUM 100 MG: 100 CAPSULE ORAL at 17:12

## 2021-10-31 RX ADMIN — DOCUSATE SODIUM 100 MG: 100 CAPSULE ORAL at 04:41

## 2021-10-31 RX ADMIN — CALCIUM CARBONATE 500 MG: 500 TABLET, CHEWABLE ORAL at 04:42

## 2021-10-31 RX ADMIN — OXYCODONE 5 MG: 5 TABLET ORAL at 18:10

## 2021-10-31 RX ADMIN — GABAPENTIN 300 MG: 300 CAPSULE ORAL at 17:13

## 2021-10-31 RX ADMIN — CALCIUM CARBONATE 500 MG: 500 TABLET, CHEWABLE ORAL at 13:48

## 2021-10-31 RX ADMIN — CALCIUM CARBONATE 500 MG: 500 TABLET, CHEWABLE ORAL at 17:12

## 2021-10-31 RX ADMIN — OXYCODONE HYDROCHLORIDE 10 MG: 10 TABLET ORAL at 00:31

## 2021-10-31 RX ADMIN — APIXABAN 10 MG: 5 TABLET, FILM COATED ORAL at 04:41

## 2021-10-31 RX ADMIN — GABAPENTIN 300 MG: 300 CAPSULE ORAL at 13:48

## 2021-10-31 ASSESSMENT — ENCOUNTER SYMPTOMS
NEUROLOGICAL NEGATIVE: 1
EYES NEGATIVE: 1
ROS GI COMMENTS: LAST BM 10/30
SHORTNESS OF BREATH: 0
MYALGIAS: 1
CONSTITUTIONAL NEGATIVE: 1

## 2021-10-31 ASSESSMENT — PAIN DESCRIPTION - PAIN TYPE
TYPE: ACUTE PAIN;SURGICAL PAIN
TYPE: ACUTE PAIN
TYPE: ACUTE PAIN;SURGICAL PAIN
TYPE: ACUTE PAIN
TYPE: ACUTE PAIN
TYPE: ACUTE PAIN;SURGICAL PAIN
TYPE: ACUTE PAIN;SURGICAL PAIN

## 2021-10-31 NOTE — DISCHARGE PLANNING
Received Choice form at [phone]  Agency/Facility Name: [facility]  Referral sent per Choice form @ [phone]

## 2021-10-31 NOTE — PROGRESS NOTES
Orthopaedic Progress Note    Interval changes:  Patient doing well      ROS - Patient denies any new issues.  Pain well controlled.    /67   Pulse (!) 109   Temp 36.9 °C (98.4 °F) (Temporal)   Resp 18   Ht 1.829 m (6')   Wt 89 kg (196 lb 3.4 oz)   SpO2 95%     Patient seen and examined  No acute distress  Breathing non labored  RRR  RLE dressings CDI.  Patient clearly fires tibialis anterior, EHL, and gastrocnemius/soleus. Sensation is intact to light touch throughout superficial peroneal, deep peroneal, tibial, saphenous, and sural nerve distributions. Strong and palpable 2+ dorsalis pedis and posterior tibial pulses with capillary refill less than 2 seconds. No lower leg tenderness or discomfort.     Recent Labs     10/29/21  0517   WBC 6.2   RBC 3.52*   HEMOGLOBIN 10.6*   HEMATOCRIT 34.9*   MCV 99.1*   MCH 30.1   MCHC 30.4*   RDW 62.1*   PLATELETCT 385   MPV 8.6*     Active Hospital Problems    Diagnosis    • Discharge planning issues [Z02.9]      Priority: High   • Fracture of shaft of femur (HCC) [S72.309A]      Priority: Medium   • Intramuscular hematoma [T14.8XXA]      Priority: Medium   • Acute deep vein thrombosis (DVT) of femoral vein of right lower extremity (HCC) [I82.411]      Priority: Medium   • Acute blood loss anemia [D62]      Priority: Low   • Trauma [T14.90XA]      Priority: Low   • Liver cirrhosis (HCC) [K74.60]      Priority: Low     Assessment/Plan:  Patient doing well    Staples to be removed Thursday or sooner if going to DC   POD#24 S/P:  1. Open treatment right intertrochanteric femur fracture with plate and screw construct  2. Open treatment of right femoral shaft fracture with insertion of intramedullary implant  3. Open reduction internal fixation right distal femur intraarticular fracture  Wt bearing status - TTWB RLE  Wound care/Drains - dressings changed every other day by nursing    Future Procedures - none planned   Lovenox: Start 10/9, Duration-until ambulatory >  150'  Sutures/Staples out yesterday  PT/OT-initiated  Antibiotics: perioperative completed  DVT Prophylaxis- TEDS/SCDs/Foot pumps  Raygoza-none  Case Coordination for Discharge Planning - Disposition home

## 2021-10-31 NOTE — DISCHARGE PLANNING
Anticipated Discharge Disposition: Home  with DME    Action: Pt needs w/c for home, referred to [facility] and [facility]. Called pharmacy about Eliquis. Spoke to Veronica, she was able to get it covered. Pt planning to discharge tomorrow to [facility] as apt is on second floor. Currently looking for another place (ground level.)    Barriers to Discharge: Pending W/C delivery.    Plan: F/U with DME providers.

## 2021-10-31 NOTE — CARE PLAN
The patient is Stable - Low risk of patient condition declining or worsening    Shift Goals  Clinical Goals: Pain Control  Patient Goals: Pain Control  Family Goals: N/A    Progress made toward(s) clinical / shift goals:    Problem: Pain - Standard  Goal: Alleviation of pain or a reduction in pain to the patient’s comfort goal  Outcome: Progressing     Problem: Knowledge Deficit - Standard  Goal: Patient and family/care givers will demonstrate understanding of plan of care, disease process/condition, diagnostic tests and medications  Outcome: Progressing     Problem: Skin Integrity  Goal: Skin integrity is maintained or improved  Outcome: Progressing     Problem: Fall Risk  Goal: Patient will remain free from falls  Outcome: Progressing     Patient is able to communicate needs effectively. Uses call light appropriately. Updated patient on plan of care and medications. Fall precautions in place. All patient's questions answered at this time. Call light in reach.     Patient is not progressing towards the following goals:
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
## 2021-11-01 ENCOUNTER — PHARMACY VISIT (OUTPATIENT)
Dept: PHARMACY | Facility: MEDICAL CENTER | Age: 46
End: 2021-11-01
Payer: COMMERCIAL

## 2021-11-01 VITALS
HEART RATE: 82 BPM | SYSTOLIC BLOOD PRESSURE: 118 MMHG | BODY MASS INDEX: 26.58 KG/M2 | DIASTOLIC BLOOD PRESSURE: 69 MMHG | TEMPERATURE: 97.2 F | HEIGHT: 72 IN | OXYGEN SATURATION: 97 % | WEIGHT: 196.21 LBS | RESPIRATION RATE: 17 BRPM

## 2021-11-01 PROCEDURE — 99024 POSTOP FOLLOW-UP VISIT: CPT | Performed by: ORTHOPAEDIC SURGERY

## 2021-11-01 PROCEDURE — 700102 HCHG RX REV CODE 250 W/ 637 OVERRIDE(OP): Performed by: PHYSICAL MEDICINE & REHABILITATION

## 2021-11-01 PROCEDURE — 700102 HCHG RX REV CODE 250 W/ 637 OVERRIDE(OP): Performed by: SPECIALIST

## 2021-11-01 PROCEDURE — A9270 NON-COVERED ITEM OR SERVICE: HCPCS | Performed by: PHYSICAL MEDICINE & REHABILITATION

## 2021-11-01 PROCEDURE — A9270 NON-COVERED ITEM OR SERVICE: HCPCS

## 2021-11-01 PROCEDURE — 700102 HCHG RX REV CODE 250 W/ 637 OVERRIDE(OP): Performed by: NURSE PRACTITIONER

## 2021-11-01 PROCEDURE — 97535 SELF CARE MNGMENT TRAINING: CPT | Mod: CQ

## 2021-11-01 PROCEDURE — A9270 NON-COVERED ITEM OR SERVICE: HCPCS | Performed by: SPECIALIST

## 2021-11-01 PROCEDURE — A9270 NON-COVERED ITEM OR SERVICE: HCPCS | Performed by: NURSE PRACTITIONER

## 2021-11-01 PROCEDURE — 700102 HCHG RX REV CODE 250 W/ 637 OVERRIDE(OP)

## 2021-11-01 RX ADMIN — CALCIUM CARBONATE 500 MG: 500 TABLET, CHEWABLE ORAL at 06:12

## 2021-11-01 RX ADMIN — APIXABAN 10 MG: 5 TABLET, FILM COATED ORAL at 06:12

## 2021-11-01 RX ADMIN — GABAPENTIN 300 MG: 300 CAPSULE ORAL at 12:56

## 2021-11-01 RX ADMIN — OXYCODONE 5 MG: 5 TABLET ORAL at 07:57

## 2021-11-01 RX ADMIN — DOCUSATE SODIUM 100 MG: 100 CAPSULE ORAL at 06:12

## 2021-11-01 RX ADMIN — CALCIUM CARBONATE 500 MG: 500 TABLET, CHEWABLE ORAL at 12:56

## 2021-11-01 RX ADMIN — OXYCODONE 5 MG: 5 TABLET ORAL at 02:51

## 2021-11-01 RX ADMIN — OXYCODONE 5 MG: 5 TABLET ORAL at 12:55

## 2021-11-01 RX ADMIN — GABAPENTIN 300 MG: 300 CAPSULE ORAL at 06:12

## 2021-11-01 ASSESSMENT — ENCOUNTER SYMPTOMS
SHORTNESS OF BREATH: 0
CONSTITUTIONAL NEGATIVE: 1
NEUROLOGICAL NEGATIVE: 1
EYES NEGATIVE: 1
ROS GI COMMENTS: LAST BM 10/31
MYALGIAS: 1

## 2021-11-01 ASSESSMENT — PAIN DESCRIPTION - PAIN TYPE
TYPE: ACUTE PAIN
TYPE: ACUTE PAIN;SURGICAL PAIN
TYPE: ACUTE PAIN;SURGICAL PAIN
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
## 2021-11-01 NOTE — DISCHARGE INSTRUCTIONS
Discharge Instructions    Discharged to home by car with friend. Discharged via wheelchair, hospital escort: Yes.  Special equipment needed: Walker and Wheelchair    Be sure to schedule a follow-up appointment with your primary care doctor or any specialists as instructed.     Discharge Plan: Toe touch weight bearing for 6 weeks.                           Diet Plan: Discussed  Activity Level: Discussed  Confirmed Follow up Appointment: Patient to Call and Schedule Appointment  Confirmed Symptoms Management: Discussed  Medication Reconciliation Updated: No (Comments)    I understand that a diet low in cholesterol, fat, and sodium is recommended for good health. Unless I have been given specific instructions below for another diet, I accept this instruction as my diet prescription.   Other diet: regular    Special Instructions: None    · Is patient discharged on Warfarin / Coumadin?   No     Depression / Suicide Risk    As you are discharged from this [facility] facility, it is important to learn how to keep safe from harming yourself.    Recognize the warning signs:  · Abrupt changes in personality, positive or negative- including increase in energy   · Giving away possessions  · Change in eating patterns- significant weight changes-  positive or negative  · Change in sleeping patterns- unable to sleep or sleeping all the time   · Unwillingness or inability to communicate  · Depression  · Unusual sadness, discouragement and loneliness  · Talk of wanting to die  · Neglect of personal appearance   · Rebelliousness- reckless behavior  · Withdrawal from people/activities they love  · Confusion- inability to concentrate     If you or a loved one observes any of these behaviors or has concerns about self-harm, here's what you can do:  · Talk about it- your feelings and reasons for harming yourself  · Remove any means that you might use to hurt yourself (examples: pills, rope, extension cords, firearm)  · Get  professional help from the community (Mental Health, Substance Abuse, psychological counseling)  · Do not be alone:Call your Safe Contact- someone whom you trust who will be there for you.  · Call your local CRISIS HOTLINE 673-5466 or 314-749-4891  · Call your local Children's Mobile Crisis Response Team [place] (495) 349-3627 or www.[employer]  · Call the toll free National Suicide Prevention Hotlines   · National Suicide Prevention Lifeline 377-135-EXVR (8084)  · [employer] Hope Line Network 800-SUICIDE (907-0565)    Toe Touch Weight Bearing     Femoral Shaft Fracture Treated With ORIF, Care After  This sheet gives you information about how to care for yourself after your procedure. Your health care provider may also give you more specific instructions. If you have problems or questions, contact your health care provider.  What can I expect after the procedure?  After the procedure, it is common to have:  · Pain.  · Swelling.  · Some redness or bruising around the incision.  · A small amount of fluid or blood coming from your incision.  · Stiffness in your leg.  Follow these instructions at home:  Medicines  · Take over-the-counter and prescription medicines only as told by your health care provider. This includes medicines to prevent blood clots from forming.  · Ask your health care provider if the medicine prescribed to you:  ? Requires you to avoid driving or using heavy machinery.  ? Can cause constipation. You may need to take these actions to prevent or treat constipation:  § Drink enough fluid to keep your urine pale yellow.  § Take over-the-counter or prescription medicines.  § Eat foods that are high in fiber, such as beans, whole grains, and fresh fruits and vegetables.  § Limit foods that are high in fat and processed sugars, such as fried or sweet foods.  Bathing  · Do not take baths, swim, or use a hot tub until your health care provider approves. Ask your health care provider if you may  take showers. You may only be allowed to take sponge baths.  · Keep your bandage (dressing) dry. Cover it with a waterproof covering if you take a sponge bath or shower.  Incision care    · Follow instructions from your health care provider about how to take care of your incision. Make sure you:  ? Wash your hands with soap and water before and after you change your bandage. If soap and water are not available, use hand .  ? Change your dressing as told by your health care provider.  ? Leave stitches (sutures), staples, or adhesive strips in place. These skin closures may need to stay in place for 2 weeks or longer. If adhesive strip edges start to loosen and curl up, you may trim the loose edges. Do not remove adhesive strips completely unless your health care provider tells you to do that.  · Check your incision area every day for signs of infection. Check for:  ? More redness, swelling, or pain.  ? More fluid or blood.  ? Warmth.  ? Pus or a bad smell.  Managing pain, stiffness, and swelling    · If directed, put ice on your leg.  ? Put ice in a plastic bag.  ? Place a towel between your skin or dressing and the bag.  ? Leave the ice on for 20 minutes, 2-3 times a day.  · Move your toes often to reduce stiffness and swelling.  · Raise (elevate) your leg when sitting or lying down.  Activity  · Do not use the injured limb to support your body weight until your health care provider says that you can. Use crutches or a walker as told by your health care provider.  · Do exercises as told by your health care provider.  · Ask your health care provider when it is safe to drive.  · Return to your normal activities as told by your health care provider. Ask your health care provider what activities are safe for you.  General instructions  · Do not use any products that contain nicotine or tobacco, such as cigarettes, e-cigarettes, and chewing tobacco. These can delay bone healing. If you need help quitting, ask  your health care provider.  · Keep all follow-up visits as told by your health care provider. This is important. This includes visits for physical therapy. Physical therapy is an important part of recovery as complete healing may take 3 to 6 months.  Contact a health care provider if:  · You have chills or fever.  · You have more redness, swelling, or pain around your incision.  · You have more fluid or blood coming from your incision.  · Your incision feels warm to touch.  · You have pus or a bad smell coming from your incision.  · You have more bruising around your incision.  Get help right away if:  · You have warmth, redness, tenderness, and swelling develop in your calf or thigh.  · You have chest pain or trouble breathing.  · Your incision breaks open.  · You have severe pain that does not get better with medicine.  Summary  · After this procedure, it is common to have pain, swelling, and stiffness.  · Take over-the-counter and prescription medicines only as told by your health care provider. This includes medicines to prevent blood clots from forming.  · Follow instructions from your health care provider about how to take care of your incision. Check your incision area every day for signs of infection.  · Keep all follow-up visits as told by your health care provider. This is important. This includes visits for physical therapy.  This information is not intended to replace advice given to you by your health care provider. Make sure you discuss any questions you have with your health care provider.  Document Released: 03/05/2020 Document Revised: 03/05/2020 Document Reviewed: 03/05/2020  [employer] Patient Education © 2020 [employer]

## 2021-11-01 NOTE — DISCHARGE PLANNING
Anticipated Discharge Disposition: Discharge to hotel with w/c.     Action: Patient to discharge to hotel pending delivery of w/c. [facility] referral sent for w/c. DPA working with [employer] companies to find accepting facility.     Barriers to Discharge: Delivery of w/c.     Plan: CM to continue to follow for discharge needs.    Update: Confirmed w/c delivered to patient's room by [facility]. Patient confirmed having ride to hotel room at [facility].

## 2021-11-01 NOTE — DISCHARGE PLANNING
Agency/Facility Name: [facility]  Spoke To: Anaimka  Outcome: Referral received and still needs to be processed.      LSW informed

## 2021-11-01 NOTE — THERAPY
11/01/21 1522   Other Treatments   Other Treatments Provided Met w/pt 1 more time for any further d/c ?'s regarding his HEP or mobility. Pt has been up self around the room w/FWW, he is anxious to be dc'd but is still waiting for the w/c. Per CM, the w/c has been accepted just waiting for finality w/DME company. Pt does have a [employer] application for further DME needs. PT will be available for ?'s regarding his w/c fitting. Pt is cleared for d/c @ this time from PT standpoint. Pt is going to be staying @ the [facility] until further arrangements can be made or until he can fully WB.

## 2021-11-01 NOTE — DISCHARGE PLANNING
Agency/Facility Name: [facility]  Spoke To: Roslyn  Outcome: Pt accepted. DME to be delivered 11/1 by 1600.    LSW informed

## 2021-11-01 NOTE — DISCHARGE PLANNING
Agency/Facility Name: [facility]  Spoke To: Tia  Outcome: Referral received and being reviewed.      LSW informed
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
## 2021-11-01 NOTE — DISCHARGE PLANNING
Agency/Facility Name: [facility]  Spoke To: Lelo  Outcome: Pt declined. Not contracted with pt secondary ins HPN.      LSW informed
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
## 2021-11-01 NOTE — DISCHARGE PLANNING
Meds-to-Beds: Discharge prescription orders listed below delivered to patient's bedside. JUANITA Gómez notified. Patient counseled.     Reviewed signs and symptoms of bleeding and when to seek medical attention. Reviewed potential drug-drug interactions.     Current Outpatient Medications   Medication Sig Dispense Refill   • apixaban (ELIQUIS) 5mg Tab Take 2 Tablets by mouth 2 times a day for 3 days then on 11/3/21 at 6pm, take 1 Tablet 2 times a day for 7 days as maintence dose. Indications: DVT/PE 26 Tablet 0   • gabapentin (NEURONTIN) 300 MG Cap Take 1 Capsule by mouth 3 times a day for 14 days. 42 Capsule 0   • oxyCODONE immediate-release (ROXICODONE) 5 MG Tab Take 1 Tablet by mouth every 6 hours as needed for Severe Pain for up to 7 days. 20 Tablet 0      Kirstie Montes, PharmD

## 2021-11-01 NOTE — DISCHARGE PLANNING
Received Choice form at 1140  Agency/Facility Name: [facility]  Referral sent per Choice form @ 1140      LSW informed

## 2021-11-01 NOTE — DISCHARGE PLANNING
Agency/Facility Name: [facility]  Spoke To: Sana  Outcome: Pt declined. Non contracted ins provider.      LSW informed

## 2021-11-01 NOTE — DISCHARGE PLANNING
Received Choice form at 1029  Agency/Facility Name: [facility]  Referral sent per Choice form @ 1022      LSW informed

## 2021-11-01 NOTE — CARE PLAN
Problem: Pain - Standard  Goal: Alleviation of pain or a reduction in pain to the patient’s comfort goal  Outcome: Progressing     Problem: Knowledge Deficit - Standard  Goal: Patient and family/care givers will demonstrate understanding of plan of care, disease process/condition, diagnostic tests and medications  Outcome: Progressing     Problem: Fall Risk  Goal: Patient will remain free from falls  Outcome: Progressing   The patient is Stable - Low risk of patient condition declining or worsening    Shift Goals  Clinical Goals: mobility, pain control, safety  Patient Goals: rest, pain control  Family Goals: N/A    Progress made toward(s) clinical / shift goals:  Pt medicated as prescribed. Pt educated about fall precautions. Pt verbalizes understanding.    Patient is not progressing towards the following goals:

## 2021-11-01 NOTE — CARE PLAN
Problem: Pain - Standard  Goal: Alleviation of pain or a reduction in pain to the patient’s comfort goal  Outcome: POC discussed, medicated accordingly to MAR and pain scale. Pt comfort is reached after PO pain medications.     The patient is Stable - Low risk of patient condition declining or worsening    Shift Goals  Clinical Goals: mobility, pain control, safety  Patient Goals: rest, pain control  Family Goals: N/A

## 2021-11-02 ENCOUNTER — DOCUMENTATION (OUTPATIENT)
Dept: VASCULAR LAB | Facility: MEDICAL CENTER | Age: 46
End: 2021-11-02

## 2021-11-02 NOTE — PROGRESS NOTES
Pt dc home via WC. AVS printed and reviewed with patient. All questions answered. Pt's WC delivered to patient. Meds to bed taken by patient. Pt requested CD with images but was unable to take it. Pt will come back to  cd.

## 2021-11-02 NOTE — PROGRESS NOTES
[facility] Heart and Vascular Health and Pharmacotherapy Programs    Received anticoagulation referral for Eliquis due to DVT from discharging team on 10/31/21    Pt started Eliquis on 10/27/21 while inpt and discharged 11/1/21 to [facility] in friend's care.     Attempted to contact pt and alternate contact brother, unable to LVM and vmbox full. Will attempt to contact at a later date    Insurance: Medicaid  PCP: None  Locations to be seen: [facility] Anticoagulation/Pharmacotherapy Clinic at [phone], fax [phone]    Juan Ellsworth, PharmD

## 2021-11-03 ENCOUNTER — DOCUMENTATION (OUTPATIENT)
Dept: VASCULAR LAB | Facility: MEDICAL CENTER | Age: 46
End: 2021-11-03

## 2021-11-03 NOTE — LETTER
Wero Thorpe  [address]  [place] [address]    11/03/21    Dear Wero Thorpe ,    We have been unsuccessful in our attempts to contact you regarding your Anticoagulation Service referral. Eliquis is a potent blood-thinning agent that requires monitoring to ensure that the dosage is correct for your body.  If it isn't, you could develop serious, sometimes life-threatening bleeding problems or life-threatening blood clots or stroke could result.    To monitor you effectively, we need to be able to communicate with you.  This is a requirement to be followed by our Service.      It is extremely important that you contact the clinic as soon as possible to arrange an appointment to establish care.  We are open Monday-Friday 8 am until 5 pm.  You may reach our Service at [phone].           Sincerely,           Jonathon Parks, PharmD, [facility]  Clinic Supervisor  [facility]  Outpatient Anticoagulation Service

## 2021-11-03 NOTE — PROGRESS NOTES
[facility] Heart and Vascular Health and Pharmacotherapy Programs     Received anticoagulation referral for Eliquis due to DVT from discharging team on 10/31/21     Pt started Eliquis on 10/27/21 while inpt and discharged 11/1/21 to [facility] in friend's care.      Attempted to contact pt and alternate contact brother, unable to LVM and vmbox full.     Sent Letter.    Will attempt to contact at a later date     Insurance: Medicaid  PCP: None  Locations to be seen: [facility] Anticoagulation/Pharmacotherapy [facility] at [phone], fax [phone]    Damien Gallo, ClaribelD

## 2021-11-11 ENCOUNTER — DOCUMENTATION (OUTPATIENT)
Dept: VASCULAR LAB | Facility: MEDICAL CENTER | Age: 46
End: 2021-11-11

## 2021-11-11 NOTE — PROGRESS NOTES
[facility] Heart and Vascular Health and Pharmacotherapy Programs     Received anticoagulation referral for Eliquis due to DVT from discharging team on 10/31/21     Pt started Eliquis on 10/27/21 while inpt and discharged 11/1/21 to [facility] in friend's care.      3rd call  Attempted to contact pt and emergency contact (pt's brother); unable to LVM and vmbox full.      Letter was sent on 11/3     Will attempt to contact at a later date     Insurance: Medicaid  PCP: None  Locations to be seen: [facility] Anticoagulation/Pharmacotherapy Clinic at [phone], fax [phone]     Shawna Medel, ClaribelD

## 2021-11-19 ENCOUNTER — TELEPHONE (OUTPATIENT)
Dept: VASCULAR LAB | Facility: MEDICAL CENTER | Age: 46
End: 2021-11-19

## 2021-11-19 NOTE — TELEPHONE ENCOUNTER
[facility] Heart and Vascular Health and Pharmacotherapy Programs     Received anticoagulation referral for Eliquis due to DVT from discharging team on 10/31/21     Pt started Eliquis on 10/27/21 while inpt and discharged 11/1/21 to \[facility]\"" in friend's care.      4th call  Attempted to contact pt and emergency contact (pt's brother); unable to LVM and vmbox full.      Letter was sent on 11/3 - sending another letter today     Will attempt to contact at a later date     Insurance: Medicaid  PCP: None  Locations to be seen: [facility] Anticoagulation[facility]Pharmacotherapy Clinic at [phone], fax [phone]    Damien Gallo, ClaribelD

## 2021-11-19 NOTE — LETTER
Wero Thorpe  [address]  [place] [address]    11/19/21    Dear Wero Thorpe ,    We have been unsuccessful in our attempts to contact you regarding your Anticoagulation Service referral. Eliquis is a potent blood-thinning agent that requires monitoring to ensure that the dosage is correct for your body.  If it isn't, you could develop serious, sometimes life-threatening bleeding problems or life-threatening blood clots or stroke could result.    To monitor you effectively, we need to be able to communicate with you.  This is a requirement to be followed by our Service.      It is extremely important that you contact the clinic as soon as possible to arrange an appointment to establish care.  We are open Monday-Friday 8 am until 5 pm.  You may reach our Service at [phone].           Sincerely,           Jonathon Parks, PharmD, [facility]  Clinic Supervisor  [facility]  Outpatient Anticoagulation Service

## 2021-11-24 ENCOUNTER — DOCUMENTATION (OUTPATIENT)
Dept: VASCULAR LAB | Facility: MEDICAL CENTER | Age: 46
End: 2021-11-24

## 2021-11-24 NOTE — PROGRESS NOTES
[facility] Heart and Vascular Health and Pharmacotherapy Programs    Received anticoagulation referral due to acute DVT from hospital discharge team on 10-31-21.    Attempted to reach patient, as well as emergency contact, VM box was full.  Multiple letters and calls made to patient.  Will await further contact.    Insurance: [employer] medicaid  PCP: none  Locations to be seen: [facility] Anticoagulation/Pharmacotherapy Clinic at [phone], fax [phone]    Jesse Cordoba, PharmD, BCACP

## 2021-12-08 ENCOUNTER — DOCUMENTATION (OUTPATIENT)
Dept: VASCULAR LAB | Facility: MEDICAL CENTER | Age: 46
End: 2021-12-08

## 2021-12-08 NOTE — PROGRESS NOTES
Called patient to schedule NP appt with [facility], phone number is not accepting calls at this time. Will try again at a later time.

## 2021-12-17 ENCOUNTER — DOCUMENTATION (OUTPATIENT)
Dept: VASCULAR LAB | Facility: MEDICAL CENTER | Age: 46
End: 2021-12-17

## 2021-12-17 NOTE — PROGRESS NOTES
Tired to reach patient on 12/8 and 12/17. Unable to reach patient, phone number is not working. Patient needs to schedule LOT and IVC Filter eval. Will let provider know we are not able to reach him at this time.

## 2021-12-30 ENCOUNTER — TELEPHONE (OUTPATIENT)
Dept: VASCULAR LAB | Facility: MEDICAL CENTER | Age: 46
End: 2021-12-30

## 2021-12-30 NOTE — TELEPHONE ENCOUNTER
Certified letter sent to pt regarding IVC filter evaluation.  Copy in media    Sheryl KENNY  [facility]

## 2022-01-20 ENCOUNTER — TELEPHONE (OUTPATIENT)
Dept: VASCULAR LAB | Facility: MEDICAL CENTER | Age: 47
End: 2022-01-20

## 2022-01-20 NOTE — TELEPHONE ENCOUNTER
Certified letter regarding IVC filter disposition was returned undeliverable and no forwarding address.  Will luciana filter as indefinite due to inability to contact pt.    Sheryl KENNY  [facility]    
20.86

## 2022-12-11 NOTE — ED NOTES
"PT FAMILY MEMBER WAS AT BEDSIDE. POC WAS REVEIWED WITH BOTH BY MD AND Rn. PTS FAMILY STATED \" WHY  ARE YOU GUYS MOVEING SO SLOW, JUST TAKE HIM TO THE OR ITS BROKE\". SHE WAS TOLD THE PT FIRST HAD TO RECEIVED A XRAY\", SHE TOOK OUT HER PHONE AND STARTED VIDEOTAPING Rn. SHE WAS TOLD SHE WAS NOT ALLOWED TO DO THAT IN THE HOSPITAL. SHE THEN STARTING CURSEING. SECURITY WAS CALLED AND SHE WAS REMOVE FROM THE Er. AND SHE IS WAITING IN THE W/ROOM.   THE PT STATED \" I AM SO SORRY WE HAVE BEING DRINKING BEERS ALL DAY\".   " - - -

## 2024-06-06 NOTE — ANESTHESIA PROCEDURE NOTES
Therapy Activity Session  Performed by Rehab Aide staff     ICU: Acute ICU Activity Program activity plan was established by a licensed therapist and performed under the guidance of a licensed therapist.      Pt seen on 8S nursing unit                                                                                         PT Frequency Frequency Comments: MTh 1/1-2 by 6/10 (ICU daily reassess by 6/10)                                                                                  OT Frequency Frequency Comments: TW 2/1-2 by 6/11 (ICU daily, re-assess by 6/11)    SLP Frequency      Availability:  Patient available and per RN report, able to particiate.     Tolerance/Participation  Participated and followed direction well during session.    SESSION    Activities/Exercises/Mobility completed this session:  Physical Therapy Exercises    TEP Follow Up Needed: Yes  Therapy Extender Program Discipline: OT     PT Session Length (min): 15 minutes (06/06/24 1116)  PT Frequency: ICU daily reassess by 6/10 (cyrus [id number])    PT Task 1: BUE exercises  PT Reps for Task 1: 10  PT Sets for Task 1: 1  PT Resistance for Task 1: AROM  PT Task 1 Completion: Completed (06/06/24 1116)    PT Task 2: LLE exercises, RLE - ankle pumps, SLR, hip abduction only  PT Reps for Task 2: 10  PT Sets for Task 2: 1  PT Resistance for Task 2: AROM/AAROM  PT Task 2 Completion: Completed (06/06/24 1116)                                      Occupational Therapy Exercises    TEP Follow Up Needed: Yes  Therapy Extender Program Discipline: OT     OT Session Length (min): 10 minutes (06/06/24 1116)  OT Frequency: ICU daily, re-assess by 6/11 Amy [phone]    OT Task 1: BUE exercises  OT Reps for Task 1: 10  OT Sets for Task 1: 1  OT Resistance for Task 1: AROM  OT Task 1 Completion: Completed (06/06/24 1116)    OT Task 2: Hand squeezes  OT Reps for Task 2: 10  OT Sets for Task 2: 1  OT Resistance for Task 2: yellow sponge  OT Task 2 Completion:  Peripheral IV    Date/Time: 10/8/2021 10:59 PM  Performed by: Jaciel Chery M.D.  Authorized by: Jaciel Chery M.D.     Size:  20 G  Laterality:  Left  Local Anesthetic:  None  Site Prep:  Alcohol  Technique:  Direct puncture  Attempts:  1         Completed (06/06/24 0865)                        Speech Therapy Exercises    TEP Follow Up Needed: Yes

## (undated) DEVICE — DRAPE STRLE REG TOWEL 18X24 - (10/BX 4BX/CA)"

## (undated) DEVICE — GUIDE PIN CALIBRATED (5EA/PK) (4TX6=24)

## (undated) DEVICE — SUTURE ETHILON 2-0 FSLX 30 (36PK/BX)"

## (undated) DEVICE — SUTURE 1 VICRYL PLUS CTX - 8 X 18 INCH (12/BX)

## (undated) DEVICE — DRAPE IOBAN II 23 IN X 33 IN - (10/BX)

## (undated) DEVICE — CANISTER SUCTION 3000ML MECHANICAL FILTER AUTO SHUTOFF MEDI-VAC NONSTERILE LF DISP  (40EA/CA)

## (undated) DEVICE — BIT DRILL LONG CALIBRATED 4.2MM X 330MM (4TX2=8)

## (undated) DEVICE — SPONGE GAUZESTER 4 X 4 4PLY - (128PK/CA)

## (undated) DEVICE — BIT DRILL SHORT 4.2MM X 155MM (4TX2=8)

## (undated) DEVICE — SUCTION INSTRUMENT YANKAUER BULBOUS TIP W/O VENT (50EA/CA)

## (undated) DEVICE — TUBING CLEARLINK DUO-VENT - C-FLO (48EA/CA)

## (undated) DEVICE — GLOVE BIOGEL SZ 7 SURGICAL PF LTX - (50PR/BX 4BX/CA)

## (undated) DEVICE — CHLORAPREP 26 ML APPLICATOR - ORANGE TINT(25/CA)

## (undated) DEVICE — BANDAGE ELASTIC 4 HONEYCOMB - 4"X5YD LF (20/CA)"

## (undated) DEVICE — GUIDE PIN 2.5 X 230 OIC - (6/BX) (SHSX6=6)

## (undated) DEVICE — DRAPE C ARMOR (12EA/CA)

## (undated) DEVICE — LACTATED RINGERS INJ 1000 ML - (14EA/CA 60CA/PF)

## (undated) DEVICE — SLEEVE, VASO, THIGH, MED

## (undated) DEVICE — PADDING CAST 6 IN STERILE - 6 X 4 YDS (24/CA)

## (undated) DEVICE — DRESSING PETROLEUM GAUZE 5 X 9" (50EA/BX 4BX/CA)"

## (undated) DEVICE — GLOVE BIOGEL SZ 6 PF LATEX - (50EA/BX 4BX/CA)

## (undated) DEVICE — MASK ANESTHESIA ADULT  - (100/CA)

## (undated) DEVICE — PROTECTOR ULNA NERVE - (36PR/CA)

## (undated) DEVICE — SET LEADWIRE 5 LEAD BEDSIDE DISPOSABLE ECG (1SET OF 5/EA)

## (undated) DEVICE — GLOVE BIOGEL INDICATOR SZ 7.5 SURGICAL PF LTX - (50PR/BX 4BX/CA)

## (undated) DEVICE — PADDING CAST 4 IN STERILE - 4 X 4 YDS (24/CA)

## (undated) DEVICE — ELECTRODE 850 FOAM ADHESIVE - HYDROGEL RADIOTRNSPRNT (50/PK)

## (undated) DEVICE — STAPLER SKIN DISP - (6/BX 10BX/CA) VISISTAT

## (undated) DEVICE — STOCKINET TUBULAR 6IN STERILE - 6 X 48YDS (25/CA)

## (undated) DEVICE — SENSOR SPO2 NEO LNCS ADHESIVE (20/BX) SEE USER NOTES

## (undated) DEVICE — TUBE E-T HI-LO CUFF 8.0MM (10EA/PK)

## (undated) DEVICE — BANDAGE ELASTIC STERILE MATRIX 6 X 10 (20EA/CA)

## (undated) DEVICE — KIT ROOM DECONTAMINATION

## (undated) DEVICE — GLOVE BIOGEL SZ 7.5 SURGICAL PF LTX - (50PR/BX 4BX/CA)

## (undated) DEVICE — SODIUM CHL IRRIGATION 0.9% 1000ML (12EA/CA)

## (undated) DEVICE — GOWN WARMING STANDARD FLEX - (30/CA)

## (undated) DEVICE — TOWEL STOP TIMEOUT SAFETY FLAG (40EA/CA)

## (undated) DEVICE — IMMOBILIZER KNEE 20 INCH - (1/EA)

## (undated) DEVICE — CLEANER ELECTRO-SURGICAL TIP - (25/BX 4BX/CA)

## (undated) DEVICE — DRAIN PENROSE 1 IN X 18 IN - STERILE (25EA/BX)

## (undated) DEVICE — KIT ANESTHESIA W/CIRCUIT & 3/LT BAG W/FILTER (20EA/CA)

## (undated) DEVICE — PENCIL ELECTSURG 10FT BTN SWH - (50/CA)

## (undated) DEVICE — HEAD HOLDER JUNIOR/ADULT

## (undated) DEVICE — NEPTUNE 4 PORT MANIFOLD - (20/PK)

## (undated) DEVICE — ELECTRODE DUAL RETURN W/ CORD - (50/PK)

## (undated) DEVICE — DRILL BIT 3.2X145 QC OIC - (5TX2=10)

## (undated) DEVICE — SUTURE GENERAL

## (undated) DEVICE — SUTURE 2-0 VICRYL PLUS CT-1 - 8 X 18 INCH(12/BX)

## (undated) DEVICE — GLOVE BIOGEL INDICATOR SZ 8.5 SURGICAL PF LTX - (50/BX 4BX/CA)

## (undated) DEVICE — PACK LOWER EXTREMITY - (2/CA)

## (undated) DEVICE — DRAPE LARGE 3 QUARTER - (20/CA)

## (undated) DEVICE — GLOVE BIOGEL ECLIPSE PF LATEX SIZE 7.5

## (undated) DEVICE — SUTURE 0 VICRYL PLUS CT-1 - 8 X 18 INCH (12/BX)

## (undated) DEVICE — SET EXTENSION WITH 2 PORTS (48EA/CA) ***PART #2C8610 IS A SUBSTITUTE*****

## (undated) DEVICE — DRAPE SURG STERI-DRAPE 7X11OD - (40EA/CA)

## (undated) DEVICE — DRAPE U ORTHOPEDIC - (10/BX)

## (undated) DEVICE — BANDAGE ELASTIC STERILE VELCRO 6 X 5 YDS (25EA/CA)

## (undated) DEVICE — SUTURE 1 VICRYL PLUS CT-1 - 18 INCH (12/BX)

## (undated) DEVICE — DRAPE C-ARM LARGE 41IN X 74 IN - (10/BX 2BX/CA)

## (undated) DEVICE — TOURNIQUET CUFF 34 X 4 ONE PORT DISP - STERILE (10/BX)

## (undated) DEVICE — TUBE CONNECT SUCTION CLEAR 120 X 1/4" (50EA/CA)"

## (undated) DEVICE — DRAPE 36X28IN RAD CARM BND BG - (25/CA) O

## (undated) DEVICE — DRAPE SURGICAL U 77X120 - (10/CA)

## (undated) DEVICE — ROD GUIDE BALL TIP 3.0MM X 1000MM

## (undated) DEVICE — SUCTION INSTRUMENT YANKAUER OPEN TIP W/O VENT (50EA/CA)

## (undated) DEVICE — PACK MAJOR ORTHO - (2EA/CA)

## (undated) DEVICE — WRAP CO-FLEX 4IN X 5YD STERIL - SELF-ADHERENT (18/CA)

## (undated) DEVICE — GLOVE BIOGEL PI INDICATOR SZ 8.0 SURGICAL PF LF -(50/BX 4BX/CA)

## (undated) DEVICE — DRESSING TRANSPARENT FILM TEGADERM 4 X 4.75" (50EA/BX)"

## (undated) DEVICE — GLOVE BIOGEL ECLIPSE PF LATEX SIZE 8.5 (50PR/BX)

## (undated) DEVICE — GLOVE BIOGEL INDICATOR SZ 8 SURGICAL PF LTX - (50/BX 4BX/CA)

## (undated) DEVICE — BLADE SURGICAL CLIPPER - (50EA/CA)

## (undated) DEVICE — WRAP COBAN SELF-ADHERENT 6 IN X  5YDS STERILE TAN (12/CA)